# Patient Record
Sex: FEMALE | URBAN - METROPOLITAN AREA
[De-identification: names, ages, dates, MRNs, and addresses within clinical notes are randomized per-mention and may not be internally consistent; named-entity substitution may affect disease eponyms.]

---

## 2017-05-02 ENCOUNTER — FOLLOW UP (OUTPATIENT)
Dept: URBAN - METROPOLITAN AREA CLINIC 27 | Facility: CLINIC | Age: 77
End: 2017-05-02

## 2017-05-02 ENCOUNTER — GENERIC CONVERSION - ENCOUNTER (OUTPATIENT)
Dept: OTHER | Facility: OTHER | Age: 77
End: 2017-05-02

## 2017-05-02 DIAGNOSIS — H43.813: ICD-10-CM

## 2017-05-02 DIAGNOSIS — H35.3132: ICD-10-CM

## 2017-05-02 DIAGNOSIS — E11.9: ICD-10-CM

## 2017-05-02 DIAGNOSIS — H25.9: ICD-10-CM

## 2017-05-02 PROCEDURE — G8427 DOCREV CUR MEDS BY ELIG CLIN: HCPCS

## 2017-05-02 PROCEDURE — 2019F DILATED MACUL EXAM DONE: CPT

## 2017-05-02 PROCEDURE — 92134 CPTRZ OPH DX IMG PST SGM RTA: CPT

## 2017-05-02 PROCEDURE — 2022F DILAT RTA XM EVC RTNOPTHY: CPT

## 2017-05-02 PROCEDURE — 92014 COMPRE OPH EXAM EST PT 1/>: CPT

## 2017-05-02 PROCEDURE — 92250 FUNDUS PHOTOGRAPHY W/I&R: CPT

## 2017-05-02 PROCEDURE — 2024F 7 FLD RTA PHOTO EVC RTNOPTHY: CPT

## 2017-05-02 PROCEDURE — 1036F TOBACCO NON-USER: CPT

## 2017-05-02 PROCEDURE — 2026F EYE IMG VALID EVC RTNOPTHY: CPT

## 2017-05-02 PROCEDURE — 4177F TALK PT/CRGVR RE AREDS PREV: CPT

## 2017-05-02 PROCEDURE — 3072F LOW RISK FOR RETINOPATHY: CPT

## 2017-05-02 ASSESSMENT — VISUAL ACUITY
OS_CC: 20/40-2
OD_CC: 20/50-2

## 2017-05-02 ASSESSMENT — TONOMETRY
OD_IOP_MMHG: 18
OS_IOP_MMHG: 17

## 2017-05-30 ENCOUNTER — GENERIC CONVERSION - ENCOUNTER (OUTPATIENT)
Dept: OTHER | Facility: OTHER | Age: 77
End: 2017-05-30

## 2017-12-08 DIAGNOSIS — E11.40 TYPE 2 DIABETES MELLITUS WITH DIABETIC NEUROPATHY (HCC): ICD-10-CM

## 2017-12-08 DIAGNOSIS — Z13.820 ENCOUNTER FOR SCREENING FOR OSTEOPOROSIS: ICD-10-CM

## 2017-12-08 DIAGNOSIS — Z12.31 ENCOUNTER FOR SCREENING MAMMOGRAM FOR MALIGNANT NEOPLASM OF BREAST: ICD-10-CM

## 2017-12-13 ENCOUNTER — TRANSCRIBE ORDERS (OUTPATIENT)
Dept: ADMINISTRATIVE | Facility: HOSPITAL | Age: 77
End: 2017-12-13

## 2017-12-13 ENCOUNTER — APPOINTMENT (OUTPATIENT)
Dept: LAB | Facility: HOSPITAL | Age: 77
End: 2017-12-13
Attending: FAMILY MEDICINE
Payer: MEDICARE

## 2017-12-13 DIAGNOSIS — E10.43 DIABETIC AUTONOMIC NEUROPATHY ASSOCIATED WITH TYPE 1 DIABETES MELLITUS (HCC): Primary | ICD-10-CM

## 2017-12-13 DIAGNOSIS — E11.40 TYPE 2 DIABETES MELLITUS WITH DIABETIC NEUROPATHY (HCC): ICD-10-CM

## 2017-12-13 DIAGNOSIS — E10.43 DIABETIC AUTONOMIC NEUROPATHY ASSOCIATED WITH TYPE 1 DIABETES MELLITUS (HCC): ICD-10-CM

## 2017-12-13 LAB
EST. AVERAGE GLUCOSE BLD GHB EST-MCNC: 186 MG/DL
HBA1C MFR BLD: 8.1 % (ref 4.2–6.3)

## 2017-12-13 PROCEDURE — 36415 COLL VENOUS BLD VENIPUNCTURE: CPT

## 2017-12-13 PROCEDURE — 83036 HEMOGLOBIN GLYCOSYLATED A1C: CPT

## 2017-12-14 ENCOUNTER — ALLSCRIPTS OFFICE VISIT (OUTPATIENT)
Dept: OTHER | Facility: OTHER | Age: 77
End: 2017-12-14

## 2017-12-21 ENCOUNTER — GENERIC CONVERSION - ENCOUNTER (OUTPATIENT)
Dept: OTHER | Facility: OTHER | Age: 77
End: 2017-12-21

## 2018-01-22 VITALS
RESPIRATION RATE: 18 BRPM | HEIGHT: 62 IN | BODY MASS INDEX: 41.29 KG/M2 | DIASTOLIC BLOOD PRESSURE: 78 MMHG | WEIGHT: 224.38 LBS | TEMPERATURE: 97.3 F | HEART RATE: 78 BPM | OXYGEN SATURATION: 98 % | SYSTOLIC BLOOD PRESSURE: 126 MMHG

## 2018-01-23 ENCOUNTER — HOSPITAL ENCOUNTER (OUTPATIENT)
Dept: RADIOLOGY | Facility: HOSPITAL | Age: 78
Discharge: HOME | End: 2018-01-23
Attending: FAMILY MEDICINE
Payer: MEDICARE

## 2018-01-23 VITALS
WEIGHT: 225 LBS | OXYGEN SATURATION: 98 % | SYSTOLIC BLOOD PRESSURE: 150 MMHG | BODY MASS INDEX: 41.41 KG/M2 | HEIGHT: 62 IN | RESPIRATION RATE: 20 BRPM | TEMPERATURE: 97.6 F | HEART RATE: 74 BPM | DIASTOLIC BLOOD PRESSURE: 72 MMHG

## 2018-01-23 DIAGNOSIS — Z13.820 ENCOUNTER FOR SCREENING FOR OSTEOPOROSIS: ICD-10-CM

## 2018-01-23 DIAGNOSIS — Z12.31 ENCOUNTER FOR SCREENING MAMMOGRAM FOR MALIGNANT NEOPLASM OF BREAST: ICD-10-CM

## 2018-01-23 PROCEDURE — 77067 SCR MAMMO BI INCL CAD: CPT

## 2018-01-23 PROCEDURE — 77080 DXA BONE DENSITY AXIAL: CPT

## 2018-01-23 PROCEDURE — 77063 BREAST TOMOSYNTHESIS BI: CPT

## 2018-01-25 DIAGNOSIS — I10 ESSENTIAL HYPERTENSION: Primary | ICD-10-CM

## 2018-01-26 RX ORDER — METOPROLOL TARTRATE 100 MG/1
TABLET ORAL
Qty: 180 TABLET | Refills: 0 | Status: SHIPPED | OUTPATIENT
Start: 2018-01-26 | End: 2018-05-03 | Stop reason: SDUPTHER

## 2018-01-31 ENCOUNTER — TRANSCRIBE ORDERS (OUTPATIENT)
Dept: ADMINISTRATIVE | Facility: HOSPITAL | Age: 78
End: 2018-01-31

## 2018-01-31 ENCOUNTER — APPOINTMENT (OUTPATIENT)
Dept: LAB | Facility: HOSPITAL | Age: 78
End: 2018-01-31
Attending: FAMILY MEDICINE
Payer: MEDICARE

## 2018-01-31 DIAGNOSIS — Z01.84 IMMUNITY STATUS TESTING: ICD-10-CM

## 2018-01-31 DIAGNOSIS — Z01.84 IMMUNITY STATUS TESTING: Primary | ICD-10-CM

## 2018-01-31 DIAGNOSIS — Z11.1 SCREENING EXAMINATION FOR PULMONARY TUBERCULOSIS: ICD-10-CM

## 2018-01-31 LAB — RUBV IGG SERPL IA-ACNC: >175 IU/ML

## 2018-01-31 PROCEDURE — 86762 RUBELLA ANTIBODY: CPT

## 2018-01-31 PROCEDURE — 86787 VARICELLA-ZOSTER ANTIBODY: CPT

## 2018-01-31 PROCEDURE — 86765 RUBEOLA ANTIBODY: CPT

## 2018-01-31 PROCEDURE — 86480 TB TEST CELL IMMUN MEASURE: CPT

## 2018-01-31 PROCEDURE — 86735 MUMPS ANTIBODY: CPT

## 2018-01-31 PROCEDURE — 36415 COLL VENOUS BLD VENIPUNCTURE: CPT

## 2018-02-01 LAB
MEV IGG SER QL: NORMAL
MUV IGG SER QL: NORMAL
VZV IGG SER IA-ACNC: NORMAL

## 2018-02-02 LAB
ANNOTATION COMMENT IMP: NORMAL
GAMMA INTERFERON BACKGROUND BLD IA-ACNC: 0.05 IU/ML
M TB IFN-G BLD-IMP: NEGATIVE
M TB IFN-G CD4+ BCKGRND COR BLD-ACNC: <0.01 IU/ML
M TB IFN-G CD4+ T-CELLS BLD-ACNC: 0.03 IU/ML
MITOGEN IGNF BLD-ACNC: 6.36 IU/ML
QUANTIFERON-TB GOLD IN TUBE: NORMAL
SERVICE CMNT-IMP: NORMAL

## 2018-02-19 ENCOUNTER — APPOINTMENT (OUTPATIENT)
Dept: LAB | Facility: HOSPITAL | Age: 78
End: 2018-02-19
Attending: FAMILY MEDICINE
Payer: MEDICARE

## 2018-02-19 ENCOUNTER — TRANSCRIBE ORDERS (OUTPATIENT)
Dept: ADMINISTRATIVE | Facility: HOSPITAL | Age: 78
End: 2018-02-19

## 2018-02-19 DIAGNOSIS — E13.49 OTHER DIABETIC NEUROLOGICAL COMPLICATION ASSOCIATED WITH OTHER SPECIFIED DIABETES MELLITUS (HCC): Primary | ICD-10-CM

## 2018-02-19 LAB
EST. AVERAGE GLUCOSE BLD GHB EST-MCNC: 174 MG/DL
HBA1C MFR BLD: 7.7 % (ref 4.2–6.3)

## 2018-02-19 PROCEDURE — 36415 COLL VENOUS BLD VENIPUNCTURE: CPT | Performed by: FAMILY MEDICINE

## 2018-02-19 PROCEDURE — 83036 HEMOGLOBIN GLYCOSYLATED A1C: CPT | Performed by: FAMILY MEDICINE

## 2018-02-22 ENCOUNTER — OFFICE VISIT (OUTPATIENT)
Dept: FAMILY MEDICINE CLINIC | Facility: CLINIC | Age: 78
End: 2018-02-22
Payer: MEDICARE

## 2018-02-22 VITALS
HEIGHT: 62 IN | OXYGEN SATURATION: 99 % | SYSTOLIC BLOOD PRESSURE: 128 MMHG | RESPIRATION RATE: 20 BRPM | DIASTOLIC BLOOD PRESSURE: 80 MMHG | HEART RATE: 82 BPM | BODY MASS INDEX: 41.22 KG/M2 | WEIGHT: 224 LBS

## 2018-02-22 DIAGNOSIS — M25.511 CHRONIC PAIN OF BOTH SHOULDERS: ICD-10-CM

## 2018-02-22 DIAGNOSIS — G89.29 CHRONIC PAIN OF BOTH SHOULDERS: ICD-10-CM

## 2018-02-22 DIAGNOSIS — Z23 NEED FOR TDAP VACCINATION: ICD-10-CM

## 2018-02-22 DIAGNOSIS — Z23 NEED FOR VACCINATION AGAINST STREPTOCOCCUS PNEUMONIAE: ICD-10-CM

## 2018-02-22 DIAGNOSIS — M25.512 CHRONIC PAIN OF BOTH SHOULDERS: ICD-10-CM

## 2018-02-22 DIAGNOSIS — E11.9 TYPE 2 DIABETES MELLITUS WITHOUT COMPLICATION, WITHOUT LONG-TERM CURRENT USE OF INSULIN (HCC): Primary | ICD-10-CM

## 2018-02-22 PROCEDURE — 90715 TDAP VACCINE 7 YRS/> IM: CPT | Performed by: FAMILY MEDICINE

## 2018-02-22 PROCEDURE — G0009 ADMIN PNEUMOCOCCAL VACCINE: HCPCS | Performed by: FAMILY MEDICINE

## 2018-02-22 PROCEDURE — 90471 IMMUNIZATION ADMIN: CPT | Performed by: FAMILY MEDICINE

## 2018-02-22 PROCEDURE — 90670 PCV13 VACCINE IM: CPT | Performed by: FAMILY MEDICINE

## 2018-02-22 PROCEDURE — 99213 OFFICE O/P EST LOW 20 MIN: CPT | Performed by: FAMILY MEDICINE

## 2018-02-22 RX ORDER — METOPROLOL SUCCINATE 100 MG/1
TABLET, EXTENDED RELEASE ORAL
COMMUNITY
Start: 2003-12-27 | End: 2018-07-12

## 2018-02-22 RX ORDER — POTASSIUM CHLORIDE 1500 MG/1
TABLET, FILM COATED, EXTENDED RELEASE ORAL EVERY MORNING
COMMUNITY
Start: 2017-03-20 | End: 2019-05-06 | Stop reason: SDUPTHER

## 2018-02-22 RX ORDER — AMLODIPINE BESYLATE 10 MG/1
TABLET ORAL
Refills: 3 | COMMUNITY
Start: 2018-01-03 | End: 2018-12-13 | Stop reason: SDUPTHER

## 2018-02-22 RX ORDER — FAMOTIDINE 20 MG/1
1 TABLET, FILM COATED ORAL 2 TIMES DAILY
COMMUNITY
Start: 2014-09-22 | End: 2018-07-12

## 2018-02-22 RX ORDER — ESOMEPRAZOLE MAGNESIUM 40 MG/1
40 CAPSULE, DELAYED RELEASE ORAL AS NEEDED
COMMUNITY
End: 2021-04-21 | Stop reason: ALTCHOICE

## 2018-02-22 RX ORDER — ATORVASTATIN CALCIUM 40 MG/1
TABLET, FILM COATED ORAL
Refills: 3 | COMMUNITY
Start: 2017-12-14 | End: 2018-09-25 | Stop reason: SDUPTHER

## 2018-02-22 RX ORDER — LISINOPRIL 10 MG/1
TABLET ORAL
Refills: 3 | COMMUNITY
Start: 2017-12-14 | End: 2018-06-10 | Stop reason: SDUPTHER

## 2018-02-26 NOTE — PROGRESS NOTES
Assessment/Plan:    No problem-specific Assessment & Plan notes found for this encounter  Diagnoses and all orders for this visit:    Type 2 diabetes mellitus without complication, without long-term current use of insulin (HCC)    Chronic pain of both shoulders  -     Ambulatory referral to Orthopedic Surgery; Future    Need for vaccination against Streptococcus pneumoniae  -     Pneumococcal Conjugate Vaccine 13-valent IM    Need for Tdap vaccination  -     Tdap vaccine greater than or equal to 6yo IM    Other orders  -     amLODIPine (NORVASC) 10 mg tablet; TK 1 T PO QD  -     atorvastatin (LIPITOR) 40 mg tablet; TK 1 T PO D  -     famotidine (PEPCID) 20 mg tablet; Take 1 tablet by mouth 2 (two) times a day  -     Potassium Chloride ER (K-TAB) 20 MEQ TBCR; Take by mouth daily  -     metFORMIN (GLUCOPHAGE) 500 mg tablet; Take 1 tablet by mouth 2 (two) times a day  -     lisinopril (ZESTRIL) 10 mg tablet; TK 1 T PO QD  -     metoprolol succinate (TOPROL-XL) 100 mg 24 hr tablet; Take by mouth  -     esomeprazole (NEXIUM) 40 MG capsule; Take by mouth          Subjective:      Patient ID: Gissel Moore is a 65 y o  female  [facility] is here to discuss her recent hemoglobin A1c which has improved from the low eights to now the low sevens she is complaining that she has bilateral shoulder pain she did get injections by Dr Marks back 2 months ago but that didn't seem to help she would like her Orthopedic referral      Diabetes         The following portions of the patient's history were reviewed and updated as appropriate: allergies, current medications, past family history, past social history, past surgical history and problem list     Review of Systems   Constitutional: Negative  Eyes: Negative  Respiratory: Negative  Cardiovascular: Negative            Objective:      /80   Pulse 82   Resp 20   Ht 5' 2" (1 575 m)   Wt 102 kg (224 lb)   SpO2 99%   BMI 40 97 kg/m²          Physical Exam   Constitutional: She appears well-developed and well-nourished  HENT:   Head: Normocephalic and atraumatic  Eyes: Pupils are equal, round, and reactive to light  Cardiovascular: Normal rate      Pulmonary/Chest: Effort normal

## 2018-03-02 ENCOUNTER — APPOINTMENT (OUTPATIENT)
Dept: RADIOLOGY | Facility: CLINIC | Age: 78
End: 2018-03-02
Payer: MEDICARE

## 2018-03-02 ENCOUNTER — OFFICE VISIT (OUTPATIENT)
Dept: OBGYN CLINIC | Facility: CLINIC | Age: 78
End: 2018-03-02
Payer: MEDICARE

## 2018-03-02 VITALS
HEIGHT: 61 IN | HEART RATE: 83 BPM | DIASTOLIC BLOOD PRESSURE: 75 MMHG | SYSTOLIC BLOOD PRESSURE: 135 MMHG | WEIGHT: 225.6 LBS | BODY MASS INDEX: 42.59 KG/M2

## 2018-03-02 DIAGNOSIS — M75.81 ROTATOR CUFF TENDONITIS, RIGHT: ICD-10-CM

## 2018-03-02 DIAGNOSIS — M25.511 BILATERAL SHOULDER PAIN, UNSPECIFIED CHRONICITY: Primary | ICD-10-CM

## 2018-03-02 DIAGNOSIS — M25.512 CHRONIC PAIN OF BOTH SHOULDERS: ICD-10-CM

## 2018-03-02 DIAGNOSIS — M25.512 BILATERAL SHOULDER PAIN, UNSPECIFIED CHRONICITY: Primary | ICD-10-CM

## 2018-03-02 DIAGNOSIS — G89.29 CHRONIC PAIN OF BOTH SHOULDERS: ICD-10-CM

## 2018-03-02 DIAGNOSIS — M75.82 ROTATOR CUFF TENDONITIS, LEFT: ICD-10-CM

## 2018-03-02 DIAGNOSIS — M25.511 CHRONIC PAIN OF BOTH SHOULDERS: ICD-10-CM

## 2018-03-02 PROCEDURE — 20610 DRAIN/INJ JOINT/BURSA W/O US: CPT | Performed by: ORTHOPAEDIC SURGERY

## 2018-03-02 PROCEDURE — 73030 X-RAY EXAM OF SHOULDER: CPT

## 2018-03-02 PROCEDURE — 99203 OFFICE O/P NEW LOW 30 MIN: CPT | Performed by: ORTHOPAEDIC SURGERY

## 2018-03-02 RX ORDER — LIDOCAINE HYDROCHLORIDE 10 MG/ML
4 INJECTION, SOLUTION INFILTRATION; PERINEURAL
Status: COMPLETED | OUTPATIENT
Start: 2018-03-02 | End: 2018-03-02

## 2018-03-02 RX ADMIN — LIDOCAINE HYDROCHLORIDE 4 ML: 10 INJECTION, SOLUTION INFILTRATION; PERINEURAL at 12:05

## 2018-03-02 NOTE — PROGRESS NOTES
Assessment/Plan:  Assessment/Plan   Diagnoses and all orders for this visit:    Bilateral shoulder pain, unspecified chronicity  -     XR shoulder 2+ vw right  -     XR shoulder 2+ vw left    Chronic pain of both shoulders  -     Ambulatory referral to Orthopedic Surgery     We discussed with Ms Birgit Hernandez her findings today being most consistent with left greater than right rotator cuff tendinitis and impingement syndrome  Considering this we have strongly recommended that she go for physical therapy along with left-sided subacromial bursa injection given today in the office, which she tolerated well  We will see her back for follow-up in 6 weeks  Subjective:   Patient ID:     Adair Devi is a 65 y o  RHD female presenting today for evaluation of bilateral shoulder pain in the left greater than the right  She has had this problem for years and has had multiple injections in the past with relief for years  She was recently seen at [facility] and given a subacromial injection bilaterally in mid December with relief only lasting roughly 1 week  She notices pain primarily with overhead activities  She denies any feelings of shoulder instability, popping, swelling  She denies any new muscle weakness of the upper extremities or numbness/tingling        The following portions of the patient's history were reviewed and updated as appropriate: allergies, current medications, past family history, past medical history, past social history, past surgical history and problem list     Review of Systems   HENT:        Cataract   Musculoskeletal:        As noted in HPI       Objective:  Right Shoulder Exam     Tests   Apprehension: negative  Cross Arm: negative    Other   Sensation: normal  Pulse: present    Comments:  (+) hawkin's   4/5 strength of supraspinatus  5/5 strength of infraspinatus and subscapularis      Left Shoulder Exam     Tests   Apprehension: negative  Cross Arm: negative    Other   Sensation: normal  Pulse: present     Comments:  (+) hawkin's   4/5 strength of supraspinatus  5/5 strength of infraspinatus and subscapularis            Physical Exam   Constitutional: She is oriented to person, place, and time  She appears well-developed and well-nourished  No distress  HENT:   Head: Normocephalic and atraumatic  Eyes: Conjunctivae and EOM are normal  Pupils are equal, round, and reactive to light  Right eye exhibits no discharge  Left eye exhibits no discharge  No scleral icterus  Cardiovascular: Normal rate, regular rhythm and intact distal pulses  Pulmonary/Chest: Effort normal  No respiratory distress  Neurological: She is alert and oriented to person, place, and time  Skin: She is not diaphoretic  Psychiatric: She has a normal mood and affect  Her behavior is normal  Judgment and thought content normal        I have personally reviewed pertinent films in PACS and my interpretation is productive changes noted at the greater tuberosity of the right shoulder  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  Wyatt Segundo Large joint arthrocentesis  Date/Time: 3/2/2018 12:05 PM  Consent given by: patient  Site marked: site marked  Timeout: Immediately prior to procedure a time out was called to verify the correct patient, procedure, equipment, support staff and site/side marked as required   Supporting Documentation  Indications: pain   Procedure Details  Location: shoulder - L subacromial bursa  Needle size: 22 G  Ultrasound guidance: no  Approach: posterolateral  Medications administered: 4 mL lidocaine 1 % (dexamethasone 120mg/30mL ([address] 66358-428-46) 1mL used along with lidocaine)    Patient tolerance: patient tolerated the procedure well with no immediate complications  Sterile dressing applied: bandage applied

## 2018-03-22 ENCOUNTER — EVALUATION (OUTPATIENT)
Dept: PHYSICAL THERAPY | Facility: CLINIC | Age: 78
End: 2018-03-22
Payer: MEDICARE

## 2018-03-22 DIAGNOSIS — M75.81 ROTATOR CUFF TENDONITIS, RIGHT: ICD-10-CM

## 2018-03-22 DIAGNOSIS — M75.82 ROTATOR CUFF TENDONITIS, LEFT: Primary | ICD-10-CM

## 2018-03-22 PROCEDURE — G8984 CARRY CURRENT STATUS: HCPCS | Performed by: PHYSICAL THERAPIST

## 2018-03-22 PROCEDURE — G8985 CARRY GOAL STATUS: HCPCS | Performed by: PHYSICAL THERAPIST

## 2018-03-22 PROCEDURE — 97162 PT EVAL MOD COMPLEX 30 MIN: CPT | Performed by: PHYSICAL THERAPIST

## 2018-03-22 NOTE — PROGRESS NOTES
PT Evaluation     Today's date: 3/22/2018  Patient name: Eyal Junior  : 1940  MRN: [id number]  Referring provider: Jerson Hensley  Dx:   Encounter Diagnosis     ICD-10-CM    1  Rotator cuff tendonitis, left M75 82 Ambulatory referral to Physical Therapy   2  Rotator cuff tendonitis, right M75 81 Ambulatory referral to Physical Therapy                  Assessment  Impairments: abnormal or restricted ROM, abnormal movement, impaired physical strength, lacks appropriate home exercise program, pain with function and scapular dyskinesis  Functional limitations: decreased ability to perform ADLs, decreased ability to perform overhead movements, decreased upper body dressing, decreased ability to perform upper body dressing  Assessment details: Eyal Junior is a 65 y o  female who presents with: Rotator cuff tendonitis, left  (primary encounter diagnosis)  Rotator cuff tendonitis, right  Pt presents with pain, decreased UE range of motion, decreased UE strength, impaired function, decreased activity tolerance and fair posture  Pt presents with these impairments and would benefit from skilled physical therapy to address these impairments in order to maximize their function  Pt to trial PT to assist with decreasing pain levels and improve ROM B shoulders     Understanding of Dx/Px/POC: good   Prognosis: good    Goals  Short term goals:  4 weeks  + Patient will have pain level 3/10 bilateral shoulder with ADL's   + Patient will report a 50% improvement in symptoms with ADL's  + Patient will demonstrate appropriate scapulohumeral rhythm with reaching shoulder height and below    + paitent will report a 50% improvement in function   + Patient will be independent in basic HEP     Long term goals: 8 weeks  +  Patient will demonstrate a reduction in tenderness and tension in hypertonic musculature    + Patient will report 85 % improvement improvement in symptoms with ADL's  + Patient will have pain level 0/10 bilateral shoulder with ADL's   + Patient will increase FOTO score to 64 (10 sessions)  + Patient will demonstrate appropriate scapulohumeral rhythm with reaching overhead   + patient will demonstrate functional reaching without compensatory patterns    + Patient will be independent in a comprehensive home exercise program           Plan  Planned modality interventions: thermotherapy: hydrocollator packs  Planned therapy interventions: ADL training, activity modification, breathing training, body mechanics training, home exercise program, graded exercise, graded activity, functional ROM exercises, flexibility, therapeutic training, therapeutic exercise, therapeutic activities, stretching, strengthening, postural training, patient education, neuromuscular re-education, joint mobilization and manual therapy  Frequency: 2x week  Duration in weeks: 8  Treatment plan discussed with: patient        Subjective Evaluation    History of Present Illness  Mechanism of injury: Pt reporting chronic B shoulder pain over past 20-30 years  Pt states she has had a multiple injections in B shoulders and in the past the patient is reporting pain has subsided with injections   Pt states she recently started having a flare up of B shoulder pain and had injection in February at [place] and then went to ortho for second injection  Recurrent probem    Quality of life: fair    Pain  Current pain ratin  At best pain ratin  At worst pain ratin  Location: B shoulders  Quality: sharp, radiating, squeezing, cramping and discomfort  Relieving factors: relaxation, support and change in position  Aggravating factors: lifting and overhead activity  Progression: worsening    Social Support  Steps to enter house: yes (4 steps to enter, 6 steps to apt)  Stairs in house: no   Lives in: apartment  Lives with: alone    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, 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)  Hand dominance: right      Diagnostic Tests  X-ray: abnormal  Treatments  Previous treatment: injection treatment  Current treatment: injection treatment  Patient Goals  Patient goals for therapy: decreased pain, increased motion, return to sport/leisure activities, increased strength and independence with ADLs/IADLs  Patient goal: "to get rid of the pain"        Objective     Postural Observations  Seated posture: fair  Standing posture: fair        Palpation   Left   Tenderness of the anterior deltoid, biceps, infraspinatus, latissimus and levator scapulae  Right Tenderness of the anterior deltoid, biceps, infraspinatus, latissimus and levator scapulae  Cervical/Thoracic Screen   Cervical range of motion within normal limits with the following exceptions: Cervical ROM WFL with stiffness reported at end range  No radicular symptoms B Ues with cervical ROM    Neurological Testing     Additional Neurological Details  Pt reporting c/o "tingling" R hand intermittent       Active Range of Motion   Left Shoulder   Flexion: 90 degrees   Abduction: 30 degrees WFL    Right Shoulder   Flexion: 124 (pain with bringing arm down) degrees with pain  Abduction: 30 degrees with pain    Additional Active Range of Motion Details  Functional IR (c7 to tip of thumb) 41 cm L side,  31 cm of R shoulder functional IR        Passive Range of Motion   Left Shoulder   Flexion: 95 degrees with pain  Abduction: 35 degrees with pain    Right Shoulder   Flexion: 130 degrees with pain  Abduction: 35 degrees with pain    Strength/Myotome Testing     Left Shoulder     Planes of Motion   Flexion: 2+   Extension: 2+   Abduction: 2+   Adduction: 2+   External rotation at 0°: 2+   External rotation at 90°: 2+   Internal rotation at 0°: 2+     Right Shoulder     Planes of Motion   Flexion: 3   Extension: 3   Abduction: 3   Adduction: 3   External rotation at 0°: 3   Internal rotation at 0°: 3     Tests     Left Shoulder   Positive empty can, full can, Hawkin's, Neer's, painful arc and Speed's  Right Shoulder   Positive empty can, full can, Hawkin's, Neer's, painful arc and Speed's       General Comments     Shoulder Comments   Pt very limited with all mobility B shoulders with increase c/o pain with all ROM B shoulders      Flowsheet Rows    Flowsheet Row Most Recent Value   PT/OT G-Codes   Current Score  51   Projected Score  64   FOTO information reviewed  Yes   Assessment Type  Evaluation   G code set  Carrying, Moving & Handling Objects   Carrying, Moving and Handling Objects Current Status ()  CK   Carrying, Moving and Handling Objects Goal Status ()  CJ          Precautions: HBP, increased cholesterol, L TKR in 2008, Diabetes    Daily Treatment Diary     Manual  3/22/18            STM B shoulders             PROM B shoulders                                                        Exercise Diary  3/22/18            scap retraction x5            pendulum             Cervical rotation x5            Bicep curls             Nu step             Shoulder circles x5                                                                                                                                                                                                      Modalities  3/22/18            MHP  MHP x 10 min B shoulders, pt seated

## 2018-03-26 ENCOUNTER — OFFICE VISIT (OUTPATIENT)
Dept: PHYSICAL THERAPY | Facility: CLINIC | Age: 78
End: 2018-03-26
Payer: MEDICARE

## 2018-03-26 DIAGNOSIS — M75.82 ROTATOR CUFF TENDONITIS, LEFT: Primary | ICD-10-CM

## 2018-03-26 DIAGNOSIS — M75.81 ROTATOR CUFF TENDONITIS, RIGHT: ICD-10-CM

## 2018-03-26 PROCEDURE — 97140 MANUAL THERAPY 1/> REGIONS: CPT | Performed by: PHYSICAL THERAPIST

## 2018-03-26 PROCEDURE — 97110 THERAPEUTIC EXERCISES: CPT | Performed by: PHYSICAL THERAPIST

## 2018-03-26 NOTE — PROGRESS NOTES
Daily Note     Today's date: 3/26/2018  Patient name: Pam Hernández  : 1940  MRN: [id number]  Referring provider: Eduardo Hugo  Dx:   Encounter Diagnosis     ICD-10-CM    1  Rotator cuff tendonitis, left M75 82    2  Rotator cuff tendonitis, right M75 81                   Subjective: Pt reporting increase mm soreness B shoulders/arms  4/10 "maybe I did too many exercises"  Objective: See treatment diary below  Precautions: HBP, increased cholesterol, L TKR in , Diabetes    Daily Treatment Diary     Manual  3/22/18 3/26/18           STM B shoulders  x13 min           PROM B shoulders  x10 reps                                     Manual   15 min total               Exercise Diary  3/22/18 3/26/18           scap retraction x5 x10 green TB           pendulum             Cervical rotation x5 x10 hold 5           Bicep curls  With cane 2x10           Nu step             Shoulder circles x5 x10 forward and backwards           Cane standing extension  x10 hold 5           Cervical SB  x10 hold 10                                                                                                                                                                           Modalities  3/22/18 3/26/18           MHP  MHP x 10 min B shoulders, pt seated MHP x10 min B shoulders                                           Assessment: Tolerated treatment well  Patient demonstrated fatigue post treatment  Pt reporting decrease in pain post PT   Pt responding well to manual STM to B UT and biceps region  Plan: Continue per plan of care  Add additional therex as per flowsheet NV as tolerated

## 2018-03-30 ENCOUNTER — OFFICE VISIT (OUTPATIENT)
Dept: PHYSICAL THERAPY | Facility: CLINIC | Age: 78
End: 2018-03-30
Payer: MEDICARE

## 2018-03-30 DIAGNOSIS — M75.82 ROTATOR CUFF TENDONITIS, LEFT: Primary | ICD-10-CM

## 2018-03-30 DIAGNOSIS — M75.81 ROTATOR CUFF TENDONITIS, RIGHT: ICD-10-CM

## 2018-03-30 PROCEDURE — 97140 MANUAL THERAPY 1/> REGIONS: CPT | Performed by: PHYSICAL THERAPIST

## 2018-03-30 PROCEDURE — 97110 THERAPEUTIC EXERCISES: CPT | Performed by: PHYSICAL THERAPIST

## 2018-03-30 NOTE — PROGRESS NOTES
Daily Note     Today's date: 3/30/2018  Patient name: Velma Landrum  : 1940  MRN: [id number]  Referring provider: Felicita Richard  Dx:   Encounter Diagnosis     ICD-10-CM    1  Rotator cuff tendonitis, left M75 82    2  Rotator cuff tendonitis, right M75 81                   Subjective: Pt reporting increase mm soreness B shoulders/arms  4/10 "I have a big muscle knot in my neck which is really bothering me"  Objective: See treatment diary below  Precautions: HBP, increased cholesterol, L TKR in , Diabetes    Daily Treatment Diary     Manual  3/22/18 3/26/18 3/30/18          STM B shoulders  x13 min x13 min          PROM B shoulders  x10 reps x10 reps                                    Manual   15 min total 15 min total               Exercise Diary  3/22/18 3/26/18 3/30/18          scap retraction x5 x10 green TB x10 green TB          pendulum   x10 hold 5          Cervical rotation x5 x10 hold 5 x10 hold 5          Bicep curls  With cane 2x10 1# x10 R, x7 Left hold 5          Nu step             Shoulder circles x5 x10 forward and backwards x10 forward/backwards          Cane standing extension  x10 hold 5 x10 hold 10          Cervical SB  x10 hold 10 x10 hold 10                                                                                                                                                                          Modalities  3/22/18 3/26/18 3/30/18          MHP  MHP x 10 min B shoulders, pt seated MHP x10 min B shoulders MHP x10 min B shoulders, pt seated                                          Assessment: Tolerated treatment well  Patient demonstrated fatigue post treatment  Pt reporting decrease in pain post PT   Pt responding well to manual STM to B UT and biceps region  Plan: Continue per plan of care  Add additional therex as per flowsheet NV as tolerated

## 2018-04-03 DIAGNOSIS — E11.42 TYPE 2 DIABETES MELLITUS WITH DIABETIC POLYNEUROPATHY, WITHOUT LONG-TERM CURRENT USE OF INSULIN (HCC): Primary | ICD-10-CM

## 2018-04-04 ENCOUNTER — OFFICE VISIT (OUTPATIENT)
Dept: PHYSICAL THERAPY | Facility: CLINIC | Age: 78
End: 2018-04-04
Payer: MEDICARE

## 2018-04-04 DIAGNOSIS — M75.82 ROTATOR CUFF TENDONITIS, LEFT: Primary | ICD-10-CM

## 2018-04-04 PROCEDURE — 97140 MANUAL THERAPY 1/> REGIONS: CPT

## 2018-04-04 PROCEDURE — 97110 THERAPEUTIC EXERCISES: CPT

## 2018-04-04 NOTE — PROGRESS NOTES
Daily Note     Today's date: 2018  Patient name: Adolfo Dumont  : 1940  MRN: [id number]  Referring provider: Yvette Lacy  Dx:   Encounter Diagnosis     ICD-10-CM    1  Rotator cuff tendonitis, left M75 82                   Subjective: left shldr pain  5/10 & right 3-410; pt states she did not have any relief from recent injections       Objective: See treatment diary below  Precautions: HBP, increased cholesterol, L TKR in , Diabetes    Daily Treatment Diary     Manual  3/22/18 3/26/18 3/30/18 4-4         STM B shoulders  x13 min x13 min X 10 min          PROM B shoulders  x10 reps x10 reps                                    Manual   15 min total 15 min total  10 min              Exercise Diary  3/22/18 3/26/18 3/30/18 4-4         scap retraction x5 x10 green TB x10 green TB X 10 green TB         pendulum   x10 hold 5 X 10 ea          Cervical rotation x5 x10 hold 5 x10 hold 5 X 10 ea hold 5          Bicep curls  With cane 2x10 1# x10 R, x7 Left hold 5 1# x 10 ea         Nu step             Shoulder circles x5 x10 forward and backwards x10 forward/backwards X 10 ea  F/B          Cane standing extension  x10 hold 5 x10 hold 10 X 10   Hold 10         Cervical SB  x10 hold 10 x10 hold 10 X 10  Hold 10                                                                                                                                                                          Modalities  3/22/18 3/26/18 3/30/18 4-         MHP  MHP x 10 min B shoulders, pt seated MHP x10 min B shoulders MHP x10 min B shoulders, pt seated MHP x 10 min B shldrs post in sitting                                           Assessment: Tolerated treatment well  Patient would benefit from continued PT; Pt with palpable tenderness bilateral biceps/ant shldrs/mid deltoid; pt req cueing for correct technique with exercises to avoid pain      Plan: Continue per plan of care   progress as tolerated with AAROM/manual therapy for bilateral RTC tendinitis

## 2018-04-06 ENCOUNTER — APPOINTMENT (OUTPATIENT)
Dept: PHYSICAL THERAPY | Facility: CLINIC | Age: 78
End: 2018-04-06
Payer: MEDICARE

## 2018-04-06 LAB
LEFT EYE DIABETIC RETINOPATHY: NORMAL
RIGHT EYE DIABETIC RETINOPATHY: NORMAL

## 2018-04-09 ENCOUNTER — OFFICE VISIT (OUTPATIENT)
Dept: PHYSICAL THERAPY | Facility: CLINIC | Age: 78
End: 2018-04-09
Payer: MEDICARE

## 2018-04-09 DIAGNOSIS — M75.81 ROTATOR CUFF TENDONITIS, RIGHT: ICD-10-CM

## 2018-04-09 DIAGNOSIS — M75.82 ROTATOR CUFF TENDONITIS, LEFT: Primary | ICD-10-CM

## 2018-04-09 PROCEDURE — 97140 MANUAL THERAPY 1/> REGIONS: CPT | Performed by: PHYSICAL THERAPIST

## 2018-04-09 PROCEDURE — 97110 THERAPEUTIC EXERCISES: CPT | Performed by: PHYSICAL THERAPIST

## 2018-04-09 NOTE — PROGRESS NOTES
Daily Note     Today's date: 2018  Patient name: Haider Marino  : 1940  MRN: [id number]  Referring provider: Beatriz Rehman  Dx:   Encounter Diagnosis     ICD-10-CM    1  Rotator cuff tendonitis, left M75 82    2  Rotator cuff tendonitis, right M75 81                   Subjective: Bilateral shldr pain  5/10  pt states has more pain today due to busy weekend of "bingo"  Objective: See treatment diary below  Precautions: HBP, increased cholesterol, L TKR in , Diabetes    Daily Treatment Diary     Manual  3/22/18 3/26/18 3/30/18 4-4 18        STM B shoulders  x13 min x13 min X 10 min  x10  Min B shoulders/B UT/B bicep region        PROM B shoulders  x10 reps x10 reps  x10 reps                                  Manual   15 min total 15 min total  10 min  15 min total            Exercise Diary  3/22/18 3/26/18 3/30/18 4-4 18        scap retraction x5 x10 green TB x10 green TB X 10 green TB x10 green TB        pendulum   x10 hold 5 X 10 ea  x10        Cervical rotation x5 x10 hold 5 x10 hold 5 X 10 ea hold 5  x15 hold 5 ea        Bicep curls  With cane 2x10 1# x10 R, x7 Left hold 5 1# x 10 ea 2# 2x10        Nu step             Shoulder circles x5 x10 forward and backwards x10 forward/backwards X 10 ea  F/B  x10 ea        Cane standing extension  x10 hold 5 x10 hold 10 X 10   Hold 10 2x10        Cervical SB  x10 hold 10 x10 hold 10 X 10  Hold 10  x10 hold 10                                                                                                                                                                        Modalities  3/22/18 3/26/18 3/30/18 4-4-18 18        MHP  MHP x 10 min B shoulders, pt seated MHP x10 min B shoulders MHP x10 min B shoulders, pt seated MHP x 10 min B shldrs post in sitting MHP x 10 min B shoulders post PT in sititng                                          Assessment: Tolerated treatment well   Patient would benefit from continued PT; Pt with palpable tenderness bilateral biceps/ant shldrs/mid deltoid; pt req cueing for correct technique with exercises to avoid pain  Pt weak and deconditioned  R biceps tightness greater than L today with decreased pain post PT to 2/10 B shoulders  FOTO issued and pt scored less  "it is just not a good day for me"   Will re attempt KHADIJAH WATKINS      Plan: Continue per plan of care  progress as tolerated with AAROM/manual therapy for bilateral RTC tendinitis   FOTO NV

## 2018-04-11 ENCOUNTER — OFFICE VISIT (OUTPATIENT)
Dept: PHYSICAL THERAPY | Facility: CLINIC | Age: 78
End: 2018-04-11
Payer: MEDICARE

## 2018-04-11 DIAGNOSIS — M75.82 ROTATOR CUFF TENDONITIS, LEFT: Primary | ICD-10-CM

## 2018-04-11 DIAGNOSIS — M75.81 ROTATOR CUFF TENDONITIS, RIGHT: ICD-10-CM

## 2018-04-11 PROCEDURE — G8984 CARRY CURRENT STATUS: HCPCS | Performed by: PHYSICAL THERAPIST

## 2018-04-11 PROCEDURE — 97110 THERAPEUTIC EXERCISES: CPT | Performed by: PHYSICAL THERAPIST

## 2018-04-11 PROCEDURE — 97140 MANUAL THERAPY 1/> REGIONS: CPT | Performed by: PHYSICAL THERAPIST

## 2018-04-11 PROCEDURE — G8985 CARRY GOAL STATUS: HCPCS | Performed by: PHYSICAL THERAPIST

## 2018-04-11 NOTE — PROGRESS NOTES
PT Re-evaluation:    Today's date: 2018  Patient name: Anibal Macias  : 1940  MRN: [id number]  Referring provider: Lacy Guzman  Dx:   Encounter Diagnosis     ICD-10-CM    1  Rotator cuff tendonitis, left M75 82    2  Rotator cuff tendonitis, right M75 81        Start Time: 1545  Stop Time: 1645  Total time in clinic (min): 60 minutes    Assessment  Impairments: abnormal or restricted ROM, abnormal movement, impaired physical strength, lacks appropriate home exercise program, pain with function and scapular dyskinesis  Functional limitations: decreased ability to perform ADLs, decreased ability to perform overhead movements, decreased upper body dressing, decreased ability to perform upper body dressing  Assessment details: Anibal Macias is a 65 y o  female who presents with: Rotator cuff tendonitis, left  (primary encounter diagnosis)  Rotator cuff tendonitis, right  Pt presents with pain, decreased UE range of motion, decreased UE strength, impaired function, decreased activity tolerance and fair posture  Pt presents with these impairments and would benefit from skilled physical therapy to address these impairments in order to maximize their function  Pt to trial PT to assist with decreasing pain levels and improve ROM B shoulders  18: Pt reporting increased functional mobility and functional use since [facility]    Understanding of Dx/Px/POC: good   Prognosis: good    Goals  Short term goals:  4 weeks  + Patient will have pain level 3/10 bilateral shoulder with ADL's (goal met 18)  + Patient will report a 50% improvement in symptoms with ADL's (goal met 18)  + Patient will demonstrate appropriate scapulohumeral rhythm with reaching shoulder height and below  (goal met 18)  + paitent will report a 50% improvement in function (goal met 18)  + Patient will be independent in basic HEP (goal met 18)    Long term goals: 8 weeks  +  Patient will demonstrate a reduction in tenderness and tension in hypertonic musculature    + Patient will report 85% improvement improvement in symptoms with ADL's  + Patient will have pain level 0/10 bilateral shoulder with ADL's   + Patient will increase FOTO score to 64 (10 sessions)  + Patient will demonstrate appropriate scapulohumeral rhythm with reaching overhead   + patient will demonstrate functional reaching without compensatory patterns    + Patient will be independent in a comprehensive home exercise program           Plan  Planned modality interventions: thermotherapy: hydrocollator packs  Planned therapy interventions: ADL training, activity modification, breathing training, body mechanics training, home exercise program, graded exercise, graded activity, functional ROM exercises, flexibility, therapeutic training, therapeutic exercise, therapeutic activities, stretching, strengthening, postural training, patient education, neuromuscular re-education, joint mobilization and manual therapy  Frequency: 2x week  Duration in weeks: 8  Treatment plan discussed with: patient        Subjective Evaluation    History of Present Illness  Mechanism of injury: Pt reporting chronic B shoulder pain over past 20-30 years  Pt states she has had a multiple injections in B shoulders and in the past the patient is reporting pain has subsided with injections  Pt states she recently started having a flare up of B shoulder pain and had injection in February at [facility] and then went to [facility] for second injection    18: pt reporting feeling good today and states she feels about 60% improvement since [facility]  Pt reporting now able to put on her jacket with less pain    Recurrent probem    Quality of life: fair    Pain  No pain reported  Current pain ratin  At best pain ratin  At worst pain ratin  Location: B shoulders  Quality: sharp, radiating, squeezing, cramping and discomfort  Relieving factors: relaxation, support and change in position  Aggravating factors: lifting and overhead activity  Progression: worsening    Social Support  Steps to enter house: yes (4 steps to enter, 6 steps to apt)  Stairs in house: no   Lives in: apartment  Lives with: alone    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, 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)  Hand dominance: right      Diagnostic Tests  X-ray: abnormal  Treatments  Previous treatment: injection treatment  Current treatment: injection treatment  Patient Goals  Patient goals for therapy: decreased pain, increased motion, return to sport/leisure activities, increased strength and independence with ADLs/IADLs  Patient goal: "to get rid of the pain"        Objective     Postural Observations  Seated posture: fair  Standing posture: fair        Palpation   Left   Tenderness of the anterior deltoid, biceps, infraspinatus, latissimus and levator scapulae  Right Tenderness of the anterior deltoid, biceps, infraspinatus, latissimus and levator scapulae  Cervical/Thoracic Screen   Cervical range of motion within normal limits with the following exceptions: Cervical ROM WFL with stiffness reported at end range  No radicular symptoms B Ues with cervical ROM    Neurological Testing     Additional Neurological Details  Pt reporting c/o "tingling" R hand intermittent       Active Range of Motion   Left Shoulder   Flexion: 120 degrees   Abduction: 90 degrees WFL    Right Shoulder   Flexion: 130 (pain with bringing arm down) degrees with pain  Abduction: 90 degrees with pain    Additional Active Range of Motion Details  Functional IR (c7 to tip of thumb) 41 cm L side,  31 cm of R shoulder functional IR        Passive Range of Motion   Left Shoulder   Flexion: with pain  Abduction: with pain    Right Shoulder   Flexion: with pain  Abduction: with pain    Strength/Myotome Testing     Left Shoulder     Planes of Motion   Flexion: 3   Extension: 3   Abduction: 3   Adduction: 3   External rotation at 0°: 3 External rotation at 90°: 3   Internal rotation at 0°: 3     Right Shoulder     Planes of Motion   Flexion: 3+   Extension: 3+   Abduction: 3+   Adduction: 3+   External rotation at 0°: 3+   Internal rotation at 0°: 3+     Tests     Left Shoulder   Positive empty can, full can, Hawkin's and painful arc  Negative Neer's and Speed's  Right Shoulder   Positive empty can, full can, Hawkin's and painful arc  Negative Neer's and Speed's  General Comments     Shoulder Comments   Pt very limited with all mobility B shoulders with increase c/o pain with all ROM B shoulders    As of 4/11/18: PROM/AROM improved since [facility] with improved strength and decreased pain since [facility]      Flowsheet Rows    Flowsheet Row Most Recent Value   PT/OT G-Codes   Current Score  62   Projected Score  64   FOTO information reviewed  Yes   Assessment Type  Re-evaluation   G code set  Carrying, Moving & Handling Objects   Carrying, Moving and Handling Objects Current Status ()  CJ   Carrying, Moving and Handling Objects Goal Status ()  CJ          Precautions: HBP, increased cholesterol, L TKR in 2008, Diabetes    Daily Treatment Diary     Manual  3/22/18 3/26/18 3/30/18 4-4 4/9/18 4/11/18       STM B shoulders  x13 min x13 min X 10 min  x10  Min B shoulders/B UT/B bicep region x10 minB shoulder/B Ut/ B bicep region       PROM B shoulders  x10 reps x10 reps  x10 reps x10 reps                                 Manual   15 min total 15 min total  10 min  15 min total 15 min total           Exercise Diary  3/22/18 3/26/18 3/30/18 4-4 4/9/18 4/11/18       scap retraction x5 x10 green TB x10 green TB X 10 green TB x10 green TB x15 green TB       pendulum   x10 hold 5 X 10 ea  x10 x10       Cervical rotation x5 x10 hold 5 x10 hold 5 X 10 ea hold 5  x15 hold 5 ea x15 hold 5 ea       Bicep curls  With cane 2x10 1# x10 R, x7 Left hold 5 1# x 10 ea 2# 2x10 2# 2x10       Nu step      5 min   15 miles arms/legs   Level 1       Shoulder circles x5 x10 forward and backwards x10 forward/backwards X 10 ea  F/B  x10 ea x20x20       Cane standing extension  x10 hold 5 x10 hold 10 X 10   Hold 10 2x10 x20       Cervical SB  x10 hold 10 x10 hold 10 X 10  Hold 10  x10 hold 10 x10 hold 10       Shoulder abd      x10 with strap                                                                                                                                                          Modalities  3/22/18 3/26/18 3/30/18 4-4-18 4/9/18 4/11/18       MHP  MHP x 10 min B shoulders, pt seated MHP x10 min B shoulders MHP x10 min B shoulders, pt seated MHP x 10 min B shldrs post in sitting MHP x 10 min B shoulders post PT in sititng MHP x 10 min B shoulder, pt seated post PT

## 2018-04-13 ENCOUNTER — OFFICE VISIT (OUTPATIENT)
Dept: OBGYN CLINIC | Facility: CLINIC | Age: 78
End: 2018-04-13
Payer: MEDICARE

## 2018-04-13 VITALS
DIASTOLIC BLOOD PRESSURE: 76 MMHG | SYSTOLIC BLOOD PRESSURE: 137 MMHG | BODY MASS INDEX: 42.29 KG/M2 | HEIGHT: 61 IN | WEIGHT: 224 LBS | HEART RATE: 90 BPM

## 2018-04-13 DIAGNOSIS — M75.52 SUBACROMIAL BURSITIS OF LEFT SHOULDER JOINT: Primary | ICD-10-CM

## 2018-04-13 DIAGNOSIS — M75.51 SUBACROMIAL BURSITIS OF RIGHT SHOULDER JOINT: ICD-10-CM

## 2018-04-13 PROCEDURE — 99213 OFFICE O/P EST LOW 20 MIN: CPT | Performed by: ORTHOPAEDIC SURGERY

## 2018-04-13 NOTE — PROGRESS NOTES
Assessment/Plan:  Assessment/Plan   1  Subacromial bursitis of left shoulder joint  Ambulatory referral to Physical Therapy   2  Subacromial bursitis of right shoulder joint  Ambulatory referral to Physical Therapy     [employer]ibe Attestation    I,:   Daniel Rosa am acting as a scribe while in the presence of the attending physician :        I,:   Jeanie Valencia MD personally performed the services described in this documentation    as scribed in my presence :            Plan  Upon physical examination, I am pleased that Tee Weaver has demonstrated improvement with physical therapy  At this time, I'm recommending she continue formal physical therapy for his long as she feels this is benefiting her, and we will provide her with a prescription in the office today  I will follow up with her on an as-needed basis as symptoms dictate  Tee Weaver understands and is in agreement with this treatment plan  Subjective:   Patient ID: Cielo Gabriel is a 65 y o  female  Tee Weaver is a 76-year-old right-hand-dominant female here today for follow-up evaluation of her bilateral shoulder pain  She was previously evaluated 3/2/18, and received an injection which she reports did not significantly affect her symptoms  She does report she initiated physical therapy, and has been attending as prescribed  She reports that this is gone well and is helped improve her symptoms and function by about 50%  Today, she reports continued intermittent bilateral shoulder pain with activities requiring her to reach above her head  She reports her left shoulder is worse than her right shoulder  She rates her pain as moderate in nature, but denies any numbness, tingling, rating pain, or prior bilateral shoulder injury/trauma  The following portions of the patient's history were reviewed and updated as appropriate:   She  has no past medical history on file  She There are no active problems to display for this patient      She  has no past surgical history on file  Her family history includes Breast cancer in her mother; Diabetes type II in her mother; Lung cancer in her father  She  reports that she has never smoked  She has never used smokeless tobacco  She reports that she drinks alcohol  She reports that she does not use drugs  Current Outpatient Prescriptions   Medication Sig Dispense Refill    amLODIPine (NORVASC) 10 mg tablet TK 1 T PO QD  3    atorvastatin (LIPITOR) 40 mg tablet TK 1 T PO D  3    esomeprazole (NEXIUM) 40 MG capsule Take by mouth      famotidine (PEPCID) 20 mg tablet Take 1 tablet by mouth 2 (two) times a day      lisinopril (ZESTRIL) 10 mg tablet TK 1 T PO QD  3    metFORMIN (GLUCOPHAGE) 500 mg tablet TAKE 1 TABLET BY MOUTH TWICE DAILY 360 tablet 0    metoprolol succinate (TOPROL-XL) 100 mg 24 hr tablet Take by mouth      metoprolol tartrate (LOPRESSOR) 100 mg tablet TAKE 1 TABLET BY MOUTH TWICE DAILY 180 tablet 0    Potassium Chloride ER (K-TAB) 20 MEQ TBCR Take by mouth daily       No current facility-administered medications for this visit  Current Outpatient Prescriptions on File Prior to Visit   Medication Sig    amLODIPine (NORVASC) 10 mg tablet TK 1 T PO QD    atorvastatin (LIPITOR) 40 mg tablet TK 1 T PO D    esomeprazole (NEXIUM) 40 MG capsule Take by mouth    famotidine (PEPCID) 20 mg tablet Take 1 tablet by mouth 2 (two) times a day    lisinopril (ZESTRIL) 10 mg tablet TK 1 T PO QD    metFORMIN (GLUCOPHAGE) 500 mg tablet TAKE 1 TABLET BY MOUTH TWICE DAILY    metoprolol succinate (TOPROL-XL) 100 mg 24 hr tablet Take by mouth    metoprolol tartrate (LOPRESSOR) 100 mg tablet TAKE 1 TABLET BY MOUTH TWICE DAILY    Potassium Chloride ER (K-TAB) 20 MEQ TBCR Take by mouth daily     No current facility-administered medications on file prior to visit  She has No Known Allergies       Review of Systems   Constitutional: Negative for chills, fever and unexpected weight change     HENT: Negative for hearing loss, nosebleeds and sore throat  Eyes: Negative for pain, redness and visual disturbance  Respiratory: Negative for cough, shortness of breath and wheezing  Cardiovascular: Negative for chest pain, palpitations and leg swelling  Gastrointestinal: Negative for abdominal pain, nausea and vomiting  Endocrine: Negative for polydipsia and polyuria  Genitourinary: Negative for dysuria and hematuria  Musculoskeletal: Positive for arthralgias  Negative for joint swelling and myalgias  Skin: Negative for rash and wound  Neurological: Negative for dizziness, numbness and headaches  Psychiatric/Behavioral: Negative for decreased concentration and suicidal ideas  The patient is not nervous/anxious  Objective:  Right Shoulder Exam     Tenderness   The patient is experiencing no tenderness  Range of Motion   The patient has normal right shoulder ROM  Active Abduction: normal   Forward Flexion: normal   External Rotation: normal   Internal Rotation 0 degrees: normal     Muscle Strength   Abduction: 4/5   Internal Rotation: 5/5   External Rotation: 4/5   Supraspinatus: 4/5   Subscapularis: 5/5     Tests   Drop Arm: negative  Hawkin's test: negative    Other   Sensation: normal  Pulse: present    Comments:  Empty can: negative      Left Shoulder Exam     Tenderness   The patient is experiencing no tenderness  Range of Motion   Active Abduction:  90 abnormal   Forward Flexion:  90 normal   External Rotation: normal   Internal Rotation 0 degrees: normal     Muscle Strength   Abduction: 4/5   Internal Rotation: 5/5   External Rotation: 4/5   Supraspinatus: 4/5   Subscapularis: 5/5     Tests   Drop Arm: negative  Hawkin's test: positive    Comments:  Empty can: positive            Physical Exam   Constitutional: She is oriented to person, place, and time  She appears well-developed and well-nourished  HENT:   Head: Normocephalic     Nose: Nose normal    Eyes: Conjunctivae and EOM are normal    Neck: Normal range of motion  Cardiovascular: Normal rate and intact distal pulses  Pulmonary/Chest: Effort normal  No respiratory distress  Abdominal: Soft  She exhibits no distension  Musculoskeletal:        Right shoulder: She exhibits pain and decreased strength  She exhibits normal range of motion, no tenderness and normal pulse  Left shoulder: She exhibits decreased range of motion, pain and decreased strength  She exhibits no tenderness and normal pulse  As noted in Ortho exam   Neurological: She is alert and oriented to person, place, and time  Skin: Skin is warm and dry  Psychiatric: She has a normal mood and affect   Her behavior is normal  Judgment and thought content normal

## 2018-04-16 ENCOUNTER — OFFICE VISIT (OUTPATIENT)
Dept: PHYSICAL THERAPY | Facility: CLINIC | Age: 78
End: 2018-04-16
Payer: MEDICARE

## 2018-04-16 DIAGNOSIS — M75.81 ROTATOR CUFF TENDONITIS, RIGHT: ICD-10-CM

## 2018-04-16 DIAGNOSIS — M75.82 ROTATOR CUFF TENDONITIS, LEFT: Primary | ICD-10-CM

## 2018-04-16 PROCEDURE — 97110 THERAPEUTIC EXERCISES: CPT | Performed by: PHYSICAL THERAPIST

## 2018-04-16 PROCEDURE — 97140 MANUAL THERAPY 1/> REGIONS: CPT | Performed by: PHYSICAL THERAPIST

## 2018-04-16 NOTE — PROGRESS NOTES
Daily Note     Today's date: 2018  Patient name: Milo Paniagua  : 1940  MRN: [id number]  Referring provider: Vinh Tucker  Dx:   Encounter Diagnosis     ICD-10-CM    1  Rotator cuff tendonitis, left M75 82    2  Rotator cuff tendonitis, right M75 81        Start Time: 1530  Stop Time: 1630  Total time in clinic (min): 60 minutes    Subjective: Bilateral shldr pain  4/10 "feels pretty good  The doctor was happy with how I was doing and said I did not need to go back to him unless I had any other problems "    Objective: See treatment diary below    Precautions: HBP, increased cholesterol, L TKR in , Diabetes    Daily Treatment Diary     Manual  3/22/18 3/26/18 3/30/18 4-4 18       STM B shoulders  x13 min x13 min X 10 min  x10  Min B shoulders/B UT/B bicep region x10 min B shoulders/ BUT/B bicep region       PROM B shoulders  x10 reps x10 reps  x10 reps x10 reps                                 Manual   15 min total 15 min total  10 min  15 min total 15 min total           Exercise Diary  3/22/18 3/26/18 3/30/18 4-4 18       scap retraction x5 x10 green TB x10 green TB X 10 green TB x10 green TB x10 hold 5 blue TB       pendulum   x10 hold 5 X 10 ea  x10 x10       Cervical rotation x5 x10 hold 5 x10 hold 5 X 10 ea hold 5  x15 hold 5 ea x15 hold 5 ea       Bicep curls  With cane 2x10 1# x10 R, x7 Left hold 5 1# x 10 ea 2# 2x10 2# 2x10        Nu step     x5 min   25 miles x10 min       Shoulder circles x5 x10 forward and backwards x10 forward/backwards X 10 ea  F/B  x10 ea x20       Cane standing extension  x10 hold 5 x10 hold 10 X 10   Hold 10 2x10 2x10       Cervical SB  x10 hold 10 x10 hold 10 X 10  Hold 10  x10 hold 10 x10 hold 10                                                                                                                                                                       Modalities  3/22/18 3/26/18 3/30/18 4-4-18 18       MHP  MHP x 10 min B shoulders, pt seated MHP x10 min B shoulders MHP x10 min B shoulders, pt seated MHP x 10 min B shldrs post in sitting MHP x 10 min B shoulders post PT in sititng MHP x10 min B shoulders post PT in sitting                                         Assessment: Tolerated treatment well  Patient would benefit from continued PT; Pt with less palpable tenderness bilateral biceps/ant shldrs/mid deltoid; Pt pleased with progress and reporting now able to lift arms behind head and put her jacket on which she was unable to do prior to [facility]    Plan: Continue per plan of care  progress as tolerated with AAROM/manual therapy for bilateral RTC tendinitis  PT x 3 additional sessions with anticipation DC to HEP

## 2018-04-16 NOTE — PROGRESS NOTES
PT Re-evaluation:    Today's date: 2018  Patient name: Kenny Cheung  : 1940  MRN: [id number]  Referring provider: Seng Haas  Dx:   Encounter Diagnosis     ICD-10-CM    1  Rotator cuff tendonitis, left M75 82    2  Rotator cuff tendonitis, right M75 81                   Assessment  Impairments: abnormal or restricted ROM, abnormal movement, impaired physical strength, lacks appropriate home exercise program, pain with function and scapular dyskinesis  Functional limitations: decreased ability to perform ADLs, decreased ability to perform overhead movements, decreased upper body dressing, decreased ability to perform upper body dressing  Assessment details: Kenny Cheung is a 65 y o  female who presents with: Rotator cuff tendonitis, left  (primary encounter diagnosis)  Rotator cuff tendonitis, right  Pt presents with pain, decreased UE range of motion, decreased UE strength, impaired function, decreased activity tolerance and fair posture  Pt presents with these impairments and would benefit from skilled physical therapy to address these impairments in order to maximize their function  Pt to trial PT to assist with decreasing pain levels and improve ROM B shoulders  18: Pt reporting increased functional mobility and functional use since [facility]  Understanding of Dx/Px/POC: good   Prognosis: good    Goals  Short term goals:  4 weeks  + Patient will have pain level 3/10 bilateral shoulder with ADL's (goal met 18)  + Patient will report a 50% improvement in symptoms with ADL's (goal met 18)  + Patient will demonstrate appropriate scapulohumeral rhythm with reaching shoulder height and below  (goal met 18)  + paitent will report a 50% improvement in function (goal met 18)  + Patient will be independent in basic HEP (goal met 18)    Long term goals: 8 weeks  +  Patient will demonstrate a reduction in tenderness and tension in hypertonic musculature     + Patient will report 85% improvement improvement in symptoms with ADL's  + Patient will have pain level 0/10 bilateral shoulder with ADL's   + Patient will increase FOTO score to 64 (10 sessions)  + Patient will demonstrate appropriate scapulohumeral rhythm with reaching overhead   + patient will demonstrate functional reaching without compensatory patterns    + Patient will be independent in a comprehensive home exercise program           Plan  Planned modality interventions: thermotherapy: hydrocollator packs  Planned therapy interventions: ADL training, activity modification, breathing training, body mechanics training, home exercise program, graded exercise, graded activity, functional ROM exercises, flexibility, therapeutic training, therapeutic exercise, therapeutic activities, stretching, strengthening, postural training, patient education, neuromuscular re-education, joint mobilization and manual therapy  Frequency: 2x week  Duration in weeks: 8  Treatment plan discussed with: patient        Subjective Evaluation    History of Present Illness  Mechanism of injury: Pt reporting chronic B shoulder pain over past 20-30 years  Pt states she has had a multiple injections in B shoulders and in the past the patient is reporting pain has subsided with injections  Pt states she recently started having a flare up of B shoulder pain and had injection in February at [facility] and then went to [facility] for second injection    18: pt reporting feeling good today and states she feels about 60% improvement since [facility]  Pt reporting now able to put on her jacket with less pain    Recurrent probem    Quality of life: fair    Pain  No pain reported  Current pain ratin  At best pain ratin  At worst pain ratin  Location: B shoulders  Quality: sharp, radiating, squeezing, cramping and discomfort  Relieving factors: relaxation, support and change in position  Aggravating factors: lifting and overhead activity  Progression: worsening    Social Support  Steps to enter house: yes (4 steps to enter, 6 steps to apt)  Stairs in house: no   Lives in: apartment  Lives with: alone    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, 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)  Hand dominance: right      Diagnostic Tests  X-ray: abnormal  Treatments  Previous treatment: injection treatment  Current treatment: injection treatment  Patient Goals  Patient goals for therapy: decreased pain, increased motion, return to sport/leisure activities, increased strength and independence with ADLs/IADLs  Patient goal: "to get rid of the pain"        Objective     Postural Observations  Seated posture: fair  Standing posture: fair        Palpation   Left   Tenderness of the anterior deltoid, biceps, infraspinatus, latissimus and levator scapulae  Right Tenderness of the anterior deltoid, biceps, infraspinatus, latissimus and levator scapulae  Cervical/Thoracic Screen   Cervical range of motion within normal limits with the following exceptions: Cervical ROM WFL with stiffness reported at end range  No radicular symptoms B Ues with cervical ROM    Neurological Testing     Additional Neurological Details  Pt reporting c/o "tingling" R hand intermittent       Active Range of Motion   Left Shoulder   Flexion: 120 degrees   Abduction: 90 degrees WFL    Right Shoulder   Flexion: 130 (pain with bringing arm down) degrees with pain  Abduction: 90 degrees with pain    Additional Active Range of Motion Details  Functional IR (c7 to tip of thumb) 41 cm L side,  31 cm of R shoulder functional IR        Passive Range of Motion   Left Shoulder   Flexion: with pain  Abduction: with pain    Right Shoulder   Flexion: with pain  Abduction: with pain    Strength/Myotome Testing     Left Shoulder     Planes of Motion   Flexion: 3   Extension: 3   Abduction: 3   Adduction: 3   External rotation at 0°: 3   External rotation at 90°: 3   Internal rotation at 0°: 3     Right Shoulder     Planes of Motion   Flexion: 3+   Extension: 3+   Abduction: 3+   Adduction: 3+   External rotation at 0°: 3+   Internal rotation at 0°: 3+     Tests     Left Shoulder   Positive empty can, full can, Hawkin's and painful arc  Negative Neer's and Speed's  Right Shoulder   Positive empty can, full can, Hawkin's and painful arc  Negative Neer's and Speed's  General Comments     Shoulder Comments   Pt very limited with all mobility B shoulders with increase c/o pain with all ROM B shoulders    As of 4/11/18: PROM/AROM improved since [facility] with improved strength and decreased pain since [facility]          Precautions: HBP, increased cholesterol, L TKR in 2008, Diabetes    Daily Treatment Diary     Manual  3/22/18 3/26/18 3/30/18 4-4 4/9/18 4/11/18       STM B shoulders  x13 min x13 min X 10 min  x10  Min B shoulders/B UT/B bicep region x10 minB shoulder/B Ut/ B bicep region       PROM B shoulders  x10 reps x10 reps  x10 reps x10 reps                                 Manual   15 min total 15 min total  10 min  15 min total 15 min total           Exercise Diary  3/22/18 3/26/18 3/30/18 4-4 4/9/18 4/11/18       scap retraction x5 x10 green TB x10 green TB X 10 green TB x10 green TB x15 green TB       pendulum   x10 hold 5 X 10 ea  x10 x10       Cervical rotation x5 x10 hold 5 x10 hold 5 X 10 ea hold 5  x15 hold 5 ea x15 hold 5 ea       Bicep curls  With cane 2x10 1# x10 R, x7 Left hold 5 1# x 10 ea 2# 2x10 2# 2x10       Nu step      5 min   15 miles arms/legs   Level 1       Shoulder circles x5 x10 forward and backwards x10 forward/backwards X 10 ea  F/B  x10 ea x20x20       Cane standing extension  x10 hold 5 x10 hold 10 X 10   Hold 10 2x10 x20       Cervical SB  x10 hold 10 x10 hold 10 X 10  Hold 10  x10 hold 10 x10 hold 10       Shoulder abd      x10 with strap Modalities  3/22/18 3/26/18 3/30/18 4-4-18 4/9/18 4/11/18       MHP  MHP x 10 min B shoulders, pt seated MHP x10 min B shoulders MHP x10 min B shoulders, pt seated MHP x 10 min B shldrs post in sitting MHP x 10 min B shoulders post PT in sititng MHP x 10 min B shoulder, pt seated post PT

## 2018-04-18 ENCOUNTER — OFFICE VISIT (OUTPATIENT)
Dept: PHYSICAL THERAPY | Facility: CLINIC | Age: 78
End: 2018-04-18
Payer: MEDICARE

## 2018-04-18 DIAGNOSIS — M75.82 ROTATOR CUFF TENDONITIS, LEFT: Primary | ICD-10-CM

## 2018-04-18 DIAGNOSIS — M75.81 ROTATOR CUFF TENDONITIS, RIGHT: ICD-10-CM

## 2018-04-18 PROCEDURE — 97110 THERAPEUTIC EXERCISES: CPT | Performed by: PHYSICAL THERAPIST

## 2018-04-18 PROCEDURE — 97140 MANUAL THERAPY 1/> REGIONS: CPT | Performed by: PHYSICAL THERAPIST

## 2018-04-18 NOTE — PROGRESS NOTES
Daily Note     Today's date: 2018  Patient name: Kenny Cheung  : 1940  MRN: [id number]  Referring provider: Seng Haas  Dx:   Encounter Diagnosis     ICD-10-CM    1  Rotator cuff tendonitis, left M75 82    2  Rotator cuff tendonitis, right M75 81        Start Time: 1545  Stop Time: 1650  Total time in clinic (min): 65 minutes    Subjective: Bilateral shldr pain 3/10 "feels pretty good" " I have 2 more sessions with you"  Objective: See treatment diary below    Precautions: HBP, increased cholesterol, L TKR in , Diabetes    Daily Treatment Diary     Manual  3/22/18 3/26/18 3/30/18 4-4 18      STM B shoulders  x13 min x13 min X 10 min  x10  Min B shoulders/B UT/B bicep region x10 min B shoulders/ BUT/B bicep region x10 min B shoulders/ B UT/B bicep region      PROM B shoulders  x10 reps x10 reps  x10 reps x10 reps x10 reps                                Manual   15 min total 15 min total  10 min  15 min total 15 min total 15 min total           Exercise Diary  3/22/18 3/26/18 3/30/18 4-4 18      scap retraction x5 x10 green TB x10 green TB X 10 green TB x10 green TB x10 hold 5 blue TB x10 hold 5 blue TB       pendulum   x10 hold 5 X 10 ea  x10 x10 2x10      Cervical rotation x5 x10 hold 5 x10 hold 5 X 10 ea hold 5  x15 hold 5 ea x15 hold 5 ea x15 hold 5 ea      Bicep curls  With cane 2x10 1# x10 R, x7 Left hold 5 1# x 10 ea 2# 2x10 2# 2x10  2# 2x10      Nu step     x5 min   25 miles x10 min   25 miles x 10 min      Shoulder circles x5 x10 forward and backwards x10 forward/backwards X 10 ea  F/B  x10 ea x20 x20      Cane standing extension  x10 hold 5 x10 hold 10 X 10   Hold 10 2x10 2x10 2x10      Cervical SB  x10 hold 10 x10 hold 10 X 10  Hold 10  x10 hold 10 x10 hold 10 x10 hold 10                                                                                                                                                                      Modalities 3/22/18 3/26/18 3/30/18 4-4-18 4/9/18 4/16/18 4/18/18      MHP  MHP x 10 min B shoulders, pt seated MHP x10 min B shoulders MHP x10 min B shoulders, pt seated MHP x 10 min B shldrs post in sitting MHP x 10 min B shoulders post PT in sititng MHP x10 min B shoulders post PT in sitting MHP x10 min B shoulders post PT in sitting                                        Assessment: Tolerated treatment well  Patient would benefit from continued PT; Pt with less palpable tenderness bilateral biceps/ant shldrs/mid deltoid; Pt pleased with progress and reporting now able to lift arms behind head and put her jacket on which she was unable to do prior to [facility]  Pt able to perform all therex without any increase pain  Plan: Continue per plan of care  progress as tolerated with AAROM/manual therapy for bilateral RTC tendinitis  PT x 2 additional sessions with anticipation DC to HEP

## 2018-04-23 ENCOUNTER — OFFICE VISIT (OUTPATIENT)
Dept: PHYSICAL THERAPY | Facility: CLINIC | Age: 78
End: 2018-04-23
Payer: MEDICARE

## 2018-04-23 DIAGNOSIS — M75.82 ROTATOR CUFF TENDONITIS, LEFT: Primary | ICD-10-CM

## 2018-04-23 DIAGNOSIS — M75.81 ROTATOR CUFF TENDONITIS, RIGHT: ICD-10-CM

## 2018-04-23 PROCEDURE — 97110 THERAPEUTIC EXERCISES: CPT | Performed by: PHYSICAL THERAPIST

## 2018-04-23 PROCEDURE — 97140 MANUAL THERAPY 1/> REGIONS: CPT | Performed by: PHYSICAL THERAPIST

## 2018-04-23 NOTE — PROGRESS NOTES
Daily Note     Today's date: 2018  Patient name: Viviane Hong  : 1940  MRN: [id number]  Referring provider: Inga Patterson  Dx:   Encounter Diagnosis     ICD-10-CM    1  Rotator cuff tendonitis, left M75 82    2  Rotator cuff tendonitis, right M75 81        Start Time: 1530  Stop Time: 1650  Total time in clinic (min): 80 minutes    Subjective: Bilateral shldr pain 2/10 "feels pretty good" " I have 1 more session with you"    Objective: See treatment diary below    Precautions: HBP, increased cholesterol, L TKR in , Diabetes    Daily Treatment Diary     Manual  3/22/18 3/26/18 3/30/18 4-4 18     STM B shoulders  x13 min x13 min X 10 min  x10  Min B shoulders/B UT/B bicep region x10 min B shoulders/ BUT/B bicep region x10 min B shoulders/ B UT/B bicep region x10 min B shoulders/B Ut/ B biceps region      PROM B shoulders  x10 reps x10 reps  x10 reps x10 reps x10 reps x10 reps                                Manual   15 min total 15 min total  10 min  15 min total 15 min total 15 min total  15 min total         Exercise Diary  3/22/18 3/26/18 3/30/18 4-4 18     scap retraction x5 x10 green TB x10 green TB X 10 green TB x10 green TB x10 hold 5 blue TB x10 hold 5 blue TB  x10 hold 5 blue TB     pendulum   x10 hold 5 X 10 ea  x10 x10 2x10 2x10     Cervical rotation x5 x10 hold 5 x10 hold 5 X 10 ea hold 5  x15 hold 5 ea x15 hold 5 ea x15 hold 5 ea x15 hold 5 ea     Bicep curls  With cane 2x10 1# x10 R, x7 Left hold 5 1# x 10 ea 2# 2x10 2# 2x10  2# 2x10 3# 2x10     Nu step     x5 min   25 miles x10 min   25 miles x 10 min   25 miles x10 min level 3     Shoulder circles x5 x10 forward and backwards x10 forward/backwards X 10 ea  F/B  x10 ea x20 x20 x20     Cane standing extension  x10 hold 5 x10 hold 10 X 10   Hold 10 2x10 2x10 2x10 2x10     Cervical SB  x10 hold 10 x10 hold 10 X 10  Hold 10  x10 hold 10 x10 hold 10 x10 hold 10 x10 hold 10 Modalities  3/22/18 3/26/18 3/30/18 4-4-18 4/9/18 4/16/18 4/18/18 4/23/18     MHP  MHP x 10 min B shoulders, pt seated MHP x10 min B shoulders MHP x10 min B shoulders, pt seated MHP x 10 min B shldrs post in sitting MHP x 10 min B shoulders post PT in sititng MHP x10 min B shoulders post PT in sitting MHP x10 min B shoulders post PT in sitting MHP x10 min B shoulders, pt seated                                       Assessment: Tolerated treatment well  Patient would benefit from continued PT; Pt with less palpable tenderness bilateral biceps/ant shldrs/mid deltoid; Pt pleased with progress and reporting now able to lift arms behind head and put her jacket on which she was unable to do prior to [facility]  Pt able to perform all therex without any increase pain  Advised pt to stretch more throughout her day as pt enjoys playing games on computer and is causing tightening of of her biceps    Plan: Continue per plan of care  progress as tolerated with AAROM/manual therapy for bilateral RTC tendinitis  PT x 1 additional session with anticipation DC to HEP

## 2018-04-25 ENCOUNTER — OFFICE VISIT (OUTPATIENT)
Dept: PHYSICAL THERAPY | Facility: CLINIC | Age: 78
End: 2018-04-25
Payer: MEDICARE

## 2018-04-25 DIAGNOSIS — M75.82 ROTATOR CUFF TENDONITIS, LEFT: Primary | ICD-10-CM

## 2018-04-25 DIAGNOSIS — M75.81 ROTATOR CUFF TENDONITIS, RIGHT: ICD-10-CM

## 2018-04-25 PROCEDURE — G8985 CARRY GOAL STATUS: HCPCS | Performed by: PHYSICAL THERAPIST

## 2018-04-25 PROCEDURE — 97140 MANUAL THERAPY 1/> REGIONS: CPT | Performed by: PHYSICAL THERAPIST

## 2018-04-25 PROCEDURE — G8986 CARRY D/C STATUS: HCPCS | Performed by: PHYSICAL THERAPIST

## 2018-04-25 PROCEDURE — 97110 THERAPEUTIC EXERCISES: CPT | Performed by: PHYSICAL THERAPIST

## 2018-04-25 NOTE — LETTER
2018    Bernardo Singh  [address] [facility] [address]  [address] [address]    Patient: Anibal Macias   YOB: 1940   Date of Visit: 2018     Encounter Diagnosis     ICD-10-CM    1  Rotator cuff tendonitis, left M75 82    2  Rotator cuff tendonitis, right M75 81        Dear Dr Kristy Jones:    Please review the attached Plan of Care from [address] [facility] recent visit  PT appropriate for DC to HEP as goals have been met for Pt  Please verify that you agree therapy should discontinue by signing the attached document and sending it back to our office  If you have any questions or concerns, please don't hesitate to call  Sincerely,    Ivan Almaraz, PT      Referring Provider:      I certify that I have read the below Plan of Care and certify the need for these services furnished under this plan of treatment while under my care  Bernardo Singh  [address] [facility] [address]  [place]               PT DISCHARGE:    Today's date: 2018  Patient name: Anibal Macias  : 1940  MRN: [id number]  Referring provider: Lacy Guzman  Dx:   Encounter Diagnosis     ICD-10-CM    1  Rotator cuff tendonitis, left M75 82    2  Rotator cuff tendonitis, right M75 81        Start Time: 1530  Stop Time: 1640  Total time in clinic (min): 70 minutes    Assessment  Impairments: abnormal or restricted ROM, abnormal movement, impaired physical strength, lacks appropriate home exercise program, pain with function and scapular dyskinesis  Functional limitations: decreased ability to perform ADLs, decreased ability to perform overhead movements, decreased upper body dressing, decreased ability to perform upper body dressing  Assessment details: Anibal Macias is a 65 y o  female who presents with: Rotator cuff tendonitis, left  (primary encounter diagnosis)  Rotator cuff tendonitis, right   Pt presents with pain, decreased UE range of motion, decreased UE strength, impaired function, decreased activity tolerance and fair posture  Pt presents with these impairments and would benefit from skilled physical therapy to address these impairments in order to maximize their function  Pt to trial PT to assist with decreasing pain levels and improve ROM B shoulders  4/25/18: Pt reporting increased functional mobility and functional use since [facility]    Understanding of Dx/Px/POC: good   Prognosis: good    Goals  Short term goals:  4 weeks  + Patient will have pain level 3/10 bilateral shoulder with ADL's (goal met 4/11/18)  + Patient will report a 50% improvement in symptoms with ADL's (goal met 4/11/18)  + Patient will demonstrate appropriate scapulohumeral rhythm with reaching shoulder height and below  (goal met 4/11/18)  + paitent will report a 50% improvement in function (goal met 4/11/18)  + Patient will be independent in basic HEP (goal met 4/11/18)    Long term goals: 8 weeks  +  Patient will demonstrate a reduction in tenderness and tension in hypertonic musculature  (goal met 4/25/18  + Patient will report 85% improvement improvement in symptoms with ADL's (goal met 4/25/18)  + Patient will have pain level 0/10 bilateral shoulder with ADL's (goal met 4/25/18 pt reporting met about 30% of time)  + Patient will increase FOTO score to 64 (10 sessions) (goal met 4/25/18)  + Patient will demonstrate appropriate scapulohumeral rhythm with reaching overhead (goal met 4/25/18)  + patient will demonstrate functional reaching without compensatory patterns  (goal met 4/25/18)  + Patient will be independent in a comprehensive home exercise program (goal met 4/25/18)          Plan  Planned modality interventions: thermotherapy: hydrocollator packs  Planned therapy interventions: ADL training, activity modification, breathing training, body mechanics training, home exercise program, graded exercise, graded activity, functional ROM exercises, flexibility, therapeutic training, therapeutic exercise, therapeutic activities, stretching, strengthening, postural training, patient education, neuromuscular re-education, joint mobilization and manual therapy  Frequency: 2x week  Duration in weeks: 8  Treatment plan discussed with: patient        Subjective Evaluation    History of Present Illness  Mechanism of injury: Pt reporting chronic B shoulder pain over past 20-30 years  Pt states she has had a multiple injections in B shoulders and in the past the patient is reporting pain has subsided with injections  Pt states she recently started having a flare up of B shoulder pain and had injection in February at [place] and then went to ortho for second injection    18: pt reporting feeling good today and states she feels about 60% improvement since [facility]  Pt reporting now able to put on her jacket with less pain      18: Pt reporting feeling 80% improvement since [facility]  "I have not had one of those incidents where I felt like crying"  Recurrent probem    Quality of life: fair    Pain  No pain reported  Current pain ratin  At best pain ratin  At worst pain rating: 3  Location: B shoulders  Quality: sharp, radiating, squeezing, cramping and discomfort  Relieving factors: relaxation, support and change in position  Aggravating factors: lifting and overhead activity  Progression: worsening    Social Support  Steps to enter house: yes (4 steps to enter, 6 steps to apt)  Stairs in house: no   Lives in: apartment  Lives with: alone    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, 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)  Hand dominance: right      Diagnostic Tests  X-ray: abnormal  Treatments  Previous treatment: injection treatment  Current treatment: injection treatment  Patient Goals  Patient goals for therapy: decreased pain, increased motion, return to sport/leisure activities, increased strength and independence with ADLs/IADLs  Patient goal: "to get rid of the pain"        Objective Postural Observations  Seated posture: fair  Standing posture: fair        Palpation   Left   Tenderness of the anterior deltoid, biceps, infraspinatus, latissimus and levator scapulae  Right Tenderness of the anterior deltoid, biceps, infraspinatus, latissimus and levator scapulae  Cervical/Thoracic Screen   Cervical range of motion within normal limits with the following exceptions: Cervical ROM WFL with stiffness reported at end range  No radicular symptoms B Ues with cervical ROM    Active Range of Motion   Left Shoulder   Flexion: 120 degrees   Abduction: 90 degrees WFL    Right Shoulder   Flexion: 130 (pain with bringing arm down) degrees with pain  Abduction: 90 degrees with pain    Additional Active Range of Motion Details  Functional IR (c7 to tip of thumb) 41 cm L side,  41 cm of R shoulder functional IR        Passive Range of Motion   Left Shoulder   Normal passive range of motion    Right Shoulder   Normal passive range of motion    Strength/Myotome Testing     Left Shoulder     Planes of Motion   Flexion: 4-   Extension: 4-   Abduction: 4-   Adduction: 4-   External rotation at 0°:  4-   External rotation at 90°:  4-   Internal rotation at 0°:  4-     Right Shoulder     Planes of Motion   Flexion: 4-   Extension: 4-   Abduction: 4-   Adduction: 4-   External rotation at 0°:  4-   Internal rotation at 0°:  4-     Tests     Left Shoulder   Negative empty can, full can, Hawkin's, Neer's, painful arc and Speed's  Right Shoulder   Negative empty can, full can, Hawkin's, Neer's, painful arc and Speed's       General Comments     Shoulder Comments   Pt very limited with all mobility B shoulders with increase c/o pain with all ROM B shoulders    As of 4/25/18: PROM/AROM improved since [facility] with improved strength and decreased pain since [facility]      Flowsheet Rows    Flowsheet Row Most Recent Value   PT/OT G-Codes   Current Score  65   Projected Score  64   FOTO information reviewed  Yes   Assessment Type  Discharge   G code set  Carrying, Moving & Handling Objects   Carrying, Moving and Handling Objects Goal Status ()  CJ   Carrying, Moving and Handling Objects Discharge Status ()  CJ          Precautions: HBP, increased cholesterol, L TKR in 2008, Diabetes    Daily Treatment Diary       Manual  3/22/18 3/26/18 3/30/18 4-4 4/9/18 4/16/18 4/18/18 4/23/18 4/25/18    STM B shoulders  x13 min x13 min X 10 min  x10  Min B shoulders/B UT/B bicep region x10 min B shoulders/ BUT/B bicep region x10 min B shoulders/ B UT/B bicep region x10 min B shoulders/B Ut/ B biceps region  x10 min B shoulder/B UT/B biceps    PROM B shoulders  x10 reps x10 reps  x10 reps x10 reps x10 reps x10 reps  x10 reps                              Manual   15 min total 15 min total  10 min  15 min total 15 min total 15 min total  15 min total 15 min total        Exercise Diary  3/22/18 3/26/18 3/30/18 4-4 4/9/18 4/16/18 4/18/18 4/23/18 4/25/18    scap retraction x5 x10 green Tbhold 5 x10 green TB X 10 green TB x10 green TB x10 hold 5 blue TB x10 hold 5 blue TB  x10 hold 5 blue TB x15 hold 5 Blue TB    pendulum   x10 hold 5 X 10 ea  x10 x10 2x10 2x10 2x10    Cervical rotation x5 x10 hold 5 x10 hold 5 X 10 ea hold 5  x15 hold 5 ea x15 hold 5 ea x15 hold 5 ea x15 hold 5 ea x15 hold 5    Bicep curls  With cane 2x10 1# x10 R, x7 Left hold 5 1# x 10 ea 2# 2x10 2# 2x10  2# 2x10 3# 2x10 3# 2x10    Nu step     x5 min   25 miles x10 min   25 miles x 10 min   25 miles x10 min level 3  25 miles x10 min level 3    Shoulder circles x5 x10 forward and backwards x10 forward/backwards X 10 ea  F/B  x10 ea x20 x20 x20 x20    Cane standing extension  x10 hold 5 x10 hold 10 X 10   Hold 10 2x10 2x10 2x10 2x10 2x10    Cervical SB  x10 hold 10 x10 hold 10 X 10  Hold 10  x10 hold 10 x10 hold 10 x10 hold 10 x10 hold 10 x10 hold 10 Modalities  3/22/18 3/26/18 3/30/18 4-4-18 4/9/18 4/16/18 4/18/18 4/23/18 4/25/18    MHP  MHP x 10 min B shoulders, pt seated MHP x10 min B shoulders MHP x10 min B shoulders, pt seated MHP x 10 min B shldrs post in sitting MHP x 10 min B shoulders post PT in sititng MHP x10 min B shoulders post PT in sitting MHP x10 min B shoulders post PT in sitting MHP x10 min B shoulders, pt seated MHP x10 min B shoulders, pt seated

## 2018-04-25 NOTE — PROGRESS NOTES
PT DISCHARGE:    Today's date: 2018  Patient name: Oswald Martin  : 1940  MRN: [id number]  Referring provider: Fremont Schlatter  Dx:   Encounter Diagnosis     ICD-10-CM    1  Rotator cuff tendonitis, left M75 82    2  Rotator cuff tendonitis, right M75 81        Start Time: 1530  Stop Time: 1640  Total time in clinic (min): 70 minutes    Assessment  Impairments: abnormal or restricted ROM, abnormal movement, impaired physical strength, lacks appropriate home exercise program, pain with function and scapular dyskinesis  Functional limitations: decreased ability to perform ADLs, decreased ability to perform overhead movements, decreased upper body dressing, decreased ability to perform upper body dressing  Assessment details: Oswald Martin is a 65 y o  female who presents with: Rotator cuff tendonitis, left  (primary encounter diagnosis)  Rotator cuff tendonitis, right  Pt presents with pain, decreased UE range of motion, decreased UE strength, impaired function, decreased activity tolerance and fair posture  Pt presents with these impairments and would benefit from skilled physical therapy to address these impairments in order to maximize their function  Pt to trial PT to assist with decreasing pain levels and improve ROM B shoulders  18: Pt reporting increased functional mobility and functional use since [facility]    Understanding of Dx/Px/POC: good   Prognosis: good    Goals  Short term goals:  4 weeks  + Patient will have pain level 3/10 bilateral shoulder with ADL's (goal met 18)  + Patient will report a 50% improvement in symptoms with ADL's (goal met 18)  + Patient will demonstrate appropriate scapulohumeral rhythm with reaching shoulder height and below  (goal met 18)  + paitent will report a 50% improvement in function (goal met 18)  + Patient will be independent in basic HEP (goal met 18)    Long term goals: 8 weeks  +  Patient will demonstrate a reduction in tenderness and tension in hypertonic musculature  (goal met 4/25/18  + Patient will report 85% improvement improvement in symptoms with ADL's (goal met 4/25/18)  + Patient will have pain level 0/10 bilateral shoulder with ADL's (goal met 4/25/18 pt reporting met about 30% of time)  + Patient will increase FOTO score to 64 (10 sessions) (goal met 4/25/18)  + Patient will demonstrate appropriate scapulohumeral rhythm with reaching overhead (goal met 4/25/18)  + patient will demonstrate functional reaching without compensatory patterns  (goal met 4/25/18)  + Patient will be independent in a comprehensive home exercise program (goal met 4/25/18)          Plan  Planned modality interventions: thermotherapy: hydrocollator packs  Planned therapy interventions: ADL training, activity modification, breathing training, body mechanics training, home exercise program, graded exercise, graded activity, functional ROM exercises, flexibility, therapeutic training, therapeutic exercise, therapeutic activities, stretching, strengthening, postural training, patient education, neuromuscular re-education, joint mobilization and manual therapy  Frequency: 2x week  Duration in weeks: 8  Treatment plan discussed with: patient        Subjective Evaluation    History of Present Illness  Mechanism of injury: Pt reporting chronic B shoulder pain over past 20-30 years  Pt states she has had a multiple injections in B shoulders and in the past the patient is reporting pain has subsided with injections  Pt states she recently started having a flare up of B shoulder pain and had injection in February at [facility] and then went to ortho for second injection    4/11/18: pt reporting feeling good today and states she feels about 60% improvement since [facility]  Pt reporting now able to put on her jacket with less pain      4/25/18: Pt reporting feeling 80% improvement since [facility]  "I have not had one of those incidents where I felt like crying"  Recurrent probem    Quality of life: fair    Pain  No pain reported  Current pain ratin  At best pain ratin  At worst pain rating: 3  Location: B shoulders  Quality: sharp, radiating, squeezing, cramping and discomfort  Relieving factors: relaxation, support and change in position  Aggravating factors: lifting and overhead activity  Progression: worsening    Social Support  Steps to enter house: yes (4 steps to enter, 6 steps to apt)  Stairs in house: no   Lives in: apartment  Lives with: alone    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, 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)  Hand dominance: right      Diagnostic Tests  X-ray: abnormal  Treatments  Previous treatment: injection treatment  Current treatment: injection treatment  Patient Goals  Patient goals for therapy: decreased pain, increased motion, return to sport/leisure activities, increased strength and independence with ADLs/IADLs  Patient goal: "to get rid of the pain"        Objective     Postural Observations  Seated posture: fair  Standing posture: fair        Palpation   Left   Tenderness of the anterior deltoid, biceps, infraspinatus, latissimus and levator scapulae  Right Tenderness of the anterior deltoid, biceps, infraspinatus, latissimus and levator scapulae  Cervical/Thoracic Screen   Cervical range of motion within normal limits with the following exceptions: Cervical ROM WFL with stiffness reported at end range   No radicular symptoms B Ues with cervical ROM    Active Range of Motion   Left Shoulder   Flexion: 120 degrees   Abduction: 90 degrees WFL    Right Shoulder   Flexion: 130 (pain with bringing arm down) degrees with pain  Abduction: 90 degrees with pain    Additional Active Range of Motion Details  Functional IR (c7 to tip of thumb) 41 cm L side,  41 cm of R shoulder functional IR        Passive Range of Motion   Left Shoulder   Normal passive range of motion    Right Shoulder   Normal passive range of motion    Strength/Myotome Testing     Left Shoulder     Planes of Motion   Flexion: 4-   Extension: 4-   Abduction: 4-   Adduction: 4-   External rotation at 0°: 4-   External rotation at 90°: 4-   Internal rotation at 0°: 4-     Right Shoulder     Planes of Motion   Flexion: 4-   Extension: 4-   Abduction: 4-   Adduction: 4-   External rotation at 0°: 4-   Internal rotation at 0°: 4-     Tests     Left Shoulder   Negative empty can, full can, Hawkin's, Neer's, painful arc and Speed's  Right Shoulder   Negative empty can, full can, Hawkin's, Neer's, painful arc and Speed's       General Comments     Shoulder Comments   Pt very limited with all mobility B shoulders with increase c/o pain with all ROM B shoulders    As of 4/25/18: PROM/AROM improved since [facility] with improved strength and decreased pain since [facility]      Flowsheet Rows    Flowsheet Row Most Recent Value   PT/OT G-Codes   Current Score  65   Projected Score  64   FOTO information reviewed  Yes   Assessment Type  Discharge   G code set  Carrying, Moving & Handling Objects   Carrying, Moving and Handling Objects Goal Status ()  CJ   Carrying, Moving and Handling Objects Discharge Status ()  CJ          Precautions: HBP, increased cholesterol, L TKR in 2008, Diabetes    Daily Treatment Diary       Manual  3/22/18 3/26/18 3/30/18 4-4 4/9/18 4/16/18 4/18/18 4/23/18 4/25/18    STM B shoulders  x13 min x13 min X 10 min  x10  Min B shoulders/B UT/B bicep region x10 min B shoulders/ BUT/B bicep region x10 min B shoulders/ B UT/B bicep region x10 min B shoulders/B Ut/ B biceps region  x10 min B shoulder/B UT/B biceps    PROM B shoulders  x10 reps x10 reps  x10 reps x10 reps x10 reps x10 reps  x10 reps                              Manual   15 min total 15 min total  10 min  15 min total 15 min total 15 min total  15 min total 15 min total        Exercise Diary  3/22/18 3/26/18 3/30/18 4-4 4/9/18 4/16/18 4/18/18 4/23/18 4/25/18    scap retraction x5 x10 green Tbhold 5 x10 green TB X 10 green TB x10 green TB x10 hold 5 blue TB x10 hold 5 blue TB  x10 hold 5 blue TB x15 hold 5 Blue TB    pendulum   x10 hold 5 X 10 ea  x10 x10 2x10 2x10 2x10    Cervical rotation x5 x10 hold 5 x10 hold 5 X 10 ea hold 5  x15 hold 5 ea x15 hold 5 ea x15 hold 5 ea x15 hold 5 ea x15 hold 5    Bicep curls  With cane 2x10 1# x10 R, x7 Left hold 5 1# x 10 ea 2# 2x10 2# 2x10  2# 2x10 3# 2x10 3# 2x10    Nu step     x5 min   25 miles x10 min   25 miles x 10 min   25 miles x10 min level 3  25 miles x10 min level 3    Shoulder circles x5 x10 forward and backwards x10 forward/backwards X 10 ea  F/B  x10 ea x20 x20 x20 x20    Cane standing extension  x10 hold 5 x10 hold 10 X 10   Hold 10 2x10 2x10 2x10 2x10 2x10    Cervical SB  x10 hold 10 x10 hold 10 X 10  Hold 10  x10 hold 10 x10 hold 10 x10 hold 10 x10 hold 10 x10 hold 10                                                                                                                                                                    Modalities  3/22/18 3/26/18 3/30/18 4-4-18 4/9/18 4/16/18 4/18/18 4/23/18 4/25/18    MHP  MHP x 10 min B shoulders, pt seated MHP x10 min B shoulders MHP x10 min B shoulders, pt seated MHP x 10 min B shldrs post in sitting MHP x 10 min B shoulders post PT in sititng MHP x10 min B shoulders post PT in sitting MHP x10 min B shoulders post PT in sitting MHP x10 min B shoulders, pt seated MHP x10 min B shoulders, pt seated

## 2018-05-03 DIAGNOSIS — I10 ESSENTIAL HYPERTENSION: ICD-10-CM

## 2018-05-04 RX ORDER — METOPROLOL TARTRATE 100 MG/1
TABLET ORAL
Qty: 180 TABLET | Refills: 0 | Status: SHIPPED | OUTPATIENT
Start: 2018-05-04 | End: 2018-08-03 | Stop reason: SDUPTHER

## 2018-06-10 DIAGNOSIS — I10 ESSENTIAL HYPERTENSION: Primary | ICD-10-CM

## 2018-06-19 ENCOUNTER — FOLLOW UP (OUTPATIENT)
Dept: URBAN - METROPOLITAN AREA CLINIC 27 | Facility: CLINIC | Age: 78
End: 2018-06-19

## 2018-06-19 DIAGNOSIS — H35.3132: ICD-10-CM

## 2018-06-19 DIAGNOSIS — H43.813: ICD-10-CM

## 2018-06-19 DIAGNOSIS — H25.9: ICD-10-CM

## 2018-06-19 DIAGNOSIS — E11.9: ICD-10-CM

## 2018-06-19 PROCEDURE — 92134 CPTRZ OPH DX IMG PST SGM RTA: CPT

## 2018-06-19 PROCEDURE — 92014 COMPRE OPH EXAM EST PT 1/>: CPT

## 2018-06-19 RX ORDER — LISINOPRIL 10 MG/1
TABLET ORAL
Qty: 90 TABLET | Refills: 0 | Status: SHIPPED | OUTPATIENT
Start: 2018-06-19 | End: 2018-09-13 | Stop reason: SDUPTHER

## 2018-06-19 ASSESSMENT — VISUAL ACUITY
OD_CC: 20/70-1
OS_CC: 20/60-2

## 2018-06-19 ASSESSMENT — TONOMETRY
OD_IOP_MMHG: 17
OS_IOP_MMHG: 18

## 2018-07-12 NOTE — PRE-PROCEDURE INSTRUCTIONS
Pre-Surgery Instructions:   Medication Instructions    amLODIPine (NORVASC) 10 mg tablet Instructed patient per Anesthesia Guidelines   atorvastatin (LIPITOR) 40 mg tablet Instructed patient per Anesthesia Guidelines   Calcium Carb-Cholecalciferol (CALCIUM 600+D) 600-800 MG-UNIT TABS Instructed patient per Anesthesia Guidelines   CINNAMON PO Instructed patient per Anesthesia Guidelines   esomeprazole (NEXIUM) 40 MG capsule Instructed patient per Anesthesia Guidelines   lisinopril (ZESTRIL) 10 mg tablet Instructed patient per Anesthesia Guidelines   metFORMIN (GLUCOPHAGE) 500 mg tablet Instructed patient per Anesthesia Guidelines   metoprolol tartrate (LOPRESSOR) 100 mg tablet Instructed patient per Anesthesia Guidelines   Potassium Chloride ER (K-TAB) 20 MEQ TBCR Instructed patient per Anesthesia Guidelines  Pre op instructions given  Pt to take lisinopril,metoprolol tartrate,amlodipine the am of surgery   Biocycle Surgical Experience    The following information was developed to assist you to prepare for your operation  What do I need to do before coming to the hospital?   Arrange for a responsible person to drive you to and from the hospital    Arrange care for your children at home  Children are not allowed in the recovery areas of the hospital   Plan to wear clothing that is easy to put on and take off  If you are having shoulder surgery, wear a shirt that buttons or zippers in the front  Bathing  o Shower the evening before and the morning of your surgery with an antibacterial soap  Please refer to the Pre Op Showering Instructions for Surgery Patients Sheet   o Remove nail polish and all body piercing jewelry  o Do not shave any body part for at least 24 hours before surgery-this includes face, arms, legs and upper body  Food  o Nothing to eat or drink after midnight the night before your surgery   This includes candy and chewing gum  o Exception: If your surgery is after 12:00pm (noon), you may have clear liquids such as 7-Up®, ginger ale, apple or cranberry juice, Jell-O®, water, or clear broth until 8:00 am  o Do not drink milk or juice with pulp on the morning before surgery  o Do not drink alcohol 24 hours before surgery  Medicine  o Follow instructions you received from your surgeon about which medicines you may take on the day of surgery  o If instructed to take medicine on the morning of surgery, take pills with just a small sip of water  Call your prescribing doctor for specific infroamtion on what to do if you take insulin    What should I bring to the hospital?    Bring:  Yadira Shelling or a walker, if you have them, for foot or knee surgery   A list of the daily medicines, vitamins, minerals, herbals and nutritional supplements you take  Include the dosages of medicines and the time you take them each day   Glasses, dentures or hearing aids   Minimal clothing; you will be wearing hospital sleepwear   Photo ID; required to verify your identity   If you have a Living Will or Power of , bring a copy of the documents   If you have an ostomy, bring an extra pouch and any supplies you use    Do not bring   Medicines or inhalers   Money, valuables or jewelry    What other information should I know about the day of surgery?  Notify your surgeons if you develop a cold, sore throat, cough, fever, rash or any other illness   Report to the Ambulatory Surgical/Same Day Surgery Unit   You will be instructed to stop at Registration only if you have not been pre-registered   Inform your  fi they do not stay that they will be asked by the staff to leave a phone number where they can be reached   Be available to be reached before surgery   In the event the operating room schedule changes, you may be asked to come in earlier or later than expected    *It is important to tell your doctor and others involved in your health care if you are taking or have been taking any non-prescription drugs, vitamins, minerals, herbals or other nutritional supplements   Any of these may interact with some food or medicines and cause a reaction

## 2018-07-16 ENCOUNTER — OFFICE VISIT (OUTPATIENT)
Dept: FAMILY MEDICINE CLINIC | Facility: CLINIC | Age: 78
End: 2018-07-16
Payer: MEDICARE

## 2018-07-16 VITALS
BODY MASS INDEX: 41.57 KG/M2 | WEIGHT: 220 LBS | HEART RATE: 78 BPM | RESPIRATION RATE: 16 BRPM | TEMPERATURE: 98 F | OXYGEN SATURATION: 98 % | DIASTOLIC BLOOD PRESSURE: 70 MMHG | SYSTOLIC BLOOD PRESSURE: 160 MMHG

## 2018-07-16 DIAGNOSIS — H25.811 COMBINED FORMS OF AGE-RELATED CATARACT OF RIGHT EYE: Primary | ICD-10-CM

## 2018-07-16 DIAGNOSIS — I10 ESSENTIAL HYPERTENSION: ICD-10-CM

## 2018-07-16 PROCEDURE — 99213 OFFICE O/P EST LOW 20 MIN: CPT | Performed by: FAMILY MEDICINE

## 2018-07-16 RX ORDER — OFLOXACIN 3 MG/ML
SOLUTION/ DROPS OPHTHALMIC
Refills: 5 | COMMUNITY
Start: 2018-06-20 | End: 2019-03-18 | Stop reason: ALTCHOICE

## 2018-07-16 RX ORDER — PREDNISOLONE ACETATE 10 MG/ML
SUSPENSION/ DROPS OPHTHALMIC
Refills: 5 | COMMUNITY
Start: 2018-06-20 | End: 2019-03-18 | Stop reason: ALTCHOICE

## 2018-07-16 RX ORDER — KETOROLAC TROMETHAMINE 5 MG/ML
SOLUTION OPHTHALMIC
Refills: 5 | COMMUNITY
Start: 2018-06-20 | End: 2019-03-18 | Stop reason: ALTCHOICE

## 2018-07-16 NOTE — PROGRESS NOTES
Assessment/Plan:    Essential hypertension  A/P:BP elevated at 160/70 during this visit  Per patient, she has not yet taken her medication for today and is very nervous about the upcoming surgery  BP at home and on previous visits range between 645 to 843J systolic  Gave reassurance and advised patient to continue with her meds  Combined forms of age-related cataract of right eye  A/P: Scheduled right eye cataract surgery on 7/19/18 by Dr Roberto Melendez, followed by left eye 1 month later  Surgical clearance form was filled out during this visit and faxed to doctor's office  Subjective:      Patient ID: Rich Cloud is a 65 y o  female  Patient is a 18-year-old female with medical history of hypertension, hyperlipidemia and controlled diabetes mellitus type 2 here for surgical clearance for cataracts  Patient has a scheduled right eye cataract surgery on 7/19/18 with Dr Roberto Melendez under conscious sedation  Will have her left eye cataract surgery removal a month later  No complaints at this time  Denies any fever, headache, dizziness, chest pain, shortness of breath, abdominal or urinary symptoms  The following portions of the patient's history were reviewed and updated as appropriate: allergies, current medications, past family history, past medical history, past social history, past surgical history and problem list     Review of Systems   Constitutional: Negative for chills and fever  HENT: Negative  Eyes: Positive for visual disturbance  Negative for pain, discharge and redness  Respiratory: Negative for shortness of breath  Cardiovascular: Negative for chest pain  Gastrointestinal: Negative  Endocrine: Negative for polydipsia and polyuria  Genitourinary: Negative  Musculoskeletal: Negative  Skin: Negative  Neurological: Negative for dizziness, light-headedness and headaches  Psychiatric/Behavioral: Negative for confusion           Objective:      /70   Pulse 78   Temp 98 °F (36 7 °C)   Resp 16   Wt 99 8 kg (220 lb)   SpO2 98%   BMI 41 57 kg/m²          Physical Exam   Constitutional: She is oriented to person, place, and time  She appears well-developed and well-nourished  No distress  HENT:   Head: Normocephalic and atraumatic  Nose: Nose normal    Mouth/Throat: Oropharynx is clear and moist  No oropharyngeal exudate  Eyes: Conjunctivae and EOM are normal  Pupils are equal, round, and reactive to light  Neck: Normal range of motion  Neck supple  Cardiovascular: Normal rate, regular rhythm and normal heart sounds  Pulmonary/Chest: Effort normal and breath sounds normal  No respiratory distress  She has no wheezes  She has no rales  She exhibits no tenderness  Abdominal: Soft  Bowel sounds are normal  She exhibits no distension and no mass  There is no tenderness  There is no rebound and no guarding  Musculoskeletal: Normal range of motion  She exhibits no edema, tenderness or deformity  Lymphadenopathy:     She has no cervical adenopathy  Neurological: She is alert and oriented to person, place, and time  Skin: Skin is warm and dry  No rash noted  No erythema  No pallor  Psychiatric: She has a normal mood and affect  Nursing note and vitals reviewed

## 2018-07-16 NOTE — H&P
Admit Note     Pippa Ferris    The above female patient   65y o   years old has been followed for cataract development in their RIGHT eye  Due to the decrease in vision and the affect on their lifestyle, they have elected to have cataract surgery  The risks and benefits were discussed with the patient using verbal discussion and education material  The IOL option were also discussed  The patient was cleared by  their medical doctor and had an interview with the anesthesia department  No contraindications to the surgery were found  Informed consent was obtained for cataract surgery and possible intraocular lens implantation  Ophthalmic Examination:     A complete ophthalmic exam was performed  The best corrected visual acuity in each eye was  OD 20/100     and OS 20/400  The Snellen chart was used as well as glare testing if indicated to determine the level of vision function  Slit lamp was used to examine the cornea and anterior structures of the eye  No significant pathology was found as a contraindication to surgery  Intraocular pressures were checked and found to be within normal limits  Dilated fundus exam was performed and no significant pathology was found that would attribute to the decreased vision  Examination of the crystalline lens revealed significant cataract formation and the cataract was a significant cause of the patient's decrease in vision  The potential benefits of the cataract surgery out weighed the risks of the procedure and were reviewed with the patient during the pre-operative visit  In summary, it was determined that the patient's decrease in visual acuity was mainly attributable to the cataract formation  Cataract surgery was recommended to for visual rehabilitation and improvement in the patient's  lifestyle  The decision included the patient's ability to safely drive a motor vehicle as well as live independently   The patient had an A-scan to determine the power of the lens to be placed into the eye at the time of cataract surgery  The risks and benefits were also review again at this visit including total loss of the eye on rare occassions  The IOL options were also reviewed based on the patients lifestyle and ocular findings  The above patient was informed of the need to be cleared by their medical doctor prior to surgery  Any pre-operative labs would be determined by their primary care doctor and/or cardiologist      A potential Vision was checked and found to be  20/200 in the operative eye  The post operative plan and visits were reviewed with the patient and scheduled  Admitting Diagnosis:    Cataract RIGHT Eye  Procedure Planned:  Cataract Extraction RIGHT Eye with possible Intraocular lens Implant      Anesthesia: Local MAC    Surgeon: Leslie Rankin MD

## 2018-07-17 PROBLEM — I10 ESSENTIAL HYPERTENSION: Status: ACTIVE | Noted: 2018-07-17

## 2018-07-17 NOTE — ASSESSMENT & PLAN NOTE
A/P:BP elevated at 160/70 during this visit  Per patient, she has not yet taken her medication for today and is very nervous about the upcoming surgery  BP at home and on previous visits range between 483 to 556I systolic  Gave reassurance and advised patient to continue with her meds

## 2018-07-17 NOTE — ASSESSMENT & PLAN NOTE
A/P: Scheduled right eye cataract surgery on 7/19/18 by Dr Jac Cannon, followed by left eye 1 month later  Surgical clearance form was filled out during this visit and faxed to doctor's office

## 2018-07-18 ENCOUNTER — ANESTHESIA EVENT (OUTPATIENT)
Dept: PERIOP | Facility: AMBULARY SURGERY CENTER | Age: 78
End: 2018-07-18
Payer: MEDICARE

## 2018-07-18 NOTE — ANESTHESIA PREPROCEDURE EVALUATION
Review of Systems/Medical History  Patient summary reviewed  Chart reviewed  No history of anesthetic complications     Cardiovascular  Exercise tolerance (METS): >4,  Hyperlipidemia, Hypertension ,    Pulmonary       GI/Hepatic    GERD ,             Endo/Other  Diabetes type 2 ,   Obesity  morbid obesity   GYN       Hematology   Musculoskeletal    Arthritis     Neurology   Psychology           Physical Exam    Airway    Mallampati score: II  TM Distance: >3 FB  Neck ROM: full     Dental   No notable dental hx     Cardiovascular  Cardiovascular exam normal    Pulmonary  Pulmonary exam normal     Other Findings        Anesthesia Plan  ASA Score- 3     Anesthesia Type- IV sedation with anesthesia with ASA Monitors  Additional Monitors:   Airway Plan:         Plan Factors-    Induction-     Postoperative Plan-     Informed Consent- Anesthetic plan and risks discussed with patient

## 2018-07-19 ENCOUNTER — ANESTHESIA (OUTPATIENT)
Dept: PERIOP | Facility: AMBULARY SURGERY CENTER | Age: 78
End: 2018-07-19
Payer: MEDICARE

## 2018-07-19 ENCOUNTER — HOSPITAL ENCOUNTER (OUTPATIENT)
Facility: AMBULARY SURGERY CENTER | Age: 78
Setting detail: OUTPATIENT SURGERY
Discharge: HOME | End: 2018-07-19
Attending: OPHTHALMOLOGY | Admitting: OPHTHALMOLOGY
Payer: MEDICARE

## 2018-07-19 VITALS
DIASTOLIC BLOOD PRESSURE: 79 MMHG | RESPIRATION RATE: 16 BRPM | OXYGEN SATURATION: 95 % | HEIGHT: 61 IN | SYSTOLIC BLOOD PRESSURE: 160 MMHG | TEMPERATURE: 97.6 F | BODY MASS INDEX: 41.54 KG/M2 | WEIGHT: 220 LBS | HEART RATE: 60 BPM

## 2018-07-19 PROBLEM — H25.811 COMBINED FORMS OF AGE-RELATED CATARACT OF RIGHT EYE: Status: RESOLVED | Noted: 2018-07-16 | Resolved: 2018-07-19

## 2018-07-19 LAB — GLUCOSE SERPL-MCNC: 158 MG/DL (ref 65–140)

## 2018-07-19 PROCEDURE — 82948 REAGENT STRIP/BLOOD GLUCOSE: CPT

## 2018-07-19 PROCEDURE — V2632 POST CHMBR INTRAOCULAR LENS: HCPCS | Performed by: OPHTHALMOLOGY

## 2018-07-19 DEVICE — IOL SN60WF 15.5: Type: IMPLANTABLE DEVICE | Site: EYE | Status: FUNCTIONAL

## 2018-07-19 RX ORDER — PHENYLEPHRINE HCL 2.5 %
1 DROPS OPHTHALMIC (EYE)
Status: COMPLETED | OUTPATIENT
Start: 2018-07-19 | End: 2018-07-19

## 2018-07-19 RX ORDER — TETRACAINE HYDROCHLORIDE 5 MG/ML
SOLUTION OPHTHALMIC AS NEEDED
Status: DISCONTINUED | OUTPATIENT
Start: 2018-07-19 | End: 2018-07-19 | Stop reason: HOSPADM

## 2018-07-19 RX ORDER — PROPOFOL 10 MG/ML
INJECTION, EMULSION INTRAVENOUS AS NEEDED
Status: DISCONTINUED | OUTPATIENT
Start: 2018-07-19 | End: 2018-07-19 | Stop reason: SURG

## 2018-07-19 RX ORDER — OFLOXACIN 3 MG/ML
1 SOLUTION/ DROPS OPHTHALMIC
Status: COMPLETED | OUTPATIENT
Start: 2018-07-19 | End: 2018-07-19

## 2018-07-19 RX ORDER — SODIUM CHLORIDE 9 MG/ML
125 INJECTION, SOLUTION INTRAVENOUS CONTINUOUS
Status: DISCONTINUED | OUTPATIENT
Start: 2018-07-19 | End: 2018-07-19 | Stop reason: HOSPADM

## 2018-07-19 RX ORDER — LIDOCAINE HYDROCHLORIDE 20 MG/ML
1 JELLY TOPICAL
Status: COMPLETED | OUTPATIENT
Start: 2018-07-19 | End: 2018-07-19

## 2018-07-19 RX ORDER — BALANCED SALT SOLUTION 6.4; .75; .48; .3; 3.9; 1.7 MG/ML; MG/ML; MG/ML; MG/ML; MG/ML; MG/ML
SOLUTION OPHTHALMIC AS NEEDED
Status: DISCONTINUED | OUTPATIENT
Start: 2018-07-19 | End: 2018-07-19 | Stop reason: HOSPADM

## 2018-07-19 RX ORDER — CYCLOPENTOLATE HYDROCHLORIDE 10 MG/ML
1 SOLUTION/ DROPS OPHTHALMIC
Status: COMPLETED | OUTPATIENT
Start: 2018-07-19 | End: 2018-07-19

## 2018-07-19 RX ORDER — TROPICAMIDE 10 MG/ML
1 SOLUTION/ DROPS OPHTHALMIC
Status: COMPLETED | OUTPATIENT
Start: 2018-07-19 | End: 2018-07-19

## 2018-07-19 RX ORDER — MIDAZOLAM HYDROCHLORIDE 1 MG/ML
INJECTION INTRAMUSCULAR; INTRAVENOUS AS NEEDED
Status: DISCONTINUED | OUTPATIENT
Start: 2018-07-19 | End: 2018-07-19 | Stop reason: SURG

## 2018-07-19 RX ORDER — OFLOXACIN 3 MG/ML
SOLUTION/ DROPS OPHTHALMIC AS NEEDED
Status: DISCONTINUED | OUTPATIENT
Start: 2018-07-19 | End: 2018-07-19 | Stop reason: HOSPADM

## 2018-07-19 RX ADMIN — LIDOCAINE HYDROCHLORIDE 1 APPLICATION: 20 JELLY TOPICAL at 06:59

## 2018-07-19 RX ADMIN — TROPICAMIDE 1 DROP: 10 SOLUTION/ DROPS OPHTHALMIC at 07:19

## 2018-07-19 RX ADMIN — MIDAZOLAM HYDROCHLORIDE 1 MG: 1 INJECTION, SOLUTION INTRAMUSCULAR; INTRAVENOUS at 07:47

## 2018-07-19 RX ADMIN — SODIUM CHLORIDE 125 ML/HR: 0.9 INJECTION, SOLUTION INTRAVENOUS at 07:05

## 2018-07-19 RX ADMIN — PHENYLEPHRINE HYDROCHLORIDE 1 DROP: 25 SOLUTION/ DROPS OPHTHALMIC at 07:19

## 2018-07-19 RX ADMIN — CYCLOPENTOLATE HYDROCHLORIDE 1 DROP: 10 SOLUTION/ DROPS OPHTHALMIC at 06:59

## 2018-07-19 RX ADMIN — TROPICAMIDE 1 DROP: 10 SOLUTION/ DROPS OPHTHALMIC at 06:59

## 2018-07-19 RX ADMIN — PROPOFOL 50 MG: 10 INJECTION, EMULSION INTRAVENOUS at 07:34

## 2018-07-19 RX ADMIN — TROPICAMIDE 1 DROP: 10 SOLUTION/ DROPS OPHTHALMIC at 07:09

## 2018-07-19 RX ADMIN — LIDOCAINE HYDROCHLORIDE 1 APPLICATION: 20 JELLY TOPICAL at 07:10

## 2018-07-19 RX ADMIN — CYCLOPENTOLATE HYDROCHLORIDE 1 DROP: 10 SOLUTION/ DROPS OPHTHALMIC at 07:19

## 2018-07-19 RX ADMIN — PHENYLEPHRINE HYDROCHLORIDE 1 DROP: 25 SOLUTION/ DROPS OPHTHALMIC at 06:44

## 2018-07-19 RX ADMIN — TROPICAMIDE 1 DROP: 10 SOLUTION/ DROPS OPHTHALMIC at 06:45

## 2018-07-19 RX ADMIN — MIDAZOLAM HYDROCHLORIDE 1 MG: 1 INJECTION, SOLUTION INTRAMUSCULAR; INTRAVENOUS at 07:39

## 2018-07-19 RX ADMIN — LIDOCAINE HYDROCHLORIDE 1 APPLICATION: 20 JELLY TOPICAL at 06:45

## 2018-07-19 RX ADMIN — LIDOCAINE HYDROCHLORIDE 1 APPLICATION: 20 JELLY TOPICAL at 07:19

## 2018-07-19 RX ADMIN — CYCLOPENTOLATE HYDROCHLORIDE 1 DROP: 10 SOLUTION/ DROPS OPHTHALMIC at 06:44

## 2018-07-19 RX ADMIN — PHENYLEPHRINE HYDROCHLORIDE 1 DROP: 25 SOLUTION/ DROPS OPHTHALMIC at 06:59

## 2018-07-19 RX ADMIN — OFLOXACIN 1 DROP: 3 SOLUTION/ DROPS OPHTHALMIC at 06:44

## 2018-07-19 RX ADMIN — OFLOXACIN 1 DROP: 3 SOLUTION/ DROPS OPHTHALMIC at 07:09

## 2018-07-19 RX ADMIN — PHENYLEPHRINE HYDROCHLORIDE 1 DROP: 25 SOLUTION/ DROPS OPHTHALMIC at 07:09

## 2018-07-19 RX ADMIN — OFLOXACIN 1 DROP: 3 SOLUTION/ DROPS OPHTHALMIC at 07:19

## 2018-07-19 RX ADMIN — CYCLOPENTOLATE HYDROCHLORIDE 1 DROP: 10 SOLUTION/ DROPS OPHTHALMIC at 07:09

## 2018-07-19 RX ADMIN — OFLOXACIN 1 DROP: 3 SOLUTION/ DROPS OPHTHALMIC at 06:59

## 2018-07-19 NOTE — OP NOTE
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]  ARMD Right Eye    Procedure(s):  EXTRACTION EXTRACAPSULAR CATARACT PHACO INTRAOCULAR LENS (IOL) RIGHT EYE    Surgeon(s) and Role:     * Alessio Razo MD - Primary    Assistants:  Circulator: Hammad Cobb RN; Aurea Valdes RN  Scrub Person: Sunil Cuevas    Anesthesia Type:   IV Sedation with Anesthesia     Anesthesiologist: Tanya Victoria MD    Operative Indication:  Vision reduced to 20/100 in the right eye secondary to progressive cataract formation  The cataract was interfering with the patient's daily activities  All risks and benefits to the surgical procedure were explained to the patient and family members that were present during the pre-operative visit  The risks included but were not limited to blindness, infection, choroidal hemorrhage, loss of the eye, glaucoma, corneal edema, macular edema, retinal detachment, the need for a corneal transplant and the need to wear glasses postoperatively  The patient understood and signed the appropriate consent form  Operative Technique:  The patient was brought back to the operating suite and placed in the supine position  The patient was identified in from of the surgeon as well as the OR staff  The operative eye was confirmed and marked  The patient  was given a peribulbar block using  2% Lidocaine  The pressure of the eye was checked by digital massage  The operative eye was prepped and draped in the usual sterile fashion  A wire lid speculum was inserted  A clear corneal, temporal approach was used  A 2 75 Phaco blade was used to tunnel and enter the  cornea from the temporal side   The anterior chamber was entered and visco-elastic was used to deepen the anterior chamber  Side port paracentesis tracts were performed using a 1 0 mm paracentesis blade at approximately 12 and 6 position  A circular tear capsulotomy was performed using a 25 gauge bent needle and Utrata capsulotomy forceps  [place] dissection was carried out with balanced salt solution using a 27 gauge flat irrigating cannula  The nucleus was freely rotatable with in the capsular bag  The Nucleus was phacoemulsified   Using the [facility] machine and the phaco chop method  The cortical shell was removed using the bimanual I/A  The posterior capsule was polished as indicated  The posterior capsular bag was inflated using Provisc  The posterior chamber intraocular lens power+15 50 [id number] was taken from the package an  inspected and the power confirmed  The lens was then inserted  into the posterior capsular bag using the appropriate IOL folder and   The lens was well centered using the Sinsky hook  The Provisc was removed using the I/A unit  The side ports and the temporal incision were stromal hydrated until they were water tight  A 10-vicryl suture was placed to further secure the temporal incision if indicated after testing the incision  The pressure of the eye was adjusted accordingly using balance salt solution through the side ports  At the end of the case the patient was given a drop of Iopidine to prevent a pressure spike  Also, the patient was given a drop of antibiotic drop and antibiotic ointment to prevent infection  The patient's eye was then patched with an eye pad and covered with a plastic shield  The Patient was then taken to the recovery room  After vital signs were stable the patient was discharged with family/friend in satisfactory condition       Addendum:  Leslie Rankin MD

## 2018-07-19 NOTE — DISCHARGE SUMMARY
Annabelle Colmenares was discharged in satisfactory condition  Discharge instructions were reviewed and then given to the patient   Arrangements were made for follow up the next day with Mary Cox MD

## 2018-07-19 NOTE — DISCHARGE INSTRUCTIONS
[facility]  Discharge Instructions    Dr Blase Kawasaki Cooley and Dr Sue Hernandez discharged in satisfactory condition  Post operative instructions were given  Follow-up Appointment was given for 9 AM at [facility] on July 20, 2018  Normal Symptoms: Foreign body sensation, scratchy, picky feeling  Try Extra Strength Tylenol for headache  Call Office if severe pain or discomfort  Keep patch on operative eye until 4 pm today  Bring sunglasses to office in the morning  Do not bring the 3 eye drops  NEW Instructions on how to use the 3 drops will be given in AM     Activity: Avoid any heavy,  bending, lifting or excessive activity until instructed  May walk around home with assistance  OK to watch TV  Avoid any excessive reading  No driving until told (Normally 2-4 days after surgery)  Avoid sleeping on operative eye  No water in the eye    Diet: Resume normal diet as tolerated  If experiencing nausea, try bland foods or liquid diet first      Resume normal medications unless otherwise instructed     If any Questions or Problems Please Call: Jayden Huntley take off [facility] and patch and start drops , Do not put shield back on during the day  BLURRY OR DOUBLE VISION IS NORMAL TODAY     Pink/White Top                     Stearns Top               Rodriguez Top    4:00PM                         4:05 PM               4:10 PM    8:00PM                         8: 05PM                8:10PM    BEDTIME:    Take Tan Top ONLY and then replace the plastic shield over eye  No gauze pad needed at bedtime    TOMORROW MORNING , before coming to office, take all THREE drops, then put on glasses or eye shield  Example of times below  7:00AM                      7: 05AM                  7:10 AM            YOU MAY EXPERIENCE "DOUBLE VISION" FOR A WHILE ONCE YOU TAKE OFF EYE PATCH/SHIELD THIS IS EXPECTED

## 2018-07-27 DIAGNOSIS — I10 ESSENTIAL HYPERTENSION: ICD-10-CM

## 2018-07-27 RX ORDER — METOPROLOL TARTRATE 100 MG/1
TABLET ORAL
Qty: 180 TABLET | Refills: 0 | Status: CANCELLED | OUTPATIENT
Start: 2018-07-27

## 2018-08-03 DIAGNOSIS — I10 ESSENTIAL HYPERTENSION: ICD-10-CM

## 2018-08-06 NOTE — PRE-PROCEDURE INSTRUCTIONS
Pre-Surgery Instructions:   Medication Instructions    amLODIPine (NORVASC) 10 mg tablet Instructed patient per Anesthesia Guidelines   atorvastatin (LIPITOR) 40 mg tablet Instructed patient per Anesthesia Guidelines   Calcium Carb-Cholecalciferol (CALCIUM 600+D) 600-800 MG-UNIT TABS Instructed patient per Anesthesia Guidelines   CINNAMON PO Patient was instructed by Physician and understands   esomeprazole (NEXIUM) 40 MG capsule Instructed patient per Anesthesia Guidelines   ketorolac (ACULAR) 0 5 % ophthalmic solution Instructed patient per Anesthesia Guidelines   lisinopril (ZESTRIL) 10 mg tablet Instructed patient per Anesthesia Guidelines   metFORMIN (GLUCOPHAGE) 500 mg tablet Instructed patient per Anesthesia Guidelines   metoprolol tartrate (LOPRESSOR) 100 mg tablet Instructed patient per Anesthesia Guidelines   ofloxacin (OCUFLOX) 0 3 % ophthalmic solution Instructed patient per Anesthesia Guidelines   Potassium Chloride ER (K-TAB) 20 MEQ TBCR Instructed patient per Anesthesia Guidelines   prednisoLONE acetate (PRED FORTE) 1 % ophthalmic suspension Instructed patient per Anesthesia Guidelines  Pre op instructions reviewed with pt via phone  Instructed to take metoprolol, amlodipine and lisinopril a m of surgery  FBS on arrival  LD cinnamon July 2018 per pt   friend Radhames Kim, to provide mobile # on arrival  My Surgical Experience    The following information was developed to assist you to prepare for your operation  What do I need to do before coming to the hospital?   Arrange for a responsible person to drive you to and from the hospital    Arrange care for your children at home  Children are not allowed in the recovery areas of the hospital   Plan to wear clothing that is easy to put on and take off   If you are having shoulder surgery, wear a shirt that buttons or zippers in the front  Bathing  o Shower the evening before and the morning of your surgery with an antibacterial soap  Please refer to the Pre Op Showering Instructions for Surgery Patients Sheet   o Remove nail polish and all body piercing jewelry  o Do not shave any body part for at least 24 hours before surgery-this includes face, arms, legs and upper body  Food  o Nothing to eat or drink after midnight the night before your surgery  This includes candy and chewing gum  o Exception: If your surgery is after 12:00pm (noon), you may have clear liquids such as 7-Up®, ginger ale, apple or cranberry juice, Jell-O®, water, or clear broth until 8:00 am  o Do not drink milk or juice with pulp on the morning before surgery  o Do not drink alcohol 24 hours before surgery  Medicine  o Follow instructions you received from your surgeon about which medicines you may take on the day of surgery  o If instructed to take medicine on the morning of surgery, take pills with just a small sip of water  Call your prescribing doctor for specific information on what to do if you take insulin    What should I bring to the hospital?    Bring:  Conception Blandon or a walker, if you have them, for foot or knee surgery   A list of the daily medicines, vitamins, minerals, herbals and nutritional supplements you take  Include the dosages of medicines and the time you take them each day   Glasses, dentures or hearing aids   Minimal clothing; you will be wearing hospital sleepwear   Photo ID; required to verify your identity   If you have a Living Will or Power of , bring a copy of the documents   If you have an ostomy, bring an extra pouch and any supplies you use    Do not bring   Medicines or inhalers   Money, valuables or jewelry    What other information should I know about the day of surgery?  Notify your surgeons if you develop a cold, sore throat, cough, fever, rash or any other illness     Report to the Ambulatory Surgical/Same Day Surgery Unit   You will be instructed to stop at Registration only if you have not been pre-registered   Inform your  fi they do not stay that they will be asked by the staff to leave a phone number where they can be reached   Be available to be reached before surgery  In the event the operating room schedule changes, you may be asked to come in earlier or later than expected    *It is important to tell your doctor and others involved in your health care if you are taking or have been taking any non-prescription drugs, vitamins, minerals, herbals or other nutritional supplements   Any of these may interact with some food or medicines and cause a reaction

## 2018-08-07 PROBLEM — H25.812 COMBINED FORMS OF AGE-RELATED CATARACT OF LEFT EYE: Status: ACTIVE | Noted: 2018-08-07

## 2018-08-07 NOTE — H&P
Admit Note     Braxton Hull    The above female patient   65y o   years old has been followed for cataract development in their LEFT eye  Due to the decrease in vision and the affect on their lifestyle, they have elected to have cataract surgery  The risks and benefits were discussed with the patient using verbal discussion and education material  The IOL option were also discussed  The patient was cleared by  their medical doctor and had an interview with the anesthesia department  No contraindications to the surgery were found  Informed consent was obtained for cataract surgery and possible intraocular lens implantation  Ophthalmic Examination:     A complete ophthalmic exam was performed  The best corrected visual acuity in each eye was  OD 20/50     and OS 20/100  The Snellen chart was used as well as glare testing if indicated to determine the level of vision function  Slit lamp was used to examine the cornea and anterior structures of the eye  No significant pathology was found as a contraindication to surgery  Intraocular pressures were checked and found to be within normal limits  Dilated fundus exam was performed and no significant pathology was found that would attribute to the decreased vision  Examination of the crystalline lens revealed significant cataract formation and the cataract was a significant cause of the patient's decrease in vision  The potential benefits of the cataract surgery out weighed the risks of the procedure and were reviewed with the patient during the pre-operative visit  In summary, it was determined that the patient's decrease in visual acuity was mainly attributable to the cataract formation  Cataract surgery was recommended to for visual rehabilitation and improvement in the patient's  lifestyle  The decision included the patient's ability to safely drive a motor vehicle as well as live independently   The patient had an A-scan to determine the power of the lens to be placed into the eye at the time of cataract surgery  The risks and benefits were also review again at this visit including total loss of the eye on rare occassions  The IOL options were also reviewed based on the patients lifestyle and ocular findings  The above patient was informed of the need to be cleared by their medical doctor prior to surgery  Any pre-operative labs would be determined by their primary care doctor and/or cardiologist      A potential Vision was checked and found to be  20/100 in the operative eye  The post operative plan and visits were reviewed with the patient and scheduled  Admitting Diagnosis:    Cataract LEFT Eye  Procedure Planned:  Cataract Extraction LEFT Eye with possible Intraocular lens Implant      Anesthesia: Local MAC    Surgeon: Bob Moss MD

## 2018-08-08 ENCOUNTER — ANESTHESIA EVENT (OUTPATIENT)
Dept: PERIOP | Facility: AMBULARY SURGERY CENTER | Age: 78
End: 2018-08-08
Payer: MEDICARE

## 2018-08-09 ENCOUNTER — ANESTHESIA (OUTPATIENT)
Dept: PERIOP | Facility: AMBULARY SURGERY CENTER | Age: 78
End: 2018-08-09
Payer: MEDICARE

## 2018-08-09 ENCOUNTER — HOSPITAL ENCOUNTER (OUTPATIENT)
Facility: AMBULARY SURGERY CENTER | Age: 78
Setting detail: OUTPATIENT SURGERY
Discharge: HOME | End: 2018-08-09
Attending: OPHTHALMOLOGY | Admitting: OPHTHALMOLOGY
Payer: MEDICARE

## 2018-08-09 VITALS
HEART RATE: 78 BPM | SYSTOLIC BLOOD PRESSURE: 194 MMHG | TEMPERATURE: 97.4 F | DIASTOLIC BLOOD PRESSURE: 93 MMHG | OXYGEN SATURATION: 98 % | RESPIRATION RATE: 16 BRPM

## 2018-08-09 PROBLEM — H25.812 COMBINED FORMS OF AGE-RELATED CATARACT OF LEFT EYE: Status: RESOLVED | Noted: 2018-08-07 | Resolved: 2018-08-09

## 2018-08-09 LAB — GLUCOSE SERPL-MCNC: 163 MG/DL (ref 65–140)

## 2018-08-09 PROCEDURE — 82948 REAGENT STRIP/BLOOD GLUCOSE: CPT

## 2018-08-09 PROCEDURE — V2632 POST CHMBR INTRAOCULAR LENS: HCPCS | Performed by: OPHTHALMOLOGY

## 2018-08-09 DEVICE — IOL SN60WF 16.0: Type: IMPLANTABLE DEVICE | Site: EYE | Status: FUNCTIONAL

## 2018-08-09 RX ORDER — MIDAZOLAM HYDROCHLORIDE 1 MG/ML
INJECTION INTRAMUSCULAR; INTRAVENOUS AS NEEDED
Status: DISCONTINUED | OUTPATIENT
Start: 2018-08-09 | End: 2018-08-09 | Stop reason: SURG

## 2018-08-09 RX ORDER — BALANCED SALT SOLUTION 6.4; .75; .48; .3; 3.9; 1.7 MG/ML; MG/ML; MG/ML; MG/ML; MG/ML; MG/ML
SOLUTION OPHTHALMIC AS NEEDED
Status: DISCONTINUED | OUTPATIENT
Start: 2018-08-09 | End: 2018-08-09 | Stop reason: HOSPADM

## 2018-08-09 RX ORDER — TETRACAINE HYDROCHLORIDE 5 MG/ML
SOLUTION OPHTHALMIC AS NEEDED
Status: DISCONTINUED | OUTPATIENT
Start: 2018-08-09 | End: 2018-08-09 | Stop reason: HOSPADM

## 2018-08-09 RX ORDER — PROPOFOL 10 MG/ML
INJECTION, EMULSION INTRAVENOUS AS NEEDED
Status: DISCONTINUED | OUTPATIENT
Start: 2018-08-09 | End: 2018-08-09 | Stop reason: SURG

## 2018-08-09 RX ORDER — OFLOXACIN 3 MG/ML
1 SOLUTION/ DROPS OPHTHALMIC
Status: COMPLETED | OUTPATIENT
Start: 2018-08-09 | End: 2018-08-09

## 2018-08-09 RX ORDER — CYCLOPENTOLATE HYDROCHLORIDE 10 MG/ML
1 SOLUTION/ DROPS OPHTHALMIC
Status: COMPLETED | OUTPATIENT
Start: 2018-08-09 | End: 2018-08-09

## 2018-08-09 RX ORDER — OFLOXACIN 3 MG/ML
SOLUTION/ DROPS OPHTHALMIC AS NEEDED
Status: DISCONTINUED | OUTPATIENT
Start: 2018-08-09 | End: 2018-08-09 | Stop reason: HOSPADM

## 2018-08-09 RX ORDER — LIDOCAINE HYDROCHLORIDE 10 MG/ML
INJECTION, SOLUTION INFILTRATION; PERINEURAL AS NEEDED
Status: DISCONTINUED | OUTPATIENT
Start: 2018-08-09 | End: 2018-08-09 | Stop reason: SURG

## 2018-08-09 RX ORDER — SODIUM CHLORIDE, SODIUM LACTATE, POTASSIUM CHLORIDE, CALCIUM CHLORIDE 600; 310; 30; 20 MG/100ML; MG/100ML; MG/100ML; MG/100ML
125 INJECTION, SOLUTION INTRAVENOUS CONTINUOUS
Status: DISCONTINUED | OUTPATIENT
Start: 2018-08-09 | End: 2018-08-09 | Stop reason: HOSPADM

## 2018-08-09 RX ORDER — LIDOCAINE HYDROCHLORIDE 20 MG/ML
1 JELLY TOPICAL
Status: COMPLETED | OUTPATIENT
Start: 2018-08-09 | End: 2018-08-09

## 2018-08-09 RX ORDER — TROPICAMIDE 10 MG/ML
1 SOLUTION/ DROPS OPHTHALMIC
Status: COMPLETED | OUTPATIENT
Start: 2018-08-09 | End: 2018-08-09

## 2018-08-09 RX ORDER — PHENYLEPHRINE HCL 2.5 %
1 DROPS OPHTHALMIC (EYE)
Status: COMPLETED | OUTPATIENT
Start: 2018-08-09 | End: 2018-08-09

## 2018-08-09 RX ADMIN — OFLOXACIN 1 DROP: 3 SOLUTION OPHTHALMIC at 06:42

## 2018-08-09 RX ADMIN — CYCLOPENTOLATE HYDROCHLORIDE 1 DROP: 10 SOLUTION/ DROPS OPHTHALMIC at 07:01

## 2018-08-09 RX ADMIN — MIDAZOLAM HYDROCHLORIDE 1 MG: 1 INJECTION, SOLUTION INTRAMUSCULAR; INTRAVENOUS at 07:28

## 2018-08-09 RX ADMIN — TROPICAMIDE 1 DROP: 10 SOLUTION/ DROPS OPHTHALMIC at 06:42

## 2018-08-09 RX ADMIN — PHENYLEPHRINE HYDROCHLORIDE 1 DROP: 25 SOLUTION/ DROPS OPHTHALMIC at 07:01

## 2018-08-09 RX ADMIN — LIDOCAINE HYDROCHLORIDE 1 APPLICATION: 20 JELLY TOPICAL at 06:42

## 2018-08-09 RX ADMIN — TROPICAMIDE 1 DROP: 10 SOLUTION/ DROPS OPHTHALMIC at 07:02

## 2018-08-09 RX ADMIN — OFLOXACIN 1 DROP: 3 SOLUTION OPHTHALMIC at 06:53

## 2018-08-09 RX ADMIN — CYCLOPENTOLATE HYDROCHLORIDE 1 DROP: 10 SOLUTION/ DROPS OPHTHALMIC at 06:52

## 2018-08-09 RX ADMIN — LIDOCAINE HYDROCHLORIDE 1 APPLICATION: 20 JELLY TOPICAL at 07:14

## 2018-08-09 RX ADMIN — PHENYLEPHRINE HYDROCHLORIDE 1 DROP: 25 SOLUTION/ DROPS OPHTHALMIC at 07:14

## 2018-08-09 RX ADMIN — TROPICAMIDE 1 DROP: 10 SOLUTION/ DROPS OPHTHALMIC at 06:53

## 2018-08-09 RX ADMIN — PROPOFOL 80 MG: 10 INJECTION, EMULSION INTRAVENOUS at 07:34

## 2018-08-09 RX ADMIN — CYCLOPENTOLATE HYDROCHLORIDE 1 DROP: 10 SOLUTION/ DROPS OPHTHALMIC at 06:41

## 2018-08-09 RX ADMIN — OFLOXACIN 1 DROP: 3 SOLUTION OPHTHALMIC at 07:14

## 2018-08-09 RX ADMIN — OFLOXACIN 1 DROP: 3 SOLUTION OPHTHALMIC at 07:01

## 2018-08-09 RX ADMIN — MIDAZOLAM HYDROCHLORIDE 1 MG: 1 INJECTION, SOLUTION INTRAMUSCULAR; INTRAVENOUS at 07:43

## 2018-08-09 RX ADMIN — LIDOCAINE HYDROCHLORIDE 20 MG: 10 INJECTION, SOLUTION INFILTRATION; PERINEURAL at 07:34

## 2018-08-09 RX ADMIN — LIDOCAINE HYDROCHLORIDE 1 APPLICATION: 20 JELLY TOPICAL at 07:01

## 2018-08-09 RX ADMIN — SODIUM CHLORIDE, SODIUM LACTATE, POTASSIUM CHLORIDE, AND CALCIUM CHLORIDE 125 ML/HR: .6; .31; .03; .02 INJECTION, SOLUTION INTRAVENOUS at 07:25

## 2018-08-09 RX ADMIN — CYCLOPENTOLATE HYDROCHLORIDE 1 DROP: 10 SOLUTION/ DROPS OPHTHALMIC at 07:15

## 2018-08-09 RX ADMIN — LIDOCAINE HYDROCHLORIDE 1 APPLICATION: 20 JELLY TOPICAL at 06:53

## 2018-08-09 RX ADMIN — PHENYLEPHRINE HYDROCHLORIDE 1 DROP: 25 SOLUTION/ DROPS OPHTHALMIC at 06:53

## 2018-08-09 RX ADMIN — PHENYLEPHRINE HYDROCHLORIDE 1 DROP: 25 SOLUTION/ DROPS OPHTHALMIC at 06:42

## 2018-08-09 RX ADMIN — TROPICAMIDE 1 DROP: 10 SOLUTION/ DROPS OPHTHALMIC at 07:14

## 2018-08-09 NOTE — OP NOTE
Postoperative Note  PATIENT NAME: Braxton Hull  : 1940  MRN: [id number]  [facility] OR ROOM 01    Surgery Date: 2018    Combined forms of age-related cataract of left eye [H25 812]    Post-Op Diagnosis Codes:     * Combined forms of age-related cataract of left eye [H25 812]    Procedure(s):  EXTRACTION EXTRACAPSULAR CATARACT PHACO INTRAOCULAR LENS (IOL) LEFT EYE    Surgeon(s) and Role:     * Aston Painting MD - Primary    Assistants:  Circulator: Jabier Giordano RN  Scrub Person: Bertha France    Anesthesia Type:   IV Sedation with Anesthesia     Anesthesiologist: Justin Espinoza MD  CRNA: Marisa Munoz CRNA    Operative Indication:  Vision reduced to 20/100 in the left eye secondary to progressive cataract formation  The cataract was interfering with the patient's daily activities  All risks and benefits to the surgical procedure were explained to the patient and family members that were present during the pre-operative visit  The risks included but were not limited to blindness, infection, choroidal hemorrhage, loss of the eye, glaucoma, corneal edema, macular edema, retinal detachment, the need for a corneal transplant and the need to wear glasses postoperatively  The patient understood and signed the appropriate consent form  Operative Technique:  The patient was brought back to the operating suite and placed in the supine position  The patient was identified in from of the surgeon as well as the OR staff  The operative eye was confirmed and marked  The patient  was given a peribulbar block using  2% Lidocaine  The pressure of the eye was checked by digital massage  The operative eye was prepped and draped in the usual sterile fashion  A wire lid speculum was inserted  A clear corneal, temporal approach was used  A 2 75 Phaco blade was used to tunnel and enter the  cornea from the temporal side  The anterior chamber was entered and visco-elastic was used to deepen the anterior chamber    Side port paracentesis tracts were performed using a 1 0 mm paracentesis blade at approximately 12 and 6 position  A circular tear capsulotomy was performed using a 25 gauge bent needle and Utrata capsulotomy forceps  [place] dissection was carried out with balanced salt solution using a 27 gauge flat irrigating cannula  The nucleus was freely rotatable with in the capsular bag  The Nucleus was phacoemulsified   Using the [facility] machine and the phaco chop method  The cortical shell was removed using the bimanual I/A  The posterior capsule was polished as indicated  The posterior capsular bag was inflated using Provisc  The posterior chamber intraocular lens power+16 00 [id number] was taken from the package an  inspected and the power confirmed  The lens was then inserted  into the posterior capsular bag using the appropriate IOL folder and   The lens was well centered using the Sinsky hook  The Provisc was removed using the I/A unit  The side ports and the temporal incision were stromal hydrated until they were water tight  A 10-vicryl suture was placed to further secure the temporal incision if indicated after testing the incision  The pressure of the eye was adjusted accordingly using balance salt solution through the side ports  At the end of the case the patient was given a drop of Iopidine to prevent a pressure spike  Also, the patient was given a drop of antibiotic drop and antibiotic ointment to prevent infection  The patient's eye was then patched with an eye pad and covered with a plastic shield  The Patient was then taken to the recovery room  After vital signs were stable the patient was discharged with family/friend in satisfactory condition       Addendum:  Gabby Segovia MD

## 2018-08-09 NOTE — DISCHARGE SUMMARY
Remy Dunaway was discharged in satisfactory condition  Discharge instructions were reviewed and then given to the patient   Arrangements were made for follow up the next day with Edmund Carlos MD

## 2018-08-09 NOTE — DISCHARGE INSTRUCTIONS
[facility]  Discharge Instructions    Dr Blase Kawasaki Cooley and Dr Sue Hernandez discharged in satisfactory condition  Post operative instructions were given  Follow-up Appointment was given for 9 AM at [facility] on Friday 8/10/18  Normal Symptoms: Foreign body sensation, scratchy, picky feeling  Try Extra Strength Tylenol for headache  Call Office if severe pain or discomfort  Keep patch on operative eye until 4 pm today  Bring sunglasses to office in the morning  Do not bring the 3 eye drops  NEW Instructions on how to use the 3 drops will be given in AM     Activity: Avoid any heavy,  bending, lifting or excessive activity until instructed  May walk around home with assistance  OK to watch TV  Avoid any excessive reading  No driving until told (Normally 2-4 days after surgery)  Avoid sleeping on operative eye  No water in the eye    Diet: Resume normal diet as tolerated  If experiencing nausea, try bland foods or liquid diet first      Resume normal medications unless otherwise instructed     If any Questions or Problems Please Call: [phone] [facility] take off [facility] and patch and start drops , Do not put shield back on during the day  BLURRY OR DOUBLE VISION IS NORMAL TODAY     Pink/White Top                     Stearns Top               Rodriguez Top    4:00PM                                     4:05 PM               4:10 PM    8:00PM                                    8: 05PM                8:10PM    BEDTIME:    Take Tan Top ONLY and then replace the plastic shield over eye  No gauze pad needed at bedtime    TOMORROW MORNING , before coming to office, take all THREE drops, then put on glasses or eye shield  Example of times below  7:00AM                                     7: 05AM                  7:10 AM            YOU MAY EXPERIENCE "DOUBLE VISION" FOR A WHILE ONCE YOU TAKE OFF EYE PATCH/SHIELD THIS IS EXPECTED

## 2018-08-09 NOTE — ANESTHESIA PREPROCEDURE EVALUATION
Review of Systems/Medical History  Patient summary reviewed  Chart reviewed  No history of anesthetic complications     Cardiovascular  Hyperlipidemia, Hypertension ,    Pulmonary       GI/Hepatic    GERD ,             Endo/Other  Diabetes type 2 Oral agent,   Obesity  morbid obesity   GYN       Hematology   Musculoskeletal    Arthritis     Neurology   Psychology           Physical Exam    Airway    Mallampati score: III  TM Distance: >3 FB  Neck ROM: full     Dental   No notable dental hx     Cardiovascular  Rhythm: regular, Rate: normal,     Pulmonary  Breath sounds clear to auscultation,     Other Findings        Anesthesia Plan  ASA Score- 3     Anesthesia Type- IV sedation with anesthesia with ASA Monitors  Additional Monitors:   Airway Plan:         Plan Factors-    Induction- intravenous  Postoperative Plan-     Informed Consent- Anesthetic plan and risks discussed with patient  I personally reviewed this patient with the CRNA  Discussed and agreed on the Anesthesia Plan with the CRNA  Horace Ragland

## 2018-08-14 RX ORDER — METOPROLOL TARTRATE 100 MG/1
100 TABLET ORAL 2 TIMES DAILY
Qty: 180 TABLET | Refills: 0 | Status: SHIPPED | OUTPATIENT
Start: 2018-08-14 | End: 2019-02-03 | Stop reason: SDUPTHER

## 2018-09-13 DIAGNOSIS — I10 ESSENTIAL HYPERTENSION: ICD-10-CM

## 2018-09-13 RX ORDER — LISINOPRIL 10 MG/1
TABLET ORAL
Qty: 90 TABLET | Refills: 0 | Status: SHIPPED | OUTPATIENT
Start: 2018-09-13 | End: 2018-12-13 | Stop reason: SDUPTHER

## 2018-09-25 DIAGNOSIS — E78.2 MIXED HYPERLIPIDEMIA: Primary | ICD-10-CM

## 2018-09-28 RX ORDER — ATORVASTATIN CALCIUM 40 MG/1
TABLET, FILM COATED ORAL
Qty: 90 TABLET | Refills: 0 | Status: SHIPPED | OUTPATIENT
Start: 2018-09-28 | End: 2018-12-25 | Stop reason: SDUPTHER

## 2018-10-22 DIAGNOSIS — E11.42 TYPE 2 DIABETES MELLITUS WITH DIABETIC POLYNEUROPATHY, WITHOUT LONG-TERM CURRENT USE OF INSULIN (HCC): ICD-10-CM

## 2018-11-12 ENCOUNTER — APPOINTMENT (OUTPATIENT)
Dept: RADIOLOGY | Facility: CLINIC | Age: 78
End: 2018-11-12
Payer: MEDICARE

## 2018-11-12 ENCOUNTER — OFFICE VISIT (OUTPATIENT)
Dept: URGENT CARE | Facility: CLINIC | Age: 78
End: 2018-11-12
Payer: MEDICARE

## 2018-11-12 VITALS
TEMPERATURE: 97.3 F | BODY MASS INDEX: 42.48 KG/M2 | DIASTOLIC BLOOD PRESSURE: 99 MMHG | HEIGHT: 61 IN | HEART RATE: 78 BPM | RESPIRATION RATE: 20 BRPM | WEIGHT: 225 LBS | SYSTOLIC BLOOD PRESSURE: 172 MMHG | OXYGEN SATURATION: 97 %

## 2018-11-12 DIAGNOSIS — R06.02 SHORTNESS OF BREATH: ICD-10-CM

## 2018-11-12 DIAGNOSIS — R06.02 SHORTNESS OF BREATH: Primary | ICD-10-CM

## 2018-11-12 PROCEDURE — 71046 X-RAY EXAM CHEST 2 VIEWS: CPT

## 2018-11-12 PROCEDURE — 99213 OFFICE O/P EST LOW 20 MIN: CPT | Performed by: FAMILY MEDICINE

## 2018-11-12 NOTE — PROGRESS NOTES
[address] [facility] [facility]        NAME: Babar Grossman is a 77 y o  female  : 1940    MRN: [id number]  DATE: 2018  TIME: 2:29 PM    Assessment and Plan   Shortness of breath [R06 02]  1  Shortness of breath  XR chest pa & lateral     Pneumonia and pulmonary embolism unlikely per clinical evaluation  Chest x-ray remarkable for mild costophrenic angle blunting, hilar lymphadenopathy and an enlarged heart  Will follow up official read  Will need to see her primary care physician to further evaluate for possible CHF versus sarcoidosis  Patient Instructions     Follow up with PCP in 3-5 days  Proceed to  ER if symptoms worsen  Chief Complaint     Chief Complaint   Patient presents with    Shortness of Breath     shortness of breath, started in early November  Mild symptoms at first and has increased in intensity  occasional cough  pt is feeling a bit better but is concerned at how long the short of breath is lasting          History of Present Illness     43-year-old female with currently uncontrolled hypertension presents today with shortness of breath on exertion x2 weeks  Takes Zyrtec every night for seasonal allergies  Denies any fevers, chills, chest pain, pillow orthopnea, dizziness or pedal swelling  Shortness of breath sometimes associated with mild cough which had moderately improved with Mucinex  Review of Systems   Review of Systems   Constitutional: Negative for chills and fever  HENT: Positive for rhinorrhea  Negative for congestion  Respiratory: Positive for cough and shortness of breath  Negative for chest tightness and wheezing  No pillow orthopnea  Cardiovascular: Negative for chest pain and palpitations  Gastrointestinal: Negative for abdominal pain, nausea and vomiting  Allergic/Immunologic: Positive for environmental allergies (takes Zyrtec qHS)  Neurological: Negative for dizziness and headaches           Current Medications       Current Outpatient Prescriptions:     amLODIPine (NORVASC) 10 mg tablet, am, Disp: , Rfl: 3    atorvastatin (LIPITOR) 40 mg tablet, TAKE 1 TABLET BY MOUTH DAILY, Disp: 90 tablet, Rfl: 0    Calcium Carb-Cholecalciferol (CALCIUM 600+D) 600-800 MG-UNIT TABS, Take by mouth every morning, Disp: , Rfl:     CINNAMON PO, Take 1,000 mg by mouth every morning, Disp: , Rfl:     esomeprazole (NEXIUM) 40 MG capsule, Take 40 mg by mouth as needed  , Disp: , Rfl:     lisinopril (ZESTRIL) 10 mg tablet, TAKE 1 TABLET BY MOUTH EVERY DAY, Disp: 90 tablet, Rfl: 0    metFORMIN (GLUCOPHAGE) 500 mg tablet, TAKE 1 TABLET BY MOUTH TWICE DAILY, Disp: 360 tablet, Rfl: 0    metoprolol tartrate (LOPRESSOR) 100 mg tablet, Take 1 tablet (100 mg total) by mouth 2 (two) times a day, Disp: 180 tablet, Rfl: 0    Potassium Chloride ER (K-TAB) 20 MEQ TBCR, Take by mouth every morning  , Disp: , Rfl:     ketorolac (ACULAR) 0 5 % ophthalmic solution, INT 1 GTT IN OD BID, Disp: , Rfl: 5    ofloxacin (OCUFLOX) 0 3 % ophthalmic solution, INT 1 GTT IN OD QID, Disp: , Rfl: 5    prednisoLONE acetate (PRED FORTE) 1 % ophthalmic suspension, PLACE 1 DROP IN RIGHT EYE EVERY 2 HOURS WHILE AWAKE   FOR USE AFTER CATARACT SURGERY , Disp: , Rfl: 5    Current Allergies     Allergies as of 11/12/2018    (No Known Allergies)            The following portions of the patient's history were reviewed and updated as appropriate: allergies, current medications, past family history, past medical history, past social history, past surgical history and problem list      Past Medical History:   Diagnosis Date    Arthritis     knees    Diabetes mellitus ([address] )     type 2    GERD (gastroesophageal reflux disease)     Hyperlipidemia     Hypertension     Urinary incontinence     Use of cane as ambulatory aid     Wears glasses        Past Surgical History:   Procedure Laterality Date    BREAST SURGERY Left     nothing was there     COLONOSCOPY      HYSTERECTOMY complete-fibroid    JOINT REPLACEMENT Left     knee    [place] REMV CATARACT EXTRACAP,INSERT LENS Right 7/19/2018    Procedure: EXTRACTION EXTRACAPSULAR CATARACT PHACO INTRAOCULAR LENS (IOL); Surgeon: Tavia Arrieta MD;  Location: [facility] MAIN OR;  Service: Ophthalmology     [address] CATARACT EXTRACAP,INSERT LENS Left 8/9/2018    Procedure: EXTRACTION EXTRACAPSULAR CATARACT PHACO INTRAOCULAR LENS (IOL); Surgeon: Tavia Arrieta MD;  Location: [facility] MAIN OR;  Service: Ophthalmology       Family History   Problem Relation Age of Onset   Lashawn Cm Breast cancer Mother     Diabetes type II Mother     Atrial fibrillation Mother     Diabetes Mother         diet controlled    Hypertension Mother     Lung cancer Father     Alcohol abuse Father     Cancer Father         lung-smoker         Medications have been verified  Objective   BP (!) 172/99   Pulse 78   Temp (!) 97 3 °F (36 3 °C)   Resp 20   Ht 5' 1" (1 549 m)   Wt 102 kg (225 lb)   SpO2 97%   BMI 42 51 kg/m²        Physical Exam     Physical Exam   Constitutional: She is oriented to person, place, and time  She appears well-developed and well-nourished  No distress  HENT:   Head: Normocephalic and atraumatic  Eyes: Conjunctivae are normal    Cardiovascular: Normal rate and regular rhythm  Murmur (Systolic ejection murmur heard best at the right upper sternal border ) heard  Pulmonary/Chest: Effort normal and breath sounds normal  No respiratory distress  She has no wheezes  She has no rales  Maintained O2 saturation greater than 97% while walking 3 laps around the office hallway, but did developed shortness of breath at the end  Musculoskeletal: She exhibits edema (Minimal to 1+ pitting edema of the ankles  )  Neurological: She is alert and oriented to person, place, and time  Skin: Skin is warm  She is not diaphoretic  Psychiatric: She has a normal mood and affect   Her behavior is normal  Judgment and thought content normal

## 2018-11-15 ENCOUNTER — OFFICE VISIT (OUTPATIENT)
Dept: FAMILY MEDICINE CLINIC | Facility: CLINIC | Age: 78
End: 2018-11-15
Payer: MEDICARE

## 2018-11-15 VITALS
HEART RATE: 82 BPM | SYSTOLIC BLOOD PRESSURE: 150 MMHG | TEMPERATURE: 98.1 F | BODY MASS INDEX: 42.83 KG/M2 | DIASTOLIC BLOOD PRESSURE: 90 MMHG | RESPIRATION RATE: 18 BRPM | OXYGEN SATURATION: 96 % | WEIGHT: 226.7 LBS

## 2018-11-15 DIAGNOSIS — R60.0 EDEMA OF LEFT LOWER EXTREMITY: ICD-10-CM

## 2018-11-15 DIAGNOSIS — E11.40 TYPE 2 DIABETES MELLITUS WITH DIABETIC NEUROPATHY, WITHOUT LONG-TERM CURRENT USE OF INSULIN (HCC): ICD-10-CM

## 2018-11-15 DIAGNOSIS — R06.00 DYSPNEA ON EXERTION: Primary | ICD-10-CM

## 2018-11-15 PROBLEM — E87.6 HYPOKALEMIA: Status: ACTIVE | Noted: 2017-03-20

## 2018-11-15 PROCEDURE — 99213 OFFICE O/P EST LOW 20 MIN: CPT | Performed by: FAMILY MEDICINE

## 2018-11-15 NOTE — PROGRESS NOTES
Assessment/Plan:  77year old T2DM, HTN, anemia presents with new onset shortness of breath and given requisition for cardiac echo to evaluate for possible CHF  Patient also given labslip for HbA1c as due for recheck  Patient counseled if develops worsening acute SOB or chest pain to go to ED  D/w Dr Veena Booker as needed, will review diagnostic tests when resulted     Diagnoses and all orders for this visit:    Dyspnea on exertion  -     Echo complete with contrast if indicated; Future    Type 2 diabetes mellitus with diabetic neuropathy, without long-term current use of insulin (HCC)  -     HEMOGLOBIN A1C W/ EAG ESTIMATION; Future    Edema of left lower extremity  · Trace swelling and described as chronic per patient as occurs when on her feet for longer periods  Subjective:      Patient ID: Juan Giordano is a 77 y o  female  HPI   77year old obese female with PMH Type 2 DM, HTN, and anemia reports new onset of SOB over last 2 weeks  Never smoked  Patient had dyspnea of exertion from walking from parking lot and even when getting dressed  Denies chest pain or palpitations  Denies syncopal episodes  Patient reports she sleeps with a wedge  Denies PND  Denies a prior cardiac work up  Patient denies No family history of stroke or MI  No fevers or chills or cough  The following portions of the patient's history were reviewed and updated as appropriate: allergies, current medications, past family history, past medical history, past social history, past surgical history and problem list     Review of Systems   Constitutional: Negative for chills and fever  Eyes: Negative for visual disturbance  Respiratory: Positive for shortness of breath  Cardiovascular: Negative for chest pain, palpitations and leg swelling  Neurological: Negative for syncope, light-headedness and headaches           Objective:       /90 (BP Location: Left arm, Patient Position: Sitting, Cuff Size: Large) Pulse 82   Temp 98 1 °F (36 7 °C) (Tympanic)   Resp 18   Wt 103 kg (226 lb 11 2 oz)   SpO2 96%   BMI 42 83 kg/m²          Physical Exam   Constitutional: She is oriented to person, place, and time  She appears well-developed and well-nourished  HENT:   Head: Normocephalic and atraumatic  Eyes: Conjunctivae are normal  No scleral icterus  Neck: Normal range of motion  Neck supple  Cardiovascular: Normal rate, regular rhythm and intact distal pulses  Murmur (systolic ejection murmur) heard  Pulmonary/Chest: Effort normal and breath sounds normal  She has no wheezes  She has no rales  Abdominal: Soft  Bowel sounds are normal    Musculoskeletal: Normal range of motion  She exhibits edema (minimal trace edema bilaterally)  Neurological: She is alert and oriented to person, place, and time  Skin: Skin is warm and dry

## 2018-11-27 RX ORDER — ATORVASTATIN CALCIUM 40 MG/1
TABLET, FILM COATED ORAL
Qty: 90 TABLET | Refills: 0 | OUTPATIENT
Start: 2018-11-27

## 2018-12-04 ENCOUNTER — HOSPITAL ENCOUNTER (OUTPATIENT)
Dept: NON INVASIVE DIAGNOSTICS | Facility: HOSPITAL | Age: 78
Discharge: HOME | End: 2018-12-04
Payer: MEDICARE

## 2018-12-04 DIAGNOSIS — R06.00 DYSPNEA ON EXERTION: ICD-10-CM

## 2018-12-04 PROCEDURE — 93306 TTE W/DOPPLER COMPLETE: CPT

## 2018-12-05 ENCOUNTER — TELEPHONE (OUTPATIENT)
Dept: FAMILY MEDICINE CLINIC | Facility: CLINIC | Age: 78
End: 2018-12-05

## 2018-12-05 PROCEDURE — 93306 TTE W/DOPPLER COMPLETE: CPT | Performed by: INTERNAL MEDICINE

## 2018-12-06 NOTE — TELEPHONE ENCOUNTER
Dr Vaibhav Gutierrez-  S/w Pt, states she missed a call from you 12/5/18, please call when you can      [phone]

## 2018-12-06 NOTE — TELEPHONE ENCOUNTER
Reviewed cardiac echo results with patient which showed normal EF 55%  Aortic Valve shows mild stenosis (functionally bicuspid)  Patient reporting improved without MOMIN walking to car and up stairs  Patient still denies having peripheral edema  Pt will follow up with appointment for diabetes in 2 weeks

## 2018-12-10 ENCOUNTER — APPOINTMENT (OUTPATIENT)
Dept: LAB | Facility: HOSPITAL | Age: 78
End: 2018-12-10
Payer: MEDICARE

## 2018-12-10 ENCOUNTER — TRANSCRIBE ORDERS (OUTPATIENT)
Dept: ADMINISTRATIVE | Facility: HOSPITAL | Age: 78
End: 2018-12-10

## 2018-12-10 DIAGNOSIS — E11.40 TYPE 2 DIABETES MELLITUS WITH DIABETIC NEUROPATHY, WITHOUT LONG-TERM CURRENT USE OF INSULIN (HCC): ICD-10-CM

## 2018-12-10 LAB
EST. AVERAGE GLUCOSE BLD GHB EST-MCNC: 180 MG/DL
HBA1C MFR BLD: 7.9 % (ref 4.2–6.3)

## 2018-12-10 PROCEDURE — 36415 COLL VENOUS BLD VENIPUNCTURE: CPT

## 2018-12-10 PROCEDURE — 83036 HEMOGLOBIN GLYCOSYLATED A1C: CPT

## 2018-12-13 DIAGNOSIS — I10 ESSENTIAL HYPERTENSION: ICD-10-CM

## 2018-12-13 DIAGNOSIS — I10 ESSENTIAL HYPERTENSION: Primary | ICD-10-CM

## 2018-12-14 RX ORDER — AMLODIPINE BESYLATE 10 MG/1
TABLET ORAL
Qty: 90 TABLET | Refills: 3 | Status: SHIPPED | OUTPATIENT
Start: 2018-12-14 | End: 2020-04-17 | Stop reason: SDUPTHER

## 2018-12-14 RX ORDER — LISINOPRIL 10 MG/1
TABLET ORAL
Qty: 90 TABLET | Refills: 0 | Status: SHIPPED | OUTPATIENT
Start: 2018-12-14 | End: 2019-03-13 | Stop reason: SDUPTHER

## 2018-12-18 ENCOUNTER — OFFICE VISIT (OUTPATIENT)
Dept: FAMILY MEDICINE CLINIC | Facility: CLINIC | Age: 78
End: 2018-12-18
Payer: MEDICARE

## 2018-12-18 VITALS
HEART RATE: 75 BPM | RESPIRATION RATE: 18 BRPM | OXYGEN SATURATION: 97 % | DIASTOLIC BLOOD PRESSURE: 80 MMHG | WEIGHT: 225.8 LBS | SYSTOLIC BLOOD PRESSURE: 144 MMHG | TEMPERATURE: 97.6 F | BODY MASS INDEX: 42.66 KG/M2

## 2018-12-18 DIAGNOSIS — R06.2 WHEEZING: ICD-10-CM

## 2018-12-18 DIAGNOSIS — E11.42 TYPE 2 DIABETES MELLITUS WITH DIABETIC POLYNEUROPATHY, WITHOUT LONG-TERM CURRENT USE OF INSULIN (HCC): ICD-10-CM

## 2018-12-18 DIAGNOSIS — E11.40 TYPE 2 DIABETES MELLITUS WITH DIABETIC NEUROPATHY, WITHOUT LONG-TERM CURRENT USE OF INSULIN (HCC): Primary | ICD-10-CM

## 2018-12-18 DIAGNOSIS — R05.9 COUGH: ICD-10-CM

## 2018-12-18 PROCEDURE — 99213 OFFICE O/P EST LOW 20 MIN: CPT | Performed by: FAMILY MEDICINE

## 2018-12-18 RX ORDER — ALBUTEROL SULFATE 90 UG/1
2 AEROSOL, METERED RESPIRATORY (INHALATION) EVERY 6 HOURS PRN
Qty: 8.5 G | Refills: 0 | Status: SHIPPED | OUTPATIENT
Start: 2018-12-18 | End: 2019-07-31 | Stop reason: ALTCHOICE

## 2018-12-18 NOTE — PROGRESS NOTES
Assessment/Plan:  76 yo female comes in for follow up of T2DM with slightly worsened HbA1c from 7 7 to 7 9 and increased Rx of metformin from 500 mg BID to 1000 mg BID  Patient also given labslip to comlete microalbumin creatining ratio and next HbA1c in 3 months  D/w Dr Margaux Schmitt in 3 months to review and f/u labwork for T2DM      Diagnoses and all orders for this visit:    Type 2 diabetes mellitus with diabetic neuropathy, without long-term current use of insulin (HCC)  -    Increased metformin to 1000mg BID   - Microalbumin / creatinine urine ratio  -     HEMOGLOBIN A1C W/ EAG ESTIMATION; Future    Wheezing  -     albuterol (PROAIR HFA) 90 mcg/act inhaler; Inhale 2 puffs every 6 (six) hours as needed for wheezing    Cough  -     albuterol (PROAIR HFA) 90 mcg/act inhaler; Inhale 2 puffs every 6 (six) hours as needed for wheezing    Type 2 diabetes mellitus with diabetic polyneuropathy, without long-term current use of insulin (HCC)  -     metFORMIN (GLUCOPHAGE) 1000 MG tablet; Take 1 tablet (1,000 mg total) by mouth 2 (two) times a day  -     HEMOGLOBIN A1C W/ EAG ESTIMATION; Future        Subjective:      Patient ID: Brook  is a 77 y o  female  HPI   76 yo female comes in to follow up type 2 diabetes  Patient's last hemoglobin A1c 7 9 resulted 12/10/18 from prior 7 7  Patient endorses controlling what she eats with low carb meals and reports compliance on metformin 500 mg BID  Denies numbness or tingling in hands or feet  Patient is up to date with annual foot exam   Patient follows opthalmologist   Patient has a history of macular degeneration that is stable and followed by ophthalmologist, on AREDS 2 sees Dr Rosario Goldman yearly  Cataract surgery completed 7/2018 and 8/2018  Patient reports mild improvement        The following portions of the patient's history were reviewed and updated as appropriate: allergies, current medications, past family history, past medical history, past social history, past surgical history and problem list     Review of Systems   Constitutional: Negative for chills and fever  Eyes: Positive for visual disturbance  Negative for pain, discharge and redness  Respiratory: Negative for shortness of breath  Cardiovascular: Negative for chest pain  Endocrine: Negative for polydipsia, polyphagia and polyuria  Skin: Negative for wound  Neurological: Negative for dizziness, light-headedness and headaches  Psychiatric/Behavioral: Negative for confusion  Objective:      /80 (BP Location: Left arm, Patient Position: Sitting, Cuff Size: Large)   Pulse 75   Temp 97 6 °F (36 4 °C) (Tympanic)   Resp 18   Wt 102 kg (225 lb 12 8 oz)   SpO2 97%   BMI 42 66 kg/m²          Physical Exam   Constitutional: She is oriented to person, place, and time  She appears well-developed and well-nourished  HENT:   Head: Normocephalic and atraumatic  Eyes: Conjunctivae are normal  No scleral icterus  Cardiovascular: Normal rate, regular rhythm and intact distal pulses  Pulmonary/Chest: Effort normal and breath sounds normal  She has no wheezes  Musculoskeletal: Normal range of motion  Neurological: She is alert and oriented to person, place, and time  Skin: No rash noted  No erythema  Psychiatric: She has a normal mood and affect

## 2018-12-25 DIAGNOSIS — E78.2 MIXED HYPERLIPIDEMIA: ICD-10-CM

## 2018-12-26 RX ORDER — LISINOPRIL 10 MG/1
TABLET ORAL
Qty: 90 TABLET | Refills: 0 | Status: SHIPPED | OUTPATIENT
Start: 2018-12-26 | End: 2019-06-26 | Stop reason: SDUPTHER

## 2018-12-27 RX ORDER — ATORVASTATIN CALCIUM 40 MG/1
TABLET, FILM COATED ORAL
Qty: 90 TABLET | Refills: 0 | Status: SHIPPED | OUTPATIENT
Start: 2018-12-27 | End: 2019-03-27 | Stop reason: SDUPTHER

## 2019-02-03 DIAGNOSIS — I10 ESSENTIAL HYPERTENSION: ICD-10-CM

## 2019-02-05 RX ORDER — METOPROLOL TARTRATE 100 MG/1
TABLET ORAL
Qty: 180 TABLET | Refills: 0 | Status: SHIPPED | OUTPATIENT
Start: 2019-02-05 | End: 2019-05-06 | Stop reason: SDUPTHER

## 2019-03-13 DIAGNOSIS — I10 ESSENTIAL HYPERTENSION: ICD-10-CM

## 2019-03-14 LAB
LEFT EYE DIABETIC RETINOPATHY: NORMAL
RIGHT EYE DIABETIC RETINOPATHY: NORMAL

## 2019-03-14 RX ORDER — LISINOPRIL 10 MG/1
TABLET ORAL
Qty: 90 TABLET | Refills: 0 | Status: SHIPPED | OUTPATIENT
Start: 2019-03-14 | End: 2019-03-18 | Stop reason: SDUPTHER

## 2019-03-16 ENCOUNTER — APPOINTMENT (EMERGENCY)
Dept: RADIOLOGY | Facility: HOSPITAL | Age: 79
End: 2019-03-16
Payer: MEDICARE

## 2019-03-16 ENCOUNTER — HOSPITAL ENCOUNTER (EMERGENCY)
Facility: HOSPITAL | Age: 79
Discharge: HOME | End: 2019-03-16
Attending: EMERGENCY MEDICINE | Admitting: EMERGENCY MEDICINE
Payer: MEDICARE

## 2019-03-16 VITALS
SYSTOLIC BLOOD PRESSURE: 169 MMHG | HEART RATE: 66 BPM | OXYGEN SATURATION: 99 % | TEMPERATURE: 98.3 F | RESPIRATION RATE: 20 BRPM | DIASTOLIC BLOOD PRESSURE: 73 MMHG

## 2019-03-16 DIAGNOSIS — S42.309A HUMERUS FRACTURE: ICD-10-CM
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
## 2019-03-16 PROCEDURE — 73030 X-RAY EXAM OF SHOULDER: CPT

## 2019-03-16 PROCEDURE — 99284 EMERGENCY DEPT VISIT MOD MDM: CPT

## 2019-03-16 PROCEDURE — 73000 X-RAY EXAM OF COLLAR BONE: CPT

## 2019-03-16 RX ORDER — IBUPROFEN 400 MG/1
400 TABLET ORAL ONCE
Status: COMPLETED | OUTPATIENT
Start: 2019-03-16 | End: 2019-03-16

## 2019-03-16 RX ORDER — OXYCODONE HYDROCHLORIDE AND ACETAMINOPHEN 5; 325 MG/1; MG/1
1 TABLET ORAL EVERY 8 HOURS PRN
Qty: 10 TABLET | Refills: 0 | Status: SHIPPED | OUTPATIENT
Start: 2019-03-16 | End: 2019-03-19

## 2019-03-16 RX ORDER — OXYCODONE HYDROCHLORIDE AND ACETAMINOPHEN 5; 325 MG/1; MG/1
1 TABLET ORAL ONCE
Status: COMPLETED | OUTPATIENT
Start: 2019-03-16 | End: 2019-03-16

## 2019-03-16 RX ADMIN — OXYCODONE AND ACETAMINOPHEN 1 TABLET: 5; 325 TABLET ORAL at 17:54

## 2019-03-16 RX ADMIN — IBUPROFEN 400 MG: 400 TABLET ORAL at 17:09

## 2019-03-16 NOTE — ED PROVIDER NOTES
History  Chief Complaint   Patient presents with   [employer] Fall     pt presents to the ed post fall, pt states she fell on a side walk  pt denies sycope at this time      76 y/o female presents after a fall, she tripped and fell today on a curb, mechanical fall  Denies any LOC, no head injury, no neck pain, headache, altered mentation, nausea  Complaining of right shoulder pain and injury, no other injuries or complaints  Not on anticoagulation  History provided by:  Patient   used: No        Prior to Admission Medications   Prescriptions Last Dose Informant Patient Reported? Taking?    CINNAMON PO   Yes No   Sig: Take 1,000 mg by mouth every morning   Calcium Carb-Cholecalciferol (CALCIUM 600+D) 600-800 MG-UNIT TABS   Yes No   Sig: Take by mouth every morning   Potassium Chloride ER (K-TAB) 20 MEQ TBCR   Yes No   Sig: Take by mouth every morning     albuterol (PROAIR HFA) 90 mcg/act inhaler   No No   Sig: Inhale 2 puffs every 6 (six) hours as needed for wheezing   amLODIPine (NORVASC) 10 mg tablet   No No   Sig: TAKE 1 TABLET BY MOUTH EVERY DAY   atorvastatin (LIPITOR) 40 mg tablet   No No   Sig: TAKE 1 TABLET BY MOUTH DAILY   esomeprazole (NEXIUM) 40 MG capsule   Yes No   Sig: Take 40 mg by mouth as needed     ketorolac (ACULAR) 0 5 % ophthalmic solution   Yes No   Sig: INT 1 GTT IN OD BID   lisinopril (ZESTRIL) 10 mg tablet   No No   Sig: TAKE 1 TABLET BY MOUTH EVERY DAY   lisinopril (ZESTRIL) 10 mg tablet   No No   Sig: TAKE 1 TABLET BY MOUTH EVERY DAY   lisinopril (ZESTRIL) 10 mg tablet   No No   Sig: TAKE 1 TABLET BY MOUTH EVERY DAY   metFORMIN (GLUCOPHAGE) 1000 MG tablet   No No   Sig: TAKE 1 TABLET(1000 MG) BY MOUTH TWICE DAILY   metoprolol tartrate (LOPRESSOR) 100 mg tablet   No No   Sig: TAKE 1 TABLET(100 MG) BY MOUTH TWICE DAILY   ofloxacin (OCUFLOX) 0 3 % ophthalmic solution   Yes No   Sig: INT 1 GTT IN OD QID   prednisoLONE acetate (PRED FORTE) 1 % ophthalmic suspension   Yes No   Sig: PLACE 1 DROP IN RIGHT EYE EVERY 2 HOURS WHILE AWAKE  FOR USE AFTER CATARACT SURGERY  Facility-Administered Medications: None       Past Medical History:   Diagnosis Date    Arthritis     knees    Diabetes mellitus ([address] )     type 2    GERD (gastroesophageal reflux disease)     Hyperlipidemia     Hypertension     Urinary incontinence     Use of cane as ambulatory aid     Wears glasses        Past Surgical History:   Procedure Laterality Date    BREAST SURGERY Left     nothing was there     COLONOSCOPY      HYSTERECTOMY      complete-fibroid    JOINT REPLACEMENT Left     knee    [place] REMV CATARACT EXTRACAP,INSERT LENS Right 7/19/2018    Procedure: EXTRACTION EXTRACAPSULAR CATARACT PHACO INTRAOCULAR LENS (IOL); Surgeon: Elizabet Giang MD;  Location: [facility] MAIN OR;  Service: Ophthalmology     [facility] CATARACT EXTRACAP,INSERT LENS Left 8/9/2018    Procedure: EXTRACTION EXTRACAPSULAR CATARACT PHACO INTRAOCULAR LENS (IOL); Surgeon: Elizabet Giang MD;  Location: [facility] MAIN OR;  Service: Ophthalmology       Family History   Problem Relation Age of Onset   [facility] Breast cancer Mother     Diabetes type II Mother     Atrial fibrillation Mother     Diabetes Mother         diet controlled    Hypertension Mother     Lung cancer Father     Alcohol abuse Father     Cancer Father         lung-smoker     I have reviewed and agree with the history as documented  Social History     Tobacco Use    Smoking status: Never Smoker    Smokeless tobacco: Never Used   Substance Use Topics    Alcohol use: Yes     Comment: social    Drug use: No        Review of Systems   All other systems reviewed and are negative  Physical Exam  Physical Exam   Constitutional: She is oriented to person, place, and time  She appears well-developed and well-nourished  HENT:   Head: Normocephalic and atraumatic  Eyes: Pupils are equal, round, and reactive to light  EOM are normal    Neck: Normal range of motion  Neck supple  Cardiovascular: Normal rate and regular rhythm  Pulmonary/Chest: Effort normal and breath sounds normal    Abdominal: Soft  Bowel sounds are normal    Musculoskeletal: Normal range of motion  Right humerus:ROM restricted because of pain, mild deformity noted at the proximal humerus, axially nerve is intact, radial and brachial pulses intact   Neurological: She is alert and oriented to person, place, and time  Skin: Skin is warm and dry  Psychiatric: She has a normal mood and affect  Nursing note and vitals reviewed  Vital Signs  ED Triage Vitals   Temperature Pulse Respirations Blood Pressure SpO2   03/16/19 1632 03/16/19 1632 03/16/19 1632 03/16/19 1632 03/16/19 1632   98 3 °F (36 8 °C) 69 18 (!) 190/84 98 %      Temp src Heart Rate Source Patient Position - Orthostatic VS BP Location FiO2 (%)   -- 03/16/19 1632 -- -- --    Monitor         Pain Score       03/16/19 1709       Worst Possible Pain           Vitals:    03/16/19 1632   BP: (!) 190/84   Pulse: 69       qSOFA     Row Name 03/16/19 1632                Altered mental status GCS < 15  --        Respiratory Rate > / =99  0        Systolic BP < / =339  0        Q Sofa Score  0              Visual Acuity      ED Medications  Medications   ibuprofen (MOTRIN) tablet 400 mg (400 mg Oral Given 3/16/19 1709)   oxyCODONE-acetaminophen (PERCOCET) 5-325 mg per tablet 1 tablet (1 tablet Oral Given 3/16/19 1754)       Diagnostic Studies  Results Reviewed     None                 XR shoulder 2+ views RIGHT    (Results Pending)   XR clavicle RIGHT    (Results Pending)              Procedures  Procedures       Phone Contacts  ED Phone Contact    ED Course                               MDM  Number of Diagnoses or Management Options  Diagnosis management comments: Patient evaluated with imaging  I reviewed the results and discussed them with the patient  placed in a sling,   Patient discharged with appropriate instructions medications and follow-up    Patient verbalized understanding had no further questions at the time of discharge  Patient had stable vital signs and well-appearing at the time of discharge  Amount and/or Complexity of Data Reviewed  Tests in the radiology section of CPT®: ordered and reviewed  Tests in the medicine section of CPT®: ordered and reviewed    Patient Progress  Patient progress: stable      Disposition  Final diagnoses:   Fall, initial encounter   Humerus fracture     Time reflects when diagnosis was documented in both MDM as applicable and the Disposition within this note     Time User Action Codes Description Comment    3/16/2019  5:57 PM Anepu, Anamika Escort Add [I73  SPIK] Fall, initial encounter     3/16/2019  5:57 PM Anepu, Anamika Escort Add [[id number]] Humerus fracture       ED Disposition     ED Disposition Condition Date/Time Comment    Discharge Stable Sat Mar 16, 2019  5:57 PM Luz Moore discharge to home/self care  Follow-up Information     Follow up With Specialties Details Why Contact Info    Patria Stanton, [address], Internal Medicine   [address] [place] [address],[address],  Orthopedic Surgery   [address] [place] [address]  [phone]            Patient's Medications   Discharge Prescriptions    OXYCODONE-ACETAMINOPHEN (PERCOCET) 5-325 MG PER TABLET    Take 1 tablet by mouth every 8 (eight) hours as needed for moderate pain for up to 3 daysMax Daily Amount: 3 tablets       Start Date: 3/16/2019 End Date: 3/19/2019       Order Dose: 1 tablet       Quantity: 10 tablet    Refills: 0     No discharge procedures on file      ED Provider  Electronically Signed by           Kita Bence, DO  03/16/19 6308

## 2019-03-18 ENCOUNTER — OFFICE VISIT (OUTPATIENT)
Dept: OBGYN CLINIC | Facility: CLINIC | Age: 79
End: 2019-03-18
Payer: MEDICARE

## 2019-03-18 VITALS
DIASTOLIC BLOOD PRESSURE: 81 MMHG | BODY MASS INDEX: 40.63 KG/M2 | SYSTOLIC BLOOD PRESSURE: 126 MMHG | WEIGHT: 215.2 LBS | HEART RATE: 101 BPM | HEIGHT: 61 IN

## 2019-03-18 DIAGNOSIS — S42.294A OTHER CLOSED NONDISPLACED FRACTURE OF PROXIMAL END OF RIGHT HUMERUS, INITIAL ENCOUNTER: Primary | ICD-10-CM

## 2019-03-18 PROCEDURE — 99213 OFFICE O/P EST LOW 20 MIN: CPT | Performed by: ORTHOPAEDIC SURGERY

## 2019-03-18 PROCEDURE — 23600 CLTX PROX HUMRL FX W/O MNPJ: CPT | Performed by: ORTHOPAEDIC SURGERY

## 2019-03-18 NOTE — PROGRESS NOTES
Assessment/Plan:  1  Other closed nondisplaced fracture of proximal end of right humerus, initial encounter         Scribe Attestation    I,:   Jesusita Morrell am acting as a scribe while in the presence of the attending physician :        I,:   Erin Velasco MD personally performed the services described in this documentation    as scribed in my presence :          Morena Boyd has suffered a proximal humerus fracture  We did review her imaging today in the office and I explained that the fracture is stable, well-aligned, and I do not believe she needs operative intervention  I explained that she should do very well with conservative management  We discussed her prognosis and I explained that she will spend 4-6 weeks immobilized in a sling at which point will begin physical therapy  She does understand that it can take up to a full year to regain full strength and range of motion about the shoulder  I did provide her with a new sling today in the office and she states that it is much more comfortable after her fitting  She may remove the sling for showering  We also discussed her medication regimen  I encouraged her to wean off of her opioid medication and transition to over-the-counter NSAIDs and analgesics  We will follow her with serial x-rays to assess fracture healing  I will see her back in one week for repeat clinical evaluation and repeat x-ray  Fracture / Dislocation Treatment  Date/Time: 3/18/2019 11:47 AM  Performed by: Erin Velasco MD  Authorized by: Erin Velasco MD     Patient Location:  Clinic  Other Assisting Provider: No    Verbal consent obtained?: Yes    Written consent obtained?: No    Risks and benefits: Risks, benefits and alternatives were discussed    Consent given by:  Patient  Patient states understanding of procedure being performed: Yes    Radiology Images displayed and confirmed   If images not available, report reviewed: Yes    Patient identity confirmed: Verbally with patient  Injury location:  Shoulder  Location details:  Right shoulder  Injury type:  Fracture  Fracture type: surgical neck    Neurovascular status: Neurovascularly intact    Distal perfusion: normal    Neurological function: normal    Range of motion: reduced    Local anesthesia used?: No    Manipulation performed?: No    Immobilization:  Sling  Neurovascular status: Neurovascularly intact    Distal perfusion: normal    Neurological function: normal    Range of motion: unchanged    Patient tolerance:  Patient tolerated the procedure well with no immediate complications          Subjective:   Velma Landrum is a 77 y o  female who presents to the office today for evaluation of her right shoulder  She states that on 03/16/2019 she fell in the parking lot while walking to [employer]  She fell on an outstretched hand and noticed immediate pain about the right shoulder  She was evaluated in the emergency department and had x-rays which demonstrated a proximal humerus fracture  She presents today for evaluation  She states that she has experienced a constant mild ache about the shoulder since that time which can be sharp were and more moderate to severe with attempts at movement  She has been in a sling provided to her at the emergency department  She finds uncomfortable and states that it bothers her neck  She does also report significant bruising throughout the upper arm  She does live alone but states that she has a neighbor that is a home health aide who can assist her while she recovers  She has been using prescription opioids for pain but states that they do upset her stomach  She denies any distal paresthesias of the upper extremity  Review of Systems   Constitutional: Negative for chills, fever and unexpected weight change  HENT: Negative for hearing loss, nosebleeds and sore throat  Eyes: Negative for pain, redness and visual disturbance     Respiratory: Negative for cough, shortness of breath and wheezing  Cardiovascular: Negative for chest pain, palpitations and leg swelling  Gastrointestinal: Negative for abdominal pain, nausea and vomiting  Endocrine: Negative for polydipsia and polyuria  Genitourinary: Negative for dysuria and hematuria  Musculoskeletal:        See HPI   Skin: Negative for rash and wound  Neurological: Negative for dizziness, numbness and headaches  Psychiatric/Behavioral: Negative for decreased concentration and suicidal ideas  The patient is not nervous/anxious  Past Medical History:   Diagnosis Date    Arthritis     knees    Diabetes mellitus ([address] )     type 2    GERD (gastroesophageal reflux disease)     Hyperlipidemia     Hypertension     Urinary incontinence     Use of cane as ambulatory aid     Wears glasses        Past Surgical History:   Procedure Laterality Date    BREAST SURGERY Left     nothing was there     COLONOSCOPY      HYSTERECTOMY      complete-fibroid    JOINT REPLACEMENT Left     knee    [place] REMV CATARACT EXTRACAP,INSERT LENS Right 7/19/2018    Procedure: EXTRACTION EXTRACAPSULAR CATARACT PHACO INTRAOCULAR LENS (IOL); Surgeon: Tavia Arrieta MD;  Location: [facility] OR;  Service: Ophthalmology     [facility] CATARACT EXTRACAP,INSERT LENS Left 8/9/2018    Procedure: EXTRACTION EXTRACAPSULAR CATARACT PHACO INTRAOCULAR LENS (IOL);   Surgeon: Tavia Arrieta MD;  Location: [facility] OR;  Service: Ophthalmology       Family History   Problem Relation Age of Onset   [facility] Breast cancer Mother     Diabetes type II Mother     Atrial fibrillation Mother     Diabetes Mother         diet controlled    Hypertension Mother     Lung cancer Father     Alcohol abuse Father     Cancer Father         lung-smoker       Social History     Occupational History    Not on file   Tobacco Use    Smoking status: Never Smoker    Smokeless tobacco: Never Used   Substance and Sexual Activity    Alcohol use: Yes     Comment: social  Drug use: No    Sexual activity: Not on file         Current Outpatient Medications:     albuterol (PROAIR HFA) 90 mcg/act inhaler, Inhale 2 puffs every 6 (six) hours as needed for wheezing, Disp: 8 5 g, Rfl: 0    amLODIPine (NORVASC) 10 mg tablet, TAKE 1 TABLET BY MOUTH EVERY DAY, Disp: 90 tablet, Rfl: 3    atorvastatin (LIPITOR) 40 mg tablet, TAKE 1 TABLET BY MOUTH DAILY, Disp: 90 tablet, Rfl: 0    Calcium Carb-Cholecalciferol (CALCIUM 600+D) 600-800 MG-UNIT TABS, Take by mouth every morning, Disp: , Rfl:     CINNAMON PO, Take 1,000 mg by mouth every morning, Disp: , Rfl:     esomeprazole (NEXIUM) 40 MG capsule, Take 40 mg by mouth as needed  , Disp: , Rfl:     lisinopril (ZESTRIL) 10 mg tablet, TAKE 1 TABLET BY MOUTH EVERY DAY, Disp: 90 tablet, Rfl: 0    metFORMIN (GLUCOPHAGE) 1000 MG tablet, TAKE 1 TABLET(1000 MG) BY MOUTH TWICE DAILY, Disp: 180 tablet, Rfl: 3    metoprolol tartrate (LOPRESSOR) 100 mg tablet, TAKE 1 TABLET(100 MG) BY MOUTH TWICE DAILY, Disp: 180 tablet, Rfl: 0    oxyCODONE-acetaminophen (PERCOCET) 5-325 mg per tablet, Take 1 tablet by mouth every 8 (eight) hours as needed for moderate pain for up to 3 daysMax Daily Amount: 3 tablets, Disp: 10 tablet, Rfl: 0    Potassium Chloride ER (K-TAB) 20 MEQ TBCR, Take by mouth every morning  , Disp: , Rfl:     No Known Allergies    Objective:  Vitals:    03/18/19 1056   BP: 126/81   Pulse: 101       Right Shoulder Exam     Tenderness   Right shoulder tenderness location: at fracture site  Muscle Strength   Right shoulder normal muscle strength: Deferred  Other   Erythema: absent  Scars: absent  Sensation: normal (to light touch through upper extremity)  Pulse: present (2+ radial)    Comments:  Strength intact in testing right EPL, FPL, apb, and 1st dorsal interosseous muscles well as right wrist flexors and extensors  Sensation to light touch intact right C5 through T1 including the axillary nerve distribution right upper extremity  Pain to palpation over the proximal humerus  Physical Exam   Constitutional: She is oriented to person, place, and time  She appears well-developed and well-nourished  HENT:   Head: Normocephalic and atraumatic  Eyes: Pupils are equal, round, and reactive to light  Conjunctivae are normal    Neck: Normal range of motion  Neck supple  Cardiovascular: Normal rate and intact distal pulses  Pulmonary/Chest: Effort normal  No respiratory distress  Musculoskeletal:   As noted in HPI   Neurological: She is alert and oriented to person, place, and time  Skin: Skin is warm and dry  Psychiatric: She has a normal mood and affect  Her behavior is normal    Vitals reviewed  I have personally reviewed pertinent films in PACS and my interpretation is as follows:  X-ray of the right shoulder obtained on 03/16/2019 shows an oblique fracture of the proximal humerus he involving the neck  There is no involvement of the humeral head, greater tuberosity, or lesser tuberosity  There is also moderate to severe degenerative change of the acromioclavicular joint

## 2019-03-20 ENCOUNTER — VBI (OUTPATIENT)
Dept: FAMILY MEDICINE CLINIC | Facility: CLINIC | Age: 79
End: 2019-03-20

## 2019-03-20 NOTE — TELEPHONE ENCOUNTER
Pt was seen in [address] on 3/16/19  CC: Fall  DX: fall;humerus fracture  Pt was seen at ortho 3/19/19, no out reach done

## 2019-03-25 ENCOUNTER — APPOINTMENT (OUTPATIENT)
Dept: RADIOLOGY | Facility: CLINIC | Age: 79
End: 2019-03-25
Payer: MEDICARE

## 2019-03-25 ENCOUNTER — OFFICE VISIT (OUTPATIENT)
Dept: OBGYN CLINIC | Facility: CLINIC | Age: 79
End: 2019-03-25

## 2019-03-25 VITALS — BODY MASS INDEX: 40.59 KG/M2 | WEIGHT: 215 LBS | HEIGHT: 61 IN

## 2019-03-25 DIAGNOSIS — S42.294A OTHER CLOSED NONDISPLACED FRACTURE OF PROXIMAL END OF RIGHT HUMERUS, INITIAL ENCOUNTER: ICD-10-CM

## 2019-03-25 DIAGNOSIS — S42.294A OTHER CLOSED NONDISPLACED FRACTURE OF PROXIMAL END OF RIGHT HUMERUS, INITIAL ENCOUNTER: Primary | ICD-10-CM

## 2019-03-25 PROCEDURE — 73030 X-RAY EXAM OF SHOULDER: CPT

## 2019-03-25 PROCEDURE — 99024 POSTOP FOLLOW-UP VISIT: CPT | Performed by: ORTHOPAEDIC SURGERY

## 2019-03-25 NOTE — PROGRESS NOTES
Assessment/Plan:  1  Other closed nondisplaced fracture of proximal end of right humerus, initial encounter  XR shoulder 2+ vw right       Scribe Attestation    I,:   Sandilazaro Sharpe Call am acting as a scribe while in the presence of the attending physician :        I,:   Julieta Puri MD personally performed the services described in this documentation    as scribed in my presence :              Yessenia's right proximal humerus fracture remains acceptably aligned  She will continue to use her sling except when bathing  I am pleased to see that her pain has reduced and she is no longer using the opioids  She can continue with the ibuprofen as long as this does not cause gastric distress  I will see her back in three weeks for repeat xrays  She will remain in her sling and will likely for a total of 6 weeks  She will follow up with Amada Hay, [facility]    Subjective:   Iraida Rodriguez is a 77 y o  female who presents to the office today for follow-up evaluation of the right proximal humerus fracture suffered 9 days ago  She states she is doing better than she was on last visit  She has not taken her opioids recently  She will take 2 ibuprofen if she knows she is going to be "out and about" and more active  She continues to use her sling  She does have the intermittent pain laterally about the right shoulder that will radiate distally with sudden movements  She denies distal paresthesias        Review of Systems      Past Medical History:   Diagnosis Date    Arthritis     knees    Diabetes mellitus ([address] )     type 2    GERD (gastroesophageal reflux disease)     Hyperlipidemia     Hypertension     Urinary incontinence     Use of cane as ambulatory aid     Wears glasses        Past Surgical History:   Procedure Laterality Date    BREAST SURGERY Left     nothing was there     COLONOSCOPY      HYSTERECTOMY      complete-fibroid    JOINT REPLACEMENT Left     knee    [facility] REMV CATARACT EXTRACAP,INSERT LENS Right 7/19/2018    Procedure: EXTRACTION EXTRACAPSULAR CATARACT PHACO INTRAOCULAR LENS (IOL); Surgeon: Joe Casillas MD;  Location: [facility] MAIN OR;  Service: Ophthalmology     [address] CATARACT EXTRACAP,INSERT LENS Left 8/9/2018    Procedure: EXTRACTION EXTRACAPSULAR CATARACT PHACO INTRAOCULAR LENS (IOL);   Surgeon: Joe Casillas MD;  Location: [facility] MAIN OR;  Service: Ophthalmology       Family History   Problem Relation Age of Onset   [facility] Breast cancer Mother     Diabetes type II Mother     Atrial fibrillation Mother     Diabetes Mother         diet controlled    Hypertension Mother     Lung cancer Father     Alcohol abuse Father     Cancer Father         lung-smoker       Social History     Occupational History    Not on file   Tobacco Use    Smoking status: Never Smoker    Smokeless tobacco: Never Used   Substance and Sexual Activity    Alcohol use: Yes     Comment: social    Drug use: No    Sexual activity: Not on file         Current Outpatient Medications:     albuterol (PROAIR HFA) 90 mcg/act inhaler, Inhale 2 puffs every 6 (six) hours as needed for wheezing, Disp: 8 5 g, Rfl: 0    amLODIPine (NORVASC) 10 mg tablet, TAKE 1 TABLET BY MOUTH EVERY DAY, Disp: 90 tablet, Rfl: 3    atorvastatin (LIPITOR) 40 mg tablet, TAKE 1 TABLET BY MOUTH DAILY, Disp: 90 tablet, Rfl: 0    Calcium Carb-Cholecalciferol (CALCIUM 600+D) 600-800 MG-UNIT TABS, Take by mouth every morning, Disp: , Rfl:     CINNAMON PO, Take 1,000 mg by mouth every morning, Disp: , Rfl:     esomeprazole (NEXIUM) 40 MG capsule, Take 40 mg by mouth as needed  , Disp: , Rfl:     lisinopril (ZESTRIL) 10 mg tablet, TAKE 1 TABLET BY MOUTH EVERY DAY, Disp: 90 tablet, Rfl: 0    metFORMIN (GLUCOPHAGE) 1000 MG tablet, TAKE 1 TABLET(1000 MG) BY MOUTH TWICE DAILY, Disp: 180 tablet, Rfl: 3    metoprolol tartrate (LOPRESSOR) 100 mg tablet, TAKE 1 TABLET(100 MG) BY MOUTH TWICE DAILY, Disp: 180 tablet, Rfl: 0    Potassium Chloride ER (K-TAB) 20 MEQ TBCR, Take by mouth every morning  , Disp: , Rfl:     No Known Allergies    Objective: There were no vitals filed for this visit  Ortho Exam  Inspection of the right upper extremity reveals ecchymosis throughout the medial aspect of the right upper arm as well as into the forearm and hand  She has normal sensations to light touch and 2+ radial pulse  Hand intrinsics are normal   Physical Exam    I have personally reviewed pertinent films in PACS and my interpretation is as follows:  X-rays of the right shoulder demonstrates an acceptably aligned proximal humerus fracture  The glenohumeral joint is well aligned

## 2019-03-27 DIAGNOSIS — E78.2 MIXED HYPERLIPIDEMIA: ICD-10-CM

## 2019-03-27 RX ORDER — ATORVASTATIN CALCIUM 40 MG/1
TABLET, FILM COATED ORAL
Qty: 90 TABLET | Refills: 0 | Status: SHIPPED | OUTPATIENT
Start: 2019-03-27 | End: 2019-06-26 | Stop reason: SDUPTHER

## 2019-04-01 ENCOUNTER — TELEPHONE (OUTPATIENT)
Dept: RHEUMATOLOGY | Facility: CLINIC | Age: 79
End: 2019-04-01

## 2019-04-01 ENCOUNTER — OFFICE VISIT (OUTPATIENT)
Dept: FAMILY MEDICINE CLINIC | Facility: CLINIC | Age: 79
End: 2019-04-01
Payer: MEDICARE

## 2019-04-01 VITALS
BODY MASS INDEX: 39.88 KG/M2 | SYSTOLIC BLOOD PRESSURE: 118 MMHG | TEMPERATURE: 98.2 F | OXYGEN SATURATION: 97 % | HEART RATE: 84 BPM | DIASTOLIC BLOOD PRESSURE: 66 MMHG | WEIGHT: 211.06 LBS

## 2019-04-01 DIAGNOSIS — E11.40 TYPE 2 DIABETES MELLITUS WITH DIABETIC NEUROPATHY, WITHOUT LONG-TERM CURRENT USE OF INSULIN (HCC): ICD-10-CM

## 2019-04-01 DIAGNOSIS — D64.9 ANEMIA, UNSPECIFIED TYPE: ICD-10-CM

## 2019-04-01 DIAGNOSIS — R53.83 FATIGUE, UNSPECIFIED TYPE: ICD-10-CM

## 2019-04-01 DIAGNOSIS — R60.0 EDEMA OF LEFT LOWER EXTREMITY: ICD-10-CM

## 2019-04-01 DIAGNOSIS — R60.0 EDEMA OF LEFT LOWER EXTREMITY: Primary | ICD-10-CM

## 2019-04-01 DIAGNOSIS — I10 ESSENTIAL HYPERTENSION: ICD-10-CM

## 2019-04-01 LAB
SL AMB  POCT GLUCOSE, UA: NORMAL
SL AMB LEUKOCYTE ESTERASE,UA: 25
SL AMB POCT BILIRUBIN,UA: ABNORMAL
SL AMB POCT BLOOD,UA: ABNORMAL
SL AMB POCT CLARITY,UA: CLEAR
SL AMB POCT COLOR,UA: ABNORMAL
SL AMB POCT KETONES,UA: 15
SL AMB POCT NITRITE,UA: ABNORMAL
SL AMB POCT PH,UA: 5
SL AMB POCT SPECIFIC GRAVITY,UA: 1.02
SL AMB POCT URINE PROTEIN: ABNORMAL
SL AMB POCT UROBILINOGEN: 1

## 2019-04-01 PROCEDURE — 81002 URINALYSIS NONAUTO W/O SCOPE: CPT | Performed by: FAMILY MEDICINE

## 2019-04-01 PROCEDURE — 99213 OFFICE O/P EST LOW 20 MIN: CPT | Performed by: FAMILY MEDICINE

## 2019-04-01 RX ORDER — FUROSEMIDE 20 MG/1
20 TABLET ORAL DAILY
Qty: 7 TABLET | Refills: 0 | Status: SHIPPED | OUTPATIENT
Start: 2019-04-01 | End: 2019-04-01 | Stop reason: SDUPTHER

## 2019-04-01 RX ORDER — FUROSEMIDE 20 MG/1
20 TABLET ORAL DAILY
Qty: 7 TABLET | Refills: 0 | Status: SHIPPED | OUTPATIENT
Start: 2019-04-01 | End: 2019-05-06

## 2019-04-01 RX ORDER — FUROSEMIDE 20 MG/1
TABLET ORAL
Qty: 90 TABLET | Refills: 0 | Status: SHIPPED | OUTPATIENT
Start: 2019-04-01 | End: 2019-05-06

## 2019-04-02 ENCOUNTER — APPOINTMENT (OUTPATIENT)
Dept: LAB | Facility: CLINIC | Age: 79
End: 2019-04-02
Payer: MEDICARE

## 2019-04-02 DIAGNOSIS — D64.9 ANEMIA, UNSPECIFIED TYPE: ICD-10-CM

## 2019-04-02 DIAGNOSIS — E11.40 TYPE 2 DIABETES MELLITUS WITH DIABETIC NEUROPATHY, WITHOUT LONG-TERM CURRENT USE OF INSULIN (HCC): ICD-10-CM

## 2019-04-02 DIAGNOSIS — R60.0 EDEMA OF LEFT LOWER EXTREMITY: ICD-10-CM

## 2019-04-02 DIAGNOSIS — R53.83 FATIGUE, UNSPECIFIED TYPE: ICD-10-CM

## 2019-04-02 LAB
ALBUMIN SERPL BCP-MCNC: 3.9 G/DL (ref 3.5–5)
ALP SERPL-CCNC: 101 U/L (ref 46–116)
ALT SERPL W P-5'-P-CCNC: 18 U/L (ref 12–78)
ANION GAP SERPL CALCULATED.3IONS-SCNC: 7 MMOL/L (ref 4–13)
AST SERPL W P-5'-P-CCNC: 13 U/L (ref 5–45)
BILIRUB SERPL-MCNC: 0.8 MG/DL (ref 0.2–1)
BUN SERPL-MCNC: 17 MG/DL (ref 5–25)
CALCIUM SERPL-MCNC: 9.3 MG/DL (ref 8.3–10.1)
CHLORIDE SERPL-SCNC: 103 MMOL/L (ref 100–108)
CO2 SERPL-SCNC: 28 MMOL/L (ref 21–32)
CREAT SERPL-MCNC: 0.96 MG/DL (ref 0.6–1.3)
CREAT UR-MCNC: 35.6 MG/DL
ERYTHROCYTE [DISTWIDTH] IN BLOOD BY AUTOMATED COUNT: 17.6 % (ref 11.6–15.1)
EST. AVERAGE GLUCOSE BLD GHB EST-MCNC: 146 MG/DL
GFR SERPL CREATININE-BSD FRML MDRD: 56 ML/MIN/1.73SQ M
GLUCOSE P FAST SERPL-MCNC: 125 MG/DL (ref 65–99)
HBA1C MFR BLD: 6.7 % (ref 4.2–6.3)
HCT VFR BLD AUTO: 34.3 % (ref 34.8–46.1)
HGB BLD-MCNC: 10.7 G/DL (ref 11.5–15.4)
MCH RBC QN AUTO: 25.5 PG (ref 26.8–34.3)
MCHC RBC AUTO-ENTMCNC: 31.2 G/DL (ref 31.4–37.4)
MCV RBC AUTO: 82 FL (ref 82–98)
MICROALBUMIN UR-MCNC: 18.9 MG/L (ref 0–20)
MICROALBUMIN/CREAT 24H UR: 53 MG/G CREATININE (ref 0–30)
NT-PROBNP SERPL-MCNC: 1033 PG/ML
PLATELET # BLD AUTO: 327 THOUSANDS/UL (ref 149–390)
PMV BLD AUTO: 11.7 FL (ref 8.9–12.7)
POTASSIUM SERPL-SCNC: 4.1 MMOL/L (ref 3.5–5.3)
PROT SERPL-MCNC: 7.6 G/DL (ref 6.4–8.2)
RBC # BLD AUTO: 4.19 MILLION/UL (ref 3.81–5.12)
SODIUM SERPL-SCNC: 138 MMOL/L (ref 136–145)
WBC # BLD AUTO: 6.09 THOUSAND/UL (ref 4.31–10.16)

## 2019-04-02 PROCEDURE — 82043 UR ALBUMIN QUANTITATIVE: CPT | Performed by: FAMILY MEDICINE

## 2019-04-02 PROCEDURE — 36415 COLL VENOUS BLD VENIPUNCTURE: CPT

## 2019-04-02 PROCEDURE — 85027 COMPLETE CBC AUTOMATED: CPT

## 2019-04-02 PROCEDURE — 80053 COMPREHEN METABOLIC PANEL: CPT

## 2019-04-02 PROCEDURE — 83036 HEMOGLOBIN GLYCOSYLATED A1C: CPT

## 2019-04-02 PROCEDURE — 83880 ASSAY OF NATRIURETIC PEPTIDE: CPT

## 2019-04-02 PROCEDURE — 82570 ASSAY OF URINE CREATININE: CPT | Performed by: FAMILY MEDICINE

## 2019-04-10 ENCOUNTER — OFFICE VISIT (OUTPATIENT)
Dept: FAMILY MEDICINE CLINIC | Facility: CLINIC | Age: 79
End: 2019-04-10
Payer: MEDICARE

## 2019-04-10 VITALS
HEIGHT: 61 IN | WEIGHT: 207 LBS | TEMPERATURE: 97.9 F | HEART RATE: 91 BPM | SYSTOLIC BLOOD PRESSURE: 120 MMHG | BODY MASS INDEX: 39.08 KG/M2 | DIASTOLIC BLOOD PRESSURE: 60 MMHG | OXYGEN SATURATION: 98 %

## 2019-04-10 DIAGNOSIS — R60.0 EDEMA OF LEFT LOWER EXTREMITY: Primary | ICD-10-CM

## 2019-04-10 DIAGNOSIS — I51.89 GRADE II DIASTOLIC DYSFUNCTION: ICD-10-CM

## 2019-04-10 DIAGNOSIS — R79.89 ELEVATED BRAIN NATRIURETIC PEPTIDE (BNP) LEVEL: ICD-10-CM

## 2019-04-10 PROCEDURE — 99213 OFFICE O/P EST LOW 20 MIN: CPT | Performed by: FAMILY MEDICINE

## 2019-04-16 DIAGNOSIS — E11.42 TYPE 2 DIABETES MELLITUS WITH DIABETIC POLYNEUROPATHY, WITHOUT LONG-TERM CURRENT USE OF INSULIN (HCC): ICD-10-CM

## 2019-04-17 ENCOUNTER — APPOINTMENT (OUTPATIENT)
Dept: RADIOLOGY | Facility: CLINIC | Age: 79
End: 2019-04-17
Payer: MEDICARE

## 2019-04-17 ENCOUNTER — OFFICE VISIT (OUTPATIENT)
Dept: OBGYN CLINIC | Facility: CLINIC | Age: 79
End: 2019-04-17

## 2019-04-17 VITALS
HEART RATE: 59 BPM | BODY MASS INDEX: 40.59 KG/M2 | WEIGHT: 215 LBS | SYSTOLIC BLOOD PRESSURE: 135 MMHG | DIASTOLIC BLOOD PRESSURE: 73 MMHG | HEIGHT: 61 IN

## 2019-04-17 DIAGNOSIS — S42.294A OTHER CLOSED NONDISPLACED FRACTURE OF PROXIMAL END OF RIGHT HUMERUS, INITIAL ENCOUNTER: Primary | ICD-10-CM

## 2019-04-17 DIAGNOSIS — S42.294A OTHER CLOSED NONDISPLACED FRACTURE OF PROXIMAL END OF RIGHT HUMERUS, INITIAL ENCOUNTER: ICD-10-CM

## 2019-04-17 PROCEDURE — 99024 POSTOP FOLLOW-UP VISIT: CPT | Performed by: PHYSICIAN ASSISTANT

## 2019-04-17 PROCEDURE — 73030 X-RAY EXAM OF SHOULDER: CPT

## 2019-04-18 ENCOUNTER — OFFICE VISIT (OUTPATIENT)
Dept: FAMILY MEDICINE CLINIC | Facility: CLINIC | Age: 79
End: 2019-04-18
Payer: MEDICARE

## 2019-04-18 VITALS
OXYGEN SATURATION: 99 % | WEIGHT: 206 LBS | HEART RATE: 71 BPM | BODY MASS INDEX: 38.92 KG/M2 | SYSTOLIC BLOOD PRESSURE: 124 MMHG | RESPIRATION RATE: 16 BRPM | DIASTOLIC BLOOD PRESSURE: 70 MMHG

## 2019-04-18 DIAGNOSIS — R60.0 EDEMA OF LEFT LOWER EXTREMITY: Primary | ICD-10-CM

## 2019-04-18 PROCEDURE — 99213 OFFICE O/P EST LOW 20 MIN: CPT | Performed by: FAMILY MEDICINE

## 2019-04-19 DIAGNOSIS — E11.42 TYPE 2 DIABETES MELLITUS WITH DIABETIC POLYNEUROPATHY, WITHOUT LONG-TERM CURRENT USE OF INSULIN (HCC): ICD-10-CM

## 2019-05-05 DIAGNOSIS — I10 ESSENTIAL HYPERTENSION: ICD-10-CM

## 2019-05-06 ENCOUNTER — CONSULT (OUTPATIENT)
Dept: CARDIOLOGY CLINIC | Facility: CLINIC | Age: 79
End: 2019-05-06
Payer: MEDICARE

## 2019-05-06 VITALS
SYSTOLIC BLOOD PRESSURE: 132 MMHG | DIASTOLIC BLOOD PRESSURE: 72 MMHG | BODY MASS INDEX: 39.76 KG/M2 | OXYGEN SATURATION: 98 % | HEIGHT: 61 IN | HEART RATE: 72 BPM | WEIGHT: 210.6 LBS

## 2019-05-06 DIAGNOSIS — R79.89 ELEVATED BRAIN NATRIURETIC PEPTIDE (BNP) LEVEL: ICD-10-CM

## 2019-05-06 DIAGNOSIS — R60.0 EDEMA OF LEFT LOWER EXTREMITY: ICD-10-CM

## 2019-05-06 DIAGNOSIS — I48.0 PAROXYSMAL ATRIAL FIBRILLATION (HCC): Primary | ICD-10-CM

## 2019-05-06 DIAGNOSIS — I10 ESSENTIAL HYPERTENSION: ICD-10-CM

## 2019-05-06 DIAGNOSIS — I51.89 GRADE II DIASTOLIC DYSFUNCTION: ICD-10-CM

## 2019-05-06 PROCEDURE — 93000 ELECTROCARDIOGRAM COMPLETE: CPT | Performed by: INTERNAL MEDICINE

## 2019-05-06 PROCEDURE — 99214 OFFICE O/P EST MOD 30 MIN: CPT | Performed by: INTERNAL MEDICINE

## 2019-05-06 RX ORDER — METOPROLOL TARTRATE 100 MG/1
100 TABLET ORAL EVERY 12 HOURS SCHEDULED
Qty: 180 TABLET | Refills: 1 | Status: SHIPPED | OUTPATIENT
Start: 2019-05-06 | End: 2019-07-31 | Stop reason: SDUPTHER

## 2019-05-06 RX ORDER — TORSEMIDE 20 MG/1
20 TABLET ORAL EVERY MORNING
Qty: 90 TABLET | Refills: 3 | Status: SHIPPED | OUTPATIENT
Start: 2019-05-06 | End: 2019-07-24 | Stop reason: SDUPTHER

## 2019-05-06 RX ORDER — POTASSIUM CHLORIDE 1500 MG/1
TABLET, FILM COATED, EXTENDED RELEASE ORAL
Qty: 90 TABLET | Refills: 3 | Status: SHIPPED | OUTPATIENT
Start: 2019-05-06 | End: 2021-04-29 | Stop reason: SDUPTHER

## 2019-05-08 ENCOUNTER — APPOINTMENT (OUTPATIENT)
Dept: RADIOLOGY | Facility: CLINIC | Age: 79
End: 2019-05-08
Payer: MEDICARE

## 2019-05-08 ENCOUNTER — TELEPHONE (OUTPATIENT)
Dept: OBGYN CLINIC | Facility: HOSPITAL | Age: 79
End: 2019-05-08

## 2019-05-08 ENCOUNTER — OFFICE VISIT (OUTPATIENT)
Dept: OBGYN CLINIC | Facility: CLINIC | Age: 79
End: 2019-05-08

## 2019-05-08 ENCOUNTER — TELEPHONE (OUTPATIENT)
Dept: OBGYN CLINIC | Facility: CLINIC | Age: 79
End: 2019-05-08

## 2019-05-08 VITALS
HEIGHT: 61 IN | BODY MASS INDEX: 40.22 KG/M2 | DIASTOLIC BLOOD PRESSURE: 75 MMHG | HEART RATE: 73 BPM | WEIGHT: 213 LBS | SYSTOLIC BLOOD PRESSURE: 144 MMHG

## 2019-05-08 DIAGNOSIS — S42.294A OTHER CLOSED NONDISPLACED FRACTURE OF PROXIMAL END OF RIGHT HUMERUS, INITIAL ENCOUNTER: ICD-10-CM

## 2019-05-08 DIAGNOSIS — S42.294A OTHER CLOSED NONDISPLACED FRACTURE OF PROXIMAL END OF RIGHT HUMERUS, INITIAL ENCOUNTER: Primary | ICD-10-CM

## 2019-05-08 PROCEDURE — 99024 POSTOP FOLLOW-UP VISIT: CPT | Performed by: PHYSICIAN ASSISTANT

## 2019-05-08 PROCEDURE — 73030 X-RAY EXAM OF SHOULDER: CPT

## 2019-05-17 ENCOUNTER — TELEPHONE (OUTPATIENT)
Dept: OBGYN CLINIC | Facility: CLINIC | Age: 79
End: 2019-05-17

## 2019-05-29 RX ORDER — METOPROLOL TARTRATE 100 MG/1
100 TABLET ORAL EVERY 12 HOURS SCHEDULED
Qty: 180 TABLET | Refills: 0 | Status: SHIPPED | OUTPATIENT
Start: 2019-05-29 | End: 2019-07-24 | Stop reason: SDUPTHER

## 2019-06-11 DIAGNOSIS — I10 ESSENTIAL HYPERTENSION: ICD-10-CM

## 2019-06-11 RX ORDER — LISINOPRIL 10 MG/1
TABLET ORAL
Qty: 90 TABLET | Refills: 0 | Status: SHIPPED | OUTPATIENT
Start: 2019-06-11 | End: 2019-06-26 | Stop reason: SDUPTHER

## 2019-06-19 ENCOUNTER — APPOINTMENT (OUTPATIENT)
Dept: RADIOLOGY | Facility: CLINIC | Age: 79
End: 2019-06-19
Payer: MEDICARE

## 2019-06-19 ENCOUNTER — OFFICE VISIT (OUTPATIENT)
Dept: OBGYN CLINIC | Facility: CLINIC | Age: 79
End: 2019-06-19
Payer: MEDICARE

## 2019-06-19 VITALS
DIASTOLIC BLOOD PRESSURE: 63 MMHG | BODY MASS INDEX: 37 KG/M2 | WEIGHT: 196 LBS | HEART RATE: 76 BPM | SYSTOLIC BLOOD PRESSURE: 99 MMHG | HEIGHT: 61 IN

## 2019-06-19 DIAGNOSIS — S42.294A OTHER CLOSED NONDISPLACED FRACTURE OF PROXIMAL END OF RIGHT HUMERUS, INITIAL ENCOUNTER: Primary | ICD-10-CM

## 2019-06-19 DIAGNOSIS — S42.294A OTHER CLOSED NONDISPLACED FRACTURE OF PROXIMAL END OF RIGHT HUMERUS, INITIAL ENCOUNTER: ICD-10-CM

## 2019-06-19 PROCEDURE — 73030 X-RAY EXAM OF SHOULDER: CPT

## 2019-06-19 PROCEDURE — 99213 OFFICE O/P EST LOW 20 MIN: CPT | Performed by: ORTHOPAEDIC SURGERY

## 2019-06-25 ENCOUNTER — EVALUATION (OUTPATIENT)
Dept: PHYSICAL THERAPY | Facility: CLINIC | Age: 79
End: 2019-06-25
Payer: MEDICARE

## 2019-06-25 ENCOUNTER — FOLLOW UP (OUTPATIENT)
Dept: URBAN - METROPOLITAN AREA CLINIC 27 | Facility: CLINIC | Age: 79
End: 2019-06-25

## 2019-06-25 DIAGNOSIS — H35.3132: ICD-10-CM

## 2019-06-25 DIAGNOSIS — E11.9: ICD-10-CM

## 2019-06-25 DIAGNOSIS — H43.813: ICD-10-CM

## 2019-06-25 DIAGNOSIS — S42.294A OTHER CLOSED NONDISPLACED FRACTURE OF PROXIMAL END OF RIGHT HUMERUS, INITIAL ENCOUNTER: Primary | ICD-10-CM

## 2019-06-25 DIAGNOSIS — Z96.1: ICD-10-CM

## 2019-06-25 PROCEDURE — G8984 CARRY CURRENT STATUS: HCPCS

## 2019-06-25 PROCEDURE — 97161 PT EVAL LOW COMPLEX 20 MIN: CPT

## 2019-06-25 PROCEDURE — 92134 CPTRZ OPH DX IMG PST SGM RTA: CPT

## 2019-06-25 PROCEDURE — G8985 CARRY GOAL STATUS: HCPCS

## 2019-06-25 PROCEDURE — 92014 COMPRE OPH EXAM EST PT 1/>: CPT

## 2019-06-25 ASSESSMENT — TONOMETRY
OS_IOP_MMHG: 18
OD_IOP_MMHG: 20

## 2019-06-25 ASSESSMENT — VISUAL ACUITY
OD_CC: 20/50
OS_CC: 20/50

## 2019-06-26 DIAGNOSIS — I10 ESSENTIAL HYPERTENSION: ICD-10-CM

## 2019-06-26 DIAGNOSIS — E78.2 MIXED HYPERLIPIDEMIA: ICD-10-CM

## 2019-06-26 RX ORDER — LISINOPRIL 10 MG/1
10 TABLET ORAL DAILY
Qty: 90 TABLET | Refills: 1 | Status: SHIPPED | OUTPATIENT
Start: 2019-06-26 | End: 2019-12-26 | Stop reason: SDUPTHER

## 2019-06-26 RX ORDER — ATORVASTATIN CALCIUM 40 MG/1
40 TABLET, FILM COATED ORAL DAILY
Qty: 90 TABLET | Refills: 1 | Status: SHIPPED | OUTPATIENT
Start: 2019-06-26 | End: 2020-01-06 | Stop reason: SDUPTHER

## 2019-06-27 ENCOUNTER — OFFICE VISIT (OUTPATIENT)
Dept: PHYSICAL THERAPY | Facility: CLINIC | Age: 79
End: 2019-06-27
Payer: MEDICARE

## 2019-06-27 DIAGNOSIS — S42.294A OTHER CLOSED NONDISPLACED FRACTURE OF PROXIMAL END OF RIGHT HUMERUS, INITIAL ENCOUNTER: Primary | ICD-10-CM

## 2019-06-27 PROCEDURE — 97110 THERAPEUTIC EXERCISES: CPT

## 2019-07-02 ENCOUNTER — OFFICE VISIT (OUTPATIENT)
Dept: PHYSICAL THERAPY | Facility: CLINIC | Age: 79
End: 2019-07-02
Payer: MEDICARE

## 2019-07-02 DIAGNOSIS — S42.294A OTHER CLOSED NONDISPLACED FRACTURE OF PROXIMAL END OF RIGHT HUMERUS, INITIAL ENCOUNTER: Primary | ICD-10-CM

## 2019-07-02 PROCEDURE — 97110 THERAPEUTIC EXERCISES: CPT

## 2019-07-02 NOTE — PROGRESS NOTES
Daily Note     Today's date: 2019  Patient name: Michaelle Nava  : 1940  MRN: [id number]  Referring provider: Rigoberto Crow  Dx:   Encounter Diagnosis     ICD-10-CM    1  Other closed nondisplaced fracture of proximal end of right humerus, initial encounter S42 294A        Start Time: 1515  Stop Time: 1610  Total time in clinic (min): 55 minutes    Subjective: Patient denies pain right shldr - stiifness/tightness; has  difficulty reaching overhead, back & out to the side       Objective: See treatment diary below    Specialty Daily Treatment Diary   Precautions: standard       Manual  19       DTM and TPR  X 10 min to R UT/LS        Shoulder mobs                                     Exercise Diary  2       PROM Semi reclined x 3 min        IR/ER isos X 10 ea ( manual )        pulleys Flexion x 20        Rows and ext RTB 2 x 10        Active IR/ER Not today        scaption & ER  w cane seated x 10 ea       Shelf reach nv        Active scaption  Seated x 10        IR/ext  w cane   x 10 ea        Flexion  Semi- reclined  hands clasped x 10;   w cane x 10                                                                                            Modalities        heat X 10 min pre        Ice  X 10 min post                       Assessment: Tolerated treatment well  Patient would benefit from continued PT; patient presents with functional ROM - will be a good candidate to tolerate gentle strengthening       Plan: Continue per plan of care   Progress to overhead reaching into cabinet

## 2019-07-05 ENCOUNTER — OFFICE VISIT (OUTPATIENT)
Dept: PHYSICAL THERAPY | Facility: CLINIC | Age: 79
End: 2019-07-05
Payer: MEDICARE

## 2019-07-05 DIAGNOSIS — S42.294A OTHER CLOSED NONDISPLACED FRACTURE OF PROXIMAL END OF RIGHT HUMERUS, INITIAL ENCOUNTER: Primary | ICD-10-CM

## 2019-07-05 PROCEDURE — 97110 THERAPEUTIC EXERCISES: CPT

## 2019-07-05 PROCEDURE — 97112 NEUROMUSCULAR REEDUCATION: CPT

## 2019-07-05 NOTE — PROGRESS NOTES
Daily Note     Today's date: 2019  Patient name: Miquel Narayan  : 1940  MRN: [id number]  Referring provider: Lis Olson  Dx:   Encounter Diagnosis     ICD-10-CM    1  Other closed nondisplaced fracture of proximal end of right humerus, initial encounter S42 294A                   Subjective: Pt reports normal fatigue but no increase in pain to start session  Feels she is improving  Objective: Pt requires cueing to decrease shoulder hike throughout AA scaption w/ cane and controlled eccentrics as well as wall slides w/ lift off  Corrects well but fatigues quickly  Precautions: standard       Manual  7-- 7/5      DTM and TPR  X 10 min to R UT/LS  no      Shoulder mobs                                     Exercise Diary  2 3      PROM Semi reclined x 3 min  x6-7 mins semi-reclined w/ end range stretch       IR/ER isos X 10 ea ( manual )  x15 in semi-reclined       pulleys Flexion x 20  x15-20 for scaption and flexion each      Rows and ext RTB 2 x 10  RTB 2x10-15      Active IR/ER Not today  YTB 2x10-15       scaption & ER  w cane seated x 10 ea Seated AA cane scaption x 15, w/ ecc lowering 2x5      Shelf reach nv        Active scaption  Seated x 10        IR/ext  w cane   x 10 ea        Flexion  Semi- reclined  hands clasped x 10;   w cane x 10  Semi-reclined wand flexion x 15        Wall slides x 10, w/ lift off 2x5                                                                                   Modalities 2 3      heat X 10 min pre  no      Ice  X 10 min post  10 min post                      Assessment: Tolerated treatment well  Patient demonstrated fatigue post treatment and would benefit from continued PT  Does well w/ progressions to more active motion, showing fatigue and tendency to hike shoulder when tired  She corrects well w/ cueing and will benefit from further attention to this matter  Given handout depicting AA and controlled ecc exercises         Plan: Continue per plan of care   IR/ER progressions, wall slide progressions, scaption progressions

## 2019-07-09 ENCOUNTER — OFFICE VISIT (OUTPATIENT)
Dept: PHYSICAL THERAPY | Facility: CLINIC | Age: 79
End: 2019-07-09
Payer: MEDICARE

## 2019-07-09 ENCOUNTER — APPOINTMENT (OUTPATIENT)
Dept: LAB | Facility: CLINIC | Age: 79
End: 2019-07-09
Payer: MEDICARE

## 2019-07-09 DIAGNOSIS — R60.0 EDEMA OF LEFT LOWER EXTREMITY: ICD-10-CM

## 2019-07-09 DIAGNOSIS — I48.0 PAROXYSMAL ATRIAL FIBRILLATION (HCC): ICD-10-CM

## 2019-07-09 DIAGNOSIS — R79.89 ELEVATED BRAIN NATRIURETIC PEPTIDE (BNP) LEVEL: ICD-10-CM

## 2019-07-09 DIAGNOSIS — I10 ESSENTIAL HYPERTENSION: ICD-10-CM

## 2019-07-09 DIAGNOSIS — S42.294A OTHER CLOSED NONDISPLACED FRACTURE OF PROXIMAL END OF RIGHT HUMERUS, INITIAL ENCOUNTER: Primary | ICD-10-CM

## 2019-07-09 LAB
ALBUMIN SERPL BCP-MCNC: 3.8 G/DL (ref 3.5–5)
ALP SERPL-CCNC: 91 U/L (ref 46–116)
ALT SERPL W P-5'-P-CCNC: 17 U/L (ref 12–78)
ANION GAP SERPL CALCULATED.3IONS-SCNC: 9 MMOL/L (ref 4–13)
AST SERPL W P-5'-P-CCNC: 12 U/L (ref 5–45)
BILIRUB SERPL-MCNC: 0.28 MG/DL (ref 0.2–1)
BUN SERPL-MCNC: 28 MG/DL (ref 5–25)
CALCIUM SERPL-MCNC: 9.2 MG/DL (ref 8.3–10.1)
CHLORIDE SERPL-SCNC: 107 MMOL/L (ref 100–108)
CO2 SERPL-SCNC: 21 MMOL/L (ref 21–32)
CREAT SERPL-MCNC: 1.19 MG/DL (ref 0.6–1.3)
GFR SERPL CREATININE-BSD FRML MDRD: 44 ML/MIN/1.73SQ M
GLUCOSE P FAST SERPL-MCNC: 126 MG/DL (ref 65–99)
NT-PROBNP SERPL-MCNC: 1300 PG/ML
POTASSIUM SERPL-SCNC: 4.3 MMOL/L (ref 3.5–5.3)
PROT SERPL-MCNC: 7.9 G/DL (ref 6.4–8.2)
SODIUM SERPL-SCNC: 137 MMOL/L (ref 136–145)

## 2019-07-09 PROCEDURE — 36415 COLL VENOUS BLD VENIPUNCTURE: CPT

## 2019-07-09 PROCEDURE — 83880 ASSAY OF NATRIURETIC PEPTIDE: CPT

## 2019-07-09 PROCEDURE — 97140 MANUAL THERAPY 1/> REGIONS: CPT

## 2019-07-09 PROCEDURE — 97112 NEUROMUSCULAR REEDUCATION: CPT

## 2019-07-09 PROCEDURE — 80053 COMPREHEN METABOLIC PANEL: CPT

## 2019-07-09 PROCEDURE — 97110 THERAPEUTIC EXERCISES: CPT

## 2019-07-09 NOTE — PROGRESS NOTES
Daily Note     Today's date: 2019  Patient name: Wyatt Thibodeaux  : 1940  MRN: [id number]  Referring provider: Jarret Lowery  Dx:   Encounter Diagnosis     ICD-10-CM    1  Other closed nondisplaced fracture of proximal end of right humerus, initial encounter S42 413A                   Subjective: Patient denies pain right shldr - just a little tight; reaching behind the back is still hard       Objective: Pt requires cueing to decrease shoulder hike throughout AA scaption w/ cane and controlled eccentrics as well as wall slides w/ lift off  Corrects well but fatigues quickly  Precautions: standard       Manual  - 7-9     DTM and TPR  X 10 min to R UT/LS  no X 10 min to R UT/LS      Shoulder mobs                                     Exercise Diary  2 3 4     PROM Semi reclined x 3 min  x6-7 mins semi-reclined w/ end range stretch  X 5 min semi reclined      IR/ER isos X 10 ea ( manual )  x15 in semi-reclined  X 15 ea in semi recl     pulleys Flexion x 20  x15-20 for scaption and flexion each Not today      Rows and ext RTB 2 x 10  RTB 2x10-15 RTB 2 x 10 ea      Active IR/ER Not today  YTB 2x10-15  YTB 2 x 10 ea      scaption & ER  w cane seated x 10 ea Seated AA cane scaption x 15, w/ ecc lowering 2x5 Stance AA cane scaption 1 x 10      Shelf reach nv        Active scaption  Seated x 10        IR/ext  w cane   x 10 ea        Flexion  Semi- reclined  hands clasped x 10;   w cane x 10  Semi-reclined wand flexion x 15 Semi- reclined wand flexion 2 x 10        Wall slides x 10, w/ lift off 2x5  WS w lift off x 7                 Post cuff stretch         IR w cane x 10 & active IR x 5                                                          Modalities 2 3 4     heat X 10 min pre  no X 10 min      Ice  X 10 min post  10 min post  X 10 min                       Assessment: Tolerated treatment well   Patient would benefit from continued PT; patient demonstrated limited ER with abduction with PROM; good range with AA IR w cane but active IR limited to right outer gluteals; palpable soft tissue restriction right rhomboid area & posterior right shldr - added posterior cuff stretch; patient fatigued post session       Plan: Continue per plan of care

## 2019-07-11 ENCOUNTER — OFFICE VISIT (OUTPATIENT)
Dept: PHYSICAL THERAPY | Facility: CLINIC | Age: 79
End: 2019-07-11
Payer: MEDICARE

## 2019-07-11 DIAGNOSIS — S42.294A OTHER CLOSED NONDISPLACED FRACTURE OF PROXIMAL END OF RIGHT HUMERUS, INITIAL ENCOUNTER: Primary | ICD-10-CM

## 2019-07-11 PROCEDURE — 97110 THERAPEUTIC EXERCISES: CPT

## 2019-07-11 PROCEDURE — 97112 NEUROMUSCULAR REEDUCATION: CPT

## 2019-07-11 NOTE — PROGRESS NOTES
Daily Note     Today's date: 2019  Patient name: Shantanu Kemp  : 1940  MRN: [id number]  Referring provider: Mya Willett  Dx:   Encounter Diagnosis     ICD-10-CM    1  Other closed nondisplaced fracture of proximal end of right humerus, initial encounter S42 294A                   Subjective: Pt stated that her pain level since last visit has been mild  She reported that she is feeling better and believes that PT is helping her  Objective: Pt was able to perform abduction with cane and slow eccentric lowering with mod cueing and hands on for tactile reinforcement  Quick fatigue noted w/ this specific technique      Precautions: standard       Manual  - 7-9     DTM and TPR  X 10 min to R UT/LS  no X 10 min to R UT/LS  no    Shoulder mobs                                     Exercise Diary  2 3 4 5    PROM Semi reclined x 3 min  x6-7 mins semi-reclined w/ end range stretch  X 5 min semi reclined  X 5-7 min semi reclined    IR/ER isos X 10 ea ( manual )  x15 in semi-reclined  X 15 ea in semi recl 2x10 ea in semi recl    pulleys Flexion x 20  x15-20 for scaption and flexion each Not today      Rows and ext RTB 2 x 10  RTB 2x10-15 RTB 2 x 10 ea  Y tubing 2x10 ea    Active IR/ER Not today  YTB 2x10-15  YTB 2 x 10 ea  YTB 2x10 ea    scaption & ER  w cane seated x 10 ea Seated AA cane scaption x 15, w/ ecc lowering 2x5 Stance AA cane scaption 1 x 10  Tried self scaption but stopped s/t pain    Shelf reach nv        Active scaption  Seated x 10        IR/ext  w cane   x 10 ea        Flexion  Semi- reclined  hands clasped x 10;   w cane x 10  Semi-reclined wand flexion x 15 Semi- reclined wand flexion 2 x 10  AB w cane slow ecc 2x 10      Wall slides x 10, w/ lift off 2x5  WS w lift off x 7  Reviewed how to do ext/ rows for HEP and gave YTB               Post cuff stretch         IR w cane x 10 & active IR x 5                                                          Modalities 2 3 4 5    heat X 10 min pre  no X 10 min  10 min seated    Ice  X 10 min post  10 min post  X 10 min  10 min seated                       Assessment: Tolerated treatment well  Patient demonstrated fatigue post treatment  Pt was quite fatigued by the end of session today  The cane abduction and slow eccentric were tolerated well, but challenging  Pt will continue to benefit from skilled PT in order to continue to progress toward her goals  Plan: Continue per plan of care      Scaption, continued cane AB, PROM, AROM    Stefany Cue, SPT

## 2019-07-12 ENCOUNTER — TELEPHONE (OUTPATIENT)
Dept: CARDIOLOGY CLINIC | Facility: CLINIC | Age: 79
End: 2019-07-12

## 2019-07-12 NOTE — TELEPHONE ENCOUNTER
SW patient, swelling is done, and her weight has tapered down     Advised to cut torsemide to EOD; and she will be here on the 24th for her appointment with you

## 2019-07-12 NOTE — TELEPHONE ENCOUNTER
----- Message from Alexandr Diaz MD sent at 7/12/2019  8:52 AM EDT -----  Is her swelling better/weight coming down   If so I want her to cut her torsemide to every other day

## 2019-07-16 ENCOUNTER — OFFICE VISIT (OUTPATIENT)
Dept: PHYSICAL THERAPY | Facility: CLINIC | Age: 79
End: 2019-07-16
Payer: MEDICARE

## 2019-07-16 DIAGNOSIS — S42.294A OTHER CLOSED NONDISPLACED FRACTURE OF PROXIMAL END OF RIGHT HUMERUS, INITIAL ENCOUNTER: Primary | ICD-10-CM

## 2019-07-16 PROCEDURE — 97112 NEUROMUSCULAR REEDUCATION: CPT

## 2019-07-16 PROCEDURE — 97110 THERAPEUTIC EXERCISES: CPT

## 2019-07-16 NOTE — PROGRESS NOTES
Daily Note     Today's date: 2019  Patient name: Haider Marino  : 1940  MRN: [id number]  Referring provider: Josefina Fields  Dx:   Encounter Diagnosis     ICD-10-CM    1  Other closed nondisplaced fracture of proximal end of right humerus, initial encounter S42 294A        Start Time: 1430  Stop Time: 1525  Total time in clinic (min): 55 minutes    Subjective: Patient denies pain right shldr; can now reach into her overhead cabinet; cannot hook her bra in the back       Objective: Pt was able to perform abduction with cane and slow eccentric lowering with mod cueing and hands on for tactile reinforcement  Quick fatigue noted w/ this specific technique      Precautions: standard       Manual    7- 7-16   DTM and TPR  X 10 min to R UT/LS  no X 10 min to R UT/LS  no no   Shoulder mobs                                     Exercise Diary   3 4 5 6   PROM Semi reclined x 3 min  x6-7 mins semi-reclined w/ end range stretch  X 5 min semi reclined  X 5-7 min semi reclined X 5 min semi-reclined    IR/ER isos X 10 ea ( manual )  x15 in semi-reclined  X 15 ea in semi recl 2x10 ea in semi recl No    pulleys Flexion x 20  x15-20 for scaption and flexion each Not today   Flexion, scaption & ER x 20 ea    Rows and ext RTB 2 x 10  RTB 2x10-15 RTB 2 x 10 ea  Y tubing 2x10 ea GTB rows & ext 2 x 10 ea seated   Active IR/ER Not today  YTB 2x10-15  YTB 2 x 10 ea  YTB 2x10 ea YTB 2 x 10 ea    scaption & ER  Trial active scaption again  Seated AA cane scaption x 15, w/ ecc lowering 2x5 Stance AA cane scaption 1 x 10  Tried self scaption but stopped s/t pain AA scaption with cane seated x 10    Shelf reach nv     Supine flex 1 lb x 10    Active scaption  Seated x 10        IR/ext  w cane   x 10 ea        Flexion  Semi- reclined  hands clasped x 10;   w cane x 10  Semi-reclined wand flexion x 15 Semi- reclined wand flexion 2 x 10  AB w cane slow ecc 2x 10 Semi reclined flexion w wand    x10      Wall slides x 10, w/ lift off 2x5  WS w lift off x 7  Reviewed how to do ext/ rows for HEP and gave YTB NS arms only lvl1 x 7 min             Trial overhead reach into cabinet   Post cuff stretch         IR w cane x 10 & active IR x 5           Biceps 2 lb x 20     Add triceps w tband                                            Modalities 2 3 4 5 6   heat X 10 min pre  no X 10 min  10 min seated No- Nustep above    Ice  X 10 min post  10 min post  X 10 min  10 min seated 10 min semi rec                        Assessment: Tolerated treatment well  Patient would benefit from continued PT; patient able to tolerate Nustep for UE warmup; progressed strengthening to include biceps w weight  & supine shldr flexion w light weight       Plan: Continue per plan of care

## 2019-07-18 ENCOUNTER — OFFICE VISIT (OUTPATIENT)
Dept: PHYSICAL THERAPY | Facility: CLINIC | Age: 79
End: 2019-07-18
Payer: MEDICARE

## 2019-07-18 DIAGNOSIS — S42.294A OTHER CLOSED NONDISPLACED FRACTURE OF PROXIMAL END OF RIGHT HUMERUS, INITIAL ENCOUNTER: Primary | ICD-10-CM

## 2019-07-18 PROCEDURE — 97140 MANUAL THERAPY 1/> REGIONS: CPT

## 2019-07-18 PROCEDURE — 97110 THERAPEUTIC EXERCISES: CPT

## 2019-07-18 PROCEDURE — 97112 NEUROMUSCULAR REEDUCATION: CPT

## 2019-07-18 NOTE — PROGRESS NOTES
Daily Note     Today's date: 2019  Patient name: Kaity Jaime  : 1940  MRN: [id number]  Referring provider: eSrvando Farnsworth  Dx:   Encounter Diagnosis     ICD-10-CM    1  Other closed nondisplaced fracture of proximal end of right humerus, initial encounter S42 294A                   Subjective: Pt reported that her pain level since the last visit had been a 0/10 and was a 0/10 after therapy  She noted that her shoulder did not cause any difficulty in sleeping  Objective: Pt needed min cueing to ensure that her shoulder did not hike during the AAROM with cane and help of therapist during scaption and slow ecc  Pt became quite fatigued by the end of the first set of 10        Precautions: standard       Manual    7-9  7-16   DTM and TPR  X 10 min to R UT/LS  no X 10 min to R UT/LS  no no   Shoulder mobs                                     Exercise Diary  7  4 5 6   PROM Semi reclined x 3 min  x6-7 mins semi-reclined w/ end range stretch  X 5 min semi reclined  X 5-7 min semi reclined X 5 min semi-reclined    IR/ER isos X 20 ea supine(manual )  x15 in semi-reclined  X 15 ea in semi recl 2x10 ea in semi recl No    pulleys Flexion, scaption & ER 3 min x15-20 for scaption and flexion each Not today   Flexion, scaption & ER x 20 ea    Rows and ext GTB 2 x 10  RTB 2x10-15 RTB 2 x 10 ea  Y tubing 2x10 ea GTB rows & ext 2 x 10 ea seated   Active IR/ER YTB 2x10 YTB 2x10-15  YTB 2 x 10 ea  YTB 2x10 ea YTB 2 x 10 ea    scaption & ER  AA with cane and therapist- slow ecc  1x10 Seated AA cane scaption x 15, w/ ecc lowering 2x5 Stance AA cane scaption 1 x 10  Tried self scaption but stopped s/t pain AA scaption with cane seated x 10    Shelf reach no    Supine flex 1 lb x 10    Active scaption         IR/ext  w cane   x 10 ea        Flexion    Semi-reclined wand flexion x 15 Semi- reclined wand flexion 2 x 10  AB w cane slow ecc 2x 10 Semi reclined flexion w wand    x10     Elbow flex/ext iso 2x10 ea Wall slides x 10, w/ lift off 2x5  WS w lift off x 7  Reviewed how to do ext/ rows for HEP and gave YTB NS arms only lvl1 x 7 min               Post cuff stretch         IR w cane x 10 & active IR x 5           Biceps 2 lb x 20                                                 Modalities 7  4 5 6   heat X 10 min pre  no X 10 min  10 min seated No- Nustep above    Ice  X 10 min post  10 min post  X 10 min  10 min seated 10 min semi rec                    Assessment: Tolerated treatment well  Patient demonstrated fatigue post treatment; pt will continue to gain strength and ROM with further skilled PT which will help her reach her goal of returning to ADLs  Plan: Continue per plan of care      AA scaption, reach toward shelf, pulleys    Mikki Garrett, SPT

## 2019-07-23 ENCOUNTER — OFFICE VISIT (OUTPATIENT)
Dept: PHYSICAL THERAPY | Facility: CLINIC | Age: 79
End: 2019-07-23
Payer: MEDICARE

## 2019-07-23 DIAGNOSIS — S42.294A OTHER CLOSED NONDISPLACED FRACTURE OF PROXIMAL END OF RIGHT HUMERUS, INITIAL ENCOUNTER: Primary | ICD-10-CM

## 2019-07-23 PROCEDURE — 97110 THERAPEUTIC EXERCISES: CPT

## 2019-07-23 PROCEDURE — 97112 NEUROMUSCULAR REEDUCATION: CPT

## 2019-07-23 PROCEDURE — 97140 MANUAL THERAPY 1/> REGIONS: CPT

## 2019-07-23 NOTE — PROGRESS NOTES
Daily Note     Today's date: 2019  Patient name: Cielo Gabriel  : 1940  MRN: [id number]  Referring provider: Sachin Ayoub  Dx:   Encounter Diagnosis     ICD-10-CM    1  Other closed nondisplaced fracture of proximal end of right humerus, initial encounter S42 294A        Start Time: 1430  Stop Time: 1535  Total time in clinic (min): 65 minutes    Subjective: Patient denies pain right shldr - reports having weakness reaching into her cabinets; has been  feeling very fatigued - not sleeping well ( but not related to shldr )       Objective: Pt needed min cueing to ensure that her shoulder did not hike during the AAROM with cane and help of therapist during scaption and slow ecc  Pt became quite fatigued by the end of the first set of 10        Precautions: standard       Manual    FOTO/PN    7-16   DTM and TPR  X 10 min to R UT/LS  X 8min X 10 min to R UT/LS  no no   Shoulder mobs                                     Exercise Diary  7 8  5 6   PROM Semi reclined x 3 min  Semi- reclined x 2 min - no  X 5 min semi reclined  X 5-7 min semi reclined X 5 min semi-reclined    IR/ER isos X 20 ea supine(manual )  X 20 ea seated - no  X 15 ea in semi recl 2x10 ea in semi recl No    pulleys Flexion, scaption & ER 3 min Flexion,  scaption and ER x 20 ea  Not today   Flexion, scaption & ER x 20 ea    Rows and ext GTB 2 x 10  GTB 2 x 10 ea   Seated  RTB 2 x 10 ea  Y tubing 2x10 ea GTB rows & ext 2 x 10 ea seated   Active IR/ER YTB 2x10 YTB 2x10 YTB 2 x 10 ea  YTB 2x10 ea YTB 2 x 10 ea    scaption & ER  AA with cane and therapist- slow ecc  1x10 Stance  AA cane scaption x 10  w/ ecc lowering  Stance AA cane scaption 1 x 10  Tried self scaption but stopped s/t pain AA scaption with cane seated x 10    Shelf reach no    Supine flex 1 lb x 10    Active scaption         IR/ext  w cane   x 10 ea  X 10 ea - no       Flexion    Overhead reach to 1st cabinet shelf w 1 lb x 10  Semi- reclined wand flexion 2 x 10  AB w cane slow ecc 2x 10 Semi reclined flexion w wand    x10     Elbow flex/ext iso  2x10 ea Wall slides x 10 - no  WS w lift off x 7  Reviewed how to do ext/ rows for HEP and gave YTB NS arms only lvl1 x 7 min               Post cuff stretch         IR w cane x 10 & active IR x 5           Biceps 2 lb x 20                                                 Modalities 7 8  5 6   heat X 10 min pre  X 10 min  X 10 min  10 min seated No- Nustep above    Ice  X 10 min post  10 min post  X 10 min  10 min seated 10 min semi rec                   Assessment: Tolerated treatment well  Patient would benefit from continued PT: FOTO score improved; ROM improving & tolerating gentle strengthening with therabands; added overhead reach into cabinet with 1 lb - able to perform 10 reps       Plan: Continue per plan of care

## 2019-07-23 NOTE — PROGRESS NOTES
Progress Note:       Goals  Short term goals (3 weeks): ALL ACHIEVED   1) Pt will improve R shoulder AROM by at least 25% pain free  2) Pt will improve R shoulder MMT testing by 1/3 grade  3) Pt will report pain at worse <3/10  4) Pt will initiate and progress HEP w/ special emphasis on functional shoulder ROM and stability  Long term goals (6 weeks) ALL PROGRESSED   1) Pt will improve FOTO to at least 65  - achieved   2) Pt will perform all self care to include dressing, washing, grooming, and light household work w/ pain <3/10 in R shoulder  3) Pt will R shoulder shelf lift w/ 2# x 10 w/o compensation or shoulder hike  4) Pt will be independent and compliant w/ HEP in order to maximize functional benefit of skilled PT following d/c  New Goals 2019:  Short term goals (2 weeks):    1) Pt will further improve R shoulder AROM by at least 25% pain free  2) Pt will further improve R shoulder MMT testing by 1/3 grade  3) Pt will report pain at worse <2/10  4) Pt will progress HEP w/ special emphasis on functional shoulder ROM and stability  Long term goals (4 weeks)   1) Pt will improve FOTO to at least 76    2) Pt will perform all self care to include dressing, washing, grooming, and light household work w/ pain <3/10 in R shoulder  3) Pt will R shoulder shelf lift w/ 2# x 10 w/o compensation or shoulder hike  4) Pt will be independent and compliant w/ HEP in order to maximize functional benefit of skilled PT following d/c       Pain  Current pain ratin  At best pain ratin  At worst pain ratin-2  Location: R anterior and lateral shoulder, into R biceps along path of proximal humerus   Quality: dull ache  Relieving factors: rest  Aggravating factors: overhead activity and lifting  Progression: improved    Social Support  Steps to enter house: yes  4  Lives in: apartment  Lives with: alone    Employment status: working (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[employer], 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)  Hand dominance: right    Patient Goals  Patient goals for therapy: increased strength, decreased pain and increased motion  Patient goal: be able to use R arm better than I currently am, be able to reach behind my back        Objective     Postural Observations  Seated posture: poor  Standing posture: fair  Correction of posture: makes symptoms better        Palpation     Additional Palpation Details  Pt has mod TTP over anterior aspect of R shoulder, along proximal shaft of humerus   General increase in tissue density throughout R upper quarter    Cervical/Thoracic Screen   Cervical range of motion within normal limits    Neurological Testing     Sensation     Shoulder   Left Shoulder   Intact: light touch    Right Shoulder   Intact: light touch    Active Range of Motion   Left Shoulder   Normal active range of motion    Right Shoulder   Flexion: 110 degrees with min pain  Abduction: 105 degrees with min pain    Additional Active Range of Motion Details  L IR to T5, R IR to base of L4/5    L ER to T1, R ER to C3/4 w/ head turn   Min sShoulder fe noted w/ R shoulder abduction and flexion     Passive Range of Motion   Left Shoulder   Normal passive range of motion    Right Shoulder   Flexion: 135 degrees   Abduction: 120 degrees   External rotation 0°: 15-20 degrees   Internal rotation 0°: 40 degrees     Additional Passive Range of Motion Details  Measured in semi-reclined position, requires less cueing throughout to prevent muscle guarding     Scapular Mobility     Right Shoulder   Scapular mobility: fair  Scapular Mobility with Shoulder to 90° FF   Upward rotation: delayed  Downward rotation: delayed    Strength/Myotome Testing     Left Shoulder     Planes of Motion   Flexion: 4+   Extension: 4+   Abduction: 4+   Adduction: 4+   External rotation at 0°: 4+   Internal rotation at 0°: 4+     Right Shoulder     Planes of Motion   Flexion: 3+   Extension: 4-   Abduction: 3+  External rotation at 0°: 4- Internal rotation at 0°: 4-     Additional Strength Details  MT and LT grossly 4/5 w/o pain     Tests     Additional Tests Details  Deferred s/t knowledge of diagnosis     ---------------------------------------------------------------------------------------------------------------------  PLEASE SEE PTA NOTE FOR TODAY'S TX  ---------------------------------------------------------------------------------------------------------------------    Assessment: Pt has achieved all STG and is making good progress towards LTG  She has done well in increases to intensity in overhead mobility and strength program  We will continue to increase this program as sx allow, w/ emphasis on functional activities  She reports very little pain throughout this process  We will continue on 2x/week basis for the next 3-4 weeks w/ eye towards possible d/c at that time

## 2019-07-24 ENCOUNTER — OFFICE VISIT (OUTPATIENT)
Dept: CARDIOLOGY CLINIC | Facility: CLINIC | Age: 79
End: 2019-07-24
Payer: MEDICARE

## 2019-07-24 VITALS
HEART RATE: 59 BPM | DIASTOLIC BLOOD PRESSURE: 70 MMHG | HEIGHT: 61 IN | SYSTOLIC BLOOD PRESSURE: 140 MMHG | BODY MASS INDEX: 38.87 KG/M2 | OXYGEN SATURATION: 97 % | WEIGHT: 205.9 LBS

## 2019-07-24 DIAGNOSIS — R60.0 EDEMA OF LEFT LOWER EXTREMITY: ICD-10-CM

## 2019-07-24 DIAGNOSIS — I48.0 PAROXYSMAL ATRIAL FIBRILLATION (HCC): Primary | ICD-10-CM

## 2019-07-24 DIAGNOSIS — I10 ESSENTIAL HYPERTENSION: ICD-10-CM

## 2019-07-24 DIAGNOSIS — R79.89 ELEVATED BRAIN NATRIURETIC PEPTIDE (BNP) LEVEL: ICD-10-CM

## 2019-07-24 DIAGNOSIS — I51.89 GRADE II DIASTOLIC DYSFUNCTION: ICD-10-CM

## 2019-07-24 PROCEDURE — 93000 ELECTROCARDIOGRAM COMPLETE: CPT | Performed by: INTERNAL MEDICINE

## 2019-07-24 PROCEDURE — 99214 OFFICE O/P EST MOD 30 MIN: CPT | Performed by: INTERNAL MEDICINE

## 2019-07-24 RX ORDER — TORSEMIDE 20 MG/1
TABLET ORAL
Qty: 90 TABLET | Refills: 3 | Status: SHIPPED | OUTPATIENT
Start: 2019-07-24 | End: 2019-10-23 | Stop reason: SDUPTHER

## 2019-07-24 RX ORDER — METOPROLOL TARTRATE 100 MG/1
100 TABLET ORAL EVERY 12 HOURS SCHEDULED
Qty: 180 TABLET | Refills: 1 | Status: SHIPPED | OUTPATIENT
Start: 2019-07-24 | End: 2020-04-22 | Stop reason: SDUPTHER

## 2019-07-24 NOTE — PROGRESS NOTES
Cardiology Outpatient Consultation                                                           Cally Zhang  [id number]  1940      Consult for:Afib  Appreciate consult by: Brooke Crawford    1  Paroxysmal atrial fibrillation (HCC)  POCT ECG       Discussion/Summary:  Afib-   Given age, htn, dm2- [id number] 5  eliquis 5mg twice a day  Continue on metoprolol 100 mg twice a day  No further falls    Acute on chronic diastolic hf- improved  Plan to change to torsemide 20 mg daily plus potassium supplementation [id number]  She will continue on metoprolol 100 mg twice a day  She was found to be in atrial fibrillation  No prior history  Recheck labwork in one month  Fall- Completed physical thearpy    Htn- controlled    Dm2- atorvastatin      HPI:  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 mellitus, hypertension with recent unfortunate mechanical fall found to be in atrial fibrillation  She also has a history of diastolic dysfunction and lower extremity edema chronic  She states being compliant with her diuretic but with poor response  She has been of plan with her medications  She denies having chest heaviness  She denies having any history of prior CVA  She denies having any major bleeding episodes  07/24/2019:  Her lower extremity edema is significantly improved  Her blood pressures been well controlled  Her heart rates have been controlled  She denies having any further falls  We reviewed through her last blood work  She denies feeling dizziness or lightheadedness    Past Medical History:   Diagnosis Date    Arthritis     knees    Diabetes mellitus ([address] )     type 2    GERD (gastroesophageal reflux disease)     Hyperlipidemia     Hypertension     Urinary incontinence     Use of cane as ambulatory aid     Wears glasses      Social History     Socioeconomic History    Marital status: Single     Spouse name: Not on file  Number of children: Not on file    Years of education: Not on file    Highest education level: Not on file   Occupational History    Not on file   Social Needs    Financial resource strain: Not on file    Food insecurity:     Worry: Not on file     Inability: Not on file    Transportation needs:     Medical: Not on file     Non-medical: Not on file   Tobacco Use    Smoking status: Never Smoker    Smokeless tobacco: Never Used   Substance and Sexual Activity    Alcohol use: Yes     Comment: social    Drug use: No    Sexual activity: Not on file   Lifestyle    Physical activity:     Days per week: Not on file     Minutes per session: Not on file    Stress: Not on file   Relationships    Social connections:     Talks on phone: Not on file     Gets together: Not on file     Attends [employer] service: Not on file     Active member of club or organization: Not on file     Attends meetings of clubs or organizations: Not on file     Relationship status: Not on file    Intimate partner violence:     Fear of current or ex partner: Not on file     Emotionally abused: Not on file     Physically abused: Not on file     Forced sexual activity: Not on file   Other Topics Concern    Not on file   Social History Narrative    Not on file      Family History   Problem Relation Age of Onset    Breast cancer Mother     Diabetes type II Mother     Atrial fibrillation Mother     Diabetes Mother         diet controlled    Hypertension Mother     Lung cancer Father     Alcohol abuse Father     Cancer Father         lung-smoker     Past Surgical History:   Procedure Laterality Date    BREAST SURGERY Left     nothing was there     COLONOSCOPY      HYSTERECTOMY      complete-fibroid    JOINT REPLACEMENT Left     knee    DC REMV CATARACT EXTRACAP,INSERT LENS Right 7/19/2018    Procedure: EXTRACTION EXTRACAPSULAR CATARACT PHACO INTRAOCULAR LENS (IOL);   Surgeon: Michelle Padron MD;  Location: [facility] MAIN OR; Service: Ophthalmology     [address] CATARACT EXTRACAP,INSERT LENS Left 8/9/2018    Procedure: EXTRACTION EXTRACAPSULAR CATARACT PHACO INTRAOCULAR LENS (IOL);   Surgeon: Arya Gaines MD;  Location: [facility] MAIN OR;  Service: Ophthalmology       Current Outpatient Medications:     amLODIPine (NORVASC) 10 mg tablet, TAKE 1 TABLET BY MOUTH EVERY DAY, Disp: 90 tablet, Rfl: 3    apixaban (ELIQUIS) 5 mg, Take 1 tablet (5 mg total) by mouth 2 (two) times a day, Disp: 56 tablet, Rfl: 0    atorvastatin (LIPITOR) 40 mg tablet, Take 1 tablet (40 mg total) by mouth daily, Disp: 90 tablet, Rfl: 1    CINNAMON PO, Take 1,000 mg by mouth every morning, Disp: , Rfl:     esomeprazole (NEXIUM) 40 MG capsule, Take 40 mg by mouth as needed  , Disp: , Rfl:     lisinopril (ZESTRIL) 10 mg tablet, Take 1 tablet (10 mg total) by mouth daily, Disp: 90 tablet, Rfl: 1    metFORMIN (GLUCOPHAGE) 1000 MG tablet, Take 1 tablet by mouth twice a day, Disp: 180 tablet, Rfl: 3    metoprolol tartrate (LOPRESSOR) 100 mg tablet, Take 1 tablet (100 mg total) by mouth every 12 (twelve) hours, Disp: 180 tablet, Rfl: 0    Multiple Vitamins-Minerals (PRESERVISION AREDS PO), Take by mouth, Disp: , Rfl:     Potassium Chloride ER (K-TAB) 20 MEQ TBCR, Take one tablet each day with torsemide, Disp: 90 tablet, Rfl: 3    torsemide (DEMADEX) 20 mg tablet, Take 1 tablet (20 mg total) by mouth every morning, Disp: 90 tablet, Rfl: 3    albuterol (PROAIR HFA) 90 mcg/act inhaler, Inhale 2 puffs every 6 (six) hours as needed for wheezing (Patient not taking: Reported on 5/6/2019), Disp: 8 5 g, Rfl: 0    Calcium Carb-Cholecalciferol (CALCIUM 600+D) 600-800 MG-UNIT TABS, Take by mouth every morning, Disp: , Rfl:     metoprolol tartrate (LOPRESSOR) 100 mg tablet, Take 1 tablet (100 mg total) by mouth every 12 (twelve) hours (Patient not taking: Reported on 7/24/2019), Disp: 180 tablet, Rfl: 1  No Known Allergies  Vitals:    07/24/19 1507   BP: 140/70   BP Location: Right arm   Patient Position: Sitting   Cuff Size: Large   Pulse: 59   SpO2: 97%   Weight: 93 4 kg (205 lb 14 4 oz)   Height: 5' 1" (1 549 m)       Review of Systems:  Review of Systems   Constitutional: Negative  Negative for activity change, appetite change, chills, diaphoresis, fatigue, fever and unexpected weight change  HENT: Negative  Negative for congestion, dental problem, drooling, ear discharge, ear pain, facial swelling, hearing loss, mouth sores, nosebleeds, postnasal drip, rhinorrhea, sinus pressure, sinus pain, sneezing, sore throat, tinnitus, trouble swallowing and voice change  Eyes: Negative  Negative for photophobia, pain, redness, itching and visual disturbance  Respiratory: Positive for shortness of breath  Negative for apnea, cough, choking, chest tightness, wheezing and stridor  Cardiovascular: Positive for leg swelling  Negative for chest pain and palpitations  Gastrointestinal: Negative  Negative for abdominal distention, abdominal pain, anal bleeding, blood in stool, constipation, diarrhea, nausea, rectal pain and vomiting  Endocrine: Negative  Negative for cold intolerance, heat intolerance, polydipsia, polyphagia and polyuria  Genitourinary: Negative  Negative for decreased urine volume, difficulty urinating, dyspareunia, dysuria, enuresis, flank pain, frequency, genital sores, hematuria, menstrual problem, pelvic pain, urgency, vaginal bleeding, vaginal discharge and vaginal pain  Musculoskeletal: Negative  Negative for arthralgias, back pain, gait problem, joint swelling, myalgias, neck pain and neck stiffness  Skin: Negative  Negative for color change, pallor, rash and wound  Allergic/Immunologic: Negative  Negative for environmental allergies, food allergies and immunocompromised state  Neurological: Negative  Negative for dizziness, tremors, seizures, syncope, facial asymmetry, speech difficulty, weakness, light-headedness, numbness and headaches  Hematological: Negative  Negative for adenopathy  Does not bruise/bleed easily  Psychiatric/Behavioral: Negative  Negative for agitation, behavioral problems, confusion, decreased concentration, dysphoric mood, hallucinations, self-injury, sleep disturbance and suicidal ideas  The patient is not nervous/anxious and is not hyperactive  All other systems reviewed and are negative  Vitals:    07/24/19 1507   BP: 140/70   BP Location: Right arm   Patient Position: Sitting   Cuff Size: Large   Pulse: 59   SpO2: 97%   Weight: 93 4 kg (205 lb 14 4 oz)   Height: 5' 1" (1 549 m)     Physical Exam:  Physical Exam   Constitutional: She is oriented to person, place, and time  No distress  HENT:   Head: Normocephalic and atraumatic  Right Ear: External ear normal    Left Ear: External ear normal    Eyes: Pupils are equal, round, and reactive to light  Conjunctivae are normal  Right eye exhibits no discharge  Left eye exhibits no discharge  No scleral icterus  Neck: Normal range of motion  Neck supple  JVD present  No tracheal deviation present  No thyromegaly present  Cardiovascular: Normal rate and regular rhythm  Exam reveals no gallop and no friction rub  Murmur heard  Pulmonary/Chest: Effort normal and breath sounds normal  No stridor  No respiratory distress  She has no wheezes  She has no rales  She exhibits no tenderness  Abdominal: Soft  Bowel sounds are normal  She exhibits no distension and no mass  There is no tenderness  There is no rebound and no guarding  Musculoskeletal: Normal range of motion  She exhibits no tenderness or deformity  Left lower leg: She exhibits edema  Neurological: She is alert and oriented to person, place, and time  She has normal reflexes  She displays normal reflexes  No cranial nerve deficit  She exhibits normal muscle tone  Coordination normal    Skin: Skin is warm and dry  No rash noted  She is not diaphoretic  No erythema  No pallor     Psychiatric: She has a normal mood and affect  Her behavior is normal  Judgment and thought content normal        Labs:     Lab Results   Component Value Date    WBC 6 09 2019    HGB 10 7 (L) 2019    HCT 34 3 (L) 2019    MCV 82 2019     2019     Lab Results   Component Value Date    K 4 3 2019     2019    CO2 21 2019    BUN 28 (H) 2019    CREATININE 1 19 2019    GLUF 126 (H) 2019    CALCIUM 9 2 2019    AST 12 2019    ALT 17 2019    ALKPHOS 91 2019    EGFR 44 2019     No results found for: CHOL  Lab Results   Component Value Date    HDL 74 (H) 2016     Lab Results   Component Value Date    LDLCALC 62 2016     Lab Results   Component Value Date    TRIG 132 2016     Lab Results   Component Value Date    HGBA1C 6 7 (H) 2019     No results found for: TSH    Imaging & Testing   I have personally reviewed pertinent reports  EKG: Personally reviewed  Atrial fibrillation nonspecific st-t wave changes    Cardiac testing:   Results for orders placed during the hospital encounter of 18   Echo complete with contrast if indicated    Narrative [address]  [address] [address]  [phone]    Transthoracic Echocardiogram  2D, M-mode, Doppler, and Color Doppler    Study date:  04-Dec-2018    Patient: Sharyn Cotton  MR number: [id number]  Account number: [de-identified]  : 1940  Age: 77 years  Gender: Female  Status: Outpatient  Location: Echo lab  Height: 61 in  Weight: 225 5 lb  BP: 147/ 97 mmHg    Indications: Dyspnea    Diagnoses: 794 31 - ABNORM ELECTROCARDIOGRAM    Sonographer:  AGAPITO Bates  Primary Physician:   Ren Leach DO  Referring Physician:  Harlan Johnson:  [facility]  Interpreting Physician:  Joe Castañeda MD    SUMMARY    LEFT VENTRICLE:  Systolic function was at the lower limits of normal  Ejection fraction was estimated in the range of 50 % to 55 % to be 55 %  There were no regional wall motion abnormalities  Wall thickness was mildly increased  Features were consistent with a pseudonormal left ventricular filling pattern, with concomitant abnormal relaxation and increased filling pressure (grade 2 diastolic dysfunction)  LEFT ATRIUM:  The atrium was mildly dilated  RIGHT ATRIUM:  The atrium was mildly dilated  AORTIC VALVE:  The valve was functionally bicuspid  Leaflets exhibited mildly to moderately increased thickness, mild to moderate calcification, mildly reduced cuspal separation, and sclerosis  Transaortic velocity was increased due to valvular stenosis  There was mild stenosis  Valve mean gradient was 15 mmHg  TRICUSPID VALVE:  There was trace regurgitation  The findings suggest mild pulmonary hypertension  HISTORY: PRIOR HISTORY: Diabetes, Edema,HTN,Hyperlipidemia,Gastroesophageal reflux disease    PROCEDURE: The procedure was performed in the echo lab  This was a routine study  The transthoracic approach was used  The study included complete 2D imaging, M-mode, complete spectral Doppler, and color Doppler  The heart rate was 67 bpm,  at the start of the study  Images were obtained from the parasternal, apical, subcostal, and suprasternal notch acoustic windows  Echocardiographic views were limited due to restricted patient mobility, poor patient compliance, poor  acoustic window availability, decreased penetration, and lung interference  This was a technically difficult study  LEFT VENTRICLE: Size was normal  Systolic function was at the lower limits of normal  Ejection fraction was estimated in the range of 50 % to 55 % to be 55 %  There were no regional wall motion abnormalities  Wall thickness was mildly  increased  No evidence of apical thrombus   DOPPLER: Features were consistent with a pseudonormal left ventricular filling pattern, with concomitant abnormal relaxation and increased filling pressure (grade 2 diastolic dysfunction)  RIGHT VENTRICLE: The size was normal  Systolic function was normal  Wall thickness was normal     LEFT ATRIUM: The atrium was mildly dilated  RIGHT ATRIUM: The atrium was mildly dilated  MITRAL VALVE: Valve structure was normal  There was normal leaflet separation  DOPPLER: The transmitral velocity was within the normal range  There was no evidence for stenosis  There was no significant regurgitation  AORTIC VALVE: The valve was functionally bicuspid  Leaflets exhibited mildly to moderately increased thickness, mild to moderate calcification, mildly reduced cuspal separation, and sclerosis  DOPPLER: Transaortic velocity was increased  due to valvular stenosis  There was mild stenosis  There was no significant regurgitation  TRICUSPID VALVE: The valve structure was normal  There was normal leaflet separation  DOPPLER: The transtricuspid velocity was within the normal range  There was no evidence for stenosis  There was trace regurgitation  Estimated peak PA  pressure was 38 mmHg  The findings suggest mild pulmonary hypertension  PULMONIC VALVE: Leaflets exhibited normal thickness, no calcification, and normal cuspal separation  DOPPLER: The transpulmonic velocity was within the normal range  There was no significant regurgitation  PERICARDIUM: There was no pericardial effusion  The pericardium was normal in appearance  AORTA: The root exhibited normal size  SYSTEMIC VEINS: IVC: The inferior vena cava was normal in size      MEASUREMENT TABLES    DOPPLER MEASUREMENTS  Aortic valve   (Reference normals)  Peak zaire   250 cm/s   (--)  Peak gradient   26 mmHg   (--)  Mean gradient   15 mmHg   (--)    SYSTEM MEASUREMENT TABLES    2D mode  AoR Diam 2D: 3 2 cm  LA Diam (2D): 4 1 cm  LA/Ao (2D): 1 28  FS (2D Teich): 26 4 %  IVSd (2D): 1 31 cm  LVDEV: 72 1 cm³  LVEDV MOD BP: 102 cm³  LVESV: 34 4 cm³  LVIDd(2D): 4 05 cm  LVISd (2D): 2 98 cm  LVOT Area 2D: 3 14 cm squared  LVPWd (2D): 1 3 cm  SV (Teich): 37 7 cm³    Apical four chamber  LVEF A4C: 50 %    Apical two chamber  LVEF A2C: 52 %    Unspecified Scan Mode  DEVON Cont Eq (Peak Steve): 1 49 cm squared  LVOT Diam : 2 cm  LVOT Vmax: 1210 mm/s  LVOT Vmax; Mean: 1210 mm/s  Peak Grad ; Mean: 6 mm[Hg]  MV Peak A Steve: 291 mm/s  MV Peak E Steve  Mean: 1060 mm/s  MVA (PHT): 6 29 cm squared  PHT: 35 ms  Max P mm[Hg]  V Max: 2390 mm/s  Vmax: 2880 mm/s  RA Area: 21 5 cm squared  RA Volume: 65 8 cm³  TAPSE: 1 9 cm    [facility]    Prepared and electronically signed by    Alexandr Diaz MD  Signed 05-Dec-2018 11:11:08       No results found for this or any previous visit  Alexandr Diaz MD [facility] [phone]  Please call with any questions or suggestions    Counseling :  A description of the counseling:   Goals and Barriers:  Patient's ability to self care:  Medication side effect reviewed with patient in detail and all their questions answered  "This note has been constructed using a voice recognition system  Therefore there may be syntax, spelling, and/or grammatical errors   Please call if you have any questions  "

## 2019-07-25 ENCOUNTER — OFFICE VISIT (OUTPATIENT)
Dept: PHYSICAL THERAPY | Facility: CLINIC | Age: 79
End: 2019-07-25
Payer: MEDICARE

## 2019-07-25 DIAGNOSIS — S42.294A OTHER CLOSED NONDISPLACED FRACTURE OF PROXIMAL END OF RIGHT HUMERUS, INITIAL ENCOUNTER: Primary | ICD-10-CM

## 2019-07-25 PROCEDURE — 97140 MANUAL THERAPY 1/> REGIONS: CPT

## 2019-07-25 PROCEDURE — 97110 THERAPEUTIC EXERCISES: CPT

## 2019-07-25 PROCEDURE — 97112 NEUROMUSCULAR REEDUCATION: CPT

## 2019-07-31 ENCOUNTER — OFFICE VISIT (OUTPATIENT)
Dept: OBGYN CLINIC | Facility: CLINIC | Age: 79
End: 2019-07-31
Payer: MEDICARE

## 2019-07-31 ENCOUNTER — APPOINTMENT (OUTPATIENT)
Dept: RADIOLOGY | Facility: CLINIC | Age: 79
End: 2019-07-31
Payer: MEDICARE

## 2019-07-31 VITALS
HEART RATE: 51 BPM | SYSTOLIC BLOOD PRESSURE: 105 MMHG | BODY MASS INDEX: 38.78 KG/M2 | WEIGHT: 205.4 LBS | DIASTOLIC BLOOD PRESSURE: 67 MMHG | HEIGHT: 61 IN

## 2019-07-31 DIAGNOSIS — S42.294A OTHER CLOSED NONDISPLACED FRACTURE OF PROXIMAL END OF RIGHT HUMERUS, INITIAL ENCOUNTER: ICD-10-CM

## 2019-07-31 DIAGNOSIS — S42.294D OTHER CLOSED NONDISPLACED FRACTURE OF PROXIMAL END OF RIGHT HUMERUS WITH ROUTINE HEALING, SUBSEQUENT ENCOUNTER: ICD-10-CM

## 2019-07-31 DIAGNOSIS — S42.294A OTHER CLOSED NONDISPLACED FRACTURE OF PROXIMAL END OF RIGHT HUMERUS, INITIAL ENCOUNTER: Primary | ICD-10-CM

## 2019-07-31 PROCEDURE — 73030 X-RAY EXAM OF SHOULDER: CPT

## 2019-07-31 PROCEDURE — 99213 OFFICE O/P EST LOW 20 MIN: CPT | Performed by: ORTHOPAEDIC SURGERY

## 2019-07-31 NOTE — PROGRESS NOTES
Assessment/Plan:  1  Other closed nondisplaced fracture of proximal end of right humerus, initial encounter  XR shoulder 2+ vw right   2  Other closed nondisplaced fracture of proximal end of right humerus with routine healing, subsequent encounter         Scribe Attestation    I,:   Nadir Adames am acting as a scribe while in the presence of the attending physician :        I,:   Harmeet Lundy MD personally performed the services described in this documentation    as scribed in my presence :          Melba Ramirez is doing very well  On exam today, she demonstrates excellent range of motion and improving strength about her shoulder  We did review her x-rays and I explained that her fracture is now completely healed  I do think that she would benefit from continued formal physical therapy 2-3 times per week for the next four weeks focusing on improving her strength  She will follow up on an as-needed basis  Subjective:   Miquel Narayan is a 77 y o  female who presents to the office today for follow-up evaluation of her right shoulder, now approximately four months status post closed treatment a proximal humerus fracture  She has been participating in formal outpatient physical therapy and is very happy with her experience there  At today's visit, she states that her shoulder is pain-free including during periods of extended activity  She notes good range of motion about the shoulder and states that her strength is improving  She does still complain of inability to reach behind her back but otherwise is able to perform all activities of daily living  She denies any new injury or trauma  She denies any distal paresthesias of the upper extremity  Review of Systems   Constitutional: Negative for chills, fever and unexpected weight change  HENT: Negative for hearing loss, nosebleeds and sore throat  Eyes: Negative for pain, redness and visual disturbance     Respiratory: Negative for cough, shortness of breath and wheezing  Cardiovascular: Negative for chest pain, palpitations and leg swelling  Gastrointestinal: Negative for abdominal pain, nausea and vomiting  Endocrine: Negative for polydipsia and polyuria  Genitourinary: Negative for dysuria and hematuria  Musculoskeletal: Negative for arthralgias, joint swelling and myalgias  See HPI   Skin: Negative for rash and wound  Neurological: Negative for dizziness, numbness and headaches  Psychiatric/Behavioral: Negative for decreased concentration and suicidal ideas  The patient is not nervous/anxious  Past Medical History:   Diagnosis Date    Arthritis     knees    Diabetes mellitus ([address] )     type 2    GERD (gastroesophageal reflux disease)     Hyperlipidemia     Hypertension     Urinary incontinence     Use of cane as ambulatory aid     Wears glasses        Past Surgical History:   Procedure Laterality Date    BREAST SURGERY Left     nothing was there     COLONOSCOPY      HYSTERECTOMY      complete-fibroid    JOINT REPLACEMENT Left     knee    HI REMV CATARACT EXTRACAP,INSERT LENS Right 7/19/2018    Procedure: EXTRACTION EXTRACAPSULAR CATARACT PHACO INTRAOCULAR LENS (IOL); Surgeon: Hellen Gutierrez MD;  Location: [facility] MAIN OR;  Service: Ophthalmology     [facility] CATARACT EXTRACAP,INSERT LENS Left 8/9/2018    Procedure: EXTRACTION EXTRACAPSULAR CATARACT PHACO INTRAOCULAR LENS (IOL);   Surgeon: Hellen Gutierrez MD;  Location: [facility] MAIN OR;  Service: Ophthalmology       Family History   Problem Relation Age of Onset   Shilpa Cabrera Breast cancer Mother     Diabetes type II Mother     Atrial fibrillation Mother     Diabetes Mother         diet controlled    Hypertension Mother     Lung cancer Father     Alcohol abuse Father     Cancer Father         lung-smoker       Social History     Occupational History    Not on file   Tobacco Use    Smoking status: Never Smoker    Smokeless tobacco: Never Used   Substance and Sexual Activity    Alcohol use: Yes     Comment: social    Drug use: No    Sexual activity: Not on file         Current Outpatient Medications:     amLODIPine (NORVASC) 10 mg tablet, TAKE 1 TABLET BY MOUTH EVERY DAY, Disp: 90 tablet, Rfl: 3    apixaban (ELIQUIS) 5 mg, Take 1 tablet (5 mg total) by mouth 2 (two) times a day, Disp: 180 tablet, Rfl: 3    atorvastatin (LIPITOR) 40 mg tablet, Take 1 tablet (40 mg total) by mouth daily, Disp: 90 tablet, Rfl: 1    Calcium Carb-Cholecalciferol (CALCIUM 600+D) 600-800 MG-UNIT TABS, Take by mouth every morning, Disp: , Rfl:     CINNAMON PO, Take 1,000 mg by mouth every morning, Disp: , Rfl:     esomeprazole (NEXIUM) 40 MG capsule, Take 40 mg by mouth as needed  , Disp: , Rfl:     lisinopril (ZESTRIL) 10 mg tablet, Take 1 tablet (10 mg total) by mouth daily, Disp: 90 tablet, Rfl: 1    metFORMIN (GLUCOPHAGE) 1000 MG tablet, Take 1 tablet by mouth twice a day, Disp: 180 tablet, Rfl: 3    metoprolol tartrate (LOPRESSOR) 100 mg tablet, Take 1 tablet (100 mg total) by mouth every 12 (twelve) hours, Disp: 180 tablet, Rfl: 1    Multiple Vitamins-Minerals (PRESERVISION AREDS PO), Take by mouth, Disp: , Rfl:     Potassium Chloride ER (K-TAB) 20 MEQ TBCR, Take one tablet each day with torsemide, Disp: 90 tablet, Rfl: 3    torsemide (DEMADEX) 20 mg tablet, Take one tablet [id number]-[id number] morning, Disp: 90 tablet, Rfl: 3    No Known Allergies    Objective:  Vitals:    07/31/19 1402   BP: 105/67   Pulse: (!) 51       Right Shoulder Exam     Range of Motion   Active abduction: 140   Forward flexion: 140   Internal rotation 0 degrees: Sacrum     Muscle Strength   Abduction: 4/5   Internal rotation: 5/5   External rotation: 5/5     Other   Erythema: absent  Scars: absent  Sensation: normal  Pulse: present            Physical Exam   Constitutional: She is oriented to person, place, and time  She appears well-developed and well-nourished     HENT:   Head: Normocephalic and atraumatic  Eyes: Pupils are equal, round, and reactive to light  Conjunctivae are normal    Neck: Normal range of motion  Neck supple  Cardiovascular: Normal rate and intact distal pulses  Pulmonary/Chest: Effort normal  No respiratory distress  Musculoskeletal:   As noted in HPI   Neurological: She is alert and oriented to person, place, and time  Skin: Skin is warm and dry  Psychiatric: She has a normal mood and affect  Her behavior is normal    Vitals reviewed  I have personally reviewed pertinent films in PACS and my interpretation is as follows:  X-ray of the right shoulder obtained on 07/31/2019 shows a healed fracture of the proximal humerus

## 2019-08-01 ENCOUNTER — OFFICE VISIT (OUTPATIENT)
Dept: PHYSICAL THERAPY | Facility: CLINIC | Age: 79
End: 2019-08-01
Payer: MEDICARE

## 2019-08-01 DIAGNOSIS — S42.294A OTHER CLOSED NONDISPLACED FRACTURE OF PROXIMAL END OF RIGHT HUMERUS, INITIAL ENCOUNTER: Primary | ICD-10-CM

## 2019-08-01 PROCEDURE — 97110 THERAPEUTIC EXERCISES: CPT

## 2019-08-01 PROCEDURE — 97112 NEUROMUSCULAR REEDUCATION: CPT

## 2019-08-01 NOTE — PROGRESS NOTES
Daily Note     Today's date: 2019  Patient name: Braxton Hull  : 1940  MRN: [id number]  Referring provider: Linda Rowan  Dx:   Encounter Diagnosis     ICD-10-CM    1   Other closed nondisplaced fracture of proximal end of right humerus, initial encounter S42 294A        Start Time: 1345  Stop Time: 1445  Total time in clinic (min): 60 minutes    Subjective: Pt denies pain right shldr - saw MD - pleased with her bone growth/ healing; sending her back for PT to improve her IR       Objective: see exercise diary below       Precautions: standard       Manual    FOTO/PN   8- 1`     DTM and TPR  X 10 min to R UT/LS  X 8min X 10 min to R UT/LS  no no   Shoulder mobs                                     Exercise Diary  7 8 9 10 6   PROM Semi reclined x 3 min  Semi- reclined x 2 min - no  X 5 min semi reclined  X 5 semi - reclined  X 5 min semi-reclined    IR/ER isos X 20 ea supine(manual )  X 20 ea seated - no  X 15 ea in semi recl   2x10 ea in semi recl - no  No    pulleys Flexion, scaption & ER 3 min Flexion,  scaption and ER x 20 ea  Not today  Pulleys for flexion, scaption, ER x 20 ea  Flexion, scaption & ER x 20 ea    Rows and ext GTB 2 x 10  GTB 2 x 10 ea   Seated  GTB  2 x 10 ea  GTB 2 x 10 ea  GTB rows & ext 2 x 10 ea seated   Active IR/ER YTB 2x10 YTB 2x10 RTB  2 x 10 ea  RTB 2 x 10 ea  YTB 2 x 10 ea    scaption & ER  AA with cane and therapist- slow ecc  1x10 Stance  AA cane scaption x 10  w/ ecc lowering  Stance AA cane scaption x 10 w ecc lowering  Stance AA cane scaption x 10 w ecc lowering  AA scaption with cane seated x 10    Shelf reach no  Biceps 3 lb 2 positions x 15 ea; triceps BTB x 20  Biceps ( hammer curls ) seated 2 x 10 ; triceps BTB 2 x 10  Supine flex 1 lb x 10    Active scaption         IR/ext  w cane   x 10 ea  X 10 ea - no  IR - active x 10  IR w cane x 10 & w SOS x 5     Flexion    Overhead reach to 1st cabinet shelf w 1 lb x 10  Overhead reach to 1st cabinet shelf w 1 lb 2 x 10  Trial 2 lb NV        Elbow flex/ext iso  2x10 ea Wall slides x 10 - no  No   NS arms only lvl1 x 7 min                                                                                 Modalities 7 8 9 10     heat X 10 min pre  X 10 min  X 10 min  10 min seated No- Nustep above    Ice  X 10 min post  10 min post  X 10 min  10 min seated 10 min semi rec                       Assessment: Tolerated treatment well  Patient would benefit from continued PT; scaption & IR remain slightly painful & limited with both AAROM & AROM       Plan: Continue per plan of care

## 2019-08-05 ENCOUNTER — OFFICE VISIT (OUTPATIENT)
Dept: PHYSICAL THERAPY | Facility: CLINIC | Age: 79
End: 2019-08-05
Payer: MEDICARE

## 2019-08-05 DIAGNOSIS — S42.294A OTHER CLOSED NONDISPLACED FRACTURE OF PROXIMAL END OF RIGHT HUMERUS, INITIAL ENCOUNTER: Primary | ICD-10-CM

## 2019-08-05 PROCEDURE — 97112 NEUROMUSCULAR REEDUCATION: CPT

## 2019-08-05 PROCEDURE — 97110 THERAPEUTIC EXERCISES: CPT

## 2019-08-05 NOTE — PROGRESS NOTES
Daily Note     Today's date: 2019  Patient name: Haider Marino  : 1940  MRN: [id number]  Referring provider: Josefina Fields  Dx:   Encounter Diagnosis     ICD-10-CM    1   Other closed nondisplaced fracture of proximal end of right humerus, initial encounter S42 294A        Start Time: 1600  Stop Time: 1650  Total time in clinic (min): 50 minutes    Subjective: Patient describes a "discomfort" in her right shldr -  even had trouble sleeping on it; has trouble reaching behind to wash/dry her back     Objective: see exercise diary below       Precautions: standard       Manual     FOTO/PN   8- 1`  8-5   DTM and TPR  X 10 min to R UT/LS  X 8min X 10 min to R UT/LS  no no   Shoulder mobs                                     Exercise Diary   8 9 10 11   PROM Semi reclined x 3 min  Semi- reclined x 2 min - no  X 5 min semi reclined  X 5 semi - reclined  X 5 min semi-reclined    IR/ER isos X 20 ea supine(manual )  X 20 ea seated - no  X 15 ea in semi recl   2x10 ea in semi recl - no  No    pulleys Flexion, scaption & ER 3 min Flexion,  scaption and ER x 20 ea  Not today  Pulleys for flexion, scaption, ER x 20 ea  Flexion, scaption & ER x 20 ea    Rows and ext GTB 2 x 10  GTB 2 x 10 ea   Seated  GTB  2 x 10 ea  GTB 2 x 10 ea  GTB rows & ext 2 x 10 ea seated   Active IR/ER YTB 2x10 YTB 2x10 RTB  2 x 10 ea  RTB 2 x 10 ea  RTB 2 X 10 EA    scaption & ER  AA with cane and therapist- slow ecc  1x10 Stance  AA cane scaption x 10  w/ ecc lowering  Stance AA cane scaption x 10 w ecc lowering  Stance AA cane scaption x 10 w ecc lowering  AA scaption with cane seated x 10    Shelf reach no  Biceps 3 lb 2 positions x 15 ea; triceps BTB x 20  Biceps ( hammer curls ) seated 2 x 10 ; triceps BTB 2 x 10  Biceps 3 lb 2 positions - palms up & hammer x 15 ea; triceps BTB 2 x 10    Active scaption         IR/ext  w cane   x 10 ea  X 10 ea - no  IR - active x 10  IR w cane x 10 & w SOS x 5  IR w cane x 10 & w SOS x 5    Flexion    Overhead reach to 1st cabinet shelf w 1 lb x 10  Overhead reach to 1st cabinet shelf w 1 lb 2 x 10  Trial 2 lb NV  Overhead reach to 1 st cabinet shelf w 2 lb x 10      Elbow flex/ext iso  2x10 ea Wall slides x 10 - no  No                                                                                   Modalities  8 9 10  11   heat X 10 min pre  X 10 min  X 10 min  10 min seated No    Ice  X 10 min post  10 min post  X 10 min  10 min seated 10 min semi rec                   Assessment: Tolerated treatment well  Patient would benefit from continued PT; patient challenged with therex today - easily fatigued this session but able to progress weight for overhead reach to cabinet; functional IR still limited - encouraged gentle stretching @ home       Plan: Continue per plan of care

## 2019-08-08 ENCOUNTER — OFFICE VISIT (OUTPATIENT)
Dept: PHYSICAL THERAPY | Facility: CLINIC | Age: 79
End: 2019-08-08
Payer: MEDICARE

## 2019-08-08 DIAGNOSIS — S42.294A OTHER CLOSED NONDISPLACED FRACTURE OF PROXIMAL END OF RIGHT HUMERUS, INITIAL ENCOUNTER: Primary | ICD-10-CM

## 2019-08-08 PROCEDURE — 97112 NEUROMUSCULAR REEDUCATION: CPT

## 2019-08-08 PROCEDURE — 97110 THERAPEUTIC EXERCISES: CPT

## 2019-08-08 NOTE — PROGRESS NOTES
Daily Note     Today's date: 2019  Patient name: Oswald Martin  : 1940  MRN: [id number]  Referring provider: Emely Mcguire  Dx:   Encounter Diagnosis     ICD-10-CM    1   Other closed nondisplaced fracture of proximal end of right humerus, initial encounter S42 294A        Start Time: 1645  Stop Time: 1735  Total time in clinic (min): 50 minutes    Subjective: 0/10 pain right shldr; states besides difficulty reaching behind to hook a bra she is doing well - even reaching out to the side has improved     Objective: see exercise diary below       Precautions: standard       Manual  8-8  7-25 8- 1`  8-5   DTM and TPR  No  X 8min X 10 min to R UT/LS  no no   Shoulder mobs                                     Exercise Diary  12  9 10 11   PROM No  Semi- reclined x 2 min - no  X 5 min semi reclined  X 5 semi - reclined  X 5 min semi-reclined    IR/ER isos No  X 20 ea seated - no  X 15 ea in semi recl   2x10 ea in semi recl - no  No    pulleys Flexion, scaption & ER 3 min - no  Flexion,  scaption and ER x 20 ea  Not today  Pulleys for flexion, scaption, ER x 20 ea  Flexion, scaption & ER x 20 ea    Rows and ext BTB rows & ext x 20 ea  GTB 2 x 10 ea   Seated  GTB  2 x 10 ea  GTB 2 x 10 ea  GTB rows & ext 2 x 10 ea seated   Active IR/ER RTB 2 x 10 ea  YTB 2x10 RTB  2 x 10 ea  RTB 2 x 10 ea  RTB 2 X 10 EA    scaption & ER  AA with cane w eccentric lower x 20  Stance  AA cane scaption x 10  w/ ecc lowering  Stance AA cane scaption x 10 w ecc lowering  Stance AA cane scaption x 10 w ecc lowering  AA scaption with cane seated x 10    Shelf reach Biceps 3 lb 2 positions - palms up & hammer curls x 15 ea; triceps   BTB 2 x 10   Biceps 3 lb 2 positions x 15 ea; triceps BTB x 20  Biceps ( hammer curls ) seated 2 x 10 ; triceps BTB 2 x 10  Biceps 3 lb 2 positions - palms up & hammer x 15 ea; triceps BTB 2 x 10    Active scaption         IR/ext  w cane  IR w SOS x 10  X 10 ea - no  IR - active x 10  IR w cane x 10 & w SOS x 5  IR w cane x 10 & w SOS x 5    Flexion  Overhead reach to 1st cabinet shelf w 2 lb x 15  Overhead reach to 1st cabinet shelf w 1 lb x 10  Overhead reach to 1st cabinet shelf w 1 lb 2 x 10  Trial 2 lb NV  Overhead reach to 1 st cabinet shelf w 2 lb x 10       Wall slides x 10 - no  No                                                                                   Modalities 12  9 10  11   heat No  X 10 min  X 10 min  10 min seated No    Ice  No  10 min post  X 10 min  10 min seated 10 min semi rec                 Assessment: Tolerated treatment well  Patient would benefit from continued PT; patient able to perform overhead reach to 1st cabinet  shelf w 2 lb without pain & with increased reps; now performing scaption incl slow eccentric lower well; IR remains painful but able to perform 10 reps gently with SOS; discussed potential DC next session - patient in agreement       Plan: Continue per plan of care

## 2019-08-14 ENCOUNTER — OFFICE VISIT (OUTPATIENT)
Dept: PHYSICAL THERAPY | Facility: CLINIC | Age: 79
End: 2019-08-14
Payer: MEDICARE

## 2019-08-14 DIAGNOSIS — S42.294A OTHER CLOSED NONDISPLACED FRACTURE OF PROXIMAL END OF RIGHT HUMERUS, INITIAL ENCOUNTER: Primary | ICD-10-CM

## 2019-08-14 PROCEDURE — 97110 THERAPEUTIC EXERCISES: CPT

## 2019-08-14 PROCEDURE — 97112 NEUROMUSCULAR REEDUCATION: CPT

## 2019-08-14 NOTE — PROGRESS NOTES
D/C Note     Today's date: 2019  Patient name: Chito Syed  : 1940  MRN: [id number]  Referring provider: Abraham Lanier  Dx:   Encounter Diagnosis     ICD-10-CM    1  Other closed nondisplaced fracture of proximal end of right humerus, initial encounter S42 294A                   Subjective: Pt reports that PT has been very helpful but that she can manage remaining sx on own  Would like to review HEP one last time before d/c, but thinks that continued work at home will ensure continued progress  She notes that overhead motions are getting easier, and that motions behind the back are starting to loosen up  Functional status below:        Goals  Short term goals (3 weeks): ALL ACHIEVED   1) Pt will improve R shoulder AROM by at least 25% pain free  2) Pt will improve R shoulder MMT testing by 1/3 grade  3) Pt will report pain at worse <3/10  4) Pt will initiate and progress HEP w/ special emphasis on functional shoulder ROM and stability  Long term goals (6 weeks) ALL PROGRESSED   1) Pt will improve FOTO to at least 65  - achieved   2) Pt will perform all self care to include dressing, washing, grooming, and light household work w/ pain <3/10 in R shoulder  3) Pt will R shoulder shelf lift w/ 2# x 10 w/o compensation or shoulder hike  4) Pt will be independent and compliant w/ HEP in order to maximize functional benefit of skilled PT following d/c  New Goals 2019:  Short term goals (2 weeks):    1) Pt will further improve R shoulder AROM by at least 25% pain free  - progressed  2) Pt will further improve R shoulder MMT testing by 1/3 grade  - achieved   3) Pt will report pain at worse <2/10  - achieved   4) Pt will progress HEP w/ special emphasis on functional shoulder ROM and stability   - achieved     Long term goals (4 weeks)   1) Pt will improve FOTO to at least 76  - achieved   2) Pt will perform all self care to include dressing, washing, grooming, and light household work w/ pain <3/10 in R shoulder  - achieved   3) Pt will R shoulder shelf lift w/ 2# x 10 w/o compensation or shoulder hike  - progressed/achieved   4) Pt will be independent and compliant w/ HEP in order to maximize functional benefit of skilled PT following d/c  - achieved     Pain  Current pain ratin  At best pain ratin  At worst pain ratin  Location: R anterior and lateral shoulder, into R biceps along path of proximal humerus   Quality: dull ache  Relieving factors: rest  Aggravating factors: overhead activity and lifting  Progression: resolved     Social Support  Steps to enter house: yes  4  Lives in: apartment  Lives with: alone    Employment status: working (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[employer], 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)  Hand dominance: right    Patient Goals  Patient goals for therapy: increased strength, decreased pain and increased motion  Patient goal: be able to use R arm better than I currently am, be able to reach behind my back        Objective     Postural Observations  Seated posture: poor  Standing posture: fair  Correction of posture: makes symptoms better        Palpation     Additional Palpation Details  Pt has minimal TTP over anterior aspect of R shoulder, along proximal shaft of humerus   General increase in tissue density throughout R upper quarter    Cervical/Thoracic Screen   Cervical range of motion within normal limits    Neurological Testing     Sensation     Shoulder   Left Shoulder   Intact: light touch    Right Shoulder   Intact: light touch    Active Range of Motion   Left Shoulder   Normal active range of motion    Right Shoulder   Flexion: 120 degrees  Abduction: 115 degrees     Additional Active Range of Motion Details  L IR to T5, R IR to base of L4    L ER to T1, R ER to C5/6    Passive Range of Motion   Left Shoulder   Normal passive range of motion    Right Shoulder   Flexion: 135 degrees   Abduction: 130 degrees   External rotation 0°: 20-25 degrees Internal rotation 0°: 40 degrees     Additional Passive Range of Motion Details  Measured in semi-reclined position, requires less cueing throughout to prevent muscle guarding     Scapular Mobility     Right Shoulder   Scapular mobility: fair  Scapular Mobility with Shoulder to 90° FF   Upward rotation: delayed  Downward rotation: delayed    Strength/Myotome Testing     Left Shoulder     Planes of Motion   Flexion: 4+   Extension: 4+   Abduction: 4+   Adduction: 4+   External rotation at 0°: 4+   Internal rotation at 0°: 4+     Right Shoulder     Planes of Motion   Flexion: 4-   Extension: 4-   Abduction: 4-  External rotation at 0°: 4-   Internal rotation at 0°: 4     Additional Strength Details  MT and LT grossly 4/5 w/o pain     Tests     Additional Tests Details  Deferred s/t knowledge of diagnosis         Precautions: standard       Manual  8-8 8/14  8- 1`  8-5   DTM and TPR  No    no no   Shoulder mobs                                     Exercise Diary  13 14  11 12   PROM No    X 5 semi - reclined  X 5 min semi-reclined    IR/ER isos No    2x10 ea in semi recl - no  No    pulleys Flexion, scaption & ER 3 min - no  Flexion,  scaption and ER x 20 ea   Pulleys for flexion, scaption, ER x 20 ea  Flexion, scaption & ER x 20 ea    Rows and ext BTB rows & ext x 20 ea  GTB 2 x 10-15 standing  GTB 2 x 10 ea  GTB rows & ext 2 x 10 ea seated   Active IR/ER RTB 2 x 10 ea  YTB 2x10  RTB 2 x 10 ea  RTB 2 X 10 EA    scaption & ER  AA with cane w eccentric lower x 20  Reviewed   Stance AA cane scaption x 10 w ecc lowering  AA scaption with cane seated x 10    Shelf reach Biceps 3 lb 2 positions - palms up & hammer curls x 15 ea; triceps   BTB 2 x 10  Review of shelf reach  Biceps ( hammer curls ) seated 2 x 10 ; triceps BTB 2 x 10  Biceps 3 lb 2 positions - palms up & hammer x 15 ea; triceps BTB 2 x 10    Active scaption         IR/ext  w cane  IR w SOS x 10    IR w cane x 10 & w SOS x 5  IR w cane x 10 & w SOS x 5 Flexion  Overhead reach to 1st cabinet shelf w 2 lb x 15    Trial 2 lb NV  Overhead reach to 1 st cabinet shelf w 2 lb x 10               Review of dowel stretches x 2-3 mins        Final HEP review and MMT/ROM assessment x 5-6 mins total                                                                   Modalities 13 14  11  12   heat No  no  10 min seated No    Ice  No  no  10 min seated 10 min semi rec               Assessment: Tolerated treatment well  Patient exhibited good technique with therapeutic exercises  She has made good progress towards, or achieved all goals set at IE  She is appropriate for d/c to home program now as her ROM and strength have improved to the point where HEP will be adequate for continued gains/maintenence  She has been a pleasure to work with and will follow up on an as needed basis         Plan: D/C to HEP

## 2019-08-19 ENCOUNTER — APPOINTMENT (OUTPATIENT)
Dept: PHYSICAL THERAPY | Facility: CLINIC | Age: 79
End: 2019-08-19
Payer: MEDICARE

## 2019-08-22 ENCOUNTER — APPOINTMENT (OUTPATIENT)
Dept: PHYSICAL THERAPY | Facility: CLINIC | Age: 79
End: 2019-08-22
Payer: MEDICARE

## 2019-08-26 ENCOUNTER — APPOINTMENT (OUTPATIENT)
Dept: PHYSICAL THERAPY | Facility: CLINIC | Age: 79
End: 2019-08-26
Payer: MEDICARE

## 2019-08-29 ENCOUNTER — APPOINTMENT (OUTPATIENT)
Dept: PHYSICAL THERAPY | Facility: CLINIC | Age: 79
End: 2019-08-29
Payer: MEDICARE

## 2019-10-07 ENCOUNTER — OFFICE VISIT (OUTPATIENT)
Dept: URGENT CARE | Facility: CLINIC | Age: 79
End: 2019-10-07
Payer: MEDICARE

## 2019-10-07 VITALS
RESPIRATION RATE: 16 BRPM | OXYGEN SATURATION: 100 % | DIASTOLIC BLOOD PRESSURE: 80 MMHG | BODY MASS INDEX: 39.78 KG/M2 | SYSTOLIC BLOOD PRESSURE: 138 MMHG | WEIGHT: 202.6 LBS | HEART RATE: 54 BPM | HEIGHT: 60 IN | TEMPERATURE: 97 F

## 2019-10-07 DIAGNOSIS — M54.31 RIGHT SIDED SCIATICA: Primary | ICD-10-CM

## 2019-10-07 PROCEDURE — 99213 OFFICE O/P EST LOW 20 MIN: CPT | Performed by: PHYSICIAN ASSISTANT

## 2019-10-07 RX ORDER — METHYLPREDNISOLONE 4 MG/1
TABLET ORAL
Qty: 21 EACH | Refills: 0 | Status: SHIPPED | OUTPATIENT
Start: 2019-10-07 | End: 2019-10-31 | Stop reason: SDUPTHER

## 2019-10-07 NOTE — PROGRESS NOTES
[address] [facility]        NAME: Michael Peguero is a [de-identified] y o  female  : 1940    MRN: [id number]  DATE: October 10, 2019  TIME: 8:49 AM    Assessment and Plan   Right sided sciatica [M54 31]  1  Right sided sciatica  methylPREDNISolone 4 MG tablet therapy pack         Patient Instructions     Discussed condition with patient  She has an acute flare sciatic in the absence of injury or trauma to lower back  Given her comorbid conditions and her anticoagulation therapy I will prescribe her an oral Medrol Dosepak to help reduce the inflammation and recommended ice, rest, gentle intermittent low back stretching, and avoiding any strenuous activity  If symptoms do not resolve within the next 1-2 weeks that she should be re-evaluated  Follow up with PCP in 3-5 days  Proceed to  ER if symptoms worsen  Chief Complaint     Chief Complaint   Patient presents with    Sciatica     pain began Friday in rt lumbar arear and radiates down to toes , has been using advil and heat but not getting expected relief   Has weakness when walking          History of Present Illness       Pt presents with 3 day history of pain in the right buttock which radiates down the RLE  Denies injury/trauma  She is concerned about sciatica  Has never had similar symptoms previously  She also has diabetic neuropathy  Reports walking and weightbearing is difficult  Denies radiation of the pain into the lower back  She has taken Advil/Tylenol and has applied heat  Review of Systems   Review of Systems   Constitutional: Negative  Respiratory: Negative  Cardiovascular: Negative  Gastrointestinal: Negative  Genitourinary: Negative  Musculoskeletal:        Pain in right buttock radiating down the right lower extremity   Neurological: Negative            Current Medications       Current Outpatient Medications:     amLODIPine (NORVASC) 10 mg tablet, TAKE 1 TABLET BY MOUTH EVERY DAY, Disp: 90 tablet, Rfl: 3    apixaban (ELIQUIS) 5 mg, Take 1 tablet (5 mg total) by mouth 2 (two) times a day, Disp: 180 tablet, Rfl: 3    atorvastatin (LIPITOR) 40 mg tablet, Take 1 tablet (40 mg total) by mouth daily, Disp: 90 tablet, Rfl: 1    CINNAMON PO, Take 1,000 mg by mouth every morning, Disp: , Rfl:     lisinopril (ZESTRIL) 10 mg tablet, Take 1 tablet (10 mg total) by mouth daily, Disp: 90 tablet, Rfl: 1    metFORMIN (GLUCOPHAGE) 1000 MG tablet, Take 1 tablet by mouth twice a day, Disp: 180 tablet, Rfl: 3    metoprolol tartrate (LOPRESSOR) 100 mg tablet, Take 1 tablet (100 mg total) by mouth every 12 (twelve) hours, Disp: 180 tablet, Rfl: 1    Multiple Vitamins-Minerals (PRESERVISION AREDS PO), Take by mouth, Disp: , Rfl:     Potassium Chloride ER (K-TAB) 20 MEQ TBCR, Take one tablet each day with torsemide, Disp: 90 tablet, Rfl: 3    torsemide (DEMADEX) 20 mg tablet, Take one tablet [id number] morning, Disp: 90 tablet, Rfl: 3    Calcium Carb-Cholecalciferol (CALCIUM 600+D) 600-800 MG-UNIT TABS, Take by mouth every morning, Disp: , Rfl:     esomeprazole (NEXIUM) 40 MG capsule, Take 40 mg by mouth as needed  , Disp: , Rfl:     methylPREDNISolone 4 MG tablet therapy pack, Use as directed on package, Disp: 21 each, Rfl: 0    Current Allergies     Allergies as of 10/07/2019    (No Known Allergies)            The following portions of the patient's history were reviewed and updated as appropriate: allergies, current medications, past family history, past medical history, past social history, past surgical history and problem list      Past Medical History:   Diagnosis Date    Arthritis     knees    Diabetes mellitus ([address] )     type 2    GERD (gastroesophageal reflux disease)     Hyperlipidemia     Hypertension     Urinary incontinence     Use of cane as ambulatory aid     Wears glasses        Past Surgical History:   Procedure Laterality Date    BREAST SURGERY Left     nothing was there     COLONOSCOPY      HYSTERECTOMY complete-fibroid    JOINT REPLACEMENT Left     knee    [place] REMV CATARACT EXTRACAP,INSERT LENS Right 7/19/2018    Procedure: EXTRACTION EXTRACAPSULAR CATARACT PHACO INTRAOCULAR LENS (IOL); Surgeon: Madan Flowers MD;  Location: [facility] MAIN OR;  Service: Ophthalmology     [address] CATARACT EXTRACAP,INSERT LENS Left 8/9/2018    Procedure: EXTRACTION EXTRACAPSULAR CATARACT PHACO INTRAOCULAR LENS (IOL); Surgeon: Madan Flowers MD;  Location: [facility] MAIN OR;  Service: Ophthalmology       Family History   Problem Relation Age of Onset   [facility] Breast cancer Mother     Diabetes type II Mother     Atrial fibrillation Mother     Diabetes Mother         diet controlled    Hypertension Mother     Lung cancer Father     Alcohol abuse Father     Cancer Father         lung-smoker         Medications have been verified  Objective   /80   Pulse (!) 54   Temp (!) 97 °F (36 1 °C)   Resp 16   Ht 5' (1 524 m)   Wt 91 9 kg (202 lb 9 6 oz)   SpO2 100%   BMI 39 57 kg/m²        Physical Exam     Physical Exam   Constitutional: She is oriented to person, place, and time  She appears well-developed and well-nourished  No distress  Musculoskeletal:   Tenderness to palpation over right buttock in area of the SI joint and gluteus  No midline tenderness over the lumbar spine  No paraspinal spasm noted  Patient was able to actively flex the lumbar spine with some difficulty and range of motion is otherwise intact  Positive straight leg raise on the right  Neurological: She is alert and oriented to person, place, and time  She has normal reflexes  Psychiatric: She has a normal mood and affect  Vitals reviewed

## 2019-10-07 NOTE — PATIENT INSTRUCTIONS
Sciatica   WHAT YOU NEED TO KNOW:   Sciatica is a condition that causes pain along your sciatic nerve  The sciatic nerve runs from your spine through both sides of your buttocks  It then runs down the back of your thigh, into your lower leg and foot  Your sciatic nerve may be compressed, inflamed, irritated, or stretched  DISCHARGE INSTRUCTIONS:   Medicines:   · NSAIDs:  These medicines decrease swelling and pain  NSAIDs are available without a doctor's order  Ask your healthcare provider which medicine is right for you  Ask how much to take and when to take it  Take as directed  NSAIDs can cause stomach bleeding or kidney problems if not taken correctly  · Acetaminophen: This medicine decreases pain  Acetaminophen is available without a doctor's order  Ask how much to take and when to take it  Follow directions  Acetaminophen can cause liver damage if not taken correctly  · Muscle relaxers  help decrease pain and muscle spasms  · Take your medicine as directed  Contact your healthcare provider if you think your medicine is not helping or if you have side effects  Tell him of her if you are allergic to any medicine  Keep a list of the medicines, vitamins, and herbs you take  Include the amounts, and when and why you take them  Bring the list or the pill bottles to follow-up visits  Carry your medicine list with you in case of an emergency  Follow up with your healthcare provider as directed:  Write down your questions so you remember to ask them during your visits  Manage your symptoms:   · Activity:  Decrease your activity  Do not lift heavy objects or twist your back for at least 6 weeks  Slowly return to your usual activity  · Ice:  Ice helps decrease swelling and pain  Ice may also help prevent tissue damage  Use an ice pack, or put crushed ice in a plastic bag  Cover it with a towel and place it on your low back or leg for 15 to 20 minutes every hour or as directed      · Heat:  Heat helps decrease pain and muscle spasms  Apply heat on the area for 20 to 30 minutes every 2 hours for as many days as directed  · Physical therapy:  You may need to see physical therapist to teach you exercises to help improve movement and strength, and to decrease pain  An occupational therapist teaches you skills to help with your daily activities  · Use assistive devices if directed: You may need to wear back support, such as a back brace  You may need crutches, a cane, or a walker to decrease stress on your lower back and leg muscles  Ask your healthcare provider for more information about assistive devices and how to use them correctly  Self-care:   · Avoid pressure on your back and legs:  Do not  lift heavy objects, or stand or sit for long periods of time  · Lift objects safely:  Keep your back straight and bend your knees when you  an object  Do not bend or twist your back when you lift  · Maintain a healthy weight:  Ask your healthcare provider how much you should weigh  Ask him to help you create a weight loss plan if you are overweight  · Exercise:  Ask your healthcare provider about the best stretching, warmup, and exercise plan for you  Contact your healthcare provider if:   · You have pain in your lower back at night or when resting  · You have pain in your lower back with numbness below the knee  · You have weakness in one leg only  · You have questions or concerns about your condition or care  Return to the emergency department if:   · You have trouble holding back your urine or bowel movements  · You have weakness in both legs  · You have numbness in your groin or buttocks  © 2017 [address] [employer] Information is for End User's use only and may not be sold, redistributed or otherwise used for commercial purposes  All illustrations and images included in CareNotes® are the copyrighted property of [employer]  or [employer]    The above information is an  only  It is not intended as medical advice for individual conditions or treatments  Talk to your doctor, nurse or pharmacist before following any medical regimen to see if it is safe and effective for you  Lower Back Exercises   WHAT YOU NEED TO KNOW:   Lower back exercises help heal and strengthen your back muscles to prevent another injury  Ask your healthcare provider if you need to see a physical therapist for more advanced exercises  DISCHARGE INSTRUCTIONS:   Return to the emergency department if:   · You have severe pain that prevents you from moving  Contact your healthcare provider if:   · Your pain becomes worse  · You have new pain  · You have questions or concerns about your condition or care  Do lower back exercises safely:   · Do the exercises on a mat or firm surface  (not on a bed) to support your spine and prevent low back pain  · Move slowly and smoothly  Avoid fast or jerky motions  · Breathe normally  Do not hold your breath  · Stop if you feel pain  It is normal to feel some discomfort at first  Regular exercise will help decrease your discomfort over time  Lower back exercises: Your healthcare provider may recommend that you do back exercises 10 to 30 minutes each day  He may also recommend that you do exercises 1 to 3 times each day  Ask your healthcare provider which exercises are best for you and how often to do them  · Ankle pumps:  Lie on your back  Move your foot up (with your toes pointing toward your head)  Then, move your foot down (with your toes pointing away from you)  Repeat this exercise 10 times on each side  · Heel slides:  Lie on your back  Slowly bend one leg and then straighten it  Next, bend the other leg and then straighten it  Repeat 10 times on each side  · Pelvic tilt:  Lie on your back with your knees bent and feet flat on the floor   Place your arms in a relaxed position beside your body  Tighten the muscles of your abdomen and flatten your back against the floor  Hold for 5 seconds  Repeat 5 times  · Back stretch:  Lie on your back with your hands behind your head  Bend your knees and turn the lower half of your body to one side  Hold this position for 10 seconds  Repeat 3 times on each side  · Straight leg raises:  Lie on your back with one leg straight  Bend the other knee  Tighten your abdomen and then slowly lift the straight leg up about 6 to 12 inches off the floor  Hold for 1 to 5 seconds  Lower your leg slowly  Repeat 10 times on each leg  · Knee-to-chest:  Lie on your back with your knees bent and feet flat on the floor  Pull one of your knees toward your chest and hold it there for 5 seconds  Return your leg to the starting position  Lift the other knee toward your chest and hold for 5 seconds  Do this 5 times on each side  · Cat and camel:  Place your hands and knees on the floor  Arch your back upward toward the ceiling and lower your head  Round out your spine as much as you can  Hold for 5 seconds  Lift your head upward and push your chest downward toward the floor  Hold for 5 seconds  Do 3 sets or as directed  · Wall squats:  Stand with your back against a wall  Tighten the muscles of your abdomen  Slowly lower your body until your knees are bent at a 45 degree angle  Hold this position for 5 seconds  Slowly move back up to a standing position  Repeat 10 times  · Curl up:  Lie on your back with your knees bent and feet flat on the floor  Place your hands, palms down, underneath the curve in your lower back  Next, with your elbows on the floor, lift your shoulders and chest 2 to 3 inches  Keep your head in line with your shoulders  Hold this position for 5 seconds  When you can do this exercise without pain for 10 to 15 seconds, you may add a rotation   While your shoulders and chest are lifted off the ground, turn slightly to the left and hold  Repeat on the other side  · Bird dog:  Place your hands and knees on the floor  Keep your wrists directly below your shoulders and your knees directly below your hips  Pull your belly button in toward your spine  Do not flatten or arch your back  Tighten your abdominal muscles  Raise one arm straight out so that it is aligned with your head  Next, raise the leg opposite your arm  Hold this position for 15 seconds  Lower your arm and leg slowly and change sides  Do 5 sets  © 2017 [employer] Information is for End User's use only and may not be sold, redistributed or otherwise used for commercial purposes  All illustrations and images included in CareNotes® are the copyrighted property of [employer]  or [employer]  The above information is an  only  It is not intended as medical advice for individual conditions or treatments  Talk to your doctor, nurse or pharmacist before following any medical regimen to see if it is safe and effective for you

## 2019-10-18 ENCOUNTER — APPOINTMENT (OUTPATIENT)
Dept: LAB | Facility: CLINIC | Age: 79
End: 2019-10-18
Payer: MEDICARE

## 2019-10-18 DIAGNOSIS — R60.0 EDEMA OF LEFT LOWER EXTREMITY: ICD-10-CM

## 2019-10-18 DIAGNOSIS — I51.89 GRADE II DIASTOLIC DYSFUNCTION: ICD-10-CM

## 2019-10-18 DIAGNOSIS — I48.0 PAROXYSMAL ATRIAL FIBRILLATION (HCC): ICD-10-CM

## 2019-10-18 LAB
ANION GAP SERPL CALCULATED.3IONS-SCNC: 8 MMOL/L (ref 4–13)
BUN SERPL-MCNC: 24 MG/DL (ref 5–25)
CALCIUM SERPL-MCNC: 9.3 MG/DL (ref 8.3–10.1)
CHLORIDE SERPL-SCNC: 109 MMOL/L (ref 100–108)
CO2 SERPL-SCNC: 24 MMOL/L (ref 21–32)
CREAT SERPL-MCNC: 1.03 MG/DL (ref 0.6–1.3)
GFR SERPL CREATININE-BSD FRML MDRD: 51 ML/MIN/1.73SQ M
GLUCOSE P FAST SERPL-MCNC: 131 MG/DL (ref 65–99)
NT-PROBNP SERPL-MCNC: 2164 PG/ML
POTASSIUM SERPL-SCNC: 4 MMOL/L (ref 3.5–5.3)
SODIUM SERPL-SCNC: 141 MMOL/L (ref 136–145)

## 2019-10-18 PROCEDURE — 83880 ASSAY OF NATRIURETIC PEPTIDE: CPT

## 2019-10-18 PROCEDURE — 80048 BASIC METABOLIC PNL TOTAL CA: CPT

## 2019-10-18 PROCEDURE — 36415 COLL VENOUS BLD VENIPUNCTURE: CPT

## 2019-10-23 ENCOUNTER — OFFICE VISIT (OUTPATIENT)
Dept: CARDIOLOGY CLINIC | Facility: CLINIC | Age: 79
End: 2019-10-23
Payer: MEDICARE

## 2019-10-23 VITALS
HEART RATE: 63 BPM | SYSTOLIC BLOOD PRESSURE: 118 MMHG | DIASTOLIC BLOOD PRESSURE: 78 MMHG | WEIGHT: 199.4 LBS | OXYGEN SATURATION: 98 % | HEIGHT: 60 IN | BODY MASS INDEX: 39.15 KG/M2

## 2019-10-23 DIAGNOSIS — I51.89 GRADE II DIASTOLIC DYSFUNCTION: ICD-10-CM

## 2019-10-23 DIAGNOSIS — E11.40 TYPE 2 DIABETES MELLITUS WITH DIABETIC NEUROPATHY, WITHOUT LONG-TERM CURRENT USE OF INSULIN (HCC): ICD-10-CM

## 2019-10-23 DIAGNOSIS — I48.0 PAROXYSMAL ATRIAL FIBRILLATION (HCC): Primary | ICD-10-CM

## 2019-10-23 DIAGNOSIS — R79.89 ELEVATED BRAIN NATRIURETIC PEPTIDE (BNP) LEVEL: ICD-10-CM

## 2019-10-23 DIAGNOSIS — I10 ESSENTIAL HYPERTENSION: ICD-10-CM

## 2019-10-23 DIAGNOSIS — R60.0 EDEMA OF LEFT LOWER EXTREMITY: ICD-10-CM

## 2019-10-23 PROCEDURE — 93000 ELECTROCARDIOGRAM COMPLETE: CPT | Performed by: INTERNAL MEDICINE

## 2019-10-23 PROCEDURE — 99214 OFFICE O/P EST MOD 30 MIN: CPT | Performed by: INTERNAL MEDICINE

## 2019-10-23 RX ORDER — TORSEMIDE 20 MG/1
TABLET ORAL
Qty: 90 TABLET | Refills: 3 | Status: SHIPPED | OUTPATIENT
Start: 2019-10-23 | End: 2020-08-17 | Stop reason: SDUPTHER

## 2019-10-23 NOTE — PROGRESS NOTES
Cardiology Outpatient Follow-up                                                          Aria Silver  [id number]  1940      1  Paroxysmal atrial fibrillation (HCC)  POCT ECG    apixaban (ELIQUIS) 5 mg    torsemide (DEMADEX) 20 mg tablet   2  Type 2 diabetes mellitus with diabetic neuropathy, without long-term current use of insulin (HCC)  Comprehensive metabolic panel    Magnesium    NT-BNP PRO   3  Grade II diastolic dysfunction  apixaban (ELIQUIS) 5 mg    torsemide (DEMADEX) 20 mg tablet    Comprehensive metabolic panel    Magnesium    NT-BNP PRO   4  Edema of left lower extremity  apixaban (ELIQUIS) 5 mg    torsemide (DEMADEX) 20 mg tablet    Comprehensive metabolic panel    Magnesium    NT-BNP PRO   5  Elevated brain natriuretic peptide (BNP) level  apixaban (ELIQUIS) 5 mg    torsemide (DEMADEX) 20 mg tablet   6  Essential hypertension  apixaban (ELIQUIS) 5 mg       Discussion/Summary:  Afib/flutter-   Given age, htn, dm2- crld0drol 5  eliquis 5mg twice a day  Continue on metoprolol 100 mg twice a day  No further falls  No dizziness or light-headness  Acute on chronic diastolic hf- improved  She has had weight loss  Plan to change to torsemide 20 mg daily plus potassium supplementation [id number]-[id number]  She will continue on metoprolol 100 mg twice a day  She was found to be in atrial fibrillation  No prior history  Recheck labwork in one month  GERD- nexium 40mg daily    Fall- Completed physical thearpy    Htn- controlled  amlodipne + lisinopril    Dm2- atorvastatin      HPI:  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, hypertension with recent unfortunate mechanical fall found to be in atrial fibrillation  She also has a history of diastolic dysfunction and lower extremity edema chronic  She states being compliant with her diuretic but with poor response  She has been of plan with her medications    She denies having chest heaviness  She denies having any history of prior CVA  She denies having any major bleeding episodes  07/24/2019:  Her lower extremity edema is significantly improved  Her blood pressures been well controlled  Her heart rates have been controlled  She denies having any further falls  We reviewed through her last blood work  She denies feeling dizziness or lightheadedness  10/23/2019:  Her weight has been trending down  She denies having recurrence of lower extremity edema  She is in chronic atrial fibrillation and rate controlled  She denies feeling dizziness or lightheadedness  There has been no falls  She has no trouble with Eliquis therapy  There has been no major bleeding    Past Medical History:   Diagnosis Date    Arthritis     knees    Diabetes mellitus ([facility] [address] )     type 2    GERD (gastroesophageal reflux disease)     Hyperlipidemia     Hypertension     Urinary incontinence     Use of cane as ambulatory aid     Wears glasses      Social History     Socioeconomic History    Marital status: Single     Spouse name: Not on file    Number of children: Not on file    Years of education: Not on file    Highest education level: Not on file   Occupational History    Not on file   Social Needs    Financial resource strain: Not on file    Food insecurity:     Worry: Not on file     Inability: Not on file    Transportation needs:     Medical: Not on file     Non-medical: Not on file   Tobacco Use    Smoking status: Never Smoker    Smokeless tobacco: Never Used   Substance and Sexual Activity    Alcohol use: Yes     Comment: social    Drug use: No    Sexual activity: Not on file   Lifestyle    Physical activity:     Days per week: Not on file     Minutes per session: Not on file    Stress: Not on file   Relationships    Social connections:     Talks on phone: Not on file     Gets together: Not on file     Attends Baptism service: Not on file     Active member of club or organization: Not on file     Attends meetings of clubs or organizations: Not on file     Relationship status: Not on file    Intimate partner violence:     Fear of current or ex partner: Not on file     Emotionally abused: Not on file     Physically abused: Not on file     Forced sexual activity: Not on file   Other Topics Concern    Not on file   Social History Narrative    Not on file      Family History   Problem Relation Age of Onset    Breast cancer Mother     Diabetes type II Mother     Atrial fibrillation Mother     Diabetes Mother         diet controlled    Hypertension Mother     Lung cancer Father     Alcohol abuse Father     Cancer Father         lung-smoker     Past Surgical History:   Procedure Laterality Date    BREAST SURGERY Left     nothing was there     COLONOSCOPY      HYSTERECTOMY      complete-fibroid    JOINT REPLACEMENT Left     knee     [facility] CATARACT EXTRACAP,INSERT LENS Right 7/19/2018    Procedure: EXTRACTION EXTRACAPSULAR CATARACT PHACO INTRAOCULAR LENS (IOL); Surgeon: Kelly Pitts MD;  Location: [facility] MAIN OR;  Service: Ophthalmology     [facility] CATARACT EXTRACAP,INSERT LENS Left 8/9/2018    Procedure: EXTRACTION EXTRACAPSULAR CATARACT PHACO INTRAOCULAR LENS (IOL);   Surgeon: Kelly Pitts MD;  Location: [facility] MAIN OR;  Service: Ophthalmology       Current Outpatient Medications:     amLODIPine (NORVASC) 10 mg tablet, TAKE 1 TABLET BY MOUTH EVERY DAY, Disp: 90 tablet, Rfl: 3    apixaban (ELIQUIS) 5 mg, Take 1 tablet (5 mg total) by mouth 2 (two) times a day, Disp: 180 tablet, Rfl: 3    atorvastatin (LIPITOR) 40 mg tablet, Take 1 tablet (40 mg total) by mouth daily, Disp: 90 tablet, Rfl: 1    CINNAMON PO, Take 1,000 mg by mouth every morning, Disp: , Rfl:     esomeprazole (NEXIUM) 40 MG capsule, Take 40 mg by mouth as needed  , Disp: , Rfl:     lisinopril (ZESTRIL) 10 mg tablet, Take 1 tablet (10 mg total) by mouth daily, Disp: 90 tablet, Rfl: 1    metFORMIN (GLUCOPHAGE) 1000 MG tablet, Take 1 tablet by mouth twice a day, Disp: 180 tablet, Rfl: 3    methylPREDNISolone 4 MG tablet therapy pack, Use as directed on package, Disp: 21 each, Rfl: 0    metoprolol tartrate (LOPRESSOR) 100 mg tablet, Take 1 tablet (100 mg total) by mouth every 12 (twelve) hours, Disp: 180 tablet, Rfl: 1    Multiple Vitamins-Minerals (PRESERVISION AREDS PO), Take by mouth, Disp: , Rfl:     Potassium Chloride ER (K-TAB) 20 MEQ TBCR, Take one tablet each day with torsemide, Disp: 90 tablet, Rfl: 3    torsemide (DEMADEX) 20 mg tablet, Take one tablet Lyk-Gsy-Rbbffw morning, Disp: 90 tablet, Rfl: 3    Calcium Carb-Cholecalciferol (CALCIUM 600+D) 600-800 MG-UNIT TABS, Take by mouth every morning, Disp: , Rfl:   No Known Allergies  Vitals:    10/23/19 1353   BP: 118/78   BP Location: Right arm   Patient Position: Sitting   Cuff Size: Large   Pulse: 63   SpO2: 98%   Weight: 90 4 kg (199 lb 6 4 oz)   Height: 5' (1 524 m)       Review of Systems:  Review of Systems   Constitutional: Negative  Negative for activity change, appetite change, chills, diaphoresis, fatigue, fever and unexpected weight change  HENT: Negative  Negative for congestion, dental problem, drooling, ear discharge, ear pain, facial swelling, hearing loss, mouth sores, nosebleeds, postnasal drip, rhinorrhea, sinus pressure, sinus pain, sneezing, sore throat, tinnitus, trouble swallowing and voice change  Eyes: Negative  Negative for photophobia, pain, redness, itching and visual disturbance  Respiratory: Positive for shortness of breath  Negative for apnea, cough, choking, chest tightness, wheezing and stridor  Cardiovascular: Positive for leg swelling  Negative for chest pain and palpitations  Gastrointestinal: Negative  Negative for abdominal distention, abdominal pain, anal bleeding, blood in stool, constipation, diarrhea, nausea, rectal pain and vomiting  Endocrine: Negative    Negative for cold intolerance, heat intolerance, polydipsia, polyphagia and polyuria  Genitourinary: Negative  Negative for decreased urine volume, difficulty urinating, dyspareunia, dysuria, enuresis, flank pain, frequency, genital sores, hematuria, menstrual problem, pelvic pain, urgency, vaginal bleeding, vaginal discharge and vaginal pain  Musculoskeletal: Negative  Negative for arthralgias, back pain, gait problem, joint swelling, myalgias, neck pain and neck stiffness  Skin: Negative  Negative for color change, pallor, rash and wound  Allergic/Immunologic: Negative  Negative for environmental allergies, food allergies and immunocompromised state  Neurological: Negative  Negative for dizziness, tremors, seizures, syncope, facial asymmetry, speech difficulty, weakness, light-headedness, numbness and headaches  Hematological: Negative  Negative for adenopathy  Does not bruise/bleed easily  Psychiatric/Behavioral: Negative  Negative for agitation, behavioral problems, confusion, decreased concentration, dysphoric mood, hallucinations, self-injury, sleep disturbance and suicidal ideas  The patient is not nervous/anxious and is not hyperactive  All other systems reviewed and are negative  Vitals:    10/23/19 1353   BP: 118/78   BP Location: Right arm   Patient Position: Sitting   Cuff Size: Large   Pulse: 63   SpO2: 98%   Weight: 90 4 kg (199 lb 6 4 oz)   Height: 5' (1 524 m)     Physical Exam:  Physical Exam   Constitutional: She is oriented to person, place, and time  She appears well-developed and well-nourished  No distress  HENT:   Head: Normocephalic and atraumatic  Right Ear: External ear normal    Left Ear: External ear normal    Eyes: Pupils are equal, round, and reactive to light  Conjunctivae are normal  Right eye exhibits no discharge  Left eye exhibits no discharge  No scleral icterus  Neck: Normal range of motion  Neck supple  JVD present  No tracheal deviation present  No thyromegaly present  Cardiovascular: Normal rate and regular rhythm  Exam reveals no gallop and no friction rub  Murmur heard  Pulmonary/Chest: Effort normal and breath sounds normal  No stridor  No respiratory distress  She has no wheezes  She has no rales  She exhibits no tenderness  Abdominal: Soft  Bowel sounds are normal  She exhibits no distension and no mass  There is no tenderness  There is no rebound and no guarding  Musculoskeletal: Normal range of motion  She exhibits no tenderness or deformity  Left lower leg: She exhibits edema  Neurological: She is alert and oriented to person, place, and time  She has normal reflexes  She displays normal reflexes  No cranial nerve deficit  She exhibits normal muscle tone  Coordination normal    Skin: Skin is warm and dry  No rash noted  She is not diaphoretic  No erythema  No pallor  Psychiatric: She has a normal mood and affect  Her behavior is normal  Judgment and thought content normal        Labs:     Lab Results   Component Value Date    WBC 6 09 04/02/2019    HGB 10 7 (L) 04/02/2019    HCT 34 3 (L) 04/02/2019    MCV 82 04/02/2019     04/02/2019     Lab Results   Component Value Date    K 4 0 10/18/2019     (H) 10/18/2019    CO2 24 10/18/2019    BUN 24 10/18/2019    CREATININE 1 03 10/18/2019    GLUF 131 (H) 10/18/2019    CALCIUM 9 3 10/18/2019    AST 12 07/09/2019    ALT 17 07/09/2019    ALKPHOS 91 07/09/2019    EGFR 51 10/18/2019     No results found for: CHOL  Lab Results   Component Value Date    HDL 74 (H) 04/11/2016     Lab Results   Component Value Date    LDLCALC 62 04/11/2016     Lab Results   Component Value Date    TRIG 132 04/11/2016     Lab Results   Component Value Date    HGBA1C 6 7 (H) 04/02/2019     No results found for: TSH    Imaging & Testing   I have personally reviewed pertinent reports  EKG: Personally reviewed      Atrial fibrillation nonspecific st-t wave changes    Cardiac testing:   Results for orders placed during the hospital encounter of 18   Echo complete with contrast if indicated    Narrative [address]  [address] [facility]  [place] [phone]    Transthoracic Echocardiogram  2D, M-mode, Doppler, and Color Doppler    Study date:  04-Dec-2018    Patient: Tg Gilmore  MR number: [id number]  Account number: [de-identified]  : 1940  Age: 77 years  Gender: Female  Status: Outpatient  Location: Echo lab  Height: 61 in  Weight: 225 5 lb  BP: 147/ 97 mmHg    Indications: Dyspnea    Diagnoses: 794 31 - ABNORM ELECTROCARDIOGRAM    Sonographer:  AGAPITO Carmona  Primary Physician: Joann Dawn DO  Referring Physician:  Jean Gold:  Cristino Schulz [facility]  Interpreting Physician:  Clarisa Rojas MD    SUMMARY    LEFT VENTRICLE:  Systolic function was at the lower limits of normal  Ejection fraction was estimated in the range of 50 % to 55 % to be 55 %  There were no regional wall motion abnormalities  Wall thickness was mildly increased  Features were consistent with a pseudonormal left ventricular filling pattern, with concomitant abnormal relaxation and increased filling pressure (grade 2 diastolic dysfunction)  LEFT ATRIUM:  The atrium was mildly dilated  RIGHT ATRIUM:  The atrium was mildly dilated  AORTIC VALVE:  The valve was functionally bicuspid  Leaflets exhibited mildly to moderately increased thickness, mild to moderate calcification, mildly reduced cuspal separation, and sclerosis  Transaortic velocity was increased due to valvular stenosis  There was mild stenosis  Valve mean gradient was 15 mmHg  TRICUSPID VALVE:  There was trace regurgitation  The findings suggest mild pulmonary hypertension  HISTORY: PRIOR HISTORY: Diabetes, Edema,HTN,Hyperlipidemia,Gastroesophageal reflux disease    PROCEDURE: The procedure was performed in the echo lab  This was a routine study  The transthoracic approach was used   The study included complete 2D imaging, M-mode, complete spectral Doppler, and color Doppler  The heart rate was 67 bpm,  at the start of the study  Images were obtained from the parasternal, apical, subcostal, and suprasternal notch acoustic windows  Echocardiographic views were limited due to restricted patient mobility, poor patient compliance, poor  acoustic window availability, decreased penetration, and lung interference  This was a technically difficult study  LEFT VENTRICLE: Size was normal  Systolic function was at the lower limits of normal  Ejection fraction was estimated in the range of 50 % to 55 % to be 55 %  There were no regional wall motion abnormalities  Wall thickness was mildly  increased  No evidence of apical thrombus  DOPPLER: Features were consistent with a pseudonormal left ventricular filling pattern, with concomitant abnormal relaxation and increased filling pressure (grade 2 diastolic dysfunction)  RIGHT VENTRICLE: The size was normal  Systolic function was normal  Wall thickness was normal     LEFT ATRIUM: The atrium was mildly dilated  RIGHT ATRIUM: The atrium was mildly dilated  MITRAL VALVE: Valve structure was normal  There was normal leaflet separation  DOPPLER: The transmitral velocity was within the normal range  There was no evidence for stenosis  There was no significant regurgitation  AORTIC VALVE: The valve was functionally bicuspid  Leaflets exhibited mildly to moderately increased thickness, mild to moderate calcification, mildly reduced cuspal separation, and sclerosis  DOPPLER: Transaortic velocity was increased  due to valvular stenosis  There was mild stenosis  There was no significant regurgitation  TRICUSPID VALVE: The valve structure was normal  There was normal leaflet separation  DOPPLER: The transtricuspid velocity was within the normal range  There was no evidence for stenosis  There was trace regurgitation  Estimated peak PA  pressure was 38 mmHg   The findings suggest mild pulmonary hypertension  PULMONIC VALVE: Leaflets exhibited normal thickness, no calcification, and normal cuspal separation  DOPPLER: The transpulmonic velocity was within the normal range  There was no significant regurgitation  PERICARDIUM: There was no pericardial effusion  The pericardium was normal in appearance  AORTA: The root exhibited normal size  SYSTEMIC VEINS: IVC: The inferior vena cava was normal in size  MEASUREMENT TABLES    DOPPLER MEASUREMENTS  Aortic valve   (Reference normals)  Peak steve   250 cm/s   (--)  Peak gradient   26 mmHg   (--)  Mean gradient   15 mmHg   (--)    SYSTEM MEASUREMENT TABLES    2D mode  AoR Diam 2D: 3 2 cm  LA Diam (2D): 4 1 cm  LA/Ao (2D): 1 28  FS (2D Teich): 26 4 %  IVSd (2D): 1 31 cm  LVDEV: 72 1 cm³  LVEDV MOD BP: 102 cm³  LVESV: 34 4 cm³  LVIDd(2D): 4 05 cm  LVISd (2D): 2 98 cm  LVOT Area 2D: 3 14 cm squared  LVPWd (2D): 1 3 cm  SV (Teich): 37 7 cm³    Apical four chamber  LVEF A4C: 50 %    Apical two chamber  LVEF A2C: 52 %    Unspecified Scan Mode  DEVON Cont Eq (Peak Steve): 1 49 cm squared  LVOT Diam : 2 cm  LVOT Vmax: 1210 mm/s  LVOT Vmax; Mean: 1210 mm/s  Peak Grad ; Mean: 6 mm[Hg]  MV Peak A Steve: 291 mm/s  MV Peak E Steve  Mean: 1060 mm/s  MVA (PHT): 6 29 cm squared  PHT: 35 ms  Max P mm[Hg]  V Max: 2390 mm/s  Vmax: 2880 mm/s  RA Area: 21 5 cm squared  RA Volume: 65 8 cm³  TAPSE: 1 9 cm    [employer] Accredited Echocardiography Laboratory    Prepared and electronically signed by    Oneyda Olmstead MD  Signed 05-Dec-2018 11:11:08         Oneyda Olmstead MD [facility][1] [phone]  Please call with any questions or suggestions    Counseling :  A description of the counseling:   Goals and Barriers:  Patient's ability to self care:  Medication side effect reviewed with patient in detail and all their questions answered  "This note has been constructed using a voice recognition system  Therefore there may be syntax, spelling, and/or grammatical errors   Please call if you have any questions  "

## 2019-10-31 ENCOUNTER — OFFICE VISIT (OUTPATIENT)
Dept: FAMILY MEDICINE CLINIC | Facility: CLINIC | Age: 79
End: 2019-10-31
Payer: MEDICARE

## 2019-10-31 VITALS
HEIGHT: 60 IN | DIASTOLIC BLOOD PRESSURE: 80 MMHG | WEIGHT: 197.9 LBS | BODY MASS INDEX: 38.85 KG/M2 | SYSTOLIC BLOOD PRESSURE: 130 MMHG | HEART RATE: 76 BPM | OXYGEN SATURATION: 99 % | RESPIRATION RATE: 20 BRPM

## 2019-10-31 DIAGNOSIS — M54.31 RIGHT SIDED SCIATICA: ICD-10-CM

## 2019-10-31 DIAGNOSIS — G57.01 PIRIFORMIS SYNDROME OF RIGHT SIDE: Primary | ICD-10-CM

## 2019-10-31 PROCEDURE — 99213 OFFICE O/P EST LOW 20 MIN: CPT | Performed by: FAMILY MEDICINE

## 2019-10-31 RX ORDER — METHYLPREDNISOLONE 4 MG/1
TABLET ORAL
Qty: 21 EACH | Refills: 0 | Status: SHIPPED | OUTPATIENT
Start: 2019-10-31 | End: 2020-04-22

## 2019-11-12 NOTE — PROGRESS NOTES
Assessment/Plan:    No problem-specific Assessment & Plan notes found for this encounter  Diagnoses and all orders for this visit:    Piriformis syndrome of right side    Right sided sciatica  -     methylPREDNISolone 4 MG tablet therapy pack; Use as directed on package          Subjective:      Patient ID: Mauro Licea is a [de-identified] y o  female  Lorenza Warner is here for sciatic pain she was seen at urgent care care now she did get better but now it is where she feels her leg buckle underneath urine is at risk for fall pain is extreme at this point  No numbness mostly pain      The following portions of the patient's history were reviewed and updated as appropriate: current medications, past family history, past medical history, past social history, past surgical history and problem list     Review of Systems   Constitutional: Negative  HENT: Negative  Respiratory: Negative  Endocrine: Negative  Genitourinary: Negative  Objective:      /80 (BP Location: Left arm, Patient Position: Sitting, Cuff Size: Adult)   Pulse 76   Resp 20   Ht 5' (1 524 m)   Wt 89 8 kg (197 lb 14 4 oz)   SpO2 99%   BMI 38 65 kg/m²          Physical Exam   Constitutional: She appears well-developed and well-nourished  She appears distressed     Significant amount of pain   Musculoskeletal:   Exquisite pain in the piriformis area strength is decreased in hip flexion due to pain she does have sensation in the entire leg  Patella and Achilles reflexes intact

## 2019-11-27 ENCOUNTER — OFFICE VISIT (OUTPATIENT)
Dept: PHYSICAL THERAPY | Facility: CLINIC | Age: 79
End: 2019-11-27
Payer: MEDICARE

## 2019-11-27 DIAGNOSIS — M54.31 SCIATICA OF RIGHT SIDE: Primary | ICD-10-CM

## 2019-11-27 PROCEDURE — 97161 PT EVAL LOW COMPLEX 20 MIN: CPT

## 2019-11-27 PROCEDURE — 97110 THERAPEUTIC EXERCISES: CPT

## 2019-11-27 NOTE — PROGRESS NOTES
PT Evaluation     Today's date: 2019  Patient name: Mayr Corbett  : 1940  MRN: [id number]  Referring provider: Self, Referral  Dx:   Encounter Diagnosis     ICD-10-CM    1  Sciatica of right side M54 31                    Assessment  Assessment details: Mary Corbett is a [de-identified] y o  female who presents with signs and symptoms consistent with a diagnosis of right-sided sciatica  Patient presents with the following impairments: pain, decreased strength, decreased ROM, decreased sensation, antalgic gait, decreased static/dynamic balance and pain with radicular symptoms down to right foot  Due to these impairments, patient has difficulty performing the following: ADL's, recreational activities, work-related activities, engaging in social activities, ambulation, stair negotiation, lifting/carrying, transfers, reaching overhead, prolonged sitting, prolonged standing, bed mobility, squatting, bending forward, bending backward, putting on/taking off shoes/socks, household chores, yard work   Patient has been educated in home exercise program and plan of care  Patient would benefit from skilled physical therapy services to address the above functional limitations and progress towards prior level of function and independence with home exercise program   Impairments: abnormal gait, abnormal muscle firing, abnormal or restricted ROM, activity intolerance, impaired balance, impaired physical strength, lacks appropriate home exercise program, pain with function, safety issue and poor body mechanics  Understanding of Dx/Px/POC: good   Prognosis: good    Goals  Short Term Goals (3 weeks)  1  Patient will be independent with HEP  2  Patient will demonstrate an increase in lumbar AROM by 20%  3  Patient will demonstrate an increase in B/L LE strength of 1/2 grade on MMT  4  Patient will present with decreased pain intensity of 5/10 at worst     Long Term Goals (6 weeks)  1   Patient will demonstrate an increase in lumbar AROM to WNL in order to promote self-care activities pain-free  2  Patient will demonstrate an increase in B/L LE strength of 1 grade on MMT in order to promote improved stair ambulation  3  Patient will present with decreased pain intensity of 2/10 at worst   4  Patient will be able to ascend/descend 13 stairs reciprocally without pain  5  Patient will be able to consistently sleep through a night without increased radicular symptoms into right LE  Plan  Patient would benefit from: skilled physical therapy  Planned modality interventions: cryotherapy, electrical stimulation/Russian stimulation, TENS, ultrasound, thermotherapy: hydrocollator packs, unattended electrical stimulation, high voltage pulsed current: pain management, high voltage pulsed current: spasm management and traction  Planned therapy interventions: flexibility, functional ROM exercises, graded exercise, home exercise program, joint mobilization, manual therapy, neuromuscular re-education, patient education, strengthening, stretching, therapeutic exercise, therapeutic activities, Duffy taping, balance/weight bearing training, gait training, abdominal trunk stabilization, activity modification, balance, body mechanics training, graded activity, self care, postural training, IADL retraining, ADL training, ADL retraining, breathing training, behavior modification, therapeutic training, transfer training and muscle pump exercises  Frequency: 2x week  Duration in weeks: 6  Treatment plan discussed with: patient        Subjective Evaluation    History of Present Illness  Date of onset: 10/4/2019  Mechanism of injury: Pt reports she has had right buttock and hip pain that began about one month ago with insidious onset  Pt states she woke up one morning and had difficulty walking  Pt states pain radiates from right buttock region down toward right foot   According to pt's record she went to urgent care on 10/7/19 and was diagnosed with right-sided sciatic  Pt states she saw PCP around 10/31/19 and was diagnosed with right piriformis syndrome  Pt was a previous pt at this facility for a right UE fracture and wishes to be evaluated and treated for right-sided sciatica  Pain  Current pain ratin  At best pain ratin  At worst pain ratin  Location: Right hip buttock  Relieving factors: heat and rest  Aggravating factors: standing, walking, stair climbing and lifting  Progression: improved    Social Support  Steps to enter house: yes  4  Stairs in house: yes   7  Lives with: alone    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: right      Diagnostic Tests  No diagnostic tests performed  Treatments  No previous or current treatments  Patient Goals  Patient goals for therapy: decreased pain, improved balance, increased motion, increased strength, independence with ADLs/IADLs and return to sport/leisure activities          Objective     Concurrent Complaints  Positive for disturbed sleep and bladder dysfunction  Negative for night pain and bowel dysfunction    Strength/Myotome Testing     Left Hip   Planes of Motion   Flexion: 4  Abduction: 3+  Adduction: 3+    Right Hip   Planes of Motion   Flexion: 3+  Abduction: 3+  Adduction: 3+    Left Knee   Flexion: 3+  Extension: 4    Right Knee   Flexion: 3+  Extension: 4-    Tests     Lumbar     Right   Positive slump test      Ambulation   Weight-Bearing Status   Assistive device used: single point cane    Observational Gait   Gait: antalgic   Decreased walking speed and stride length  Additional Observational Gait Details  Forward trunk, decreased knee flexion bilaterally    Functional Assessment      Squat    Unable to perform   General Comments:      Lumbar Comments  Pt presents with difficulty tolerating supine position  Pt tolerates head elevated better         Lumbar AROM     Flexion Fingertips to mid shins   Extension 50% with inc  radicular symptoms   L lateral flexion LJL   R lateral flexion LJL with pain   L rotation WNL   R rotation 75%              Precautions: HTN, DM, neuropathy    Daily Treatment Diary     Manual  11/27                                                   Exercise Diary  EVAL       Flexion in sitting 10x5s       Slump nerve glide seated 20x5s       Clamshells Seated  10x3s  red                                                                                                                       Pt edu/HEP Performed               Time            Modalities

## 2019-12-02 ENCOUNTER — APPOINTMENT (OUTPATIENT)
Dept: PHYSICAL THERAPY | Facility: CLINIC | Age: 79
End: 2019-12-02
Payer: MEDICARE

## 2019-12-04 ENCOUNTER — OFFICE VISIT (OUTPATIENT)
Dept: PHYSICAL THERAPY | Facility: CLINIC | Age: 79
End: 2019-12-04
Payer: MEDICARE

## 2019-12-04 DIAGNOSIS — M54.31 SCIATICA OF RIGHT SIDE: Primary | ICD-10-CM

## 2019-12-04 PROCEDURE — 97112 NEUROMUSCULAR REEDUCATION: CPT

## 2019-12-04 PROCEDURE — 97110 THERAPEUTIC EXERCISES: CPT

## 2019-12-04 NOTE — PROGRESS NOTES
Daily Note      Today's date: 2019  Patient name: Kota Velasquez  : 1940  MRN: [id number]  Referring provider: Self, Referral  Dx:   Encounter Diagnosis     ICD-10-CM    1  Sciatica of right side M54 31        Subjective: Pt reports right hip feels good today  Objective: See treatment diary below    Assessment: Pt tolerated treatment well  Reviewed initial HEP with pt and pt demonstrated good understanding  Pt introduced to seated hip adduction, seated march, standing hip flex, ext, and abd, and sit-to-stand  Pt noted muscle fatigue following today's exercise program, but presented without radicular symptoms  Plan: Progress treatment as tolerated  Precautions: HTN, DM, neuropathy    Daily Treatment Diary     Manual                                                    Exercise Diary  EVAL       Nustep (seat, arms 8) --> 10'  L1              Flexion in sitting 10x5s 20x5s      Slump nerve glide seated 20x5s 20x      Clamshells Seated  10x3s  red Seated  2x10  red      Seated hip ADD squeeze --> 2x10 (5s)      Standing hip flex, abd, ext --> 10x ea        Sit-to-stand --> 2x10   Elevated mat                                                                                                      Pt edu/HEP Performed Updated              Time            Modalities

## 2019-12-09 ENCOUNTER — OFFICE VISIT (OUTPATIENT)
Dept: PHYSICAL THERAPY | Facility: CLINIC | Age: 79
End: 2019-12-09
Payer: MEDICARE

## 2019-12-09 DIAGNOSIS — M54.31 SCIATICA OF RIGHT SIDE: Primary | ICD-10-CM

## 2019-12-09 PROCEDURE — 97112 NEUROMUSCULAR REEDUCATION: CPT

## 2019-12-09 PROCEDURE — 97110 THERAPEUTIC EXERCISES: CPT

## 2019-12-09 NOTE — PROGRESS NOTES
Daily Note      Today's date: 2019  Patient name: Mary Corbett  : 1940  MRN: [id number]  Referring provider: Self, Referral  Dx:   Encounter Diagnosis     ICD-10-CM    1  Sciatica of right side M54 31        Subjective: Pt reports that she currently does not have right-sided symptoms at all, however had stomach discomfort since this morning  Objective: See treatment diary below    Assessment: Pt tolerated treatment well  Pt demonstrates functional improvement with sit-to-stand transfers and is able to do so with more smoothness of movement  Pt introduced to step ups on level 1 step and requires B/L UE support to complete this  Pt notes that she currently negotiates steps at home non-reciprocally, leading with LLE  Plan: Progress treatment as tolerated  Precautions: HTN, DM, neuropathy    Daily Treatment Diary     Manual                                                   Exercise Diary  EVAL      Nustep (seat 7, arms 8) --> 10'  L1 10'  L1             Flexion in sitting 10x5s 20x5s 20x5s     Slump nerve glide seated 20x5s 20x 20x     Clamshells Seated  10x3s  red Seated  2x10  red Seated  2x10  red     Seated hip ADD squeeze --> 2x10 (5s) 2x10 (5s)     Standing hip flex, abd, ext --> 10x ea  15x ea       Sit-to-stand --> 2x10   Elevated mat 2x10  Elevated mat     Step up  --> 10x B/L  4" step                                                                                             Pt edu/HEP Performed Updated Reviewed             Time            Modalities

## 2019-12-11 ENCOUNTER — OFFICE VISIT (OUTPATIENT)
Dept: PHYSICAL THERAPY | Facility: CLINIC | Age: 79
End: 2019-12-11
Payer: MEDICARE

## 2019-12-11 DIAGNOSIS — M54.31 SCIATICA OF RIGHT SIDE: Primary | ICD-10-CM

## 2019-12-11 PROCEDURE — 97112 NEUROMUSCULAR REEDUCATION: CPT

## 2019-12-11 PROCEDURE — 97110 THERAPEUTIC EXERCISES: CPT

## 2019-12-11 PROCEDURE — 97140 MANUAL THERAPY 1/> REGIONS: CPT

## 2019-12-11 NOTE — PROGRESS NOTES
Daily Note      Today's date: 2019  Patient name: Leo Martínez  : 1940  MRN: [id number]  Referring provider: Self, Referral  Dx:   Encounter Diagnosis     ICD-10-CM    1  Sciatica of right side M54 31        Subjective: Pt reports that she felt increased soreness of right hip when she woke up this morning and is unable to attribute it to certain activity  Pt states that with more movement throughout this morning, right hip began to feel better  Pt states she just came from a luncheon  Objective: See treatment diary below    Assessment: Pt tolerated treatment fair  Pt progressed to hip strengthening exercises in supine and presented with difficulty attaining the position and with bed mobility, however tolerated the exercise well  Pt presents with improved mobility while walking with cane and is able to ambulate with faster gait speed  Plan: Progress treatment as tolerated  Precautions: HTN, DM, neuropathy    Daily Treatment Diary     Manual      B/L hamstring stretching    Performed    Sciatic nerve glide    Performed                                Exercise Diary  EVAL     Nustep (seat 7, arms 8) --> 10'  L1 10'  L1 10'  L1            Flexion in sitting 10x5s 20x5s 20x5s 20x5s    Slump nerve glide seated 20x5s 20x 20x 20x    Clamshells Seated  10x3s  red Seated  2x10  red Seated  2x10  red Supine  2x10  green    Seated hip ADD squeeze --> 2x10 (5s) 2x10 (5s) Supine  2x10    Standing hip flex, abd, ext --> 10x ea  15x ea  15x ea      Sit-to-stand --> 2x10   Elevated mat 2x10  Elevated mat --    Step up  --> 10x B/L  4" step --                                                                                            Pt edu/HEP Performed Updated Reviewed Reviewed            Time            Modalities

## 2019-12-16 ENCOUNTER — OFFICE VISIT (OUTPATIENT)
Dept: PHYSICAL THERAPY | Facility: CLINIC | Age: 79
End: 2019-12-16
Payer: MEDICARE

## 2019-12-16 DIAGNOSIS — M54.31 SCIATICA OF RIGHT SIDE: Primary | ICD-10-CM

## 2019-12-16 PROCEDURE — 97112 NEUROMUSCULAR REEDUCATION: CPT

## 2019-12-16 PROCEDURE — 97110 THERAPEUTIC EXERCISES: CPT

## 2019-12-16 NOTE — PROGRESS NOTES
Daily Note      Today's date: 2019  Patient name: Mary Corbett  : 1940  MRN: [id number]  Referring provider: Self, Referral  Dx:   Encounter Diagnosis     ICD-10-CM    1  Sciatica of right side M54 31         Subjective: Pt reports that she woke up with increased pain in right side and she used a heating pad and Advil, none of which relieved symptoms  Objective: See treatment diary below    Assessment: Pt tolerated treatment fair  Pt presented with increased difficulty transferring to the plinth and completing bed mobility this visit  Pt introduced to glute sets and tolerated well  Pt presented with noted muscle fatigue after today's session  Plan: Progress treatment as tolerated  Precautions: HTN, DM, neuropathy    Daily Treatment Diary     Manual     B/L hamstring stretching    Performed --   Sciatic nerve glide    Performed --                               Exercise Diary  EVAL    Nustep (seat 7, arms 8) --> 10'  L1 10'  L1 10'  L1 10'  L1           Flexion in sitting 10x5s 20x5s 20x5s 20x5s 20x5s   Slump nerve glide seated 20x5s 20x 20x 20x 20x   Clamshells Seated  10x3s  red Seated  2x10  red Seated  2x10  red Supine  2x10  green Supine  2x10  green   Seated hip ADD squeeze --> 2x10 (5s) 2x10 (5s) Supine  2x10 Supine  20x (5s)   Standing hip flex, abd, ext --> 10x ea  15x ea  15x ea  10x ea     Sit-to-stand --> 2x10   Elevated mat 2x10  Elevated mat -- 2x10   Elevated mat   Step up  --> 10x B/L  4" step -- 10x B/L  4" step   Glute sets    --> 2x10 (3s)                                                                                   Pt edu/HEP Performed Updated Reviewed Reviewed            Time            Modalities

## 2019-12-18 ENCOUNTER — OFFICE VISIT (OUTPATIENT)
Dept: PHYSICAL THERAPY | Facility: CLINIC | Age: 79
End: 2019-12-18
Payer: MEDICARE

## 2019-12-18 DIAGNOSIS — M54.31 SCIATICA OF RIGHT SIDE: Primary | ICD-10-CM

## 2019-12-18 PROCEDURE — 97112 NEUROMUSCULAR REEDUCATION: CPT

## 2019-12-18 PROCEDURE — 97110 THERAPEUTIC EXERCISES: CPT

## 2019-12-18 NOTE — PROGRESS NOTES
Daily Note      Today's date: 2019  Patient name: Alexsander Hdez  : 1940  MRN: [id number]  Referring provider: Self, Referral  Dx:   Encounter Diagnosis     ICD-10-CM    1  Sciatica of right side M54 31        Subjective: Pt reports that she is having a good day today  Pt states she is able to move around more easily  Pt notes that her right shoulder is bothering her, but she is unsure why  Objective: See treatment diary below    Assessment: Pt tolerated treatment well  Pt progressed from flexion in sitting to double knees to chest using peanut  Pt tolerated well, however continues to have decreased flexion ROM  Pt presented with improved exercise endurance and required two rest breaks in today's session  Plan: Progress treatment as tolerated            Precautions: HTN, DM, neuropathy    Daily Treatment Diary     Manual         B/L hamstring stretching --       Sciatic nerve glide --                                   Exercise Diary         Nustep (seat 7, arms 8) 10'  L1               Flexion in sitting --       Slump nerve glide seated 20x5s       Clamshells 2x10  green       Hip ADD squeeze 20x (5s)       Standing hip flex, abd, ext 15x ea       Sit-to-stand 2x10  Elevated mat       Step up 10x B/L   4" step       Glute sets 2x10 (3s)       DKTC 20x w/ PB                                                                               Pt edu/HEP Performed               Time            Modalities

## 2019-12-23 ENCOUNTER — OFFICE VISIT (OUTPATIENT)
Dept: PHYSICAL THERAPY | Facility: CLINIC | Age: 79
End: 2019-12-23
Payer: MEDICARE

## 2019-12-23 DIAGNOSIS — M54.31 SCIATICA OF RIGHT SIDE: Primary | ICD-10-CM

## 2019-12-23 PROCEDURE — 97110 THERAPEUTIC EXERCISES: CPT

## 2019-12-23 PROCEDURE — 97112 NEUROMUSCULAR REEDUCATION: CPT

## 2019-12-23 NOTE — PROGRESS NOTES
Daily Note      Today's date: 2019  Patient name: Sage Segovia  : 1940  MRN: [id number]  Referring provider: Self, Referral  Dx:   Encounter Diagnosis     ICD-10-CM    1  Sciatica of right side M54 31        Subjective: Pt reports that low back feels good today, however has increased pain in upper back on the right side, near the shoulder  Objective: See treatment diary below    Assessment: Pt tolerated treatment well  Pt demonstrates improved muscle endurance and is able to work through exercise program at a faster pace  Pt introduced to mini squats at parallel bars and was able to squat to 1/4 depth without increased symptoms  Plan: Progress treatment as tolerated  Precautions: HTN, DM, neuropathy    Daily Treatment Diary     Manual        B/L hamstring stretching -- --      Sciatic nerve glide -- --                                  Exercise Diary        Nustep (seat 7, arms 8) 10'  L1 10'  L1      Flexion in sitting -- --      Slump nerve glide seated 20x5s 20x5s      Clamshells 2x10  green 20x  green      Hip ADD squeeze 20x (5s) 20x5s      Standing hip flex, abd, ext 15x ea 15x ea    Outside parallel bars      Sit-to-stand 2x10  Elevated mat 2x10  Elevated mat      Step up 10x B/L   4" step 10x B/L  4" step      Glute sets 2x10 (3s) 20x3s      DKTC 20x w/ PB 20x w/ PB      Mini squat   2x10   Outside parallel bars                                                                      Pt edu/HEP Performed Reviewed              Time            Modalities

## 2019-12-26 DIAGNOSIS — I10 ESSENTIAL HYPERTENSION: ICD-10-CM

## 2019-12-26 RX ORDER — LISINOPRIL 10 MG/1
10 TABLET ORAL DAILY
Qty: 90 TABLET | Refills: 1 | Status: SHIPPED | OUTPATIENT
Start: 2019-12-26 | End: 2020-06-26 | Stop reason: SDUPTHER

## 2019-12-30 ENCOUNTER — OFFICE VISIT (OUTPATIENT)
Dept: PHYSICAL THERAPY | Facility: CLINIC | Age: 79
End: 2019-12-30
Payer: MEDICARE

## 2019-12-30 DIAGNOSIS — M54.31 SCIATICA OF RIGHT SIDE: Primary | ICD-10-CM

## 2019-12-30 PROCEDURE — 97112 NEUROMUSCULAR REEDUCATION: CPT

## 2019-12-30 PROCEDURE — 97110 THERAPEUTIC EXERCISES: CPT

## 2019-12-30 NOTE — PROGRESS NOTES
PT Discharge    Today's date: 2019  Patient name: Jase Frank  : 1940  MRN: [id number]  Referring provider: Self, Referral  Dx:   Encounter Diagnosis     ICD-10-CM    1  Sciatica of right side M54 31                    Assessment  Assessment details: Jase Frank has been seen in skilled outpatient PT for 8 visits for the diagnosis of sciatica of right side  (primary encounter diagnosis)  Patient has received therapeutic exercises, neuromuscular re-education, and manual therapy for this condition  Patient presents with the following improvements noted: decreased pain, increased lumbar AROM, increased postural awareness, improved balance, increased hip A/PROM, increased knee A/PROM, increased LE strength and increased ambulation distance  Pt no longer has difficulty performing the following: ADL's, recreational activities, engaging in social activities, ambulation, stair negotiation, lifting/carrying, self-care activities, prolonged sitting, prolonged standing, bed mobility, bending forward, putting on/taking off shoes/socks, household chores, yard work  Pt has agreed to be discharged to [facility] due to all goals met     Impairments: impaired physical strength  Understanding of Dx/Px/POC: good   Prognosis: good    Goals  Short Term Goals (3 weeks)  1  (Goal Met) Patient will be independent with HEP    2  (Goal Met) Patient will demonstrate an increase in lumbar AROM by 20%  3  (Goal Met) Patient will demonstrate an increase in B/L LE strength of 1/2 grade on MMT  4  (Goal Met) Patient will present with decreased pain intensity of 5/10 at worst     Long Term Goals (6 weeks)  1  (Goal Met) Patient will demonstrate an increase in lumbar AROM to WNL in order to promote self-care activities pain-free  2  (Goal Met) Patient will demonstrate an increase in B/L LE strength of 1 grade on MMT in order to promote improved stair ambulation    3  (80% Met) Patient will present with decreased pain intensity of 2/10 at worst   4  (Goal Met) Patient will be able to ascend/descend 13 stairs reciprocally without pain  5  (Goal Met)Patient will be able to consistently sleep through a night without increased radicular symptoms into right LE  Plan  Planned therapy interventions: home exercise program  Treatment plan discussed with: patient        Subjective Evaluation    History of Present Illness  Date of onset: 10/4/2019  Mechanism of injury: Pt reports she has had right buttock and hip pain that began about one month ago with insidious onset  Pt states she woke up one morning and had difficulty walking  Pt states pain radiates from right buttock region down toward right foot  According to pt's record she went to urgent care on 10/7/19 and was diagnosed with right-sided sciatic  Pt states she saw PCP around 10/31/19 and was diagnosed with right piriformis syndrome  Pt was a previous pt at this facility for a right UE fracture and wishes to be evaluated and treated for right-sided sciatica  (19) Pt reports that she is pain-free in right hip and is able to complete all ADL's  Pt states she wishes to be discharged from PT and has agreed to continue HEP  Pain  Current pain ratin  At best pain ratin  At worst pain rating: 3  Location: Right hip buttock  Relieving factors: heat and rest  Aggravating factors: standing, walking, stair climbing and lifting  Progression: improved    Social Support  Steps to enter house: yes  4  Stairs in house: yes   7  Lives with: alone    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: right      Diagnostic Tests  No diagnostic tests performed  Treatments  No previous or current treatments  Patient Goals  Patient goals for therapy: decreased pain, improved balance, increased motion, increased strength, independence with ADLs/IADLs and return to sport/leisure activities          Objective     Concurrent Complaints  Positive for bladder dysfunction  Negative for night pain, disturbed sleep and bowel dysfunction    Strength/Myotome Testing     Left Hip   Planes of Motion   Flexion: 4  Abduction: 4  Adduction: 4    Right Hip   Planes of Motion   Flexion: 4  Abduction: 4  Adduction: 4    Left Knee   Flexion: 4  Extension: 4    Right Knee   Flexion: 4  Extension: 4    Tests     Lumbar     Right   Positive slump test      Ambulation   Weight-Bearing Status   Assistive device used: single point cane    Observational Gait   Gait: antalgic   Decreased walking speed and stride length  Additional Observational Gait Details  Improved trunk posture, slight limitation in B/L knee extension during stance phase    Functional Assessment      Squat    Left within functional limits and right within functional limits  Forward Step Up 6"   Left Leg  Increased contralateral push off  Right Leg  Increased contralateral push off  General Comments:      Lumbar Comments  Pt is now able to attain supine and is able to complete rolling to each side as well as supine to sit independently  Lumbar AROM     Flexion Fingertips to mid shins   Extension WNL   L lateral flexion Lateral joint line   R lateral flexion Lateral joint line   L rotation WNL   R rotation WNL              Precautions: HTN, DM, neuropathy    Daily Treatment Diary     Manual  12/18 12/23 12/30     B/L hamstring stretching -- -- --     Sciatic nerve glide -- -- --                                 Exercise Diary  12/18 12/23 12/30     Nustep (seat 7, arms 8) 10'  L1 10'  L1 10'  L1     Flexion in sitting -- --      Slump nerve glide seated 20x5s 20x5s 20x5s     Clamshells 2x10  green 20x  green 20x  green     Hip ADD squeeze 20x (5s) 20x5s 20x5s     Standing hip flex, abd, ext 15x ea 15x ea  Outside parallel bars 15x ea     Outside parallel bars     Sit-to-stand 2x10  Elevated mat 2x10  Elevated mat 2x10  Elevated mat     Step up 10x B/L   4" step 10x B/L  4" step --     Glute sets 2x10 (3s) 20x3s 20x3s DKTC 20x w/ PB 20x w/ PB 20x w/ PB     Mini squat   2x10   Outside parallel bars 20x   Outside parallel bars                                                                     Pt edu/HEP Performed Reviewed Discharge HEP reviewed             Time            Modalities

## 2020-01-06 DIAGNOSIS — E78.2 MIXED HYPERLIPIDEMIA: ICD-10-CM

## 2020-01-06 RX ORDER — ATORVASTATIN CALCIUM 40 MG/1
40 TABLET, FILM COATED ORAL DAILY
Qty: 90 TABLET | Refills: 1 | Status: SHIPPED | OUTPATIENT
Start: 2020-01-06 | End: 2020-07-06 | Stop reason: SDUPTHER

## 2020-04-14 DIAGNOSIS — I10 ESSENTIAL HYPERTENSION: ICD-10-CM

## 2020-04-15 RX ORDER — AMLODIPINE BESYLATE 10 MG/1
10 TABLET ORAL DAILY
Qty: 90 TABLET | Refills: 3 | OUTPATIENT
Start: 2020-04-15

## 2020-04-16 ENCOUNTER — APPOINTMENT (OUTPATIENT)
Dept: LAB | Facility: CLINIC | Age: 80
End: 2020-04-16
Payer: MEDICARE

## 2020-04-16 DIAGNOSIS — E11.40 TYPE 2 DIABETES MELLITUS WITH DIABETIC NEUROPATHY, WITHOUT LONG-TERM CURRENT USE OF INSULIN (HCC): ICD-10-CM

## 2020-04-16 DIAGNOSIS — R60.0 EDEMA OF LEFT LOWER EXTREMITY: ICD-10-CM

## 2020-04-16 DIAGNOSIS — I51.89 GRADE II DIASTOLIC DYSFUNCTION: ICD-10-CM

## 2020-04-16 LAB
ALBUMIN SERPL BCP-MCNC: 3.6 G/DL (ref 3.5–5)
ALP SERPL-CCNC: 84 U/L (ref 46–116)
ALT SERPL W P-5'-P-CCNC: 22 U/L (ref 12–78)
ANION GAP SERPL CALCULATED.3IONS-SCNC: 6 MMOL/L (ref 4–13)
AST SERPL W P-5'-P-CCNC: 14 U/L (ref 5–45)
BILIRUB SERPL-MCNC: 0.31 MG/DL (ref 0.2–1)
BUN SERPL-MCNC: 25 MG/DL (ref 5–25)
CALCIUM SERPL-MCNC: 9 MG/DL (ref 8.3–10.1)
CHLORIDE SERPL-SCNC: 108 MMOL/L (ref 100–108)
CO2 SERPL-SCNC: 26 MMOL/L (ref 21–32)
CREAT SERPL-MCNC: 1.12 MG/DL (ref 0.6–1.3)
GFR SERPL CREATININE-BSD FRML MDRD: 47 ML/MIN/1.73SQ M
GLUCOSE P FAST SERPL-MCNC: 117 MG/DL (ref 65–99)
MAGNESIUM SERPL-MCNC: 2.1 MG/DL (ref 1.6–2.6)
NT-PROBNP SERPL-MCNC: 1754 PG/ML
POTASSIUM SERPL-SCNC: 4 MMOL/L (ref 3.5–5.3)
PROT SERPL-MCNC: 7.6 G/DL (ref 6.4–8.2)
SODIUM SERPL-SCNC: 140 MMOL/L (ref 136–145)

## 2020-04-16 PROCEDURE — 80053 COMPREHEN METABOLIC PANEL: CPT

## 2020-04-16 PROCEDURE — 83880 ASSAY OF NATRIURETIC PEPTIDE: CPT

## 2020-04-16 PROCEDURE — 83735 ASSAY OF MAGNESIUM: CPT

## 2020-04-16 PROCEDURE — 36415 COLL VENOUS BLD VENIPUNCTURE: CPT

## 2020-04-17 ENCOUNTER — TELEPHONE (OUTPATIENT)
Dept: FAMILY MEDICINE CLINIC | Facility: CLINIC | Age: 80
End: 2020-04-17

## 2020-04-17 DIAGNOSIS — I10 ESSENTIAL HYPERTENSION: ICD-10-CM

## 2020-04-17 RX ORDER — AMLODIPINE BESYLATE 10 MG/1
10 TABLET ORAL DAILY
Qty: 90 TABLET | Refills: 3 | Status: SHIPPED | OUTPATIENT
Start: 2020-04-17 | End: 2021-04-21 | Stop reason: SDUPTHER

## 2020-04-22 ENCOUNTER — TELEMEDICINE (OUTPATIENT)
Dept: CARDIOLOGY CLINIC | Facility: CLINIC | Age: 80
End: 2020-04-22
Payer: MEDICARE

## 2020-04-22 DIAGNOSIS — I10 ESSENTIAL HYPERTENSION: ICD-10-CM

## 2020-04-22 DIAGNOSIS — I51.89 GRADE II DIASTOLIC DYSFUNCTION: ICD-10-CM

## 2020-04-22 DIAGNOSIS — R79.89 ELEVATED BRAIN NATRIURETIC PEPTIDE (BNP) LEVEL: ICD-10-CM

## 2020-04-22 DIAGNOSIS — I48.0 PAROXYSMAL ATRIAL FIBRILLATION (HCC): ICD-10-CM

## 2020-04-22 DIAGNOSIS — R60.0 EDEMA OF LEFT LOWER EXTREMITY: ICD-10-CM

## 2020-04-22 PROCEDURE — 99442 PR PHYS/QHP TELEPHONE EVALUATION 11-20 MIN: CPT | Performed by: INTERNAL MEDICINE

## 2020-04-22 RX ORDER — METOPROLOL TARTRATE 100 MG/1
100 TABLET ORAL EVERY 12 HOURS SCHEDULED
Qty: 180 TABLET | Refills: 1 | Status: SHIPPED | OUTPATIENT
Start: 2020-04-22 | End: 2020-11-30 | Stop reason: SDUPTHER

## 2020-04-30 DIAGNOSIS — E11.42 TYPE 2 DIABETES MELLITUS WITH DIABETIC POLYNEUROPATHY, WITHOUT LONG-TERM CURRENT USE OF INSULIN (HCC): ICD-10-CM

## 2020-06-02 ENCOUNTER — FOLLOW UP (OUTPATIENT)
Dept: URBAN - METROPOLITAN AREA CLINIC 27 | Facility: CLINIC | Age: 80
End: 2020-06-02

## 2020-06-02 DIAGNOSIS — H35.3132: ICD-10-CM

## 2020-06-02 DIAGNOSIS — E11.9: ICD-10-CM

## 2020-06-02 DIAGNOSIS — Z96.1: ICD-10-CM

## 2020-06-02 DIAGNOSIS — H43.813: ICD-10-CM

## 2020-06-02 PROCEDURE — 92014 COMPRE OPH EXAM EST PT 1/>: CPT

## 2020-06-02 PROCEDURE — 92134 CPTRZ OPH DX IMG PST SGM RTA: CPT

## 2020-06-02 ASSESSMENT — VISUAL ACUITY
OD_CC: 20/40
OS_CC: 20/50

## 2020-06-02 ASSESSMENT — TONOMETRY
OD_IOP_MMHG: 20
OS_IOP_MMHG: 20

## 2020-06-24 DIAGNOSIS — I10 ESSENTIAL HYPERTENSION: ICD-10-CM

## 2020-06-26 RX ORDER — LISINOPRIL 10 MG/1
10 TABLET ORAL DAILY
Qty: 90 TABLET | Refills: 1 | Status: SHIPPED | OUTPATIENT
Start: 2020-06-26 | End: 2020-12-22 | Stop reason: SDUPTHER

## 2020-07-06 DIAGNOSIS — E78.2 MIXED HYPERLIPIDEMIA: ICD-10-CM

## 2020-07-06 RX ORDER — ATORVASTATIN CALCIUM 40 MG/1
40 TABLET, FILM COATED ORAL DAILY
Qty: 90 TABLET | Refills: 1 | Status: SHIPPED | OUTPATIENT
Start: 2020-07-06 | End: 2020-12-29 | Stop reason: SDUPTHER

## 2020-07-08 ENCOUNTER — TELEPHONE (OUTPATIENT)
Dept: CARDIOLOGY CLINIC | Facility: CLINIC | Age: 80
End: 2020-07-08

## 2020-07-08 DIAGNOSIS — R60.0 EDEMA OF LEFT LOWER EXTREMITY: Primary | ICD-10-CM

## 2020-08-17 DIAGNOSIS — I48.0 PAROXYSMAL ATRIAL FIBRILLATION (HCC): ICD-10-CM

## 2020-08-17 DIAGNOSIS — R60.0 EDEMA OF LEFT LOWER EXTREMITY: ICD-10-CM

## 2020-08-17 DIAGNOSIS — I51.89 GRADE II DIASTOLIC DYSFUNCTION: ICD-10-CM

## 2020-08-17 DIAGNOSIS — R79.89 ELEVATED BRAIN NATRIURETIC PEPTIDE (BNP) LEVEL: ICD-10-CM

## 2020-08-17 RX ORDER — TORSEMIDE 20 MG/1
TABLET ORAL
Qty: 90 TABLET | Refills: 3 | Status: SHIPPED | OUTPATIENT
Start: 2020-08-17 | End: 2020-11-04

## 2020-08-19 ENCOUNTER — APPOINTMENT (OUTPATIENT)
Dept: LAB | Facility: CLINIC | Age: 80
End: 2020-08-19
Payer: MEDICARE

## 2020-08-19 DIAGNOSIS — R60.0 LOWER EXTREMITY EDEMA: Primary | ICD-10-CM

## 2020-08-19 LAB
ANION GAP SERPL CALCULATED.3IONS-SCNC: 6 MMOL/L (ref 4–13)
BUN SERPL-MCNC: 27 MG/DL (ref 5–25)
CALCIUM SERPL-MCNC: 9.1 MG/DL (ref 8.3–10.1)
CHLORIDE SERPL-SCNC: 109 MMOL/L (ref 100–108)
CO2 SERPL-SCNC: 25 MMOL/L (ref 21–32)
CREAT SERPL-MCNC: 1.14 MG/DL (ref 0.6–1.3)
GFR SERPL CREATININE-BSD FRML MDRD: 46 ML/MIN/1.73SQ M
GLUCOSE P FAST SERPL-MCNC: 112 MG/DL (ref 65–99)
MAGNESIUM SERPL-MCNC: 2.1 MG/DL (ref 1.6–2.6)
POTASSIUM SERPL-SCNC: 4.7 MMOL/L (ref 3.5–5.3)
SODIUM SERPL-SCNC: 140 MMOL/L (ref 136–145)

## 2020-08-19 PROCEDURE — 80048 BASIC METABOLIC PNL TOTAL CA: CPT

## 2020-08-19 PROCEDURE — 83735 ASSAY OF MAGNESIUM: CPT

## 2020-08-19 PROCEDURE — 36415 COLL VENOUS BLD VENIPUNCTURE: CPT

## 2020-08-25 ENCOUNTER — OFFICE VISIT (OUTPATIENT)
Dept: CARDIOLOGY CLINIC | Facility: CLINIC | Age: 80
End: 2020-08-25
Payer: MEDICARE

## 2020-08-25 VITALS
BODY MASS INDEX: 40.25 KG/M2 | HEIGHT: 60 IN | WEIGHT: 205 LBS | OXYGEN SATURATION: 98 % | TEMPERATURE: 97.5 F | DIASTOLIC BLOOD PRESSURE: 72 MMHG | SYSTOLIC BLOOD PRESSURE: 118 MMHG | HEART RATE: 58 BPM

## 2020-08-25 DIAGNOSIS — I48.0 PAROXYSMAL ATRIAL FIBRILLATION (HCC): Primary | ICD-10-CM

## 2020-08-25 DIAGNOSIS — R60.0 EDEMA OF LEFT LOWER EXTREMITY: ICD-10-CM

## 2020-08-25 DIAGNOSIS — I10 ESSENTIAL HYPERTENSION: ICD-10-CM

## 2020-08-25 DIAGNOSIS — I51.89 GRADE II DIASTOLIC DYSFUNCTION: ICD-10-CM

## 2020-08-25 DIAGNOSIS — E11.40 TYPE 2 DIABETES MELLITUS WITH DIABETIC NEUROPATHY, WITHOUT LONG-TERM CURRENT USE OF INSULIN (HCC): ICD-10-CM

## 2020-08-25 DIAGNOSIS — R79.89 ELEVATED BRAIN NATRIURETIC PEPTIDE (BNP) LEVEL: ICD-10-CM

## 2020-08-25 PROCEDURE — 1036F TOBACCO NON-USER: CPT | Performed by: INTERNAL MEDICINE

## 2020-08-25 PROCEDURE — 3008F BODY MASS INDEX DOCD: CPT | Performed by: INTERNAL MEDICINE

## 2020-08-25 PROCEDURE — 3074F SYST BP LT 130 MM HG: CPT | Performed by: INTERNAL MEDICINE

## 2020-08-25 PROCEDURE — 2022F DILAT RTA XM EVC RTNOPTHY: CPT | Performed by: INTERNAL MEDICINE

## 2020-08-25 PROCEDURE — 1160F RVW MEDS BY RX/DR IN RCRD: CPT | Performed by: INTERNAL MEDICINE

## 2020-08-25 PROCEDURE — 99214 OFFICE O/P EST MOD 30 MIN: CPT | Performed by: INTERNAL MEDICINE

## 2020-08-25 PROCEDURE — 4040F PNEUMOC VAC/ADMIN/RCVD: CPT | Performed by: INTERNAL MEDICINE

## 2020-08-25 PROCEDURE — 93000 ELECTROCARDIOGRAM COMPLETE: CPT | Performed by: INTERNAL MEDICINE

## 2020-08-25 PROCEDURE — 3078F DIAST BP <80 MM HG: CPT | Performed by: INTERNAL MEDICINE

## 2020-08-25 RX ORDER — AMOXICILLIN 500 MG/1
CAPSULE ORAL
COMMUNITY
Start: 2020-08-14

## 2020-08-25 NOTE — PROGRESS NOTES
Cardiology Outpatient Follow-up                                                          Luz Moore  [id number]  1940      1  Paroxysmal atrial fibrillation (HCC)  POCT ECG   2  Grade II diastolic dysfunction  POCT ECG   3  Edema of left lower extremity  POCT ECG   4  Essential hypertension  POCT ECG   5  Type 2 diabetes mellitus with diabetic neuropathy, without long-term current use of insulin (HCC)  POCT ECG   6  Elevated brain natriuretic peptide (BNP) level         Discussion/Summary:  Afib/flutter-   Given age, htn, dm2- [id number] 5  eliquis 5mg twice a day  Continue on metoprolol 100 mg twice a day  No further falls  No dizziness or light-headness  Acute on chronic diastolic hf- improved  She has had weight loss  Plan to change to torsemide 20 mg daily  Cut potassium supplementation  She will continue on metoprolol 100 mg twice a day  She was found to be in atrial fibrillation  No prior history  Recheck labwork in six months    GERD- nexium 40mg daily    Fall- Completed physical thearpy    Htn- controlled  amlodipne + lisinopril    Dm2- atorvastatin    Colonoscopy screening negative    rtc- 6 months      HPI:  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, hypertension with recent unfortunate mechanical fall found to be in atrial fibrillation  She also has a history of diastolic dysfunction and lower extremity edema chronic  She states being compliant with her diuretic but with poor response  She has been of plan with her medications  She denies having chest heaviness  She denies having any history of prior CVA  She denies having any major bleeding episodes  07/24/2019:  Her lower extremity edema is significantly improved  Her blood pressures been well controlled  Her heart rates have been controlled  She denies having any further falls  We reviewed through her last blood work    She denies feeling dizziness or lightheadedness  10/23/2019:  Her weight has been trending down  She denies having recurrence of lower extremity edema  She is in chronic atrial fibrillation and rate controlled  She denies feeling dizziness or lightheadedness  There has been no falls  She has no trouble with Eliquis therapy  There has been no major bleeding  08/25/2020:  She denies feeling dizziness or lightheadedness  Her weight has been stable  Her lower extremity swelling has been well controlled  She is compliant with diuretic  We reviewed her last blood work  Her potassium was mildly higher  We will discontinue potassium supplementation    Past Medical History:   Diagnosis Date    Arthritis     knees    Diabetes mellitus ([facility] [address] )     type 2    GERD (gastroesophageal reflux disease)     Hyperlipidemia     Hypertension     Urinary incontinence     Use of cane as ambulatory aid     Wears glasses      Social History     Socioeconomic History    Marital status: Single     Spouse name: Not on file    Number of children: Not on file    Years of education: Not on file    Highest education level: Not on file   Occupational History    Not on file   Social Needs    Financial resource strain: Not on file    Food insecurity     Worry: Not on file     Inability: Not on file    Transportation needs     Medical: Not on file     Non-medical: Not on file   Tobacco Use    Smoking status: Never Smoker    Smokeless tobacco: Never Used   Substance and Sexual Activity    Alcohol use: Yes     Comment: social    Drug use: No    Sexual activity: Not on file   Lifestyle    Physical activity     Days per week: Not on file     Minutes per session: Not on file    Stress: Not on file   Relationships    Social connections     Talks on phone: Not on file     Gets together: Not on file     Attends Anabaptism service: Not on file     Active member of club or organization: Not on file     Attends meetings of clubs or organizations: Not on file     Relationship status: Not on file    Intimate partner violence     Fear of current or ex partner: Not on file     Emotionally abused: Not on file     Physically abused: Not on file     Forced sexual activity: Not on file   Other Topics Concern    Not on file   Social History Narrative    Not on file      Family History   Problem Relation Age of Onset    Breast cancer Mother     Diabetes type II Mother     Atrial fibrillation Mother     Diabetes Mother         diet controlled    Hypertension Mother     Lung cancer Father     Alcohol abuse Father     Cancer Father         lung-smoker     Past Surgical History:   Procedure Laterality Date    BREAST SURGERY Left     nothing was there     COLONOSCOPY      HYSTERECTOMY      complete-fibroid    JOINT REPLACEMENT Left     knee    KS XCAPSL CTRC RMVL INSJ IO LENS PROSTH W/O ECP Right 7/19/2018    Procedure: EXTRACTION EXTRACAPSULAR CATARACT PHACO INTRAOCULAR LENS (IOL); Surgeon: Joe Casillas MD;  Location: [facility] MAIN OR;  Service: Ophthalmology    KS XCAPSL CTRC RMVL INSJ IO LENS PROSTH W/O ECP Left 8/9/2018    Procedure: EXTRACTION EXTRACAPSULAR CATARACT PHACO INTRAOCULAR LENS (IOL);   Surgeon: Joe Casillas MD;  Location: [facility] MAIN OR;  Service: Ophthalmology       Current Outpatient Medications:     amLODIPine (NORVASC) 10 mg tablet, Take 1 tablet (10 mg total) by mouth daily, Disp: 90 tablet, Rfl: 3    apixaban (ELIQUIS) 5 mg, Take 1 tablet (5 mg total) by mouth 2 (two) times a day, Disp: 180 tablet, Rfl: 3    atorvastatin (LIPITOR) 40 mg tablet, Take 1 tablet (40 mg total) by mouth daily, Disp: 90 tablet, Rfl: 1    CINNAMON PO, Take 1,000 mg by mouth every morning, Disp: , Rfl:     lisinopril (ZESTRIL) 10 mg tablet, Take 1 tablet (10 mg total) by mouth daily, Disp: 90 tablet, Rfl: 1    metFORMIN (GLUCOPHAGE) 1000 MG tablet, Take 1 tablet by mouth twice a day, Disp: 180 tablet, Rfl: 3    metoprolol tartrate (LOPRESSOR) 100 mg tablet, Take 1 tablet (100 mg total) by mouth every 12 (twelve) hours, Disp: 180 tablet, Rfl: 1    Multiple Vitamins-Minerals (PRESERVISION AREDS PO), Take by mouth, Disp: , Rfl:     Potassium Chloride ER (K-TAB) 20 MEQ TBCR, Take one tablet each day with torsemide, Disp: 90 tablet, Rfl: 3    torsemide (DEMADEX) 20 mg tablet, Take one tablet Rag-Sey-Rnnhlk morning, Disp: 90 tablet, Rfl: 3    amoxicillin (AMOXIL) 500 mg capsule, TAKE TWO CAPSULES IMMEDIATELY THEN TAKE ONE CAPSULE EVERY 8 HOURS UNTIL GONE, Disp: , Rfl:     Calcium Carb-Cholecalciferol (CALCIUM 600+D) 600-800 MG-UNIT TABS, Take by mouth every morning, Disp: , Rfl:     esomeprazole (NEXIUM) 40 MG capsule, Take 40 mg by mouth as needed  , Disp: , Rfl:   No Known Allergies  Vitals:    08/25/20 1359   BP: 118/72   BP Location: Right arm   Patient Position: Sitting   Cuff Size: Large   Pulse: 58   Temp: 97 5 °F (36 4 °C)   SpO2: 98%   Weight: 93 kg (205 lb)   Height: 5' (1 524 m)       Review of Systems:  Review of Systems   Constitutional: Negative  Negative for activity change, appetite change, chills, diaphoresis, fatigue, fever and unexpected weight change  HENT: Negative  Negative for congestion, dental problem, drooling, ear discharge, ear pain, facial swelling, hearing loss, mouth sores, nosebleeds, postnasal drip, rhinorrhea, sinus pressure, sinus pain, sneezing, sore throat, tinnitus, trouble swallowing and voice change  Eyes: Negative  Negative for photophobia, pain, redness, itching and visual disturbance  Respiratory: Positive for shortness of breath  Negative for apnea, cough, choking, chest tightness, wheezing and stridor  Cardiovascular: Positive for leg swelling  Negative for chest pain and palpitations  Gastrointestinal: Negative  Negative for abdominal distention, abdominal pain, anal bleeding, blood in stool, constipation, diarrhea, nausea, rectal pain and vomiting  Endocrine: Negative    Negative for cold intolerance, heat intolerance, polydipsia, polyphagia and polyuria  Genitourinary: Negative  Negative for decreased urine volume, difficulty urinating, dyspareunia, dysuria, enuresis, flank pain, frequency, genital sores, hematuria, menstrual problem, pelvic pain, urgency, vaginal bleeding, vaginal discharge and vaginal pain  Musculoskeletal: Negative  Negative for arthralgias, back pain, gait problem, joint swelling, myalgias, neck pain and neck stiffness  Skin: Negative  Negative for color change, pallor, rash and wound  Allergic/Immunologic: Negative  Negative for environmental allergies, food allergies and immunocompromised state  Neurological: Negative  Negative for dizziness, tremors, seizures, syncope, facial asymmetry, speech difficulty, weakness, light-headedness, numbness and headaches  Hematological: Negative  Negative for adenopathy  Does not bruise/bleed easily  Psychiatric/Behavioral: Negative  Negative for agitation, behavioral problems, confusion, decreased concentration, dysphoric mood, hallucinations, self-injury, sleep disturbance and suicidal ideas  The patient is not nervous/anxious and is not hyperactive  All other systems reviewed and are negative  Vitals:    08/25/20 1359   BP: 118/72   BP Location: Right arm   Patient Position: Sitting   Cuff Size: Large   Pulse: 58   Temp: 97 5 °F (36 4 °C)   SpO2: 98%   Weight: 93 kg (205 lb)   Height: 5' (1 524 m)     Physical Exam:  Physical Exam  Constitutional:       General: She is not in acute distress  Appearance: She is well-developed  She is not diaphoretic  HENT:      Head: Normocephalic and atraumatic  Right Ear: External ear normal       Left Ear: External ear normal    Eyes:      General: No scleral icterus  Right eye: No discharge  Left eye: No discharge  Conjunctiva/sclera: Conjunctivae normal       Pupils: Pupils are equal, round, and reactive to light     Neck:      Musculoskeletal: Normal range of motion and neck supple  Thyroid: No thyromegaly  Vascular: JVD present  Trachea: No tracheal deviation  Cardiovascular:      Rate and Rhythm: Normal rate and regular rhythm  Heart sounds: Murmur present  No friction rub  No gallop  Pulmonary:      Effort: Pulmonary effort is normal  No respiratory distress  Breath sounds: Normal breath sounds  No stridor  No wheezing or rales  Chest:      Chest wall: No tenderness  Abdominal:      General: Bowel sounds are normal  There is no distension  Palpations: Abdomen is soft  There is no mass  Tenderness: There is no abdominal tenderness  There is no guarding or rebound  Musculoskeletal: Normal range of motion  General: No tenderness or deformity  Left lower leg: Edema present  Skin:     General: Skin is warm and dry  Coloration: Skin is not pale  Findings: No erythema or rash  Neurological:      Mental Status: She is alert and oriented to person, place, and time  Cranial Nerves: No cranial nerve deficit  Motor: No abnormal muscle tone  Coordination: Coordination normal       Deep Tendon Reflexes: Reflexes are normal and symmetric  Reflexes normal    Psychiatric:         Behavior: Behavior normal          Thought Content:  Thought content normal          Judgment: Judgment normal          Labs:     Lab Results   Component Value Date    WBC 6 09 04/02/2019    HGB 10 7 (L) 04/02/2019    HCT 34 3 (L) 04/02/2019    MCV 82 04/02/2019     04/02/2019     Lab Results   Component Value Date    K 4 7 08/19/2020     (H) 08/19/2020    CO2 25 08/19/2020    BUN 27 (H) 08/19/2020    CREATININE 1 14 08/19/2020    GLUF 112 (H) 08/19/2020    CALCIUM 9 1 08/19/2020    AST 14 04/16/2020    ALT 22 04/16/2020    ALKPHOS 84 04/16/2020    EGFR 46 08/19/2020     No results found for: CHOL  Lab Results   Component Value Date    HDL 74 (H) 04/11/2016     Lab Results   Component Value Date    LDLCALC 62 2016     Lab Results   Component Value Date    TRIG 132 2016     Lab Results   Component Value Date    HGBA1C 6 7 (H) 2019     No results found for: TSH    Imaging & Testing   I have personally reviewed pertinent reports  EKG: Personally reviewed  Atrial fibrillation nonspecific st-t wave changes    Cardiac testing:   Results for orders placed during the hospital encounter of 18   Echo complete with contrast if indicated    Narrative [address]  [address] [facility] [phone]    Transthoracic Echocardiogram  2D, M-mode, Doppler, and Color Doppler    Study date:  04-Dec-2018    Patient: Ayla Toscano  MR number: [id number]  Account number: [de-identified]  : 1940  Age: 77 years  Gender: Female  Status: Outpatient  Location: Echo lab  Height: 61 in  Weight: 225 5 lb  BP: 147/ 97 mmHg    Indications: Dyspnea    Diagnoses: 794 31 - ABNORM ELECTROCARDIOGRAM    Sonographer:  AGAPITO Huffman  Primary Physician: Patria Stanton DO  Referring Physician:  Esteban Civil:  [facility]  Interpreting Physician:  Franky Blackburn MD    SUMMARY    LEFT VENTRICLE:  Systolic function was at the lower limits of normal  Ejection fraction was estimated in the range of 50 % to 55 % to be 55 %  There were no regional wall motion abnormalities  Wall thickness was mildly increased  Features were consistent with a pseudonormal left ventricular filling pattern, with concomitant abnormal relaxation and increased filling pressure (grade 2 diastolic dysfunction)  LEFT ATRIUM:  The atrium was mildly dilated  RIGHT ATRIUM:  The atrium was mildly dilated  AORTIC VALVE:  The valve was functionally bicuspid  Leaflets exhibited mildly to moderately increased thickness, mild to moderate calcification, mildly reduced cuspal separation, and sclerosis  Transaortic velocity was increased due to valvular stenosis    There was mild stenosis  Valve mean gradient was 15 mmHg  TRICUSPID VALVE:  There was trace regurgitation  The findings suggest mild pulmonary hypertension  HISTORY: PRIOR HISTORY: Diabetes, Edema,HTN,Hyperlipidemia,Gastroesophageal reflux disease    PROCEDURE: The procedure was performed in the echo lab  This was a routine study  The transthoracic approach was used  The study included complete 2D imaging, M-mode, complete spectral Doppler, and color Doppler  The heart rate was 67 bpm,  at the start of the study  Images were obtained from the parasternal, apical, subcostal, and suprasternal notch acoustic windows  Echocardiographic views were limited due to restricted patient mobility, poor patient compliance, poor  acoustic window availability, decreased penetration, and lung interference  This was a technically difficult study  LEFT VENTRICLE: Size was normal  Systolic function was at the lower limits of normal  Ejection fraction was estimated in the range of 50 % to 55 % to be 55 %  There were no regional wall motion abnormalities  Wall thickness was mildly  increased  No evidence of apical thrombus  DOPPLER: Features were consistent with a pseudonormal left ventricular filling pattern, with concomitant abnormal relaxation and increased filling pressure (grade 2 diastolic dysfunction)  RIGHT VENTRICLE: The size was normal  Systolic function was normal  Wall thickness was normal     LEFT ATRIUM: The atrium was mildly dilated  RIGHT ATRIUM: The atrium was mildly dilated  MITRAL VALVE: Valve structure was normal  There was normal leaflet separation  DOPPLER: The transmitral velocity was within the normal range  There was no evidence for stenosis  There was no significant regurgitation  AORTIC VALVE: The valve was functionally bicuspid  Leaflets exhibited mildly to moderately increased thickness, mild to moderate calcification, mildly reduced cuspal separation, and sclerosis   DOPPLER: Transaortic velocity was increased  due to valvular stenosis  There was mild stenosis  There was no significant regurgitation  TRICUSPID VALVE: The valve structure was normal  There was normal leaflet separation  DOPPLER: The transtricuspid velocity was within the normal range  There was no evidence for stenosis  There was trace regurgitation  Estimated peak PA  pressure was 38 mmHg  The findings suggest mild pulmonary hypertension  PULMONIC VALVE: Leaflets exhibited normal thickness, no calcification, and normal cuspal separation  DOPPLER: The transpulmonic velocity was within the normal range  There was no significant regurgitation  PERICARDIUM: There was no pericardial effusion  The pericardium was normal in appearance  AORTA: The root exhibited normal size  SYSTEMIC VEINS: IVC: The inferior vena cava was normal in size  MEASUREMENT TABLES    DOPPLER MEASUREMENTS  Aortic valve   (Reference normals)  Peak steve   250 cm/s   (--)  Peak gradient   26 mmHg   (--)  Mean gradient   15 mmHg   (--)    SYSTEM MEASUREMENT TABLES    2D mode  AoR Diam 2D: 3 2 cm  LA Diam (2D): 4 1 cm  LA/Ao (2D): 1 28  FS (2D Teich): 26 4 %  IVSd (2D): 1 31 cm  LVDEV: 72 1 cm³  LVEDV MOD BP: 102 cm³  LVESV: 34 4 cm³  LVIDd(2D): 4 05 cm  LVISd (2D): 2 98 cm  LVOT Area 2D: 3 14 cm squared  LVPWd (2D): 1 3 cm  SV (Teich): 37 7 cm³    Apical four chamber  LVEF A4C: 50 %    Apical two chamber  LVEF A2C: 52 %    Unspecified Scan Mode  DEVON Cont Eq (Peak Steve): 1 49 cm squared  LVOT Diam : 2 cm  LVOT Vmax: 1210 mm/s  LVOT Vmax; Mean: 1210 mm/s  Peak Grad ; Mean: 6 mm[Hg]  MV Peak A Steve: 291 mm/s  MV Peak E Steve   Mean: 1060 mm/s  MVA (PHT): 6 29 cm squared  PHT: 35 ms  Max P mm[Hg]  V Max: 2390 mm/s  Vmax: 2880 mm/s  RA Area: 21 5 cm squared  RA Volume: 65 8 cm³  TAPSE: 1 9 cm    [employer] Accredited Echocardiography Laboratory    Prepared and electronically signed by    Carissa Brown MD  Signed 05-Dec-2018 11:11:08         Carlos Tabby Robison MD [facility] [place]  [address] [phone]  Please call with any questions or suggestions    Counseling :  A description of the counseling:   Goals and Barriers:  Patient's ability to self care:  Medication side effect reviewed with patient in detail and all their questions answered  "This note has been constructed using a voice recognition system  Therefore there may be syntax, spelling, and/or grammatical errors   Please call if you have any questions  "

## 2020-08-25 NOTE — PATIENT INSTRUCTIONS
Potassium Content of Foods List   WHAT YOU NEED TO KNOW:   Potassium is a mineral that is found in most foods  Potassium helps to balance fluids and minerals in your body  It also helps your body maintain a normal blood pressure  Potassium helps your muscles contract and your nerves function normally  You may need to increase or decrease potassium if you have certain health conditions  DISCHARGE INSTRUCTIONS:   Why you may need to change the amount of potassium you eat:   · You may need more potassium  if you have hypokalemia (low potassium levels) or high blood pressure  You may also need more potassium if you are taking diuretics  Diuretics and certain medicines cause your body to lose potassium  · You may need less potassium  in your diet if you have hyperkalemia (high potassium levels) or kidney disease  Potassium content of fruit:  The amount of potassium in milligrams (mg) contained in each fruit or serving of fruit is listed beside the item    · High-potassium foods (more than 200 mg per serving):      ¨ 1 medium banana (425)    ¨ ½ of a papaya (390)    ¨ ½ cup of prune juice (370)    ¨ ¼ cup of raisins (270)    ¨ 1 medium emilie (325) or kiwi (240)    ¨ 1 small orange (240) or ½ cup of orange juice (235)    ¨ ½ cup of cubed cantaloupe (215) or diced honeydew melon (200)    ¨ 1 medium pear (200)    · Medium-potassium foods (50 to 200 mg per serving):      ¨ 1 medium peach (185)    ¨ 1 small apple or ½ cup of apple juice (150)    ¨ ½ cup of peaches canned in juice (120)    ¨ ½ cup of canned pineapple (100)    ¨ ½ cup of fresh, sliced strawberries (358)    ¨ ½ cup of watermelon (85)    · Low-potassium foods (less than 50 mg per serving):      ¨ ½ cup of cranberries (45) or cranberry juice cocktail (20)    ¨ ½ cup of nectar of papaya, emilie, or pear (35)  Potassium content of vegetables:   · High-potassium foods (more than 200 mg per serving):      ¨ 1 medium baked potato, with skin (925)    ¨ 1 baked medium sweet potato, with skin (450)    ¨ ½ cup of tomato or vegetable juice (275), or 1 medium raw tomato (290)    ¨ ½ cup of mushrooms (280)    ¨ ½ cup of fresh brussels sprouts (250)    ¨ ½ cup of cooked zucchini (220) or winter squash (250)    ¨ ¼ of a medium avocado (245)    ¨ ½ cup of broccoli (230)    · Medium-potassium foods (50 to 200 mg per serving):      ¨ ½ cup of corn (195)    ¨ ½ cup of fresh or cooked carrots (180)    ¨ ½ cup of fresh cauliflower (150)    ¨ ½ cup of asparagus (155)    ¨ ½ cup of canned peas (90)     ¨ 1 cup of lettuce, all types (100)    ¨ ½ cup of fresh green beans (90)    ¨ ½ cup of frozen green beans (85)    ¨ ½ cup of cucumber (80)  Potassium content of protein foods:   · High-potassium foods (more than 200 mg per serving):      ¨ ½ cup of cooked rangel beans (400) or lentils (365)    ¨ 1 cup of soy milk (300)    ¨ 3 ounces of baked or broiled salmon (319)    ¨ 3 ounces of roasted turkey, dark meat (250)    ¨ ¼ cup of sunflower seeds (241)    ¨ 3 ounces of cooked lean beef (224)    ¨ 2 tablespoons of smooth peanut butter (210)    · Medium-potassium foods (50 to 200 mg per serving):      ¨ 1 ounce of salted peanuts, almonds, or cashews (200)    ¨ 1 large egg (60 mg)  Potassium content of dairy foods:   · High-potassium foods (more than 200 mg per serving):      ¨ 6 ounces of yogurt (260 to 435)    ¨ 1 cup of nonfat, low-fat, or whole milk (350 to 380)    · Medium-potassium foods (50 to 200 mg per serving):      ¨ ½ cup of ricotta cheese (154)    ¨ ½ cup of vanilla ice cream (131)    ¨ ½ cup of low-fat (2%) cottage cheese (110)    · Low-potassium foods (less than 50 mg per serving):      ¨ 1 ounce of cheese (20 to 30)    Potassium content of grains:   · 1 slice of white bread (30)    · ½ cup of white or brown rice (50)    · ½ cup of spaghetti or macaroni (30)    · 1 flour or corn tortilla (50)    · 1 four-inch waffle (50)  Potassium content of other foods:   · 1 tablespoon of molasses (295)    · 1½ ounces of chocolate (165)    · Some salt substitutes may contain a high amount of potassium  Check the food label to find the amount of potassium it contains  © 2017 [address] [employer] Information is for End User's use only and may not be sold, redistributed or otherwise used for commercial purposes  All illustrations and images included in CareNotes® are the copyrighted property of [facility] , [employer]  or [employer] Alvarez  The above information is an  only  It is not intended as medical advice for individual conditions or treatments  Talk to your doctor, nurse or pharmacist before following any medical regimen to see if it is safe and effective for you

## 2020-09-11 ENCOUNTER — TELEPHONE (OUTPATIENT)
Dept: CARDIOLOGY CLINIC | Facility: CLINIC | Age: 80
End: 2020-09-11

## 2020-09-11 NOTE — TELEPHONE ENCOUNTER
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

## 2020-11-02 DIAGNOSIS — I51.89 GRADE II DIASTOLIC DYSFUNCTION: ICD-10-CM

## 2020-11-02 DIAGNOSIS — I48.0 PAROXYSMAL ATRIAL FIBRILLATION (HCC): ICD-10-CM

## 2020-11-02 DIAGNOSIS — R79.89 ELEVATED BRAIN NATRIURETIC PEPTIDE (BNP) LEVEL: ICD-10-CM

## 2020-11-02 DIAGNOSIS — R60.0 EDEMA OF LEFT LOWER EXTREMITY: ICD-10-CM

## 2020-11-04 RX ORDER — TORSEMIDE 20 MG/1
TABLET ORAL
Qty: 90 TABLET | Refills: 3 | Status: SHIPPED | OUTPATIENT
Start: 2020-11-04 | End: 2021-04-29 | Stop reason: SDUPTHER

## 2020-11-24 DIAGNOSIS — I10 ESSENTIAL HYPERTENSION: ICD-10-CM

## 2020-11-30 DIAGNOSIS — I10 ESSENTIAL HYPERTENSION: ICD-10-CM

## 2020-12-01 RX ORDER — METOPROLOL TARTRATE 100 MG/1
100 TABLET ORAL EVERY 12 HOURS SCHEDULED
Qty: 180 TABLET | Refills: 3 | Status: SHIPPED | OUTPATIENT
Start: 2020-12-01 | End: 2021-02-24 | Stop reason: SDUPTHER

## 2020-12-01 RX ORDER — METOPROLOL TARTRATE 100 MG/1
TABLET ORAL
Qty: 180 TABLET | Refills: 1 | Status: SHIPPED | OUTPATIENT
Start: 2020-12-01 | End: 2021-06-02

## 2020-12-22 DIAGNOSIS — I10 ESSENTIAL HYPERTENSION: ICD-10-CM

## 2020-12-22 RX ORDER — LISINOPRIL 10 MG/1
10 TABLET ORAL DAILY
Qty: 90 TABLET | Refills: 1 | Status: SHIPPED | OUTPATIENT
Start: 2020-12-22 | End: 2021-04-21 | Stop reason: SDUPTHER

## 2020-12-29 DIAGNOSIS — E78.2 MIXED HYPERLIPIDEMIA: ICD-10-CM

## 2020-12-29 RX ORDER — ATORVASTATIN CALCIUM 40 MG/1
40 TABLET, FILM COATED ORAL DAILY
Qty: 90 TABLET | Refills: 1 | Status: SHIPPED | OUTPATIENT
Start: 2020-12-29 | End: 2021-03-29 | Stop reason: SDUPTHER

## 2021-01-03 DIAGNOSIS — I10 ESSENTIAL HYPERTENSION: ICD-10-CM

## 2021-01-08 ENCOUNTER — IMMUNIZATIONS (OUTPATIENT)
Dept: FAMILY MEDICINE CLINIC | Facility: HOSPITAL | Age: 81
End: 2021-01-08

## 2021-01-08 DIAGNOSIS — Z23 ENCOUNTER FOR IMMUNIZATION: ICD-10-CM

## 2021-01-08 PROCEDURE — 0011A SARS-COV-2 / COVID-19 MRNA VACCINE (MODERNA) 100 MCG: CPT

## 2021-01-08 PROCEDURE — 91301 SARS-COV-2 / COVID-19 MRNA VACCINE (MODERNA) 100 MCG: CPT

## 2021-02-09 ENCOUNTER — IMMUNIZATIONS (OUTPATIENT)
Dept: FAMILY MEDICINE CLINIC | Facility: HOSPITAL | Age: 81
End: 2021-02-09

## 2021-02-09 DIAGNOSIS — Z23 ENCOUNTER FOR IMMUNIZATION: Primary | ICD-10-CM

## 2021-02-09 PROCEDURE — 91301 SARS-COV-2 / COVID-19 MRNA VACCINE (MODERNA) 100 MCG: CPT

## 2021-02-09 PROCEDURE — 0012A SARS-COV-2 / COVID-19 MRNA VACCINE (MODERNA) 100 MCG: CPT

## 2021-02-17 ENCOUNTER — LAB (OUTPATIENT)
Dept: LAB | Facility: CLINIC | Age: 81
End: 2021-02-17
Payer: MEDICARE

## 2021-02-17 DIAGNOSIS — E11.40 TYPE 2 DIABETES MELLITUS WITH DIABETIC NEUROPATHY, WITHOUT LONG-TERM CURRENT USE OF INSULIN (HCC): ICD-10-CM

## 2021-02-17 DIAGNOSIS — I10 ESSENTIAL HYPERTENSION: ICD-10-CM

## 2021-02-17 DIAGNOSIS — R79.89 ELEVATED BRAIN NATRIURETIC PEPTIDE (BNP) LEVEL: ICD-10-CM

## 2021-02-17 LAB
ANION GAP SERPL CALCULATED.3IONS-SCNC: 8 MMOL/L (ref 4–13)
BUN SERPL-MCNC: 23 MG/DL (ref 5–25)
CALCIUM SERPL-MCNC: 9.3 MG/DL (ref 8.3–10.1)
CHLORIDE SERPL-SCNC: 109 MMOL/L (ref 100–108)
CHOLEST SERPL-MCNC: 148 MG/DL (ref 50–200)
CO2 SERPL-SCNC: 25 MMOL/L (ref 21–32)
CREAT SERPL-MCNC: 1.12 MG/DL (ref 0.6–1.3)
GFR SERPL CREATININE-BSD FRML MDRD: 46 ML/MIN/1.73SQ M
GLUCOSE P FAST SERPL-MCNC: 115 MG/DL (ref 65–99)
HDLC SERPL-MCNC: 76 MG/DL
LDLC SERPL CALC-MCNC: 54 MG/DL (ref 0–100)
POTASSIUM SERPL-SCNC: 4.2 MMOL/L (ref 3.5–5.3)
SODIUM SERPL-SCNC: 142 MMOL/L (ref 136–145)
TRIGL SERPL-MCNC: 90 MG/DL

## 2021-02-17 PROCEDURE — 80048 BASIC METABOLIC PNL TOTAL CA: CPT

## 2021-02-17 PROCEDURE — 36415 COLL VENOUS BLD VENIPUNCTURE: CPT

## 2021-02-17 PROCEDURE — 80061 LIPID PANEL: CPT

## 2021-02-24 ENCOUNTER — OFFICE VISIT (OUTPATIENT)
Dept: CARDIOLOGY CLINIC | Facility: CLINIC | Age: 81
End: 2021-02-24
Payer: MEDICARE

## 2021-02-24 VITALS
HEART RATE: 54 BPM | WEIGHT: 215 LBS | TEMPERATURE: 98.4 F | SYSTOLIC BLOOD PRESSURE: 142 MMHG | DIASTOLIC BLOOD PRESSURE: 62 MMHG | OXYGEN SATURATION: 96 % | BODY MASS INDEX: 42.21 KG/M2 | HEIGHT: 60 IN

## 2021-02-24 DIAGNOSIS — R60.0 EDEMA OF LEFT LOWER EXTREMITY: ICD-10-CM

## 2021-02-24 DIAGNOSIS — I48.0 PAROXYSMAL ATRIAL FIBRILLATION (HCC): Primary | ICD-10-CM

## 2021-02-24 DIAGNOSIS — E11.40 TYPE 2 DIABETES MELLITUS WITH DIABETIC NEUROPATHY, WITHOUT LONG-TERM CURRENT USE OF INSULIN (HCC): ICD-10-CM

## 2021-02-24 DIAGNOSIS — I51.89 GRADE II DIASTOLIC DYSFUNCTION: ICD-10-CM

## 2021-02-24 DIAGNOSIS — I10 ESSENTIAL HYPERTENSION: ICD-10-CM

## 2021-02-24 PROCEDURE — 93000 ELECTROCARDIOGRAM COMPLETE: CPT | Performed by: INTERNAL MEDICINE

## 2021-02-24 PROCEDURE — 99214 OFFICE O/P EST MOD 30 MIN: CPT | Performed by: INTERNAL MEDICINE

## 2021-02-24 RX ORDER — METOPROLOL TARTRATE 100 MG/1
TABLET ORAL
Qty: 180 TABLET | Refills: 3
Start: 2021-02-24 | End: 2021-04-29 | Stop reason: SDUPTHER

## 2021-02-24 NOTE — PROGRESS NOTES
Cardiology Outpatient Follow-up                                                          Mauro Licea  [id number]  1940      1  Paroxysmal atrial fibrillation (HCC)  POCT ECG   2  Essential hypertension  POCT ECG   3  Grade II diastolic dysfunction  POCT ECG   4  Edema of left lower extremity  POCT ECG   5  Type 2 diabetes mellitus with diabetic neuropathy, without long-term current use of insulin (HCC)  POCT ECG       Discussion/Summary:  Afib/flutter-  she is noted to have atrial fibrillation with slow heart rate response  We will cut down her metoprolol to 100 mg in the morning and 50 mg at night  We will obtain a 3 day heart monitor to evaluate her resting heart rate and with activities  Given age, htn, dm2- [id number] 5  eliquis 5mg twice a day  No further falls  No dizziness or light-headness  Acute on chronic diastolic hf- improved  She has had weight loss  Plan to change to torsemide 20 mg daily  Cut potassium supplementation  She will continue on metoprolol 100 mg twice a day  GERD- nexium 40mg daily    Fall- Completed physical thearpy    Htn- controlled  amlodipne + lisinopril    Dm2- atorvastatin    Colonoscopy screening negative    rtc- 6 months      HPI:  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, hypertension with recent unfortunate mechanical fall found to be in atrial fibrillation  She also has a history of diastolic dysfunction and lower extremity edema chronic  She states being compliant with her diuretic but with poor response  She has been of plan with her medications  She denies having chest heaviness  She denies having any history of prior CVA  She denies having any major bleeding episodes  07/24/2019:  Her lower extremity edema is significantly improved  Her blood pressures been well controlled  Her heart rates have been controlled  She denies having any further falls    We reviewed through her last blood work  She denies feeling dizziness or lightheadedness  10/23/2019:  Her weight has been trending down  She denies having recurrence of lower extremity edema  She is in chronic atrial fibrillation and rate controlled  She denies feeling dizziness or lightheadedness  There has been no falls  She has no trouble with Eliquis therapy  There has been no major bleeding  08/25/2020:  She denies feeling dizziness or lightheadedness  Her weight has been stable  Her lower extremity swelling has been well controlled  She is compliant with diuretic  We reviewed her last blood work  Her potassium was mildly higher  We will discontinue potassium supplementation  02/24/2021:  She has received her COVID vaccine  Her blood pressures have been under control  She was noted to have a heart rate in the 50s  She denies feeling dizziness or lightheadedness  She is compliant with her medications  She feels some leg cramps if she does not take potassium      Past Medical History:   Diagnosis Date    Arthritis     knees    Diabetes mellitus ([facility] [address] )     type 2    GERD (gastroesophageal reflux disease)     Hyperlipidemia     Hypertension     Urinary incontinence     Use of cane as ambulatory aid     Wears glasses      Social History     Socioeconomic History    Marital status: Single     Spouse name: Not on file    Number of children: Not on file    Years of education: Not on file    Highest education level: Not on file   Occupational History    Not on file   Social Needs    Financial resource strain: Not on file    Food insecurity     Worry: Not on file     Inability: Not on file    Transportation needs     Medical: Not on file     Non-medical: Not on file   Tobacco Use    Smoking status: Never Smoker    Smokeless tobacco: Never Used   Substance and Sexual Activity    Alcohol use: Yes     Comment: social    Drug use: No    Sexual activity: Not on file   Lifestyle    Physical activity Days per week: Not on file     Minutes per session: Not on file    Stress: Not on file   Relationships    Social connections     Talks on phone: Not on file     Gets together: Not on file     Attends Synagogue service: Not on file     Active member of club or organization: Not on file     Attends meetings of clubs or organizations: Not on file     Relationship status: Not on file    Intimate partner violence     Fear of current or ex partner: Not on file     Emotionally abused: Not on file     Physically abused: Not on file     Forced sexual activity: Not on file   Other Topics Concern    Not on file   Social History Narrative    Not on file      Family History   Problem Relation Age of Onset    Breast cancer Mother     Diabetes type II Mother     Atrial fibrillation Mother     Diabetes Mother         diet controlled    Hypertension Mother     Lung cancer Father     Alcohol abuse Father     Cancer Father         lung-smoker     Past Surgical History:   Procedure Laterality Date    BREAST SURGERY Left     nothing was there     COLONOSCOPY      HYSTERECTOMY      complete-fibroid    JOINT REPLACEMENT Left     knee    MT XCAPSL CTRC RMVL INSJ IO LENS PROSTH W/O ECP Right 7/19/2018    Procedure: EXTRACTION EXTRACAPSULAR CATARACT PHACO INTRAOCULAR LENS (IOL); Surgeon: J Carlos Luque MD;  Location: [facility] MAIN OR;  Service: Ophthalmology    MT XCAPSL CTRC RMVL INSJ IO LENS PROSTH W/O ECP Left 8/9/2018    Procedure: EXTRACTION EXTRACAPSULAR CATARACT PHACO INTRAOCULAR LENS (IOL);   Surgeon: J Carlos Luque MD;  Location: [facility] MAIN OR;  Service: Ophthalmology       Current Outpatient Medications:     amLODIPine (NORVASC) 10 mg tablet, Take 1 tablet (10 mg total) by mouth daily, Disp: 90 tablet, Rfl: 3    amoxicillin (AMOXIL) 500 mg capsule, TAKE TWO CAPSULES IMMEDIATELY THEN TAKE ONE CAPSULE EVERY 8 HOURS UNTIL GONE, Disp: , Rfl:     apixaban (ELIQUIS) 5 mg, Take 1 tablet (5 mg total) by mouth 2 (two) times a day, Disp: 180 tablet, Rfl: 3    atorvastatin (LIPITOR) 40 mg tablet, Take 1 tablet (40 mg total) by mouth daily, Disp: 90 tablet, Rfl: 1    Calcium Carb-Cholecalciferol (CALCIUM 600+D) 600-800 MG-UNIT TABS, Take by mouth every morning, Disp: , Rfl:     CINNAMON PO, Take 1,000 mg by mouth every morning, Disp: , Rfl:     lisinopril (ZESTRIL) 10 mg tablet, Take 1 tablet (10 mg total) by mouth daily, Disp: 90 tablet, Rfl: 1    metFORMIN (GLUCOPHAGE) 1000 MG tablet, Take 1 tablet by mouth twice a day, Disp: 180 tablet, Rfl: 3    metoprolol tartrate (LOPRESSOR) 100 mg tablet, TAKE ONE TABLET BY MOUTH EVERY 12 HOURS, Disp: 180 tablet, Rfl: 1    metoprolol tartrate (LOPRESSOR) 100 mg tablet, Take 1 tablet (100 mg total) by mouth every 12 (twelve) hours, Disp: 180 tablet, Rfl: 3    Multiple Vitamins-Minerals (PRESERVISION AREDS PO), Take by mouth, Disp: , Rfl:     Potassium Chloride ER (K-TAB) 20 MEQ TBCR, Take one tablet each day with torsemide, Disp: 90 tablet, Rfl: 3    torsemide (DEMADEX) 20 mg tablet, TAKE 1 TABLET daily in morning, Disp: 90 tablet, Rfl: 3    esomeprazole (NEXIUM) 40 MG capsule, Take 40 mg by mouth as needed  , Disp: , Rfl:   No Known Allergies  Vitals:    02/24/21 1347   BP: 142/62   BP Location: Right arm   Patient Position: Sitting   Cuff Size: Large   Pulse: (!) 54   Temp: 98 4 °F (36 9 °C)   SpO2: 96%   Weight: 97 5 kg (215 lb)   Height: 5' (1 524 m)       Review of Systems:  Review of Systems   Constitutional: Negative  Negative for activity change, appetite change, chills, diaphoresis, fatigue, fever and unexpected weight change  HENT: Negative  Negative for congestion, dental problem, drooling, ear discharge, ear pain, facial swelling, hearing loss, mouth sores, nosebleeds, postnasal drip, rhinorrhea, sinus pressure, sinus pain, sneezing, sore throat, tinnitus, trouble swallowing and voice change  Eyes: Negative    Negative for photophobia, pain, redness, itching and visual disturbance  Respiratory: Positive for shortness of breath  Negative for apnea, cough, choking, chest tightness, wheezing and stridor  Cardiovascular: Positive for leg swelling  Negative for chest pain and palpitations  Gastrointestinal: Negative  Negative for abdominal distention, abdominal pain, anal bleeding, blood in stool, constipation, diarrhea, nausea, rectal pain and vomiting  Endocrine: Negative  Negative for cold intolerance, heat intolerance, polydipsia, polyphagia and polyuria  Genitourinary: Negative  Negative for decreased urine volume, difficulty urinating, dyspareunia, dysuria, enuresis, flank pain, frequency, genital sores, hematuria, menstrual problem, pelvic pain, urgency, vaginal bleeding, vaginal discharge and vaginal pain  Musculoskeletal: Negative  Negative for arthralgias, back pain, gait problem, joint swelling, myalgias, neck pain and neck stiffness  Skin: Negative  Negative for color change, pallor, rash and wound  Allergic/Immunologic: Negative  Negative for environmental allergies, food allergies and immunocompromised state  Neurological: Negative  Negative for dizziness, tremors, seizures, syncope, facial asymmetry, speech difficulty, weakness, light-headedness, numbness and headaches  Hematological: Negative  Negative for adenopathy  Does not bruise/bleed easily  Psychiatric/Behavioral: Negative  Negative for agitation, behavioral problems, confusion, decreased concentration, dysphoric mood, hallucinations, self-injury, sleep disturbance and suicidal ideas  The patient is not nervous/anxious and is not hyperactive  All other systems reviewed and are negative        Vitals:    02/24/21 1347   BP: 142/62   BP Location: Right arm   Patient Position: Sitting   Cuff Size: Large   Pulse: (!) 54   Temp: 98 4 °F (36 9 °C)   SpO2: 96%   Weight: 97 5 kg (215 lb)   Height: 5' (1 524 m)     Physical Exam:  Physical Exam  Constitutional:       General: She is not in acute distress  Appearance: She is well-developed  She is not diaphoretic  HENT:      Head: Normocephalic and atraumatic  Right Ear: External ear normal       Left Ear: External ear normal    Eyes:      General: No scleral icterus  Right eye: No discharge  Left eye: No discharge  Conjunctiva/sclera: Conjunctivae normal       Pupils: Pupils are equal, round, and reactive to light  Neck:      Musculoskeletal: Normal range of motion and neck supple  Thyroid: No thyromegaly  Vascular: JVD present  Trachea: No tracheal deviation  Cardiovascular:      Rate and Rhythm: Normal rate and regular rhythm  Heart sounds: Murmur present  No friction rub  No gallop  Pulmonary:      Effort: Pulmonary effort is normal  No respiratory distress  Breath sounds: Normal breath sounds  No stridor  No wheezing or rales  Chest:      Chest wall: No tenderness  Abdominal:      General: Bowel sounds are normal  There is no distension  Palpations: Abdomen is soft  There is no mass  Tenderness: There is no abdominal tenderness  There is no guarding or rebound  Musculoskeletal: Normal range of motion  General: No tenderness or deformity  Left lower leg: Edema present  Skin:     General: Skin is warm and dry  Coloration: Skin is not pale  Findings: No erythema or rash  Neurological:      Mental Status: She is alert and oriented to person, place, and time  Cranial Nerves: No cranial nerve deficit  Motor: No abnormal muscle tone  Coordination: Coordination normal       Deep Tendon Reflexes: Reflexes are normal and symmetric  Reflexes normal    Psychiatric:         Behavior: Behavior normal          Thought Content:  Thought content normal          Judgment: Judgment normal          Labs:     Lab Results   Component Value Date    WBC 6 09 04/02/2019    HGB 10 7 (L) 04/02/2019    HCT 34 3 (L) 04/02/2019    MCV 82 04/02/2019  2019     Lab Results   Component Value Date    K 4 2 2021     (H) 2021    CO2 25 2021    BUN 23 2021    CREATININE 1 12 2021    GLUF 115 (H) 2021    CALCIUM 9 3 2021    AST 14 2020    ALT 22 2020    ALKPHOS 84 2020    EGFR 46 2021     No results found for: CHOL  Lab Results   Component Value Date    HDL 76 2021    HDL 74 (H) 2016     Lab Results   Component Value Date    LDLCALC 54 2021    LDLCALC 62 2016     Lab Results   Component Value Date    TRIG 90 2021    TRIG 132 2016     Lab Results   Component Value Date    HGBA1C 6 7 (H) 2019     No results found for: TSH    Imaging & Testing   I have personally reviewed pertinent reports  EKG: Personally reviewed  Atrial fibrillation nonspecific st-t wave changes    Cardiac testing:   Results for orders placed during the hospital encounter of 18   Echo complete with contrast if indicated    Narrative [address]  [address] [facility] [address]  [phone]    Transthoracic Echocardiogram  2D, M-mode, Doppler, and Color Doppler    Study date:  04-Dec-2018    Patient: Jesus Foote  MR number: [id number]  Account number: [de-identified]  : 1940  Age: 77 years  Gender: Female  Status: Outpatient  Location: Echo lab  Height: 61 in  Weight: 225 5 lb  BP: 147/ 97 mmHg    Indications: Dyspnea    Diagnoses: 794 31 - ABNORM ELECTROCARDIOGRAM    Sonographer:  AGAPITO Vega  Primary Physician: Warner Loco DO  Referring Physician:  Lars Velazco:  Ayana Warren [facility]  Interpreting Physician:  Arnoldo Hess MD    SUMMARY    LEFT VENTRICLE:  Systolic function was at the lower limits of normal  Ejection fraction was estimated in the range of 50 % to 55 % to be 55 %  There were no regional wall motion abnormalities  Wall thickness was mildly increased    Features were consistent with a pseudonormal left ventricular filling pattern, with concomitant abnormal relaxation and increased filling pressure (grade 2 diastolic dysfunction)  LEFT ATRIUM:  The atrium was mildly dilated  RIGHT ATRIUM:  The atrium was mildly dilated  AORTIC VALVE:  The valve was functionally bicuspid  Leaflets exhibited mildly to moderately increased thickness, mild to moderate calcification, mildly reduced cuspal separation, and sclerosis  Transaortic velocity was increased due to valvular stenosis  There was mild stenosis  Valve mean gradient was 15 mmHg  TRICUSPID VALVE:  There was trace regurgitation  The findings suggest mild pulmonary hypertension  HISTORY: PRIOR HISTORY: Diabetes, Edema,HTN,Hyperlipidemia,Gastroesophageal reflux disease    PROCEDURE: The procedure was performed in the echo lab  This was a routine study  The transthoracic approach was used  The study included complete 2D imaging, M-mode, complete spectral Doppler, and color Doppler  The heart rate was 67 bpm,  at the start of the study  Images were obtained from the parasternal, apical, subcostal, and suprasternal notch acoustic windows  Echocardiographic views were limited due to restricted patient mobility, poor patient compliance, poor  acoustic window availability, decreased penetration, and lung interference  This was a technically difficult study  LEFT VENTRICLE: Size was normal  Systolic function was at the lower limits of normal  Ejection fraction was estimated in the range of 50 % to 55 % to be 55 %  There were no regional wall motion abnormalities  Wall thickness was mildly  increased  No evidence of apical thrombus  DOPPLER: Features were consistent with a pseudonormal left ventricular filling pattern, with concomitant abnormal relaxation and increased filling pressure (grade 2 diastolic dysfunction)      RIGHT VENTRICLE: The size was normal  Systolic function was normal  Wall thickness was normal     LEFT ATRIUM: The atrium was mildly dilated  RIGHT ATRIUM: The atrium was mildly dilated  MITRAL VALVE: Valve structure was normal  There was normal leaflet separation  DOPPLER: The transmitral velocity was within the normal range  There was no evidence for stenosis  There was no significant regurgitation  AORTIC VALVE: The valve was functionally bicuspid  Leaflets exhibited mildly to moderately increased thickness, mild to moderate calcification, mildly reduced cuspal separation, and sclerosis  DOPPLER: Transaortic velocity was increased  due to valvular stenosis  There was mild stenosis  There was no significant regurgitation  TRICUSPID VALVE: The valve structure was normal  There was normal leaflet separation  DOPPLER: The transtricuspid velocity was within the normal range  There was no evidence for stenosis  There was trace regurgitation  Estimated peak PA  pressure was 38 mmHg  The findings suggest mild pulmonary hypertension  PULMONIC VALVE: Leaflets exhibited normal thickness, no calcification, and normal cuspal separation  DOPPLER: The transpulmonic velocity was within the normal range  There was no significant regurgitation  PERICARDIUM: There was no pericardial effusion  The pericardium was normal in appearance  AORTA: The root exhibited normal size  SYSTEMIC VEINS: IVC: The inferior vena cava was normal in size      MEASUREMENT TABLES    DOPPLER MEASUREMENTS  Aortic valve   (Reference normals)  Peak zaire   250 cm/s   (--)  Peak gradient   26 mmHg   (--)  Mean gradient   15 mmHg   (--)    SYSTEM MEASUREMENT TABLES    2D mode  AoR Diam 2D: 3 2 cm  LA Diam (2D): 4 1 cm  LA/Ao (2D): 1 28  FS (2D Teich): 26 4 %  IVSd (2D): 1 31 cm  LVDEV: 72 1 cm³  LVEDV MOD BP: 102 cm³  LVESV: 34 4 cm³  LVIDd(2D): 4 05 cm  LVISd (2D): 2 98 cm  LVOT Area 2D: 3 14 cm squared  LVPWd (2D): 1 3 cm  SV (Teich): 37 7 cm³    Apical four chamber  LVEF A4C: 50 %    Apical two chamber  LVEF A2C: 52 %    Unspecified Scan Mode  DEVON Cont Eq (Peak Steve): 1 49 cm squared  LVOT Diam : 2 cm  LVOT Vmax: 1210 mm/s  LVOT Vmax; Mean: 1210 mm/s  Peak Grad ; Mean: 6 mm[Hg]  MV Peak A Steve: 291 mm/s  MV Peak E Steve  Mean: 1060 mm/s  MVA (PHT): 6 29 cm squared  PHT: 35 ms  Max P mm[Hg]  V Max: 2390 mm/s  Vmax: 2880 mm/s  RA Area: 21 5 cm squared  RA Volume: 65 8 cm³  TAPSE: 1 9 cm    [facility]    Prepared and electronically signed by    Gigi Harrison MD  Signed 05-Dec-2018 11:11:08         Gigi Gonzalez  Please call with any questions or suggestions    Counseling :  A description of the counseling:   Goals and Barriers:  Patient's ability to self care:  Medication side effect reviewed with patient in detail and all their questions answered  "This note has been constructed using a voice recognition system  Therefore there may be syntax, spelling, and/or grammatical errors   Please call if you have any questions  "

## 2021-02-25 ENCOUNTER — TELEPHONE (OUTPATIENT)
Dept: CARDIOLOGY CLINIC | Facility: CLINIC | Age: 81
End: 2021-02-25

## 2021-03-29 DIAGNOSIS — E78.2 MIXED HYPERLIPIDEMIA: ICD-10-CM

## 2021-03-29 RX ORDER — ATORVASTATIN CALCIUM 40 MG/1
40 TABLET, FILM COATED ORAL DAILY
Qty: 30 TABLET | Refills: 0 | Status: SHIPPED | OUTPATIENT
Start: 2021-03-29 | End: 2021-04-21 | Stop reason: SDUPTHER

## 2021-03-29 NOTE — TELEPHONE ENCOUNTER
Patient left message requesting refill on attached  Last visit 10/31/2019   Please advise  Thank you

## 2021-03-29 NOTE — TELEPHONE ENCOUNTER
A per Dr Shira Cole, "Please advise patient that she needs to be seen in the office for further refills"     Called and left patient a message to call back [phone]

## 2021-04-04 DIAGNOSIS — I10 ESSENTIAL HYPERTENSION: ICD-10-CM

## 2021-04-04 DIAGNOSIS — E11.42 TYPE 2 DIABETES MELLITUS WITH DIABETIC POLYNEUROPATHY, WITHOUT LONG-TERM CURRENT USE OF INSULIN (HCC): ICD-10-CM

## 2021-04-21 ENCOUNTER — OFFICE VISIT (OUTPATIENT)
Dept: FAMILY MEDICINE CLINIC | Facility: CLINIC | Age: 81
End: 2021-04-21
Payer: MEDICARE

## 2021-04-21 VITALS
HEIGHT: 60 IN | BODY MASS INDEX: 41.21 KG/M2 | DIASTOLIC BLOOD PRESSURE: 60 MMHG | SYSTOLIC BLOOD PRESSURE: 142 MMHG | OXYGEN SATURATION: 99 % | HEART RATE: 64 BPM | TEMPERATURE: 96.7 F | RESPIRATION RATE: 18 BRPM | WEIGHT: 209.9 LBS

## 2021-04-21 DIAGNOSIS — E78.2 MIXED HYPERLIPIDEMIA: ICD-10-CM

## 2021-04-21 DIAGNOSIS — I10 ESSENTIAL HYPERTENSION: ICD-10-CM

## 2021-04-21 DIAGNOSIS — E11.42 TYPE 2 DIABETES MELLITUS WITH DIABETIC POLYNEUROPATHY, WITHOUT LONG-TERM CURRENT USE OF INSULIN (HCC): ICD-10-CM

## 2021-04-21 PROCEDURE — G0439 PPPS, SUBSEQ VISIT: HCPCS | Performed by: FAMILY MEDICINE

## 2021-04-21 PROCEDURE — 1124F ACP DISCUSS-NO DSCNMKR DOCD: CPT | Performed by: FAMILY MEDICINE

## 2021-04-21 RX ORDER — ATORVASTATIN CALCIUM 40 MG/1
40 TABLET, FILM COATED ORAL DAILY
Qty: 90 TABLET | Refills: 3 | Status: SHIPPED | OUTPATIENT
Start: 2021-04-21 | End: 2022-04-25

## 2021-04-21 RX ORDER — AMLODIPINE BESYLATE 10 MG/1
10 TABLET ORAL DAILY
Qty: 90 TABLET | Refills: 3 | Status: SHIPPED | OUTPATIENT
Start: 2021-04-21 | End: 2022-04-25

## 2021-04-21 RX ORDER — LISINOPRIL 10 MG/1
10 TABLET ORAL DAILY
Qty: 90 TABLET | Refills: 3 | Status: SHIPPED | OUTPATIENT
Start: 2021-04-21 | End: 2022-04-25

## 2021-04-21 NOTE — PROGRESS NOTES
Assessment and Plan:     Problem List Items Addressed This Visit     None        BMI Counseling: Body mass index is 40 99 kg/m²  The BMI is above normal  Nutrition recommendations include decreasing portion sizes and decreasing fast food intake  Exercise recommendations include moderate physical activity 150 minutes/week  No pharmacotherapy was ordered  Preventive health issues were discussed with patient, and age appropriate screening tests were ordered as noted in patient's After Visit Summary  Personalized health advice and appropriate referrals for health education or preventive services given if needed, as noted in patient's After Visit Summary  History of Present Illness:     Patient presents for Medicare Annual Wellness visit    Patient Care Team:  Haider Rojas MD as PCP - General (Family Medicine)     Problem List:     Patient Active Problem List   Diagnosis    Essential hypertension    Anemia    Type 2 diabetes mellitus with diabetic neuropathy ([facility] [address] )    Edema of left lower extremity    Hypokalemia    Urinary incontinence    Wheezing    Grade II diastolic dysfunction      Past Medical and Surgical History:     Past Medical History:   Diagnosis Date    Arthritis     knees    Diabetes mellitus ([address] )     type 2    GERD (gastroesophageal reflux disease)     Hyperlipidemia     Hypertension     Urinary incontinence     Use of cane as ambulatory aid     Wears glasses      Past Surgical History:   Procedure Laterality Date    BREAST SURGERY Left     nothing was there     COLONOSCOPY      HYSTERECTOMY      complete-fibroid    JOINT REPLACEMENT Left     knee    CO XCAPSL CTRC RMVL INSJ IO LENS PROSTH W/O ECP Right 7/19/2018    Procedure: EXTRACTION EXTRACAPSULAR CATARACT PHACO INTRAOCULAR LENS (IOL);   Surgeon: Jessica Fernandez MD;  Location[facility] MAIN OR;  Service: Ophthalmology    CO XCAPSL CTRC RMVL INSJ IO LENS PROSTH W/O ECP Left 8/9/2018    Procedure: EXTRACTION EXTRACAPSULAR CATARACT PHACO INTRAOCULAR LENS (IOL);   Surgeon: Rica Bolaños MD;  Location: [facility] MAIN OR;  Service: Ophthalmology      Family History:     Family History   Problem Relation Age of Onset   Konstantin Larson Breast cancer Mother     Diabetes type II Mother     Atrial fibrillation Mother     Diabetes Mother         diet controlled    Hypertension Mother     Lung cancer Father     Alcohol abuse Father     Cancer Father         lung-smoker      Social History:     E-Cigarette/Vaping    E-Cigarette Use Never User      E-Cigarette/Vaping Substances    Nicotine No     THC No     CBD No      Social History     Socioeconomic History    Marital status: Single     Spouse name: Not on file    Number of children: Not on file    Years of education: Not on file    Highest education level: Not on file   Occupational History    Not on file   Social Needs    Financial resource strain: Not on file    Food insecurity     Worry: Not on file     Inability: Not on file    Transportation needs     Medical: Not on file     Non-medical: Not on file   Tobacco Use    Smoking status: Never Smoker    Smokeless tobacco: Never Used   Substance and Sexual Activity    Alcohol use: Yes     Comment: social    Drug use: No    Sexual activity: Not on file   Lifestyle    Physical activity     Days per week: Not on file     Minutes per session: Not on file    Stress: Not on file   Relationships    Social connections     Talks on phone: Not on file     Gets together: Not on file     Attends [employer] service: Not on file     Active member of club or organization: Not on file     Attends meetings of clubs or organizations: Not on file     Relationship status: Not on file    Intimate partner violence     Fear of current or ex partner: Not on file     Emotionally abused: Not on file     Physically abused: Not on file     Forced sexual activity: Not on file   Other Topics Concern    Not on file   Social History Narrative    Not on file Medications and Allergies:     Current Outpatient Medications   Medication Sig Dispense Refill    amLODIPine (NORVASC) 10 mg tablet Take 1 tablet (10 mg total) by mouth daily 90 tablet 3    apixaban (ELIQUIS) 5 mg Take 1 tablet (5 mg total) by mouth 2 (two) times a day 180 tablet 3    atorvastatin (LIPITOR) 40 mg tablet Take 1 tablet (40 mg total) by mouth daily 30 tablet 0    CINNAMON PO Take 1,000 mg by mouth every morning      esomeprazole (NEXIUM) 40 MG capsule Take 40 mg by mouth as needed        lisinopril (ZESTRIL) 10 mg tablet Take 1 tablet (10 mg total) by mouth daily 90 tablet 1    metFORMIN (GLUCOPHAGE) 1000 MG tablet Take 1 tablet by mouth twice a day 180 tablet 3    metoprolol tartrate (LOPRESSOR) 100 mg tablet TAKE ONE TABLET BY MOUTH EVERY 12 HOURS 180 tablet 1    Multiple Vitamins-Minerals (PRESERVISION AREDS PO) Take by mouth      Potassium Chloride ER (K-TAB) 20 MEQ TBCR Take one tablet each day with torsemide 90 tablet 3    torsemide (DEMADEX) 20 mg tablet TAKE 1 TABLET daily in morning 90 tablet 3    amoxicillin (AMOXIL) 500 mg capsule TAKE TWO CAPSULES IMMEDIATELY THEN TAKE ONE CAPSULE EVERY 8 HOURS UNTIL GONE      Calcium Carb-Cholecalciferol (CALCIUM 600+D) 600-800 MG-UNIT TABS Take by mouth every morning      metoprolol tartrate (LOPRESSOR) 100 mg tablet Take one tablet in morning and half tablet at night 180 tablet 3     No current facility-administered medications for this visit  No Known Allergies   Immunizations:     Immunization History   Administered Date(s) Administered    INFLUENZA 09/24/2015, 10/15/2018    Influenza Quadrivalent Preservative Free 3 years and older IM 09/24/2015, 11/01/2017    Pneumococcal Conjugate 13-Valent 02/22/2018    SARS-CoV-2 / COVID-19 mRNA IM (Moderna) 01/08/2021, 02/09/2021    Tdap 02/22/2018      Health Maintenance: There are no preventive care reminders to display for this patient        Topic Date Due    Pneumococcal Vaccine: 65+ Years (2 of 2 - PPSV23) 02/22/2019    Influenza Vaccine (1) 09/01/2020      Medicare Health Risk Assessment:     There were no vitals taken for this visit  Hector Rodriguez is here for her Subsequent Wellness visit  Health Risk Assessment:   Patient rates overall health as good  Patient feels that their physical health rating is slightly better  Patient is satisfied with their life  Eyesight was rated as slightly worse  Hearing was rated as slightly worse  Patient feels that their emotional and mental health rating is same  Patients states they are never, rarely angry  Patient states they are never, rarely unusually tired/fatigued  Pain experienced in the last 7 days has been none  Patient states that she has experienced no weight loss or gain in last 6 months  Depression Screening:   PHQ-2 Score: 0      Fall Risk Screening: In the past year, patient has experienced: no history of falling in past year      Urinary Incontinence Screening:   Patient has not leaked urine accidently in the last six months  Home Safety:  Patient has trouble with stairs inside or outside of their home  Patient has working smoke alarms and has working carbon monoxide detector  Home safety hazards include: none  Nutrition:   Current diet is Regular  Medications:   Patient is currently taking over-the-counter supplements  OTC medications include: see medication list  Patient is able to manage medications  Activities of Daily Living (ADLs)/Instrumental Activities of Daily Living (IADLs):   Walk and transfer into and out of bed and chair?: Yes  Dress and groom yourself?: Yes    Bathe or shower yourself?: Yes    Feed yourself?  Yes  Do your laundry/housekeeping?: Yes  Manage your money, pay your bills and track your expenses?: Yes  Make your own meals?: Yes    Do your own shopping?: Yes    Previous Hospitalizations:   Any hospitalizations or ED visits within the last 12 months?: No      PREVENTIVE SCREENINGS Cardiovascular Screening:    General: Screening Not Indicated and History Lipid Disorder      Diabetes Screening:     General: Screening Not Indicated and History Diabetes      Cervical Cancer Screening:    General: Screening Not Indicated      Lung Cancer Screening:     General: Screening Not Indicated    Screening, Brief Intervention, and Referral to Treatment (SBIRT)    Screening  Typical number of drinks in a day: 0  Typical number of drinks in a week: 0  Interpretation: Low risk drinking behavior      Single Item Drug Screening:  How often have you used an illegal drug (including marijuana) or a prescription medication for non-medical reasons in the past year? never    Single Item Drug Screen Score: 0  Interpretation: Negative screen for possible drug use disorder      Haider Rojas MD

## 2021-04-21 NOTE — PATIENT INSTRUCTIONS
Medicare Preventive Visit Patient Instructions  Thank you for completing your Welcome to Medicare Visit or Medicare Annual Wellness Visit today  Your next wellness visit will be due in one year (4/22/2022)  The screening/preventive services that you may require over the next 5-10 years are detailed below  Some tests may not apply to you based off risk factors and/or age  Screening tests ordered at today's visit but not completed yet may show as past due  Also, please note that scanned in results may not display below  Preventive Screenings:  Service Recommendations Previous Testing/Comments   Colorectal Cancer Screening  * Colonoscopy    * Fecal Occult Blood Test (FOBT)/Fecal Immunochemical Test (FIT)  * Fecal DNA/Cologuard Test  * Flexible Sigmoidoscopy Age: 54-65 years old   Colonoscopy: every 10 years (may be performed more frequently if at higher risk)  OR  FOBT/FIT: every 1 year  OR  Cologuard: every 3 years  OR  Sigmoidoscopy: every 5 years  Screening may be recommended earlier than age 48 if at higher risk for colorectal cancer  Also, an individualized decision between you and your healthcare provider will decide whether screening between the ages of 74-80 would be appropriate  Colonoscopy: Not on file  FOBT/FIT: Not on file  Cologuard: Not on file  Sigmoidoscopy: Not on file          Breast Cancer Screening Age: 36 years old  Frequency: every 1-2 years  Not required if history of left and right mastectomy Mammogram: 01/23/2018        Cervical Cancer Screening Between the ages of 21-29, pap smear recommended once every 3 years  Between the ages of 33-67, can perform pap smear with HPV co-testing every 5 years     Recommendations may differ for women with a history of total hysterectomy, cervical cancer, or abnormal pap smears in past  Pap Smear: Not on file    Screening Not Indicated   Hepatitis C Screening Once for adults born between [facility]  More frequently in patients at high risk for Hepatitis C Hep C Antibody: Not on file        Diabetes Screening 1-2 times per year if you're at risk for diabetes or have pre-diabetes Fasting glucose: 115 mg/dL   A1C: 6 7 %    Screening Not Indicated  History Diabetes   Cholesterol Screening Once every 5 years if you don't have a lipid disorder  May order more often based on risk factors  Lipid panel: 02/17/2021    Screening Not Indicated  History Lipid Disorder     Other Preventive Screenings Covered by Medicare:  1  Abdominal Aortic Aneurysm (AAA) Screening: covered once if your at risk  You're considered to be at risk if you have a family history of AAA  2  Lung Cancer Screening: covers low dose CT scan once per year if you meet all of the following conditions: (1) Age 50-69; (2) No signs or symptoms of lung cancer; (3) Current smoker or have quit smoking within the last 15 years; (4) You have a tobacco smoking history of at least 30 pack years (packs per day multiplied by number of years you smoked); (5) You get a written order from a healthcare provider  3  Glaucoma Screening: covered annually if you're considered high risk: (1) You have diabetes OR (2) Family history of glaucoma OR (3)  aged 48 and older OR (3)  American aged 72 and older  3  Osteoporosis Screening: covered every 2 years if you meet one of the following conditions: (1) You're estrogen deficient and at risk for osteoporosis based off medical history and other findings; (2) Have a vertebral abnormality; (3) On glucocorticoid therapy for more than 3 months; (4) Have primary hyperparathyroidism; (5) On osteoporosis medications and need to assess response to drug therapy  · Last bone density test (DXA Scan): 01/23/2018  5  HIV Screening: covered annually if you're between the age of 12-76  Also covered annually if you are younger than 13 and older than 72 with risk factors for HIV infection   For pregnant patients, it is covered up to 3 times per pregnancy  Immunizations:  Immunization Recommendations   Influenza Vaccine Annual influenza vaccination during flu season is recommended for all persons aged >= 6 months who do not have contraindications   Pneumococcal Vaccine (Prevnar and Pneumovax)  * Prevnar = PCV13  * Pneumovax = PPSV23   Adults 25-60 years old: 1-3 doses may be recommended based on certain risk factors  Adults 72 years old: Prevnar (PCV13) vaccine recommended followed by Pneumovax (PPSV23) vaccine  If already received PPSV23 since turning 65, then PCV13 recommended at least one year after PPSV23 dose  Hepatitis B Vaccine 3 dose series if at intermediate or high risk (ex: diabetes, end stage renal disease, liver disease)   Tetanus (Td) Vaccine - COST NOT COVERED BY MEDICARE PART B Following completion of primary series, a booster dose should be given every 10 years to maintain immunity against tetanus  Td may also be given as tetanus wound prophylaxis  Tdap Vaccine - COST NOT COVERED BY MEDICARE PART B Recommended at least once for all adults  For pregnant patients, recommended with each pregnancy  Shingles Vaccine (Shingrix) - COST NOT COVERED BY MEDICARE PART B  2 shot series recommended in those aged 48 and above     Health Maintenance Due:  There are no preventive care reminders to display for this patient  Immunizations Due:      Topic Date Due    Pneumococcal Vaccine: 65+ Years (2 of 2 - PPSV23) 02/22/2019    Influenza Vaccine (1) 09/01/2020     Advance Directives   What are advance directives? Advance directives are legal documents that state your wishes and plans for medical care  These plans are made ahead of time in case you lose your ability to make decisions for yourself  Advance directives can apply to any medical decision, such as the treatments you want, and if you want to donate organs  What are the types of advance directives?   There are many types of advance directives, and each state has rules about how to use them  You may choose a combination of any of the following:  · Living will: This is a written record of the treatment you want  You can also choose which treatments you do not want, which to limit, and which to stop at a certain time  This includes surgery, medicine, IV fluid, and tube feedings  · Durable power of  for healthcare [place] [facility]): This is a written record that states who you want to make healthcare choices for you when you are unable to make them for yourself  This person, called a proxy, is usually a family member or a friend  You may choose more than 1 proxy  · Do not resuscitate (DNR) order:  A DNR order is used in case your heart stops beating or you stop breathing  It is a request not to have certain forms of treatment, such as CPR  A DNR order may be included in other types of advance directives  · Medical directive: This covers the care that you want if you are in a coma, near death, or unable to make decisions for yourself  You can list the treatments you want for each condition  Treatment may include pain medicine, surgery, blood transfusions, dialysis, IV or tube feedings, and a ventilator (breathing machine)  · Values history: This document has questions about your views, beliefs, and how you feel and think about life  This information can help others choose the care that you would choose  Why are advance directives important? An advance directive helps you control your care  Although spoken wishes may be used, it is better to have your wishes written down  Spoken wishes can be misunderstood, or not followed  Treatments may be given even if you do not want them  An advance directive may make it easier for your family to make difficult choices about your care  Urinary Incontinence   Urinary incontinence (UI)  is when you lose control of your bladder  UI develops because your bladder cannot store or empty urine properly   The 3 most common types of UI are stress incontinence, urge incontinence, or both  Medicines:   · May be given to help strengthen your bladder control  Report any side effects of medication to your healthcare provider  Do pelvic muscle exercises often:  Your pelvic muscles help you stop urinating  Squeeze these muscles tight for 5 seconds, then relax for 5 seconds  Gradually work up to squeezing for 10 seconds  Do 3 sets of 15 repetitions a day, or as directed  This will help strengthen your pelvic muscles and improve bladder control  Train your bladder:  Go to the bathroom at set times, such as every 2 hours, even if you do not feel the urge to go  You can also try to hold your urine when you feel the urge to go  For example, hold your urine for 5 minutes when you feel the urge to go  As that becomes easier, hold your urine for 10 minutes  Self-care:   · Keep a UI record  Write down how often you leak urine and how much you leak  Make a note of what you were doing when you leaked urine  · Drink liquids as directed  You may need to limit the amount of liquid you drink to help control your urine leakage  Do not drink any liquid right before you go to bed  Limit or do not have drinks that contain caffeine or alcohol  · Prevent constipation  Eat a variety of high-fiber foods  Good examples are high-fiber cereals, beans, vegetables, and whole-grain breads  Walking is the best way to trigger your intestines to have a bowel movement  · Exercise regularly and maintain a healthy weight  Weight loss and exercise will decrease pressure on your bladder and help you control your leakage  · Use a catheter as directed  to help empty your bladder  A catheter is a tiny, plastic tube that is put into your bladder to drain your urine  · Go to behavior therapy as directed  Behavior therapy may be used to help you learn to control your urge to urinate      Weight Management   Why it is important to manage your weight:  Being overweight increases your risk of health conditions such as heart disease, high blood pressure, type 2 diabetes, and certain types of cancer  It can also increase your risk for osteoarthritis, sleep apnea, and other respiratory problems  Aim for a slow, steady weight loss  Even a small amount of weight loss can lower your risk of health problems  How to lose weight safely:  A safe and healthy way to lose weight is to eat fewer calories and get regular exercise  You can lose up about 1 pound a week by decreasing the number of calories you eat by 500 calories each day  Healthy meal plan for weight management:  A healthy meal plan includes a variety of foods, contains fewer calories, and helps you stay healthy  A healthy meal plan includes the following:  · Eat whole-grain foods more often  A healthy meal plan should contain fiber  Fiber is the part of grains, fruits, and vegetables that is not broken down by your body  Whole-grain foods are healthy and provide extra fiber in your diet  Some examples of whole-grain foods are whole-wheat breads and pastas, oatmeal, brown rice, and bulgur  · Eat a variety of vegetables every day  Include dark, leafy greens such as spinach, kale, donell greens, and mustard greens  Eat yellow and orange vegetables such as carrots, sweet potatoes, and winter squash  · Eat a variety of fruits every day  Choose fresh or canned fruit (canned in its own juice or light syrup) instead of juice  Fruit juice has very little or no fiber  · Eat low-fat dairy foods  Drink fat-free (skim) milk or 1% milk  Eat fat-free yogurt and low-fat cottage cheese  Try low-fat cheeses such as mozzarella and other reduced-fat cheeses  · Choose meat and other protein foods that are low in fat  Choose beans or other legumes such as split peas or lentils  Choose fish, skinless poultry (chicken or turkey), or lean cuts of red meat (beef or pork)  Before you cook meat or poultry, cut off any visible fat  · Use less fat and oil    Try baking foods instead of frying them  Add less fat, such as margarine, sour cream, regular salad dressing and mayonnaise to foods  Eat fewer high-fat foods  Some examples of high-fat foods include french fries, doughnuts, ice cream, and cakes  · Eat fewer sweets  Limit foods and drinks that are high in sugar  This includes candy, cookies, regular soda, and sweetened drinks  Exercise:  Exercise at least 30 minutes per day on most days of the week  Some examples of exercise include walking, biking, dancing, and swimming  You can also fit in more physical activity by taking the stairs instead of the elevator or parking farther away from stores  Ask your healthcare provider about the best exercise plan for you  © Copyright [employer] 2018 Information is for End User's use only and may not be sold, redistributed or otherwise used for commercial purposes   All illustrations and images included in CareNotes® are the copyrighted property of [employer]  or [address] [place] [address]

## 2021-04-29 ENCOUNTER — OFFICE VISIT (OUTPATIENT)
Dept: CARDIOLOGY CLINIC | Facility: CLINIC | Age: 81
End: 2021-04-29
Payer: MEDICARE

## 2021-04-29 VITALS
HEART RATE: 57 BPM | HEIGHT: 60 IN | BODY MASS INDEX: 41.43 KG/M2 | SYSTOLIC BLOOD PRESSURE: 132 MMHG | DIASTOLIC BLOOD PRESSURE: 66 MMHG | WEIGHT: 211 LBS | OXYGEN SATURATION: 98 % | TEMPERATURE: 98.1 F

## 2021-04-29 DIAGNOSIS — R60.0 EDEMA OF LEFT LOWER EXTREMITY: ICD-10-CM

## 2021-04-29 DIAGNOSIS — I48.0 PAROXYSMAL ATRIAL FIBRILLATION (HCC): Primary | ICD-10-CM

## 2021-04-29 DIAGNOSIS — I10 ESSENTIAL HYPERTENSION: ICD-10-CM

## 2021-04-29 DIAGNOSIS — R79.89 ELEVATED BRAIN NATRIURETIC PEPTIDE (BNP) LEVEL: ICD-10-CM

## 2021-04-29 DIAGNOSIS — E11.40 TYPE 2 DIABETES MELLITUS WITH DIABETIC NEUROPATHY, WITHOUT LONG-TERM CURRENT USE OF INSULIN (HCC): ICD-10-CM

## 2021-04-29 DIAGNOSIS — I51.89 GRADE II DIASTOLIC DYSFUNCTION: ICD-10-CM

## 2021-04-29 PROCEDURE — 93242 EXT ECG>48HR<7D RECORDING: CPT | Performed by: INTERNAL MEDICINE

## 2021-04-29 PROCEDURE — 93000 ELECTROCARDIOGRAM COMPLETE: CPT | Performed by: INTERNAL MEDICINE

## 2021-04-29 PROCEDURE — 99214 OFFICE O/P EST MOD 30 MIN: CPT | Performed by: INTERNAL MEDICINE

## 2021-04-29 RX ORDER — TORSEMIDE 20 MG/1
TABLET ORAL
Qty: 90 TABLET | Refills: 3 | Status: SHIPPED | OUTPATIENT
Start: 2021-04-29 | End: 2021-10-28 | Stop reason: SDUPTHER

## 2021-04-29 RX ORDER — POTASSIUM CHLORIDE 1500 MG/1
TABLET, FILM COATED, EXTENDED RELEASE ORAL
Qty: 90 TABLET | Refills: 3 | Status: SHIPPED | OUTPATIENT
Start: 2021-04-29 | End: 2022-04-14

## 2021-04-29 RX ORDER — METOPROLOL TARTRATE 100 MG/1
TABLET ORAL
Qty: 180 TABLET | Refills: 3 | Status: SHIPPED | OUTPATIENT
Start: 2021-04-29 | End: 2021-07-01 | Stop reason: SDUPTHER

## 2021-04-29 NOTE — PROGRESS NOTES
Cardiology Outpatient Follow-up                                                          Christian Patel  [id number]  1940      1  Paroxysmal atrial fibrillation (HCC)  POCT ECG    apixaban (ELIQUIS) 5 mg    torsemide (DEMADEX) 20 mg tablet   2  Essential hypertension  POCT ECG    metoprolol tartrate (LOPRESSOR) 100 mg tablet    apixaban (ELIQUIS) 5 mg   3  Grade II diastolic dysfunction  POCT ECG    apixaban (ELIQUIS) 5 mg    torsemide (DEMADEX) 20 mg tablet    Potassium Chloride ER (K-Tab) 20 MEQ TBCR   4  Edema of left lower extremity  POCT ECG    apixaban (ELIQUIS) 5 mg    torsemide (DEMADEX) 20 mg tablet    Potassium Chloride ER (K-Tab) 20 MEQ TBCR   5  Type 2 diabetes mellitus with diabetic neuropathy, without long-term current use of insulin (HCC)  POCT ECG   6  Elevated brain natriuretic peptide (BNP) level  apixaban (ELIQUIS) 5 mg    torsemide (DEMADEX) 20 mg tablet    Potassium Chloride ER (K-Tab) 20 MEQ TBCR       Discussion/Summary:  Afib/flutter-   100 mg in the morning and 50 mg at night  liquis 5mg twice a day  No further falls  No dizziness or light-headness  Acute on chronic diastolic hf- improved  She has had weight loss  Torsemide [id number]  potassium three times a week  She will continue on metoprolol 100 mg twice a day  GERD- nexium 40mg daily    Fall- Completed physical thearpy    Htn- controlled  amlodipne + lisinopril    Dm2- atorvastatin    Colonoscopy screening negative    rtc-6months      HPI:  This 78-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 mellitus, hypertension with recent unfortunate mechanical fall found to be in atrial fibrillation  She also has a history of diastolic dysfunction and lower extremity edema chronic  She states being compliant with her diuretic but with poor response  She has been of plan with her medications  She denies having chest heaviness  She denies having any history of prior CVA  She denies having any major bleeding episodes  07/24/2019:  Her lower extremity edema is significantly improved  Her blood pressures been well controlled  Her heart rates have been controlled  She denies having any further falls  We reviewed through her last blood work  She denies feeling dizziness or lightheadedness  10/23/2019:  Her weight has been trending down  She denies having recurrence of lower extremity edema  She is in chronic atrial fibrillation and rate controlled  She denies feeling dizziness or lightheadedness  There has been no falls  She has no trouble with Eliquis therapy  There has been no major bleeding  08/25/2020:  She denies feeling dizziness or lightheadedness  Her weight has been stable  Her lower extremity swelling has been well controlled  She is compliant with diuretic  We reviewed her last blood work  Her potassium was mildly higher  We will discontinue potassium supplementation  02/24/2021:  She has received her COVID vaccine  Her blood pressures have been under control  She was noted to have a heart rate in the 50s  She denies feeling dizziness or lightheadedness  She is compliant with her medications  She feels some leg cramps if she does not take potassium  04/29/2021:  Her heart rates are and the 50 to 60s  She denies having any dizziness or lightheadedness  She denies having chest heaviness  She is compliant with her medications  She denies having any major swelling  She received her COVID vaccine      Past Medical History:   Diagnosis Date    Arthritis     knees    Diabetes mellitus ([address] )     type 2    GERD (gastroesophageal reflux disease)     Hyperlipidemia     Hypertension     Urinary incontinence     Use of cane as ambulatory aid     Wears glasses      Social History     Socioeconomic History    Marital status: Single     Spouse name: Not on file    Number of children: Not on file    Years of education: Not on file    Highest education level: Not on file   Occupational History    Not on file   Social Needs    Financial resource strain: Not on file    Food insecurity     Worry: Not on file     Inability: Not on file    Transportation needs     Medical: Not on file     Non-medical: Not on file   Tobacco Use    Smoking status: Never Smoker    Smokeless tobacco: Never Used   Substance and Sexual Activity    Alcohol use: Yes     Comment: social    Drug use: No    Sexual activity: Not on file   Lifestyle    Physical activity     Days per week: Not on file     Minutes per session: Not on file    Stress: Not on file   Relationships    Social connections     Talks on phone: Not on file     Gets together: Not on file     Attends Religion service: Not on file     Active member of club or organization: Not on file     Attends meetings of clubs or organizations: Not on file     Relationship status: Not on file    Intimate partner violence     Fear of current or ex partner: Not on file     Emotionally abused: Not on file     Physically abused: Not on file     Forced sexual activity: Not on file   Other Topics Concern    Not on file   Social History Narrative    Not on file      Family History   Problem Relation Age of Onset    Breast cancer Mother     Diabetes type II Mother     Atrial fibrillation Mother     Diabetes Mother         diet controlled    Hypertension Mother     Lung cancer Father     Alcohol abuse Father     Cancer Father         lung-smoker     Past Surgical History:   Procedure Laterality Date    BREAST SURGERY Left     nothing was there     COLONOSCOPY      HYSTERECTOMY      complete-fibroid    JOINT REPLACEMENT Left     knee    WY XCAPSL CTRC RMVL INSJ IO LENS PROSTH W/O ECP Right 7/19/2018    Procedure: EXTRACTION EXTRACAPSULAR CATARACT PHACO INTRAOCULAR LENS (IOL);   Surgeon: Lillian Urbina MD;  Location: [facility] MAIN OR;  Service: Ophthalmology    WY XCAPSL CTRC RMVL INSJ IO LENS PROSTH W/O ECP Left 8/9/2018    Procedure: EXTRACTION EXTRACAPSULAR CATARACT PHACO INTRAOCULAR LENS (IOL); Surgeon: Merlin Sharp MD;  Location: [facility] MAIN OR;  Service: Ophthalmology       Current Outpatient Medications:     amLODIPine (NORVASC) 10 mg tablet, Take 1 tablet (10 mg total) by mouth daily, Disp: 90 tablet, Rfl: 3    apixaban (ELIQUIS) 5 mg, Take 1 tablet (5 mg total) by mouth 2 (two) times a day, Disp: 180 tablet, Rfl: 3    atorvastatin (LIPITOR) 40 mg tablet, Take 1 tablet (40 mg total) by mouth daily, Disp: 90 tablet, Rfl: 3    lisinopril (ZESTRIL) 10 mg tablet, Take 1 tablet (10 mg total) by mouth daily, Disp: 90 tablet, Rfl: 3    metFORMIN (GLUCOPHAGE) 1000 MG tablet, Take 1 tablet by mouth twice a day, Disp: 180 tablet, Rfl: 3    metoprolol tartrate (LOPRESSOR) 100 mg tablet, Take one tablet in morning and half tablet at night, Disp: 180 tablet, Rfl: 3    Multiple Vitamins-Minerals (PRESERVISION AREDS PO), Take by mouth, Disp: , Rfl:     Potassium Chloride ER (K-Tab) 20 MEQ TBCR, Take one tablet each day with torsemide, Disp: 90 tablet, Rfl: 3    torsemide (DEMADEX) 20 mg tablet, TAKE 1 TABLET daily in morning, Disp: 90 tablet, Rfl: 3    amoxicillin (AMOXIL) 500 mg capsule, TAKE TWO CAPSULES IMMEDIATELY THEN TAKE ONE CAPSULE EVERY 8 HOURS UNTIL GONE, Disp: , Rfl:     Calcium Carb-Cholecalciferol (CALCIUM 600+D) 600-800 MG-UNIT TABS, Take by mouth every morning, Disp: , Rfl:     CINNAMON PO, Take 1,000 mg by mouth every morning, Disp: , Rfl:     metoprolol tartrate (LOPRESSOR) 100 mg tablet, TAKE ONE TABLET BY MOUTH EVERY 12 HOURS (Patient not taking: Reported on 4/29/2021), Disp: 180 tablet, Rfl: 1  No Known Allergies  Vitals:    04/29/21 1249   BP: 132/66   BP Location: Right arm   Patient Position: Sitting   Cuff Size: Large   Pulse: 57   Temp: 98 1 °F (36 7 °C)   SpO2: 98%   Weight: 95 7 kg (211 lb)   Height: 5' (1 524 m)       Review of Systems:  Review of Systems   Constitutional: Negative  Negative for activity change, appetite change, chills, diaphoresis, fatigue, fever and unexpected weight change  HENT: Negative  Negative for congestion, dental problem, drooling, ear discharge, ear pain, facial swelling, hearing loss, mouth sores, nosebleeds, postnasal drip, rhinorrhea, sinus pressure, sinus pain, sneezing, sore throat, tinnitus, trouble swallowing and voice change  Eyes: Negative  Negative for photophobia, pain, redness, itching and visual disturbance  Respiratory: Positive for shortness of breath  Negative for apnea, cough, choking, chest tightness, wheezing and stridor  Cardiovascular: Positive for leg swelling  Negative for chest pain and palpitations  Gastrointestinal: Negative  Negative for abdominal distention, abdominal pain, anal bleeding, blood in stool, constipation, diarrhea, nausea, rectal pain and vomiting  Endocrine: Negative  Negative for cold intolerance, heat intolerance, polydipsia, polyphagia and polyuria  Genitourinary: Negative  Negative for decreased urine volume, difficulty urinating, dyspareunia, dysuria, enuresis, flank pain, frequency, genital sores, hematuria, menstrual problem, pelvic pain, urgency, vaginal bleeding, vaginal discharge and vaginal pain  Musculoskeletal: Negative  Negative for arthralgias, back pain, gait problem, joint swelling, myalgias, neck pain and neck stiffness  Skin: Negative  Negative for color change, pallor, rash and wound  Allergic/Immunologic: Negative  Negative for environmental allergies, food allergies and immunocompromised state  Neurological: Negative  Negative for dizziness, tremors, seizures, syncope, facial asymmetry, speech difficulty, weakness, light-headedness, numbness and headaches  Hematological: Negative  Negative for adenopathy  Does not bruise/bleed easily  Psychiatric/Behavioral: Negative    Negative for agitation, behavioral problems, confusion, decreased concentration, dysphoric mood, hallucinations, self-injury, sleep disturbance and suicidal ideas  The patient is not nervous/anxious and is not hyperactive  All other systems reviewed and are negative  Vitals:    04/29/21 1249   BP: 132/66   BP Location: Right arm   Patient Position: Sitting   Cuff Size: Large   Pulse: 57   Temp: 98 1 °F (36 7 °C)   SpO2: 98%   Weight: 95 7 kg (211 lb)   Height: 5' (1 524 m)     Physical Exam:  Physical Exam  Constitutional:       General: She is not in acute distress  Appearance: She is well-developed  She is not diaphoretic  HENT:      Head: Normocephalic and atraumatic  Right Ear: External ear normal       Left Ear: External ear normal    Eyes:      General: No scleral icterus  Right eye: No discharge  Left eye: No discharge  Conjunctiva/sclera: Conjunctivae normal       Pupils: Pupils are equal, round, and reactive to light  Neck:      Musculoskeletal: Normal range of motion and neck supple  Thyroid: No thyromegaly  Vascular: JVD present  Trachea: No tracheal deviation  Cardiovascular:      Rate and Rhythm: Normal rate and regular rhythm  Heart sounds: Murmur present  No friction rub  No gallop  Pulmonary:      Effort: Pulmonary effort is normal  No respiratory distress  Breath sounds: Normal breath sounds  No stridor  No wheezing or rales  Chest:      Chest wall: No tenderness  Abdominal:      General: Bowel sounds are normal  There is no distension  Palpations: Abdomen is soft  There is no mass  Tenderness: There is no abdominal tenderness  There is no guarding or rebound  Musculoskeletal: Normal range of motion  General: No tenderness or deformity  Left lower leg: Edema present  Skin:     General: Skin is warm and dry  Coloration: Skin is not pale  Findings: No erythema or rash  Neurological:      Mental Status: She is alert and oriented to person, place, and time  Cranial Nerves:  No cranial nerve deficit  Motor: No abnormal muscle tone  Coordination: Coordination normal       Deep Tendon Reflexes: Reflexes are normal and symmetric  Reflexes normal    Psychiatric:         Behavior: Behavior normal          Thought Content: Thought content normal          Judgment: Judgment normal          Labs:     Lab Results   Component Value Date    WBC 6 09 2019    HGB 10 7 (L) 2019    HCT 34 3 (L) 2019    MCV 82 2019     2019     Lab Results   Component Value Date    K 4 2 2021     (H) 2021    CO2 25 2021    BUN 23 2021    CREATININE 1 12 2021    GLUF 115 (H) 2021    CALCIUM 9 3 2021    AST 14 2020    ALT 22 2020    ALKPHOS 84 2020    EGFR 46 2021     No results found for: CHOL  Lab Results   Component Value Date    HDL 76 2021    HDL 74 (H) 2016     Lab Results   Component Value Date    LDLCALC 54 2021    LDLCALC 62 2016     Lab Results   Component Value Date    TRIG 90 2021    TRIG 132 2016     Lab Results   Component Value Date    HGBA1C 6 7 (H) 2019     No results found for: TSH    Imaging & Testing   I have personally reviewed pertinent reports  EKG: Personally reviewed      Atrial fibrillation nonspecific st-t wave changes    Cardiac testing:   Results for orders placed during the hospital encounter of 18   Echo complete with contrast if indicated    Narrative [address]  [address] [address]  [phone]    Transthoracic Echocardiogram  2D, M-mode, Doppler, and Color Doppler    Study date:  04-Dec-2018    Patient: Anna Resendez  MR number: [id number]  Account number: [de-identified]  : 1940  Age: 77 years  Gender: Female  Status: Outpatient  Location: Echo lab  Height: 61 in  Weight: 225 5 lb  BP: 147/ 97 mmHg    Indications: Dyspnea    Diagnoses: 794 31 - ABNORM ELECTROCARDIOGRAM    Sonographer:  AGAPITO Rangel  Primary Physician: Shannon Augustin DO  Referring Physician:  Anjel Vázquez:  [facility]  Interpreting Physician:  Karena Pierce MD    SUMMARY    LEFT VENTRICLE:  Systolic function was at the lower limits of normal  Ejection fraction was estimated in the range of 50 % to 55 % to be 55 %  There were no regional wall motion abnormalities  Wall thickness was mildly increased  Features were consistent with a pseudonormal left ventricular filling pattern, with concomitant abnormal relaxation and increased filling pressure (grade 2 diastolic dysfunction)  LEFT ATRIUM:  The atrium was mildly dilated  RIGHT ATRIUM:  The atrium was mildly dilated  AORTIC VALVE:  The valve was functionally bicuspid  Leaflets exhibited mildly to moderately increased thickness, mild to moderate calcification, mildly reduced cuspal separation, and sclerosis  Transaortic velocity was increased due to valvular stenosis  There was mild stenosis  Valve mean gradient was 15 mmHg  TRICUSPID VALVE:  There was trace regurgitation  The findings suggest mild pulmonary hypertension  HISTORY: PRIOR HISTORY: Diabetes, Edema,HTN,Hyperlipidemia,Gastroesophageal reflux disease    PROCEDURE: The procedure was performed in the echo lab  This was a routine study  The transthoracic approach was used  The study included complete 2D imaging, M-mode, complete spectral Doppler, and color Doppler  The heart rate was 67 bpm,  at the start of the study  Images were obtained from the parasternal, apical, subcostal, and suprasternal notch acoustic windows  Echocardiographic views were limited due to restricted patient mobility, poor patient compliance, poor  acoustic window availability, decreased penetration, and lung interference  This was a technically difficult study      LEFT VENTRICLE: Size was normal  Systolic function was at the lower limits of normal  Ejection fraction was estimated in the range of 50 % to 55 % to be 55 %  There were no regional wall motion abnormalities  Wall thickness was mildly  increased  No evidence of apical thrombus  DOPPLER: Features were consistent with a pseudonormal left ventricular filling pattern, with concomitant abnormal relaxation and increased filling pressure (grade 2 diastolic dysfunction)  RIGHT VENTRICLE: The size was normal  Systolic function was normal  Wall thickness was normal     LEFT ATRIUM: The atrium was mildly dilated  RIGHT ATRIUM: The atrium was mildly dilated  MITRAL VALVE: Valve structure was normal  There was normal leaflet separation  DOPPLER: The transmitral velocity was within the normal range  There was no evidence for stenosis  There was no significant regurgitation  AORTIC VALVE: The valve was functionally bicuspid  Leaflets exhibited mildly to moderately increased thickness, mild to moderate calcification, mildly reduced cuspal separation, and sclerosis  DOPPLER: Transaortic velocity was increased  due to valvular stenosis  There was mild stenosis  There was no significant regurgitation  TRICUSPID VALVE: The valve structure was normal  There was normal leaflet separation  DOPPLER: The transtricuspid velocity was within the normal range  There was no evidence for stenosis  There was trace regurgitation  Estimated peak PA  pressure was 38 mmHg  The findings suggest mild pulmonary hypertension  PULMONIC VALVE: Leaflets exhibited normal thickness, no calcification, and normal cuspal separation  DOPPLER: The transpulmonic velocity was within the normal range  There was no significant regurgitation  PERICARDIUM: There was no pericardial effusion  The pericardium was normal in appearance  AORTA: The root exhibited normal size  SYSTEMIC VEINS: IVC: The inferior vena cava was normal in size      MEASUREMENT TABLES    DOPPLER MEASUREMENTS  Aortic valve   (Reference normals)  Peak zaire   250 cm/s (--)  Peak gradient   26 mmHg   (--)  Mean gradient   15 mmHg   (--)    SYSTEM MEASUREMENT TABLES    2D mode  AoR Diam 2D: 3 2 cm  LA Diam (2D): 4 1 cm  LA/Ao (2D): 1 28  FS (2D Teich): 26 4 %  IVSd (2D): 1 31 cm  LVDEV: 72 1 cm³  LVEDV MOD BP: 102 cm³  LVESV: 34 4 cm³  LVIDd(2D): 4 05 cm  LVISd (2D): 2 98 cm  LVOT Area 2D: 3 14 cm squared  LVPWd (2D): 1 3 cm  SV (Teich): 37 7 cm³    Apical four chamber  LVEF A4C: 50 %    Apical two chamber  LVEF A2C: 52 %    Unspecified Scan Mode  DEVON Cont Eq (Peak Steve): 1 49 cm squared  LVOT Diam : 2 cm  LVOT Vmax: 1210 mm/s  LVOT Vmax; Mean: 1210 mm/s  Peak Grad ; Mean: 6 mm[Hg]  MV Peak A Steve: 291 mm/s  MV Peak E Steve  Mean: 1060 mm/s  MVA (PHT): 6 29 cm squared  PHT: 35 ms  Max P mm[Hg]  V Max: 2390 mm/s  Vmax: 2880 mm/s  RA Area: 21 5 cm squared  RA Volume: 65 8 cm³  TAPSE: 1 9 cm    [employer] Accredited Echocardiography [facility]    Prepared and electronically signed by    Kiara Wang MD  Signed 05-Dec-2018 11:11:08         Kiara Gonzalez  Please call with any questions or suggestions    Counseling :  A description of the counseling:   Goals and Barriers:  Patient's ability to self care:  Medication side effect reviewed with patient in detail and all their questions answered  "This note has been constructed using a voice recognition system  Therefore there may be syntax, spelling, and/or grammatical errors   Please call if you have any questions  "

## 2021-05-14 ENCOUNTER — CLINICAL SUPPORT (OUTPATIENT)
Dept: CARDIOLOGY CLINIC | Facility: CLINIC | Age: 81
End: 2021-05-14
Payer: MEDICARE

## 2021-05-14 ENCOUNTER — TELEPHONE (OUTPATIENT)
Dept: CARDIOLOGY CLINIC | Facility: CLINIC | Age: 81
End: 2021-05-14

## 2021-05-14 DIAGNOSIS — I48.0 PAROXYSMAL ATRIAL FIBRILLATION (HCC): ICD-10-CM

## 2021-05-14 DIAGNOSIS — E11.40 TYPE 2 DIABETES MELLITUS WITH DIABETIC NEUROPATHY, WITHOUT LONG-TERM CURRENT USE OF INSULIN (HCC): ICD-10-CM

## 2021-05-14 DIAGNOSIS — R60.0 EDEMA OF LEFT LOWER EXTREMITY: ICD-10-CM

## 2021-05-14 PROCEDURE — 93244 EXT ECG>48HR<7D REV&INTERPJ: CPT

## 2021-05-14 NOTE — TELEPHONE ENCOUNTER
Critical sabaso repert  A flutter, 38 beats per minute, for 60 seconds  Pt was in continuous a flutter with 100% burden the entire wear time  For 20hrs

## 2021-06-02 RX ORDER — METOPROLOL TARTRATE 100 MG/1
TABLET ORAL
Qty: 180 TABLET | Refills: 1 | Status: SHIPPED | OUTPATIENT
Start: 2021-06-02 | End: 2021-07-01 | Stop reason: DRUGHIGH

## 2021-06-14 RX ORDER — AMLODIPINE BESYLATE 10 MG/1
TABLET ORAL
Qty: 90 TABLET | Refills: 3 | OUTPATIENT
Start: 2021-06-14

## 2021-06-29 ENCOUNTER — 1 YEAR FOLLOW-UP (OUTPATIENT)
Dept: URBAN - METROPOLITAN AREA CLINIC 27 | Facility: CLINIC | Age: 81
End: 2021-06-29

## 2021-06-29 DIAGNOSIS — H35.3124: ICD-10-CM

## 2021-06-29 DIAGNOSIS — E11.9: ICD-10-CM

## 2021-06-29 DIAGNOSIS — H35.3113: ICD-10-CM

## 2021-06-29 DIAGNOSIS — Z96.1: ICD-10-CM

## 2021-06-29 DIAGNOSIS — H43.813: ICD-10-CM

## 2021-06-29 PROCEDURE — 92250 FUNDUS PHOTOGRAPHY W/I&R: CPT

## 2021-06-29 PROCEDURE — 92134 CPTRZ OPH DX IMG PST SGM RTA: CPT

## 2021-06-29 PROCEDURE — 92014 COMPRE OPH EXAM EST PT 1/>: CPT

## 2021-06-29 PROCEDURE — 92235 FLUORESCEIN ANGRPH MLTIFRAME: CPT

## 2021-06-29 ASSESSMENT — TONOMETRY
OD_IOP_MMHG: 19
OS_IOP_MMHG: 22

## 2021-06-29 ASSESSMENT — VISUAL ACUITY
OD_CC: 20/50
OS_CC: 20/200

## 2021-07-01 DIAGNOSIS — I10 ESSENTIAL HYPERTENSION: ICD-10-CM

## 2021-07-06 RX ORDER — METOPROLOL TARTRATE 100 MG/1
TABLET ORAL
Qty: 180 TABLET | Refills: 3 | Status: SHIPPED | OUTPATIENT
Start: 2021-07-06 | End: 2021-09-08 | Stop reason: SDUPTHER

## 2021-09-03 ENCOUNTER — TELEPHONE (OUTPATIENT)
Dept: CARDIOLOGY CLINIC | Facility: CLINIC | Age: 81
End: 2021-09-03

## 2021-09-03 NOTE — TELEPHONE ENCOUNTER
Patient was called in regards to follow up appointment  Transferred to back line due to seeking monitor results  Can you please advise? I don't see it resulted?

## 2021-10-28 ENCOUNTER — OFFICE VISIT (OUTPATIENT)
Dept: CARDIOLOGY CLINIC | Facility: CLINIC | Age: 81
End: 2021-10-28
Payer: MEDICARE

## 2021-10-28 VITALS
HEIGHT: 60 IN | SYSTOLIC BLOOD PRESSURE: 124 MMHG | OXYGEN SATURATION: 99 % | DIASTOLIC BLOOD PRESSURE: 82 MMHG | TEMPERATURE: 97.5 F | HEART RATE: 46 BPM | WEIGHT: 204 LBS | BODY MASS INDEX: 40.05 KG/M2

## 2021-10-28 DIAGNOSIS — E11.40 TYPE 2 DIABETES MELLITUS WITH DIABETIC NEUROPATHY, WITHOUT LONG-TERM CURRENT USE OF INSULIN (HCC): ICD-10-CM

## 2021-10-28 DIAGNOSIS — R79.89 ELEVATED BRAIN NATRIURETIC PEPTIDE (BNP) LEVEL: ICD-10-CM

## 2021-10-28 DIAGNOSIS — I10 ESSENTIAL HYPERTENSION: ICD-10-CM

## 2021-10-28 DIAGNOSIS — I48.0 PAROXYSMAL ATRIAL FIBRILLATION (HCC): Primary | ICD-10-CM

## 2021-10-28 DIAGNOSIS — I51.89 GRADE II DIASTOLIC DYSFUNCTION: ICD-10-CM

## 2021-10-28 DIAGNOSIS — R60.0 EDEMA OF LEFT LOWER EXTREMITY: ICD-10-CM

## 2021-10-28 PROCEDURE — 93000 ELECTROCARDIOGRAM COMPLETE: CPT | Performed by: INTERNAL MEDICINE

## 2021-10-28 PROCEDURE — 99214 OFFICE O/P EST MOD 30 MIN: CPT | Performed by: INTERNAL MEDICINE

## 2021-10-28 RX ORDER — TORSEMIDE 20 MG/1
TABLET ORAL
Qty: 90 TABLET | Refills: 3 | Status: SHIPPED | OUTPATIENT
Start: 2021-10-28 | End: 2022-04-14 | Stop reason: SDUPTHER

## 2021-11-04 ENCOUNTER — APPOINTMENT (OUTPATIENT)
Dept: LAB | Facility: CLINIC | Age: 81
End: 2021-11-04
Payer: MEDICARE

## 2021-11-04 DIAGNOSIS — E11.42 TYPE 2 DIABETES MELLITUS WITH DIABETIC POLYNEUROPATHY, WITHOUT LONG-TERM CURRENT USE OF INSULIN (HCC): ICD-10-CM

## 2021-11-04 DIAGNOSIS — I48.0 PAROXYSMAL ATRIAL FIBRILLATION (HCC): ICD-10-CM

## 2021-11-04 DIAGNOSIS — I51.89 GRADE II DIASTOLIC DYSFUNCTION: ICD-10-CM

## 2021-11-04 DIAGNOSIS — E11.40 TYPE 2 DIABETES MELLITUS WITH DIABETIC NEUROPATHY, WITHOUT LONG-TERM CURRENT USE OF INSULIN (HCC): ICD-10-CM

## 2021-11-04 LAB
ALBUMIN SERPL BCP-MCNC: 3.7 G/DL (ref 3.5–5)
ALP SERPL-CCNC: 84 U/L (ref 46–116)
ALT SERPL W P-5'-P-CCNC: 20 U/L (ref 12–78)
ANION GAP SERPL CALCULATED.3IONS-SCNC: 5 MMOL/L (ref 4–13)
AST SERPL W P-5'-P-CCNC: 14 U/L (ref 5–45)
BILIRUB SERPL-MCNC: 0.61 MG/DL (ref 0.2–1)
BUN SERPL-MCNC: 25 MG/DL (ref 5–25)
CALCIUM SERPL-MCNC: 9.2 MG/DL (ref 8.3–10.1)
CHLORIDE SERPL-SCNC: 107 MMOL/L (ref 100–108)
CHOLEST SERPL-MCNC: 150 MG/DL (ref 50–200)
CO2 SERPL-SCNC: 27 MMOL/L (ref 21–32)
CREAT SERPL-MCNC: 1.36 MG/DL (ref 0.6–1.3)
EST. AVERAGE GLUCOSE BLD GHB EST-MCNC: 126 MG/DL
GFR SERPL CREATININE-BSD FRML MDRD: 36 ML/MIN/1.73SQ M
GLUCOSE P FAST SERPL-MCNC: 118 MG/DL (ref 65–99)
HBA1C MFR BLD: 6 %
HDLC SERPL-MCNC: 81 MG/DL
LDLC SERPL CALC-MCNC: 53 MG/DL (ref 0–100)
POTASSIUM SERPL-SCNC: 3.9 MMOL/L (ref 3.5–5.3)
PROT SERPL-MCNC: 8.1 G/DL (ref 6.4–8.2)
SODIUM SERPL-SCNC: 139 MMOL/L (ref 136–145)
TRIGL SERPL-MCNC: 82 MG/DL

## 2021-11-04 PROCEDURE — 36415 COLL VENOUS BLD VENIPUNCTURE: CPT | Performed by: INTERNAL MEDICINE

## 2021-11-04 PROCEDURE — 80053 COMPREHEN METABOLIC PANEL: CPT | Performed by: INTERNAL MEDICINE

## 2021-11-04 PROCEDURE — 83036 HEMOGLOBIN GLYCOSYLATED A1C: CPT

## 2021-11-04 PROCEDURE — 80061 LIPID PANEL: CPT

## 2021-11-11 ENCOUNTER — TELEPHONE (OUTPATIENT)
Dept: CARDIOLOGY CLINIC | Facility: CLINIC | Age: 81
End: 2021-11-11

## 2021-11-29 ENCOUNTER — CLINICAL SUPPORT (OUTPATIENT)
Dept: CARDIOLOGY CLINIC | Facility: CLINIC | Age: 81
End: 2021-11-29
Payer: MEDICARE

## 2021-11-29 DIAGNOSIS — I48.0 PAROXYSMAL ATRIAL FIBRILLATION (HCC): ICD-10-CM

## 2021-11-29 PROCEDURE — 93242 EXT ECG>48HR<7D RECORDING: CPT

## 2021-12-10 ENCOUNTER — TELEPHONE (OUTPATIENT)
Dept: CARDIOLOGY CLINIC | Facility: CLINIC | Age: 81
End: 2021-12-10

## 2021-12-10 ENCOUNTER — CLINICAL SUPPORT (OUTPATIENT)
Dept: CARDIOLOGY CLINIC | Facility: CLINIC | Age: 81
End: 2021-12-10
Payer: MEDICARE

## 2021-12-10 DIAGNOSIS — I48.0 PAROXYSMAL ATRIAL FIBRILLATION (HCC): ICD-10-CM

## 2021-12-10 DIAGNOSIS — E11.40 TYPE 2 DIABETES MELLITUS WITH DIABETIC NEUROPATHY, WITHOUT LONG-TERM CURRENT USE OF INSULIN (HCC): ICD-10-CM

## 2021-12-10 PROCEDURE — 93244 EXT ECG>48HR<7D REV&INTERPJ: CPT | Performed by: INTERNAL MEDICINE

## 2022-01-10 ENCOUNTER — TELEPHONE (OUTPATIENT)
Dept: CARDIOLOGY CLINIC | Facility: CLINIC | Age: 82
End: 2022-01-10

## 2022-01-24 ENCOUNTER — TELEPHONE (OUTPATIENT)
Dept: CARDIOLOGY CLINIC | Facility: CLINIC | Age: 82
End: 2022-01-24

## 2022-01-24 DIAGNOSIS — I51.89 GRADE II DIASTOLIC DYSFUNCTION: ICD-10-CM

## 2022-01-24 DIAGNOSIS — E11.40 TYPE 2 DIABETES MELLITUS WITH DIABETIC NEUROPATHY, WITHOUT LONG-TERM CURRENT USE OF INSULIN (HCC): ICD-10-CM

## 2022-01-24 DIAGNOSIS — I48.0 PAROXYSMAL ATRIAL FIBRILLATION (HCC): ICD-10-CM

## 2022-04-12 ENCOUNTER — FOLLOW UP (OUTPATIENT)
Dept: URBAN - METROPOLITAN AREA CLINIC 27 | Facility: CLINIC | Age: 82
End: 2022-04-12

## 2022-04-12 DIAGNOSIS — H43.813: ICD-10-CM

## 2022-04-12 DIAGNOSIS — E11.9: ICD-10-CM

## 2022-04-12 DIAGNOSIS — H35.3124: ICD-10-CM

## 2022-04-12 DIAGNOSIS — H35.3113: ICD-10-CM

## 2022-04-12 DIAGNOSIS — Z96.1: ICD-10-CM

## 2022-04-12 PROCEDURE — 92014 COMPRE OPH EXAM EST PT 1/>: CPT

## 2022-04-12 PROCEDURE — 92134 CPTRZ OPH DX IMG PST SGM RTA: CPT

## 2022-04-12 ASSESSMENT — TONOMETRY
OD_IOP_MMHG: 19
OS_IOP_MMHG: 20

## 2022-04-12 ASSESSMENT — VISUAL ACUITY
OD_CC: 20/50-2
OS_CC: CF 3FT

## 2022-04-14 ENCOUNTER — OFFICE VISIT (OUTPATIENT)
Dept: CARDIOLOGY CLINIC | Facility: CLINIC | Age: 82
End: 2022-04-14
Payer: MEDICARE

## 2022-04-14 VITALS
SYSTOLIC BLOOD PRESSURE: 140 MMHG | HEIGHT: 60 IN | TEMPERATURE: 97.7 F | WEIGHT: 202 LBS | BODY MASS INDEX: 39.66 KG/M2 | DIASTOLIC BLOOD PRESSURE: 60 MMHG | HEART RATE: 61 BPM

## 2022-04-14 DIAGNOSIS — I48.0 PAROXYSMAL ATRIAL FIBRILLATION (HCC): Primary | ICD-10-CM

## 2022-04-14 DIAGNOSIS — I10 ESSENTIAL HYPERTENSION: ICD-10-CM

## 2022-04-14 DIAGNOSIS — R79.89 ELEVATED BRAIN NATRIURETIC PEPTIDE (BNP) LEVEL: ICD-10-CM

## 2022-04-14 DIAGNOSIS — I51.89 GRADE II DIASTOLIC DYSFUNCTION: ICD-10-CM

## 2022-04-14 DIAGNOSIS — R60.0 EDEMA OF LEFT LOWER EXTREMITY: ICD-10-CM

## 2022-04-14 DIAGNOSIS — N18.31 STAGE 3A CHRONIC KIDNEY DISEASE (HCC): ICD-10-CM

## 2022-04-14 DIAGNOSIS — E11.40 TYPE 2 DIABETES MELLITUS WITH DIABETIC NEUROPATHY, WITHOUT LONG-TERM CURRENT USE OF INSULIN (HCC): ICD-10-CM

## 2022-04-14 PROCEDURE — 99214 OFFICE O/P EST MOD 30 MIN: CPT | Performed by: INTERNAL MEDICINE

## 2022-04-14 PROCEDURE — 93000 ELECTROCARDIOGRAM COMPLETE: CPT | Performed by: INTERNAL MEDICINE

## 2022-04-14 RX ORDER — TORSEMIDE 20 MG/1
20 TABLET ORAL AS NEEDED
Qty: 90 TABLET | Refills: 3 | Status: SHIPPED | COMMUNITY
Start: 2022-04-14

## 2022-04-14 NOTE — PROGRESS NOTES
Cardiology Outpatient Follow-up                                                          Kisohre Correa  [id number]  1940      1  Paroxysmal atrial fibrillation (HCC)  POCT ECG   2  Type 2 diabetes mellitus with diabetic neuropathy, without long-term current use of insulin (HCC)  POCT ECG   3  Grade II diastolic dysfunction  POCT ECG   4  Essential hypertension  POCT ECG   5  Edema of left lower extremity  POCT ECG       Discussion/Summary:  Acute on chronic diastolic hf- improved  We will try jardiance 10mg daily  She will continue on metoprolol 25 mg in morning    Afib/flutter-  She was shown to have slow ventricular response  Her last holter monitor showed pauses  Decrease to metoprolol 25mg in the morning  eliquis 5mg twice a day  No further falls  No dizziness or light-headness  GERD- nexium 40mg daily    Fall- Completed physical thearpy    Htn- controlled  amlodipne + lisinopril    Dm2- atorvastatin    Colonoscopy screening negative    rtc-6months      HPI:  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, hypertension with recent unfortunate mechanical fall found to be in atrial fibrillation  She also has a history of diastolic dysfunction and lower extremity edema chronic  She states being compliant with her diuretic but with poor response  She has been of plan with her medications  She denies having chest heaviness  She denies having any history of prior CVA  She denies having any major bleeding episodes  07/24/2019:  Her lower extremity edema is significantly improved  Her blood pressures been well controlled  Her heart rates have been controlled  She denies having any further falls  We reviewed through her last blood work  She denies feeling dizziness or lightheadedness  10/23/2019:  Her weight has been trending down  She denies having recurrence of lower extremity edema    She is in chronic atrial fibrillation and rate controlled  She denies feeling dizziness or lightheadedness  There has been no falls  She has no trouble with Eliquis therapy  There has been no major bleeding  08/25/2020:  She denies feeling dizziness or lightheadedness  Her weight has been stable  Her lower extremity swelling has been well controlled  She is compliant with diuretic  We reviewed her last blood work  Her potassium was mildly higher  We will discontinue potassium supplementation  02/24/2021:  She has received her COVID vaccine  Her blood pressures have been under control  She was noted to have a heart rate in the 50s  She denies feeling dizziness or lightheadedness  She is compliant with her medications  She feels some leg cramps if she does not take potassium  04/29/2021:  Her heart rates are and the 50 to 60s  She denies having any dizziness or lightheadedness  She denies having chest heaviness  She is compliant with her medications  She denies having any major swelling  She received her COVID vaccine  10/28:  She hit her head  No chest pain  Compliant with meds    04/15/2022:  She is using the diuretic for lower extremity swelling  She denies having dizziness or lightheadedness      Past Medical History:   Diagnosis Date    Arthritis     knees    Diabetes mellitus ([facility] [address] )     type 2    GERD (gastroesophageal reflux disease)     Hyperlipidemia     Hypertension     Urinary incontinence     Use of cane as ambulatory aid     Wears glasses      Social History     Socioeconomic History    Marital status: Single     Spouse name: Not on file    Number of children: Not on file    Years of education: Not on file    Highest education level: Not on file   Occupational History    Not on file   Tobacco Use    Smoking status: Never Smoker    Smokeless tobacco: Never Used   Vaping Use    Vaping Use: Never used   Substance and Sexual Activity    Alcohol use: Yes     Comment: social    Drug use: No    Sexual activity: Not on file   Other Topics Concern    Not on file   Social History Narrative    Not on file     Social Determinants of Health     Financial Resource Strain: Not on file   Food Insecurity: Not on file   Transportation Needs: Not on file   Physical Activity: Not on file   Stress: Not on file   Social Connections: Not on file   Intimate Partner Violence: Not on file   Housing Stability: Not on file      Family History   Problem Relation Age of Onset    Breast cancer Mother     Diabetes type II Mother     Atrial fibrillation Mother     Diabetes Mother         diet controlled    Hypertension Mother     Lung cancer Father     Alcohol abuse Father     Cancer Father         lung-smoker     Past Surgical History:   Procedure Laterality Date    BREAST SURGERY Left     nothing was there     COLONOSCOPY      HYSTERECTOMY      complete-fibroid    JOINT REPLACEMENT Left     knee    IN XCAPSL CTRC RMVL INSJ IO LENS PROSTH W/O ECP Right 7/19/2018    Procedure: EXTRACTION EXTRACAPSULAR CATARACT PHACO INTRAOCULAR LENS (IOL); Surgeon: Albertina Cantu MD;  Location: [facility] MAIN OR;  Service: Ophthalmology    IN XCAPSL CTRC RMVL INSJ IO LENS PROSTH W/O ECP Left 8/9/2018    Procedure: EXTRACTION EXTRACAPSULAR CATARACT PHACO INTRAOCULAR LENS (IOL);   Surgeon: Albertina Cantu MD;  Location: [facility] MAIN OR;  Service: Ophthalmology       Current Outpatient Medications:     amLODIPine (NORVASC) 10 mg tablet, Take 1 tablet (10 mg total) by mouth daily, Disp: 90 tablet, Rfl: 3    amoxicillin (AMOXIL) 500 mg capsule, TAKE TWO CAPSULES IMMEDIATELY THEN TAKE ONE CAPSULE EVERY 8 HOURS UNTIL GONE, Disp: , Rfl:     apixaban (ELIQUIS) 5 mg, Take 1 tablet (5 mg total) by mouth 2 (two) times a day, Disp: 180 tablet, Rfl: 3    atorvastatin (LIPITOR) 40 mg tablet, Take 1 tablet (40 mg total) by mouth daily, Disp: 90 tablet, Rfl: 3    Calcium Carb-Cholecalciferol (CALCIUM 600+D) 600-800 MG-UNIT TABS, Take by mouth every morning, Disp: , Rfl:     CINNAMON PO, Take 1,000 mg by mouth every morning, Disp: , Rfl:     lisinopril (ZESTRIL) 10 mg tablet, Take 1 tablet (10 mg total) by mouth daily, Disp: 90 tablet, Rfl: 3    metFORMIN (GLUCOPHAGE) 1000 MG tablet, Take 1 tablet by mouth twice a day, Disp: 180 tablet, Rfl: 3    metoprolol tartrate (LOPRESSOR) 25 mg tablet, Take 1 tablet (25 mg total) by mouth in the morning, Disp: 180 tablet, Rfl: 1    Multiple Vitamins-Minerals (PRESERVISION AREDS PO), Take by mouth, Disp: , Rfl:     Potassium Chloride ER (K-Tab) 20 MEQ TBCR, Take one tablet each day with torsemide, Disp: 90 tablet, Rfl: 3    torsemide (DEMADEX) 20 mg tablet, TAKE 1 TABLET daily in morning, Disp: 90 tablet, Rfl: 3  No Known Allergies  Vitals:    04/14/22 1340   BP: 140/60   BP Location: Right arm   Patient Position: Sitting   Cuff Size: Large   Pulse: 61   Temp: 97 7 °F (36 5 °C)   Weight: 91 6 kg (202 lb)   Height: 5' (1 524 m)       Review of Systems:  Review of Systems   Constitutional: Negative  Negative for activity change, appetite change, chills, diaphoresis, fatigue, fever and unexpected weight change  HENT: Negative  Negative for congestion, dental problem, drooling, ear discharge, ear pain, facial swelling, hearing loss, mouth sores, nosebleeds, postnasal drip, rhinorrhea, sinus pressure, sinus pain, sneezing, sore throat, tinnitus, trouble swallowing and voice change  Eyes: Negative  Negative for photophobia, pain, redness, itching and visual disturbance  Respiratory: Positive for shortness of breath  Negative for apnea, cough, choking, chest tightness, wheezing and stridor  Cardiovascular: Positive for leg swelling  Negative for chest pain and palpitations  Gastrointestinal: Negative  Negative for abdominal distention, abdominal pain, anal bleeding, blood in stool, constipation, diarrhea, nausea, rectal pain and vomiting  Endocrine: Negative    Negative for cold intolerance, heat intolerance, polydipsia, polyphagia and polyuria  Genitourinary: Negative  Negative for decreased urine volume, difficulty urinating, dyspareunia, dysuria, enuresis, flank pain, frequency, genital sores, hematuria, menstrual problem, pelvic pain, urgency, vaginal bleeding, vaginal discharge and vaginal pain  Musculoskeletal: Negative  Negative for arthralgias, back pain, gait problem, joint swelling, myalgias, neck pain and neck stiffness  Skin: Negative  Negative for color change, pallor, rash and wound  Allergic/Immunologic: Negative  Negative for environmental allergies, food allergies and immunocompromised state  Neurological: Negative  Negative for dizziness, tremors, seizures, syncope, facial asymmetry, speech difficulty, weakness, light-headedness, numbness and headaches  Hematological: Negative  Negative for adenopathy  Does not bruise/bleed easily  Psychiatric/Behavioral: Negative  Negative for agitation, behavioral problems, confusion, decreased concentration, dysphoric mood, hallucinations, self-injury, sleep disturbance and suicidal ideas  The patient is not nervous/anxious and is not hyperactive  All other systems reviewed and are negative  Vitals:    04/14/22 1340   BP: 140/60   BP Location: Right arm   Patient Position: Sitting   Cuff Size: Large   Pulse: 61   Temp: 97 7 °F (36 5 °C)   Weight: 91 6 kg (202 lb)   Height: 5' (1 524 m)     Physical Exam:  Physical Exam  Constitutional:       General: She is not in acute distress  Appearance: She is well-developed  She is not diaphoretic  HENT:      Head: Normocephalic and atraumatic  Right Ear: External ear normal       Left Ear: External ear normal    Eyes:      General: No scleral icterus  Right eye: No discharge  Left eye: No discharge  Conjunctiva/sclera: Conjunctivae normal       Pupils: Pupils are equal, round, and reactive to light  Neck:      Thyroid: No thyromegaly        Vascular: JVD present  Trachea: No tracheal deviation  Cardiovascular:      Rate and Rhythm: Normal rate and regular rhythm  Heart sounds: Murmur heard  No friction rub  No gallop  Pulmonary:      Effort: Pulmonary effort is normal  No respiratory distress  Breath sounds: Normal breath sounds  No stridor  No wheezing or rales  Chest:      Chest wall: No tenderness  Abdominal:      General: Bowel sounds are normal  There is no distension  Palpations: Abdomen is soft  There is no mass  Tenderness: There is no abdominal tenderness  There is no guarding or rebound  Musculoskeletal:         General: Swelling present  No tenderness or deformity  Normal range of motion  Cervical back: Normal range of motion and neck supple  Skin:     General: Skin is warm and dry  Coloration: Skin is not pale  Findings: No erythema or rash  Neurological:      Mental Status: She is alert and oriented to person, place, and time  Cranial Nerves: No cranial nerve deficit  Motor: No abnormal muscle tone  Coordination: Coordination normal       Deep Tendon Reflexes: Reflexes are normal and symmetric  Reflexes normal    Psychiatric:         Behavior: Behavior normal          Thought Content:  Thought content normal          Judgment: Judgment normal          Labs:     Lab Results   Component Value Date    WBC 6 09 04/02/2019    HGB 10 7 (L) 04/02/2019    HCT 34 3 (L) 04/02/2019    MCV 82 04/02/2019     04/02/2019     Lab Results   Component Value Date    K 3 9 11/04/2021     11/04/2021    CO2 27 11/04/2021    BUN 25 11/04/2021    CREATININE 1 36 (H) 11/04/2021    GLUF 118 (H) 11/04/2021    CALCIUM 9 2 11/04/2021    AST 14 11/04/2021    ALT 20 11/04/2021    ALKPHOS 84 11/04/2021    EGFR 36 11/04/2021     No results found for: CHOL  Lab Results   Component Value Date    HDL 81 11/04/2021    HDL 76 02/17/2021    HDL 74 (H) 04/11/2016     Lab Results   Component Value Date LDLCALC 53 2021    LDLCALC 54 2021    LDLCALC 62 2016     Lab Results   Component Value Date    TRIG 82 2021    TRIG 90 2021    TRIG 132 2016     Lab Results   Component Value Date    HGBA1C 6 0 (H) 2021     No results found for: TSH    Imaging & Testing   I have personally reviewed pertinent reports  EKG: Personally reviewed  Atrial fibrillation nonspecific st-t wave changes    Cardiac testing:   Results for orders placed during the hospital encounter of 18   Echo complete with contrast if indicated    [address]  [phone]    Transthoracic Echocardiogram  2D, M-mode, Doppler, and Color Doppler    Study date:  04-Dec-2018    Patient: Marsha Coats  MR number: [id number]  Account number: [de-identified]  : 1940  Age: 77 years  Gender: Female  Status: Outpatient  Location: Echo lab  Height: 61 in  Weight: 225 5 lb  BP: 147/ 97 mmHg    Indications: Dyspnea    Diagnoses: 794 31 - ABNORM ELECTROCARDIOGRAM    Sonographer:  Jolynn Duane, RCS  Primary Physician: Jessica Dacosta DO  Referring Physician:  Abhilash Ackerman:  [facility]  Interpreting Physician:  Alaian Blount MD    SUMMARY    LEFT VENTRICLE:  Systolic function was at the lower limits of normal  Ejection fraction was estimated in the range of 50 % to 55 % to be 55 %  There were no regional wall motion abnormalities  Wall thickness was mildly increased  Features were consistent with a pseudonormal left ventricular filling pattern, with concomitant abnormal relaxation and increased filling pressure (grade 2 diastolic dysfunction)  LEFT ATRIUM:  The atrium was mildly dilated  RIGHT ATRIUM:  The atrium was mildly dilated  AORTIC VALVE:  The valve was functionally bicuspid   Leaflets exhibited mildly to moderately increased thickness, mild to moderate calcification, mildly reduced cuspal separation, and sclerosis  Transaortic velocity was increased due to valvular stenosis  There was mild stenosis  Valve mean gradient was 15 mmHg  TRICUSPID VALVE:  There was trace regurgitation  The findings suggest mild pulmonary hypertension  HISTORY: PRIOR HISTORY: Diabetes, Edema,HTN,Hyperlipidemia,Gastroesophageal reflux disease    PROCEDURE: The procedure was performed in the echo lab  This was a routine study  The transthoracic approach was used  The study included complete 2D imaging, M-mode, complete spectral Doppler, and color Doppler  The heart rate was 67 bpm,  at the start of the study  Images were obtained from the parasternal, apical, subcostal, and suprasternal notch acoustic windows  Echocardiographic views were limited due to restricted patient mobility, poor patient compliance, poor  acoustic window availability, decreased penetration, and lung interference  This was a technically difficult study  LEFT VENTRICLE: Size was normal  Systolic function was at the lower limits of normal  Ejection fraction was estimated in the range of 50 % to 55 % to be 55 %  There were no regional wall motion abnormalities  Wall thickness was mildly  increased  No evidence of apical thrombus  DOPPLER: Features were consistent with a pseudonormal left ventricular filling pattern, with concomitant abnormal relaxation and increased filling pressure (grade 2 diastolic dysfunction)  RIGHT VENTRICLE: The size was normal  Systolic function was normal  Wall thickness was normal     LEFT ATRIUM: The atrium was mildly dilated  RIGHT ATRIUM: The atrium was mildly dilated  MITRAL VALVE: Valve structure was normal  There was normal leaflet separation  DOPPLER: The transmitral velocity was within the normal range  There was no evidence for stenosis  There was no significant regurgitation  AORTIC VALVE: The valve was functionally bicuspid   Leaflets exhibited mildly to moderately increased thickness, mild to moderate calcification, mildly reduced cuspal separation, and sclerosis  DOPPLER: Transaortic velocity was increased  due to valvular stenosis  There was mild stenosis  There was no significant regurgitation  TRICUSPID VALVE: The valve structure was normal  There was normal leaflet separation  DOPPLER: The transtricuspid velocity was within the normal range  There was no evidence for stenosis  There was trace regurgitation  Estimated peak PA  pressure was 38 mmHg  The findings suggest mild pulmonary hypertension  PULMONIC VALVE: Leaflets exhibited normal thickness, no calcification, and normal cuspal separation  DOPPLER: The transpulmonic velocity was within the normal range  There was no significant regurgitation  PERICARDIUM: There was no pericardial effusion  The pericardium was normal in appearance  AORTA: The root exhibited normal size  SYSTEMIC VEINS: IVC: The inferior vena cava was normal in size  MEASUREMENT TABLES    DOPPLER MEASUREMENTS  Aortic valve   (Reference normals)  Peak steve   250 cm/s   (--)  Peak gradient   26 mmHg   (--)  Mean gradient   15 mmHg   (--)    SYSTEM MEASUREMENT TABLES    2D mode  AoR Diam 2D: 3 2 cm  LA Diam (2D): 4 1 cm  LA/Ao (2D): 1 28  FS (2D Teich): 26 4 %  IVSd (2D): 1 31 cm  LVDEV: 72 1 cm³  LVEDV MOD BP: 102 cm³  LVESV: 34 4 cm³  LVIDd(2D): 4 05 cm  LVISd (2D): 2 98 cm  LVOT Area 2D: 3 14 cm squared  LVPWd (2D): 1 3 cm  SV (Teich): 37 7 cm³    Apical four chamber  LVEF A4C: 50 %    Apical two chamber  LVEF A2C: 52 %    Unspecified Scan Mode  DEVON Cont Eq (Peak Steve): 1 49 cm squared  LVOT Diam : 2 cm  LVOT Vmax: 1210 mm/s  LVOT Vmax; Mean: 1210 mm/s  Peak Grad ; Mean: 6 mm[Hg]  MV Peak A Steve: 291 mm/s  MV Peak E Steve   Mean: 1060 mm/s  MVA (PHT): 6 29 cm squared  PHT: 35 ms  Max P mm[Hg]  V Max: 2390 mm/s  Vmax: 2880 mm/s  RA Area: 21 5 cm squared  RA Volume: 65 8 cm³  TAPSE: 1 9 cm    [facility]    Prepared and electronically signed by    Quinn Joyce MD  Signed 05-Dec-2018 11:11:08       afib with slow ventricular response    Quinn Gonzalez  Please call with any questions or suggestions    Counseling :  A description of the counseling:   Goals and Barriers:  Patient's ability to self care:  Medication side effect reviewed with patient in detail and all their questions answered  "This note has been constructed using a voice recognition system  Therefore there may be syntax, spelling, and/or grammatical errors   Please call if you have any questions  "

## 2022-04-25 DIAGNOSIS — E78.2 MIXED HYPERLIPIDEMIA: ICD-10-CM

## 2022-04-25 DIAGNOSIS — I10 ESSENTIAL HYPERTENSION: ICD-10-CM

## 2022-04-25 DIAGNOSIS — E11.42 TYPE 2 DIABETES MELLITUS WITH DIABETIC POLYNEUROPATHY, WITHOUT LONG-TERM CURRENT USE OF INSULIN (HCC): ICD-10-CM

## 2022-04-25 RX ORDER — AMLODIPINE BESYLATE 10 MG/1
10 TABLET ORAL DAILY
Qty: 90 TABLET | Refills: 3 | OUTPATIENT
Start: 2022-04-25

## 2022-04-25 RX ORDER — ATORVASTATIN CALCIUM 40 MG/1
40 TABLET, FILM COATED ORAL DAILY
Qty: 90 TABLET | Refills: 3 | OUTPATIENT
Start: 2022-04-25

## 2022-04-25 RX ORDER — LISINOPRIL 10 MG/1
10 TABLET ORAL DAILY
Qty: 30 TABLET | Refills: 0 | Status: SHIPPED | OUTPATIENT
Start: 2022-04-25 | End: 2022-07-14 | Stop reason: SDUPTHER

## 2022-04-25 RX ORDER — LISINOPRIL 10 MG/1
10 TABLET ORAL DAILY
Qty: 90 TABLET | Refills: 3 | OUTPATIENT
Start: 2022-04-25

## 2022-04-25 RX ORDER — ATORVASTATIN CALCIUM 40 MG/1
40 TABLET, FILM COATED ORAL DAILY
Qty: 30 TABLET | Refills: 0 | Status: SHIPPED | OUTPATIENT
Start: 2022-04-25 | End: 2022-05-23 | Stop reason: SDUPTHER

## 2022-04-25 RX ORDER — AMLODIPINE BESYLATE 10 MG/1
10 TABLET ORAL DAILY
Qty: 30 TABLET | Refills: 0 | Status: SHIPPED | OUTPATIENT
Start: 2022-04-25 | End: 2022-05-16 | Stop reason: SDUPTHER

## 2022-04-25 NOTE — TELEPHONE ENCOUNTER
Pt scheduled appt for 5/16 with Dr Keke Kay   Pt is concerned about not having medication for that long, requesting a partial prescription

## 2022-04-25 NOTE — TELEPHONE ENCOUNTER
Patient last had these medications filled one year ago  Please call patient to schedule and appointment

## 2022-05-06 ENCOUNTER — RA CDI HCC (OUTPATIENT)
Dept: OTHER | Facility: HOSPITAL | Age: 82
End: 2022-05-06

## 2022-05-06 NOTE — PROGRESS NOTES
E66 01  E11 [id number]  [facility]  coding opportunities          Chart Reviewed number of suggestions sent to Provider: 2     Patients Insurance     Medicare Insurance: [employer]

## 2022-05-08 DIAGNOSIS — R60.0 EDEMA OF LEFT LOWER EXTREMITY: ICD-10-CM

## 2022-05-08 DIAGNOSIS — I48.0 PAROXYSMAL ATRIAL FIBRILLATION (HCC): ICD-10-CM

## 2022-05-08 DIAGNOSIS — I51.89 GRADE II DIASTOLIC DYSFUNCTION: ICD-10-CM

## 2022-05-08 DIAGNOSIS — R79.89 ELEVATED BRAIN NATRIURETIC PEPTIDE (BNP) LEVEL: ICD-10-CM

## 2022-05-08 DIAGNOSIS — I10 ESSENTIAL HYPERTENSION: ICD-10-CM

## 2022-05-10 RX ORDER — APIXABAN 5 MG/1
TABLET, FILM COATED ORAL
Qty: 180 TABLET | Refills: 3 | Status: SHIPPED | OUTPATIENT
Start: 2022-05-10

## 2022-05-16 ENCOUNTER — OFFICE VISIT (OUTPATIENT)
Dept: FAMILY MEDICINE CLINIC | Facility: CLINIC | Age: 82
End: 2022-05-16
Payer: MEDICARE

## 2022-05-16 VITALS
HEIGHT: 61 IN | TEMPERATURE: 97.6 F | DIASTOLIC BLOOD PRESSURE: 72 MMHG | SYSTOLIC BLOOD PRESSURE: 148 MMHG | OXYGEN SATURATION: 98 % | BODY MASS INDEX: 38.33 KG/M2 | RESPIRATION RATE: 16 BRPM | WEIGHT: 203 LBS | HEART RATE: 75 BPM

## 2022-05-16 DIAGNOSIS — Z12.31 ENCOUNTER FOR SCREENING MAMMOGRAM FOR MALIGNANT NEOPLASM OF BREAST: ICD-10-CM

## 2022-05-16 DIAGNOSIS — I10 ESSENTIAL HYPERTENSION: ICD-10-CM

## 2022-05-16 DIAGNOSIS — Z00.00 MEDICARE ANNUAL WELLNESS VISIT, SUBSEQUENT: Primary | ICD-10-CM

## 2022-05-16 PROCEDURE — G0438 PPPS, INITIAL VISIT: HCPCS | Performed by: FAMILY MEDICINE

## 2022-05-16 RX ORDER — AMLODIPINE BESYLATE 10 MG/1
10 TABLET ORAL DAILY
Qty: 90 TABLET | Refills: 3 | Status: SHIPPED | OUTPATIENT
Start: 2022-05-16 | End: 2022-08-14

## 2022-05-16 NOTE — PROGRESS NOTES
Assessment and Plan:     Problem List Items Addressed This Visit        Other    Medicare annual wellness visit, subsequent - Primary          Patient with no concerns or complaints today  Did note during medication reviewed the patient is on full-dose Eliquis 5 milligram b i d   Measured sent to cardiologist as they are the prescribing physician to see if this is appropriate  Preventive health issues were discussed with patient, and age appropriate screening tests were ordered as noted in patient's After Visit Summary  Personalized health advice and appropriate referrals for health education or preventive services given if needed, as noted in patient's After Visit Summary       History of Present Illness:     Patient presents for Medicare Annual Wellness visit    Patient Care Team:  Yifan Ace MD as PCP - General (Family Medicine)     Problem List:     Patient Active Problem List   Diagnosis    Essential hypertension    Anemia    Type 2 diabetes mellitus with diabetic neuropathy ([facility]    Edema of left lower extremity    Hypokalemia    Urinary incontinence    Wheezing    Grade II diastolic dysfunction    Body mass index (BMI) 45 0-49 9, adult ([facility] )    [facility]    Medicare annual wellness visit, subsequent      Past Medical and Surgical History:     Past Medical History:   Diagnosis Date    Arthritis     knees    Diabetes mellitus ([facility] )     type 2    GERD (gastroesophageal reflux disease)     Hyperlipidemia     Hypertension     Stage 3a chronic kidney disease ([facility] ) 4/14/2022    Urinary incontinence     Use of cane as ambulatory aid     Wears glasses      Past Surgical History:   Procedure Laterality Date    BREAST SURGERY Left     nothing was there     COLONOSCOPY      HYSTERECTOMY      complete-fibroid    JOINT REPLACEMENT Left     knee    GA XCAPSL CTRC RMVL INSJ IO LENS PROSTH W/O ECP Right 7/19/2018    Procedure: EXTRACTION EXTRACAPSULAR CATARACT PHACO INTRAOCULAR LENS (IOL); Surgeon: Pablo Rodgers MD;  Location: [facility] MAIN OR;  Service: Ophthalmology    [place] XCAPSL CTRC RMVL INSJ IO LENS PROSTH W/O ECP Left 8/9/2018    Procedure: EXTRACTION EXTRACAPSULAR CATARACT PHACO INTRAOCULAR LENS (IOL);   Surgeon: Pablo Rodgers MD;  Location: [facility] MAIN OR;  Service: Ophthalmology      Family History:     Family History   Problem Relation Age of Onset   Dario Shoshana Breast cancer Mother     Diabetes type II Mother     Atrial fibrillation Mother     Diabetes Mother         diet controlled    Hypertension Mother     Lung cancer Father     Alcohol abuse Father     Cancer Father         lung-smoker      Social History:     Social History     Socioeconomic History    Marital status: Single     Spouse name: Not on file    Number of children: Not on file    Years of education: Not on file    Highest education level: Not on file   Occupational History    Not on file   Tobacco Use    Smoking status: Never Smoker    Smokeless tobacco: Never Used   Vaping Use    Vaping Use: Never used   Substance and Sexual Activity    Alcohol use: Yes     Comment: social    Drug use: No    Sexual activity: Not on file   Other Topics Concern    Not on file   Social History Narrative    Not on file     Social Determinants of Health     Financial Resource Strain: Not on file   Food Insecurity: Not on file   Transportation Needs: Not on file   Physical Activity: Not on file   Stress: Not on file   Social Connections: Not on file   Intimate Partner Violence: Not on file   Housing Stability: Not on file      Medications and Allergies:     Current Outpatient Medications   Medication Sig Dispense Refill    amLODIPine (NORVASC) 10 mg tablet Take 1 tablet (10 mg total) by mouth daily 30 tablet 0    atorvastatin (LIPITOR) 40 mg tablet Take 1 tablet (40 mg total) by mouth daily 30 tablet 0    CINNAMON PO Take 1,000 mg by mouth every morning      Eliquis 5 MG TAKE ONE TABLET BY MOUTH TWICE A  tablet 3    Empagliflozin (Jardiance) 10 MG TABS Take 1 tablet (10 mg total) by mouth every morning 30 tablet 6    lisinopril (ZESTRIL) 10 mg tablet Take 1 tablet (10 mg total) by mouth daily 30 tablet 0    metoprolol tartrate (LOPRESSOR) 25 mg tablet Take 1 tablet (25 mg total) by mouth in the morning 90 tablet 1    Multiple Vitamins-Minerals (PRESERVISION AREDS PO) Take by mouth      torsemide (DEMADEX) 20 mg tablet Take 1 tablet (20 mg total) by mouth as needed (leg swelling) TAKE 1 TABLET as needed 90 tablet 3    amoxicillin (AMOXIL) 500 mg capsule TAKE TWO CAPSULES IMMEDIATELY THEN TAKE ONE CAPSULE EVERY 8 HOURS UNTIL GONE (Patient not taking: Reported on 5/16/2022)      Calcium Carb-Cholecalciferol 600-800 MG-UNIT TABS Take by mouth every morning (Patient not taking: Reported on 5/16/2022)      metFORMIN (GLUCOPHAGE) 1000 MG tablet Take 1 tablet by mouth twice a day 60 tablet 0     No current facility-administered medications for this visit  Allergies   Allergen Reactions    Other Allergic Rhinitis     Seasonal/pollen      Immunizations:     Immunization History   Administered Date(s) Administered    COVID-19 MODERNA VACC 0 5 ML IM 01/08/2021, 02/09/2021    INFLUENZA 09/24/2015, 10/15/2018    Influenza Quadrivalent Preservative Free 3 years and older IM 09/24/2015, 11/01/2017    Pneumococcal Conjugate 13-Valent 02/22/2018    Tdap 02/22/2018      Health Maintenance:         Topic Date Due    Breast Cancer Screening: Mammogram  01/23/2019         Topic Date Due    DTaP,Tdap,and Td Vaccines (1 - Tdap) 02/22/2018    Pneumococcal Vaccine: 65+ Years (2 - PPSV23 or PCV20) 02/22/2019    COVID-19 Vaccine (3 - Booster for Moderna series) 07/09/2021      Medicare Health Risk Assessment:     /72   Pulse 75   Temp 97 6 °F (36 4 °C)   Resp 16   Ht 5' 1" (1 549 m)   Wt 92 1 kg (203 lb)   SpO2 98%   BMI 38 36 kg/m²      Brian Calderon is here for her Subsequent Wellness visit   Last Medicare Wellness visit information reviewed, patient interviewed, no change since last AWV  Health Risk Assessment:   Patient rates overall health as good  Patient feels that their physical health rating is same  Patient is satisfied with their life  Eyesight was rated as much worse  Hearing was rated as same  Patient feels that their emotional and mental health rating is same  Patients states they are never, rarely angry  Patient states they are sometimes unusually tired/fatigued  Pain experienced in the last 7 days has been none  Patient states that she has experienced no weight loss or gain in last 6 months  Fall Risk Screening: In the past year, patient has experienced: no history of falling in past year      Urinary Incontinence Screening:   Patient has leaked urine accidently in the last six months  Home Safety:  Patient does not have trouble with stairs inside or outside of their home  Patient has working smoke alarms and has working carbon monoxide detector  Home safety hazards include: none  Nutrition:   Current diet is Diabetic  Medications:   Patient is currently taking over-the-counter supplements  OTC medications include: see medication list  Patient is able to manage medications  Activities of Daily Living (ADLs)/Instrumental Activities of Daily Living (IADLs):   Walk and transfer into and out of bed and chair?: Yes  Dress and groom yourself?: Yes    Bathe or shower yourself?: Yes    Feed yourself?  Yes  Do your laundry/housekeeping?: Yes  Manage your money, pay your bills and track your expenses?: Yes  Make your own meals?: Yes    Do your own shopping?: Yes    Previous Hospitalizations:   Any hospitalizations or ED visits within the last 12 months?: No      Advance Care Planning:   Living will: Yes    Durable POA for healthcare: No    Advanced directive: Yes      PREVENTIVE SCREENINGS      Cardiovascular Screening:    General: Screening Not Indicated and History Lipid Disorder Diabetes Screening:     General: Screening Not Indicated and History Diabetes      Cervical Cancer Screening:    General: Screening Not Indicated      Lung Cancer Screening:     General: Screening Not Indicated    Screening, Brief Intervention, and Referral to Treatment (SBIRT)    Screening  Typical number of drinks in a day: 0  Typical number of drinks in a week: 0  Interpretation: Low risk drinking behavior      Single Item Drug Screening:  How often have you used an illegal drug (including marijuana) or a prescription medication for non-medical reasons in the past year? never    Single Item Drug Screen Score: 0  Interpretation: Negative screen for possible drug use disorder      Julito Recinos MD

## 2022-05-23 DIAGNOSIS — E11.42 TYPE 2 DIABETES MELLITUS WITH DIABETIC POLYNEUROPATHY, WITHOUT LONG-TERM CURRENT USE OF INSULIN (HCC): ICD-10-CM

## 2022-05-23 DIAGNOSIS — E78.2 MIXED HYPERLIPIDEMIA: ICD-10-CM

## 2022-05-23 RX ORDER — ATORVASTATIN CALCIUM 40 MG/1
40 TABLET, FILM COATED ORAL DAILY
Qty: 30 TABLET | Refills: 0 | Status: SHIPPED | OUTPATIENT
Start: 2022-05-23 | End: 2022-06-22 | Stop reason: SDUPTHER

## 2022-06-22 DIAGNOSIS — E78.2 MIXED HYPERLIPIDEMIA: ICD-10-CM

## 2022-06-22 RX ORDER — ATORVASTATIN CALCIUM 40 MG/1
40 TABLET, FILM COATED ORAL DAILY
Qty: 30 TABLET | Refills: 0 | Status: SHIPPED | OUTPATIENT
Start: 2022-06-22 | End: 2022-07-14 | Stop reason: SDUPTHER

## 2022-06-22 NOTE — TELEPHONE ENCOUNTER
Metformin 1,000 mg, PO, BID refilled with two additional refills (90 day refills)       Dr Jabier Lemon

## 2022-07-07 NOTE — PROGRESS NOTES
Daily Note     Today's date: 2019  Patient name: Pippa Ferris  : 1940  MRN: [id number]  Referring provider: Eli Stewart  Dx:   Encounter Diagnosis     ICD-10-CM    1  Other closed nondisplaced fracture of proximal end of right humerus, initial encounter S42 [id number]        Start Time: 1425  Stop Time: 1525  Total time in clinic (min): 60 minutes    Subjective: Patient denies pain right shldr; reaching overhead to her shelves is improving; less fatigued today         Objective: Pt needed min cueing to ensure that her shoulder did not hike during the AAROM with cane and help of therapist during scaption and slow ecc  Pt became quite fatigued by the end of the first set of 10        Precautions: standard       Manual    FOTO/PN    7-16   DTM and TPR  X 10 min to R UT/LS  X 8min X 10 min to R UT/LS  no no   Shoulder mobs                                     Exercise Diary  7 8 9 5 6   PROM Semi reclined x 3 min  Semi- reclined x 2 min - no  X 5 min semi reclined  X 5-7 min semi reclined X 5 min semi-reclined    IR/ER isos X 20 ea supine(manual )  X 20 ea seated - no  X 15 ea in semi recl - no  2x10 ea in semi recl No    pulleys Flexion, scaption & ER 3 min Flexion,  scaption and ER x 20 ea  Not today   Flexion, scaption & ER x 20 ea    Rows and ext GTB 2 x 10  GTB 2 x 10 ea   Seated  GTB  2 x 10 ea  Y tubing 2x10 ea GTB rows & ext 2 x 10 ea seated   Active IR/ER YTB 2x10 YTB 2x10 RTB  2 x 10 ea  YTB 2x10 ea YTB 2 x 10 ea    scaption & ER  AA with cane and therapist- slow ecc  1x10 Stance  AA cane scaption x 10  w/ ecc lowering  Stance AA cane scaption x 10 w ecc lowering  Tried self scaption but stopped s/t pain AA scaption with cane seated x 10    Shelf reach no  Biceps 3 lb 2 positions x 15 ea; triceps BTB x 20   Supine flex 1 lb x 10    Active scaption         IR/ext  w cane   x 10 ea  X 10 ea - no  IR - active x 10      Flexion    Overhead reach to 1st cabinet shelf w 1 lb x 10 Overhead reach to 1st cabinet shelf w 1 lb 2 x 10  Trial 2 lb NV  Semi reclined flexion w wand    x10     Elbow flex/ext iso  2x10 ea Wall slides x 10 - no  No  Reviewed how to do ext/ rows for HEP and gave YTB NS arms only lvl1 x 7 min                                 Biceps 2 lb x 20                                                 Modalities 7 8 9  6   heat X 10 min pre  X 10 min  X 10 min  10 min seated No- Nustep above    Ice  X 10 min post  10 min post  X 10 min  10 min seated 10 min semi rec                       Assessment: Tolerated treatment well  Patient would benefit from continued PT;patient's strength improving with overhead reach - increased reps with 1lb - trial 2 lb next session ;  functional IR remains painful/limited       Plan: Continue per plan of care  [Mother] : mother [Formula ___ oz/feed] : [unfilled] oz of formula per feed [Fruit] : fruit [Vegetables] : vegetables [Meat] : meat [Dairy] : dairy [Finger food] : finger food [___ stools per day] : [unfilled]  stools per day [Normal] : Normal [Brushing teeth] : Brushing teeth [Yes] : Patient goes to dentist yearly [Playtime] : Playtime  [No] : Not at  exposure [Up to date] : Up to date [Water heater temperature set at <120 degrees F] : Water heater temperature not set at <120 degrees F [Car seat in back seat] : No car seat in back seat [Smoke Detectors] : No smoke detectors [Gun in Home] : No gun in home [Exposure to electronic nicotine delivery system] : No exposure to electronic nicotine delivery system [Carbon Monoxide Detectors] : No carbon monoxide detectors [At risk for exposure to TB] : Not at risk for exposure to Tuberculosis [FreeTextEntry1] : 12 M Old F with no pertenent PMH Presents for 1 year old well check\par Welsh spots noted on physical examination\par no problems with stooling ,eating sleeping\par advised to go to dentist as all teeth have come in \par

## 2022-07-14 ENCOUNTER — HOSPITAL ENCOUNTER (OUTPATIENT)
Dept: RADIOLOGY | Facility: HOSPITAL | Age: 82
Discharge: HOME | End: 2022-07-14
Payer: MEDICARE

## 2022-07-14 VITALS — WEIGHT: 203 LBS | BODY MASS INDEX: 38.33 KG/M2 | HEIGHT: 61 IN

## 2022-07-14 DIAGNOSIS — E78.2 MIXED HYPERLIPIDEMIA: ICD-10-CM

## 2022-07-14 DIAGNOSIS — E11.42 TYPE 2 DIABETES MELLITUS WITH DIABETIC POLYNEUROPATHY, WITHOUT LONG-TERM CURRENT USE OF INSULIN (HCC): ICD-10-CM

## 2022-07-14 DIAGNOSIS — I10 ESSENTIAL HYPERTENSION: ICD-10-CM

## 2022-07-14 DIAGNOSIS — Z12.31 ENCOUNTER FOR SCREENING MAMMOGRAM FOR MALIGNANT NEOPLASM OF BREAST: ICD-10-CM

## 2022-07-14 PROCEDURE — 77067 SCR MAMMO BI INCL CAD: CPT

## 2022-07-14 PROCEDURE — 77063 BREAST TOMOSYNTHESIS BI: CPT

## 2022-07-20 RX ORDER — LISINOPRIL 10 MG/1
10 TABLET ORAL DAILY
Qty: 90 TABLET | Refills: 1 | Status: SHIPPED | OUTPATIENT
Start: 2022-07-20

## 2022-07-20 RX ORDER — ATORVASTATIN CALCIUM 40 MG/1
40 TABLET, FILM COATED ORAL DAILY
Qty: 90 TABLET | Refills: 1 | Status: SHIPPED | OUTPATIENT
Start: 2022-07-20

## 2022-07-20 NOTE — TELEPHONE ENCOUNTER
Lipitor 40mg  Metoformin 1000mg twice daily  Lisinopril 10mg   Refilled (90 day supply and an additional refill)    Dr Conchis Larson

## 2022-07-20 NOTE — TELEPHONE ENCOUNTER
Patient called for update on refills  She is now out of meds  Needs ASAP  Confirmed to send to [facility]  No white team 3 members in office  Routing to Dr Sheila Lawton- only white team member in office       Also sending her TT

## 2022-09-11 ENCOUNTER — HOSPITAL ENCOUNTER (EMERGENCY)
Facility: HOSPITAL | Age: 82
Discharge: HOME | End: 2022-09-11
Attending: EMERGENCY MEDICINE
Payer: MEDICARE

## 2022-09-11 ENCOUNTER — APPOINTMENT (EMERGENCY)
Dept: RADIOLOGY | Facility: HOSPITAL | Age: 82
End: 2022-09-11
Payer: MEDICARE

## 2022-09-11 VITALS
HEIGHT: 61 IN | HEART RATE: 60 BPM | SYSTOLIC BLOOD PRESSURE: 187 MMHG | RESPIRATION RATE: 18 BRPM | BODY MASS INDEX: 38.33 KG/M2 | WEIGHT: 203 LBS | OXYGEN SATURATION: 99 % | DIASTOLIC BLOOD PRESSURE: 84 MMHG | TEMPERATURE: 98.7 F

## 2022-09-11 DIAGNOSIS — M79.2 NEUROPATHIC PAIN: Primary | ICD-10-CM

## 2022-09-11 LAB — GLUCOSE SERPL-MCNC: 119 MG/DL (ref 65–140)

## 2022-09-11 PROCEDURE — 72125 CT NECK SPINE W/O DYE: CPT

## 2022-09-11 PROCEDURE — 99284 EMERGENCY DEPT VISIT MOD MDM: CPT | Performed by: EMERGENCY MEDICINE

## 2022-09-11 PROCEDURE — 82948 REAGENT STRIP/BLOOD GLUCOSE: CPT

## 2022-09-11 PROCEDURE — 99284 EMERGENCY DEPT VISIT MOD MDM: CPT

## 2022-09-11 PROCEDURE — 93005 ELECTROCARDIOGRAM TRACING: CPT

## 2022-09-11 RX ORDER — GABAPENTIN 300 MG/1
300 CAPSULE ORAL ONCE
Status: COMPLETED | OUTPATIENT
Start: 2022-09-11 | End: 2022-09-11

## 2022-09-11 RX ORDER — GABAPENTIN 300 MG/1
CAPSULE ORAL
Qty: 23 CAPSULE | Refills: 0 | Status: SHIPPED | OUTPATIENT
Start: 2022-09-11 | End: 2022-09-14 | Stop reason: SDUPTHER

## 2022-09-11 RX ORDER — GABAPENTIN 100 MG/1
100 CAPSULE ORAL ONCE
Status: DISCONTINUED | OUTPATIENT
Start: 2022-09-11 | End: 2022-09-11

## 2022-09-11 RX ADMIN — GABAPENTIN 300 MG: 300 CAPSULE ORAL at 12:53

## 2022-09-11 NOTE — ED PROVIDER NOTES
History  Chief Complaint   Patient presents with    Arm Pain     Patient c/o "soreness" in b/l "fingertips, hands, and arms into my neck" for about two weeks  States she started taking Jardiance about a month ago and thought it may be related to that so stopped taking it about one week ago  Patient presents for evaluation of soreness and pain in the bilateral fingertips  Pain also radiated up into her hands and arms  She also has a little bit pain in her neck  She has been experiencing these symptoms for 2 weeks  She started taking Jardiance for diabetes and month ago  She stopped this toe because she thought might be related to her symptoms about 1 week ago  Patient denies any fall or trauma  There is no weakness in the hands  She does not experience any joint pain or swelling in the hands majority of the pain is in the finger tips  States she could not take it anymore so she came in today  History provided by:  Patient   used: No    Arm Pain  Associated symptoms: no abdominal pain, no chest pain, no cough, no ear pain, no fever, no headaches, no rash, no shortness of breath, no sore throat and no vomiting        Prior to Admission Medications   Prescriptions Last Dose Informant Patient Reported? Taking? CINNAMON PO  Self Yes No   Sig: Take 1,000 mg by mouth every morning   Calcium Carb-Cholecalciferol 600-800 MG-UNIT TABS  Self Yes No   Sig: Take by mouth every morning   Patient not taking: Reported on 5/16/2022   Eliquis 5 MG   No No   Sig: TAKE ONE TABLET BY MOUTH TWICE A DAY   Empagliflozin (Jardiance) 10 MG TABS   No No   Sig: Take 1 tablet (10 mg total) by mouth every morning   Multiple Vitamins-Minerals (PRESERVISION AREDS PO)  Self Yes No   Sig: Take by mouth   amLODIPine (NORVASC) 10 mg tablet   No No   Sig: Take 1 tablet (10 mg total) by mouth in the morning     amoxicillin (AMOXIL) 500 mg capsule  Self Yes No   Sig: TAKE TWO CAPSULES IMMEDIATELY THEN TAKE ONE CAPSULE EVERY 8 HOURS UNTIL GONE   Patient not taking: Reported on 5/16/2022   atorvastatin (LIPITOR) 40 mg tablet   No No   Sig: Take 1 tablet (40 mg total) by mouth daily   lisinopril (ZESTRIL) 10 mg tablet   No No   Sig: Take 1 tablet (10 mg total) by mouth daily   metFORMIN (GLUCOPHAGE) 1000 MG tablet   No No   Sig: Take 1 tablet by mouth twice a day   metoprolol tartrate (LOPRESSOR) 25 mg tablet   No No   Sig: Take 1 tablet (25 mg total) by mouth in the morning   torsemide (DEMADEX) 20 mg tablet   No No   Sig: Take 1 tablet (20 mg total) by mouth as needed (leg swelling) TAKE 1 TABLET as needed      Facility-Administered Medications: None       Past Medical History:   Diagnosis Date    Arthritis     knees    Diabetes mellitus ([facility] )     type 2    GERD (gastroesophageal reflux disease)     Hyperlipidemia     Hypertension     Stage 3a [facility] ) 4/14/2022    Urinary incontinence     Use of cane as ambulatory aid     Wears glasses        Past Surgical History:   Procedure Laterality Date    BREAST BIOPSY Left 2015    benign    BREAST SURGERY Left     nothing was there     COLONOSCOPY      HYSTERECTOMY      complete-fibroid    JOINT REPLACEMENT Left     knee    TX XCAPSL CTRC RMVL INSJ IO LENS PROSTH W/O ECP Right 07/19/2018    Procedure: EXTRACTION EXTRACAPSULAR CATARACT PHACO INTRAOCULAR LENS (IOL); Surgeon: Joe Casillas MD;  Location: [facility] MAIN OR;  Service: Ophthalmology    TX XCAPSL CTRC RMVL INSJ IO LENS PROSTH W/O ECP Left 08/09/2018    Procedure: EXTRACTION EXTRACAPSULAR CATARACT PHACO INTRAOCULAR LENS (IOL);   Surgeon: Joe Casillas MD;  Location: [facility] MAIN OR;  Service: Ophthalmology       Family History   Problem Relation Age of Onset   Chris Hoover Breast cancer Mother     Diabetes type II Mother     Atrial fibrillation Mother     Diabetes Mother         diet controlled    Hypertension Mother     Lung cancer Father     Alcohol abuse Father     Cancer Father lung-smoker     I have reviewed and agree with the history as documented  E-Cigarette/Vaping    E-Cigarette Use Never User      E-Cigarette/Vaping Substances    Nicotine No     THC No     CBD No      Social History     Tobacco Use    Smoking status: Never Smoker    Smokeless tobacco: Never Used   Vaping Use    Vaping Use: Never used   Substance Use Topics    Alcohol use: Yes     Comment: social    Drug use: No       Review of Systems   Constitutional: Negative for chills and fever  HENT: Negative for ear pain and sore throat  Eyes: Negative for pain and visual disturbance  Respiratory: Negative for cough and shortness of breath  Cardiovascular: Negative for chest pain and palpitations  Gastrointestinal: Negative for abdominal pain and vomiting  Genitourinary: Negative for dysuria and hematuria  Musculoskeletal: Negative for arthralgias and back pain  Skin: Negative for color change and rash  Neurological: Negative for seizures, syncope, weakness, numbness and headaches  All other systems reviewed and are negative  Physical Exam  Physical Exam  Vitals and nursing note reviewed  Constitutional:       General: She is not in acute distress  HENT:      Head: Atraumatic  Right Ear: External ear normal       Left Ear: External ear normal       Nose: Nose normal       Mouth/Throat:      Mouth: Mucous membranes are moist       Pharynx: Oropharynx is clear  Eyes:      General: No scleral icterus  Extraocular Movements: Extraocular movements intact  Conjunctiva/sclera: Conjunctivae normal       Pupils: Pupils are equal, round, and reactive to light  Neck:     Cardiovascular:      Rate and Rhythm: Normal rate and regular rhythm  Pulses: Normal pulses  Pulmonary:      Effort: Pulmonary effort is normal  No respiratory distress  Breath sounds: Normal breath sounds  Abdominal:      General: Abdomen is flat  Bowel sounds are normal  There is no distension  Palpations: Abdomen is soft  Tenderness: There is no abdominal tenderness  There is no guarding or rebound  Musculoskeletal:         General: No deformity  Normal range of motion  Cervical back: Normal range of motion  Tenderness present  Skin:     Capillary Refill: Capillary refill takes less than 2 seconds  Findings: No rash  Neurological:      General: No focal deficit present  Mental Status: She is alert and oriented to person, place, and time  Cranial Nerves: No cranial nerve deficit  Sensory: No sensory deficit  Motor: No weakness  Coordination: Coordination normal          Vital Signs  ED Triage Vitals [09/11/22 1101]   Temperature Pulse Respirations Blood Pressure SpO2   98 7 °F (37 1 °C) 70 19 (!) 185/81 98 %      Temp Source Heart Rate Source Patient Position - Orthostatic VS BP Location FiO2 (%)   Oral Monitor Sitting Left arm --      Pain Score       --           Vitals:    09/11/22 1101 09/11/22 1239   BP: (!) 185/81 (!) 187/84   Pulse: 70 60   Patient Position - Orthostatic VS: Sitting          Visual Acuity      ED Medications  Medications   gabapentin (NEURONTIN) capsule 300 mg (300 mg Oral Given 9/11/22 1253)       Diagnostic Studies  Results Reviewed     Procedure Component Value Units Date/Time    Fingerstick Glucose (POCT) [[id number]]  (Normal) Collected: 09/11/22 1133    Lab Status: Final result Updated: 09/11/22 1134     POC Glucose 119 mg/dl                  CT cervical spine without contrast   Final Result by Choco Alonso MD (09/11 1215)         1  No acute fracture or subluxation  2   Multilevel spondylosis most pronounced at C5-C6 and C6-C7  3   Rounded calcification posteriorly in the spinal canal at the C2-C3 level possibly dystrophic calcification related to ligamentum flavum infolding  Differential diagnosis of a calcified meningioma or other calcified lesion not excluded    There is    mild narrowing of the central canal  Further clinical assessment advised  Workstation performed: [id number]                    Procedures  Procedures         ED Course                               SBIRT 22yo+    Flowsheet Row Most Recent Value   SBIRT (23 yo +)    In order to provide better care to our patients, we are screening all of our patients for alcohol and drug use  Would it be okay to ask you these screening questions? No Filed at: 09/11/2022 1236                    [facility]  Number of Diagnoses or Management Options  Neuropathic pain  Diagnosis management comments: Pulse ox 99% on room air indicating adequate oxygenation  CT scan done and discussed with patient at bedside  Follow-up outpatient with her doctor  Patient states she would not p o  Tolerate MRI because of her claustrophobia  Unsure the exact cause of patient's symptoms appears to be more nerve pain will try gabapentin  Will hold off on trying any steroids for a radiculopathy symptoms because patient is diabetic and is likely cause problem with her sugar  Amount and/or Complexity of Data Reviewed  Tests in the radiology section of CPT®: ordered and reviewed  Decide to obtain previous medical records or to obtain history from someone other than the patient: yes  Review and summarize past medical records: yes    Patient Progress  Patient progress: stable      Disposition  Final diagnoses:   Neuropathic pain     Time reflects when diagnosis was documented in both MDM as applicable and the Disposition within this note     Time User Action Codes Description Comment    9/11/2022 12:48 PM Yanira Santizo Add [M79 2] Neuropathic pain       ED Disposition     ED Disposition   Discharge    Condition   Stable    Date/Time   Sun Sep 11, 2022 12:47 PM    [address] discharge to home/self care                 Follow-up Information     Follow up With Specialties Details Why Contact Info Additional Information    [employer]  In 1 week [phone]         [address] Emergency Department Emergency Medicine  If symptoms worsen [address] [address]  [address] [address] Emergency Department, [place], [place], [address]          Discharge Medication List as of 9/11/2022 12:50 PM      START taking these medications    Details   gabapentin (Neurontin) 300 mg capsule Multiple Dosages:Starting Sun 9/11/2022, Until Sun 9/11/2022 at 2359, THEN Starting Mon 9/12/2022, Until Sun 9/18/2022 at 2359Take 1 capsule (300 mg total) by mouth 2 (two) times a day for 1 day, THEN 1 capsule (300 mg total) 3 (three) times a day for  7 days  For post-herpetic neuralgia: Take 1 tablet on day 1,  Then take 2 tablets on day 2, Then take 3 tablets on day 3 and every day after that as instructed by your doctor   , Normal         CONTINUE these medications which have NOT CHANGED    Details   amLODIPine (NORVASC) 10 mg tablet Take 1 tablet (10 mg total) by mouth in the morning , Starting Mon 5/16/2022, Until Sun 8/14/2022, Normal      amoxicillin (AMOXIL) 500 mg capsule TAKE TWO CAPSULES IMMEDIATELY THEN TAKE ONE CAPSULE EVERY 8 HOURS UNTIL GONE, Historical Med      atorvastatin (LIPITOR) 40 mg tablet Take 1 tablet (40 mg total) by mouth daily, Starting Wed 7/20/2022, Normal      Calcium Carb-Cholecalciferol 600-800 MG-UNIT TABS Take by mouth every morning, Historical Med      CINNAMON PO Take 1,000 mg by mouth every morning, Historical Med      Eliquis 5 MG TAKE ONE TABLET BY MOUTH TWICE A DAY, Normal      Empagliflozin (Jardiance) 10 MG TABS Take 1 tablet (10 mg total) by mouth every morning, Starting Thu 4/14/2022, Normal      lisinopril (ZESTRIL) 10 mg tablet Take 1 tablet (10 mg total) by mouth daily, Starting Wed 7/20/2022, Normal      metFORMIN (GLUCOPHAGE) 1000 MG tablet Take 1 tablet by mouth twice a day, Normal      metoprolol tartrate (LOPRESSOR) 25 mg tablet Take 1 tablet (25 mg total) by mouth in the morning, Starting Thu 4/14/2022, Normal      Multiple Vitamins-Minerals (PRESERVISION AREDS PO) Take by mouth, Historical Med      torsemide (DEMADEX) 20 mg tablet Take 1 tablet (20 mg total) by mouth as needed (leg swelling) TAKE 1 TABLET as needed, Starting u 4/14/2022, Sample             No discharge procedures on file      PDMP Review     None          ED Provider  Electronically Signed by           Krysten Lam DO  09/11/22 2451

## 2022-09-11 NOTE — ED PROCEDURE NOTE
PROCEDURE  ECG 12 Lead Documentation Only    Date/Time: 9/11/2022 12:11 PM  Performed by: Raul Berry DO  Authorized by: Raul Berry DO     ECG reviewed by me, the ED Provider: yes    Patient location:  ED  Interpretation:     Interpretation: abnormal    Rate:     ECG rate:  56    ECG rate assessment: bradycardic    Rhythm:     Rhythm: atrial fibrillation    Ectopy:     Ectopy: none    ST segments:     ST segments:  Normal  T waves:     T waves: normal           Raul Berry DO  09/11/22 1211

## 2022-09-12 LAB
QRS AXIS: 21 DEGREES
QRSD INTERVAL: 82 MS
QT INTERVAL: 432 MS
QTC INTERVAL: 416 MS
T WAVE AXIS: 21 DEGREES
VENTRICULAR RATE: 56 BPM

## 2022-09-12 PROCEDURE — 93010 ELECTROCARDIOGRAM REPORT: CPT | Performed by: INTERNAL MEDICINE

## 2022-09-14 ENCOUNTER — OFFICE VISIT (OUTPATIENT)
Dept: FAMILY MEDICINE CLINIC | Facility: CLINIC | Age: 82
End: 2022-09-14
Payer: MEDICARE

## 2022-09-14 VITALS
HEART RATE: 82 BPM | RESPIRATION RATE: 16 BRPM | WEIGHT: 200 LBS | DIASTOLIC BLOOD PRESSURE: 62 MMHG | TEMPERATURE: 98 F | SYSTOLIC BLOOD PRESSURE: 130 MMHG | BODY MASS INDEX: 37.79 KG/M2

## 2022-09-14 DIAGNOSIS — E11.40 TYPE 2 DIABETES MELLITUS WITH DIABETIC NEUROPATHY, WITHOUT LONG-TERM CURRENT USE OF INSULIN (HCC): ICD-10-CM

## 2022-09-14 DIAGNOSIS — M79.2 NEUROPATHIC PAIN: Primary | ICD-10-CM

## 2022-09-14 DIAGNOSIS — M19.90 ARTHRITIS: ICD-10-CM

## 2022-09-14 PROCEDURE — 99214 OFFICE O/P EST MOD 30 MIN: CPT | Performed by: NURSE PRACTITIONER

## 2022-09-14 RX ORDER — GABAPENTIN 300 MG/1
CAPSULE ORAL
Qty: 23 CAPSULE | Refills: 0 | Status: SHIPPED | OUTPATIENT
Start: 2022-09-14 | End: 2022-09-28 | Stop reason: DRUGHIGH

## 2022-09-14 RX ORDER — SENNOSIDES 8.6 MG
650 CAPSULE ORAL EVERY 8 HOURS PRN
Qty: 30 TABLET | Refills: 0 | Status: SHIPPED | OUTPATIENT
Start: 2022-09-14

## 2022-09-14 NOTE — PROGRESS NOTES
Name: Carline Almanza      : 1940      MRN: [id number]  Encounter Provider: ELBA Correa  Encounter Date: 2022   Encounter department: [facility]    Assessment & Plan     Problem List Items Addressed This Visit        Endocrine    Type 2 diabetes mellitus with diabetic neuropathy (HCC)       Musculoskeletal and Integument    Arthritis    Relevant Medications    acetaminophen (TYLENOL) 650 mg CR tablet    Other Relevant Orders    XR hand 3+ vw left    XR hand 3+ vw right    Ambulatory Referral to Orthopedic Surgery       Other    Neuropathic pain - Primary    Relevant Medications    gabapentin (Neurontin) 300 mg capsule          Patient's shoes and socks removed  Right Foot/Ankle   Right Foot Inspection  Skin Exam: skin normal and skin intact  No dry skin, no warmth, no callus, no erythema, no maceration, no abnormal color, no pre-ulcer, no ulcer and no callus  Toe Exam: ROM and strength within normal limits  Left Foot/Ankle  Left Foot Inspection  Skin Exam: skin normal and skin intact  No dry skin, no warmth, no erythema, no maceration, normal color, no pre-ulcer, no ulcer and no callus  Toe Exam: ROM and strength within normal limits  Assign Risk Category  No deformity present  No loss of protective sensation  No weak pulses  Risk: 0  ete this SmartLink)}  BMI Counseling: Body mass index is 37 79 kg/m²  The BMI is above normal  Nutrition recommendations include decreasing portion sizes and encouraging healthy choices of fruits and vegetables  Exercise recommendations include exercising 3-5 times per week  No pharmacotherapy was ordered  Rationale for BMI follow-up plan is due to patient being overweight or obese  Subjective      Patient c/o pain both hands starting to resolve slowly with gabapentin  Still c/o issues with opening and closing hands could be arthritis  B/L feet checked-educated to go to podiatry frequently  Educated on gabapentin  Will check hands for arthritis via xray  In addition has diagnosis of calcification in the spinal canal C-2-C-3 that needs follow up-found in ER visit 9-11-22  Will continue use of gabapentin  Educated patient on diabetic meds and checking BG  Review of Systems   Constitutional: Negative  HENT: Negative  Eyes: Negative  Respiratory: Negative  Cardiovascular: Negative  Gastrointestinal: Negative  Endocrine: Negative  Genitourinary: Negative  Musculoskeletal: Negative  Skin: Negative  Allergic/Immunologic: Negative  Neurological: Positive for numbness  Hematological: Negative  Psychiatric/Behavioral: Negative  Current Outpatient Medications on File Prior to Visit   Medication Sig    amLODIPine (NORVASC) 10 mg tablet Take 1 tablet (10 mg total) by mouth in the morning   atorvastatin (LIPITOR) 40 mg tablet Take 1 tablet (40 mg total) by mouth daily    CINNAMON PO Take 1,000 mg by mouth every morning    Eliquis 5 MG TAKE ONE TABLET BY MOUTH TWICE A DAY    Empagliflozin (Jardiance) 10 MG TABS Take 1 tablet (10 mg total) by mouth every morning    lisinopril (ZESTRIL) 10 mg tablet Take 1 tablet (10 mg total) by mouth daily    metFORMIN (GLUCOPHAGE) 1000 MG tablet Take 1 tablet by mouth twice a day    metoprolol tartrate (LOPRESSOR) 25 mg tablet Take 1 tablet (25 mg total) by mouth in the morning    Multiple Vitamins-Minerals (PRESERVISION AREDS PO) Take by mouth    torsemide (DEMADEX) 20 mg tablet Take 1 tablet (20 mg total) by mouth as needed (leg swelling) TAKE 1 TABLET as needed       Objective     /62   Pulse 82   Temp 98 °F (36 7 °C)   Resp 16   Wt 90 7 kg (200 lb)   BMI 37 79 kg/m²     Physical Exam  Constitutional:       General: She is not in acute distress  Appearance: Normal appearance  She is not ill-appearing or toxic-appearing  HENT:      Head: Normocephalic and atraumatic        Right Ear: Tympanic membrane normal       Left Ear: Tympanic membrane normal       Nose: Nose normal       Mouth/Throat:      Mouth: Mucous membranes are moist    Eyes:      Extraocular Movements: Extraocular movements intact  Cardiovascular:      Rate and Rhythm: Normal rate and regular rhythm  Pulses: Normal pulses  no weak pulses     Heart sounds: Normal heart sounds  Pulmonary:      Effort: Pulmonary effort is normal  No respiratory distress  Breath sounds: Normal breath sounds  No wheezing, rhonchi or rales  Abdominal:      General: Bowel sounds are normal       Palpations: Abdomen is soft  Musculoskeletal:         General: Normal range of motion  Cervical back: Normal range of motion and neck supple  Right lower leg: No edema  Left lower leg: No edema  Feet:      Right foot:      Skin integrity: No ulcer, skin breakdown, erythema, warmth, callus or dry skin  Left foot:      Skin integrity: No ulcer, skin breakdown, erythema, warmth, callus or dry skin  Skin:     General: Skin is warm and dry  Findings: No lesion  Neurological:      General: No focal deficit present  Mental Status: She is alert and oriented to person, place, and time  Motor: No weakness  Gait: Gait normal    Psychiatric:         Mood and Affect: Mood normal          Behavior: Behavior normal          Thought Content:  Thought content normal          Judgment: Judgment normal        Tuesday ELBA Carey

## 2022-09-15 PROBLEM — M19.90 ARTHRITIS: Status: ACTIVE | Noted: 2022-09-15

## 2022-09-15 PROBLEM — M79.2 NEUROPATHIC PAIN: Status: ACTIVE | Noted: 2022-09-15

## 2022-09-16 ENCOUNTER — HOSPITAL ENCOUNTER (OUTPATIENT)
Dept: RADIOLOGY | Facility: HOSPITAL | Age: 82
Discharge: HOME | End: 2022-09-16
Payer: MEDICARE

## 2022-09-16 DIAGNOSIS — M19.90 ARTHRITIS: ICD-10-CM

## 2022-09-16 PROCEDURE — 73130 X-RAY EXAM OF HAND: CPT

## 2022-09-20 ENCOUNTER — RA CDI HCC (OUTPATIENT)
Dept: OTHER | Facility: HOSPITAL | Age: 82
End: 2022-09-20

## 2022-09-20 NOTE — PROGRESS NOTES
E11 22  E66 01  [facility]  coding opportunities          Chart Reviewed number of suggestions sent to Provider: 2     Patients Insurance     Medicare Insurance: [employer]

## 2022-09-28 ENCOUNTER — OFFICE VISIT (OUTPATIENT)
Dept: FAMILY MEDICINE CLINIC | Facility: CLINIC | Age: 82
End: 2022-09-28
Payer: MEDICARE

## 2022-09-28 VITALS
DIASTOLIC BLOOD PRESSURE: 76 MMHG | WEIGHT: 194.31 LBS | BODY MASS INDEX: 36.69 KG/M2 | HEART RATE: 98 BPM | SYSTOLIC BLOOD PRESSURE: 128 MMHG | OXYGEN SATURATION: 98 % | HEIGHT: 61 IN | TEMPERATURE: 97.6 F | RESPIRATION RATE: 18 BRPM

## 2022-09-28 DIAGNOSIS — E11.40 TYPE 2 DIABETES MELLITUS WITH DIABETIC NEUROPATHY, WITHOUT LONG-TERM CURRENT USE OF INSULIN (HCC): ICD-10-CM

## 2022-09-28 DIAGNOSIS — Z23 ENCOUNTER FOR IMMUNIZATION: Primary | ICD-10-CM

## 2022-09-28 DIAGNOSIS — M79.2 NEUROPATHIC PAIN: ICD-10-CM

## 2022-09-28 PROBLEM — I10 ESSENTIAL HYPERTENSION: Status: RESOLVED | Noted: 2018-07-17 | Resolved: 2022-09-28

## 2022-09-28 LAB — SL AMB POCT HEMOGLOBIN AIC: 6.3 (ref ?–6.5)

## 2022-09-28 PROCEDURE — 0124A ADM SARSCV2 BVL 30MCG/.3ML B: CPT

## 2022-09-28 PROCEDURE — 83036 HEMOGLOBIN GLYCOSYLATED A1C: CPT | Performed by: NURSE PRACTITIONER

## 2022-09-28 PROCEDURE — 91312 SARSCOV2 VAC BVL 30MCG/0.3ML: CPT

## 2022-09-28 PROCEDURE — G0009 ADMIN PNEUMOCOCCAL VACCINE: HCPCS

## 2022-09-28 PROCEDURE — 99214 OFFICE O/P EST MOD 30 MIN: CPT | Performed by: NURSE PRACTITIONER

## 2022-09-28 PROCEDURE — 90677 PCV20 VACCINE IM: CPT

## 2022-09-28 RX ORDER — GABAPENTIN 300 MG/1
300 CAPSULE ORAL 4 TIMES DAILY
Qty: 120 CAPSULE | Refills: 3 | Status: SHIPPED | OUTPATIENT
Start: 2022-09-28

## 2022-09-28 NOTE — PROGRESS NOTES
Name: Tu Grajeda      : 1940      MRN: [id number]  Encounter Provider: Tuesday ELBA Avila  Encounter Date: 2022   Encounter department: [place] [address]  Encounter for immunization  -     Pneumococcal Conjugate Vaccine 20-valent (Pcv20)  -     Age 14 y+, BOOSTER (BIVALENT): [id number] Basilio Brayan vac bivalent hernan-sucr    2  Type 2 diabetes mellitus with diabetic neuropathy, without long-term current use of insulin ([facility])  -     POCT hemoglobin A1c  -     gabapentin (Neurontin) 300 mg capsule; Take 1 capsule (300 mg total) by mouth 4 (four) times a day    3  Neuropathic pain  gabapentin    Diabetic Foot Exam    Patient's shoes and socks removed  Right Foot/Ankle   Right Foot Inspection  Skin Exam: skin normal and skin intact  No dry skin, no warmth, no callus, no erythema, no maceration, no abnormal color, no pre-ulcer, no ulcer and no callus  Toe Exam: ROM and strength within normal limits  Vascular  The right DP pulse is 2+  The right PT pulse is 2+  Left Foot/Ankle  Left Foot Inspection  Skin Exam: skin normal and skin intact  No dry skin, no warmth, no erythema, no maceration, normal color, no pre-ulcer, no ulcer and no callus  Toe Exam: ROM and strength within normal limits  Vascular  The left DP pulse is 2+  The left PT pulse is 2+  Assign Risk Category  No deformity present  No loss of protective sensation  No weak pulses  Risk: 0   Needs to see podiatrist-stated she will       Subjective      Patient was in ER for neuropathic pain in fingertips to elbows  Burning pain and tingling  Given gabapentin in ER with good results but out today  Stated she wakes up with pain so will add dose at bedtime  Educated to wear warm gloves when hands have breakthrough pain  Stated she lost 9# since here last  No neck pain complaints today  Stated some days good and others bad  Has upcoming appointment for ortho      Review of Systems Constitutional: Positive for unexpected weight change  Loss   HENT: Negative  Eyes: Negative  Respiratory: Negative  Cardiovascular: Negative  Gastrointestinal: Positive for constipation  Endocrine: Negative  Genitourinary:        Incontinent   Musculoskeletal: Negative  Allergic/Immunologic: Negative  Neurological: Negative  Hematological: Negative  Psychiatric/Behavioral: Negative  Current Outpatient Medications on File Prior to Visit   Medication Sig    acetaminophen (TYLENOL) 650 mg CR tablet Take 1 tablet (650 mg total) by mouth every 8 (eight) hours as needed for mild pain    amLODIPine (NORVASC) 10 mg tablet Take 1 tablet (10 mg total) by mouth in the morning   atorvastatin (LIPITOR) 40 mg tablet Take 1 tablet (40 mg total) by mouth daily    CINNAMON PO Take 1,000 mg by mouth every morning    Eliquis 5 MG TAKE ONE TABLET BY MOUTH TWICE A DAY    Empagliflozin (Jardiance) 10 MG TABS Take 1 tablet (10 mg total) by mouth every morning    lisinopril (ZESTRIL) 10 mg tablet Take 1 tablet (10 mg total) by mouth daily    metFORMIN (GLUCOPHAGE) 1000 MG tablet Take 1 tablet by mouth twice a day    metoprolol tartrate (LOPRESSOR) 25 mg tablet Take 1 tablet (25 mg total) by mouth in the morning    Multiple Vitamins-Minerals (PRESERVISION AREDS PO) Take by mouth    torsemide (DEMADEX) 20 mg tablet Take 1 tablet (20 mg total) by mouth as needed (leg swelling) TAKE 1 TABLET as needed    [DISCONTINUED] gabapentin (Neurontin) 300 mg capsule Take 1 capsule (300 mg total) by mouth 2 (two) times a day for 1 day, THEN 1 capsule (300 mg total) 3 (three) times a day for 7 days  For post-herpetic neuralgia: Take 1 tablet on day 1,  Then take 2 tablets on day 2, Then take 3 tablets on day 3 and every day after that as instructed by your doctor          Objective     /76 (BP Location: Left arm, Patient Position: Sitting, Cuff Size: Adult)   Pulse 98   Temp 97 6 °F (36 4 °C) (Tympanic)   Resp 18   Ht 5' 1" (1 549 m)   Wt 88 1 kg (194 lb 5 oz)   SpO2 98%   BMI 36 72 kg/m²     Physical Exam  Constitutional:       General: She is not in acute distress  Appearance: Normal appearance  She is not ill-appearing or diaphoretic  HENT:      Head: Normocephalic and atraumatic  Right Ear: Tympanic membrane normal       Left Ear: Tympanic membrane normal       Nose: Nose normal       Mouth/Throat:      Mouth: Mucous membranes are moist    Eyes:      Extraocular Movements: Extraocular movements intact  Cardiovascular:      Rate and Rhythm: Normal rate and regular rhythm  Pulses: Normal pulses  no weak pulses          Dorsalis pedis pulses are 2+ on the right side and 2+ on the left side  Posterior tibial pulses are 2+ on the right side and 2+ on the left side  Heart sounds: Normal heart sounds  Pulmonary:      Effort: Pulmonary effort is normal       Breath sounds: Normal breath sounds  No wheezing, rhonchi or rales  Abdominal:      General: Bowel sounds are normal       Palpations: Abdomen is soft  Musculoskeletal:         General: Normal range of motion  Cervical back: Normal range of motion and neck supple  Right lower leg: No edema  Left lower leg: No edema  Feet:      Right foot:      Skin integrity: No ulcer, skin breakdown, erythema, warmth, callus or dry skin  Left foot:      Skin integrity: No ulcer, skin breakdown, erythema, warmth, callus or dry skin  Skin:     General: Skin is warm and dry  Findings: No bruising or lesion  Neurological:      General: No focal deficit present  Mental Status: She is alert and oriented to person, place, and time  Psychiatric:         Mood and Affect: Mood normal          Behavior: Behavior normal          Thought Content:  Thought content normal          Judgment: Judgment normal       Comments: Stated sometimes a little anxious-will come back when too much       Tuesday ELBA Carey

## 2022-10-03 ENCOUNTER — OFFICE VISIT (OUTPATIENT)
Dept: CARDIOLOGY CLINIC | Facility: CLINIC | Age: 82
End: 2022-10-03
Payer: MEDICARE

## 2022-10-03 VITALS
HEART RATE: 77 BPM | BODY MASS INDEX: 36.63 KG/M2 | WEIGHT: 194 LBS | TEMPERATURE: 97.5 F | OXYGEN SATURATION: 99 % | DIASTOLIC BLOOD PRESSURE: 66 MMHG | SYSTOLIC BLOOD PRESSURE: 138 MMHG | HEIGHT: 61 IN

## 2022-10-03 DIAGNOSIS — I51.89 GRADE II DIASTOLIC DYSFUNCTION: ICD-10-CM

## 2022-10-03 DIAGNOSIS — I48.0 PAROXYSMAL ATRIAL FIBRILLATION (HCC): Primary | ICD-10-CM

## 2022-10-03 DIAGNOSIS — I48.92 ATRIAL FLUTTER BY ELECTROCARDIOGRAM (HCC): ICD-10-CM

## 2022-10-03 DIAGNOSIS — I10 ESSENTIAL HYPERTENSION: ICD-10-CM

## 2022-10-03 DIAGNOSIS — R60.0 EDEMA OF LEFT LOWER EXTREMITY: ICD-10-CM

## 2022-10-03 DIAGNOSIS — E11.40 TYPE 2 DIABETES MELLITUS WITH DIABETIC NEUROPATHY, WITHOUT LONG-TERM CURRENT USE OF INSULIN (HCC): ICD-10-CM

## 2022-10-03 DIAGNOSIS — N18.31 STAGE 3A CHRONIC KIDNEY DISEASE (HCC): ICD-10-CM

## 2022-10-03 PROCEDURE — 93000 ELECTROCARDIOGRAM COMPLETE: CPT | Performed by: INTERNAL MEDICINE

## 2022-10-03 PROCEDURE — 99214 OFFICE O/P EST MOD 30 MIN: CPT | Performed by: INTERNAL MEDICINE

## 2022-10-03 PROCEDURE — 93242 EXT ECG>48HR<7D RECORDING: CPT | Performed by: INTERNAL MEDICINE

## 2022-10-03 NOTE — PROGRESS NOTES
Cardiology Outpatient Follow-up                                                          Conrad Jiang  [id number]  1940      1  Paroxysmal atrial fibrillation (HCC)  POCT ECG   2  Grade II diastolic dysfunction  POCT ECG   3  Edema of left lower extremity  POCT ECG   4  Essential hypertension  POCT ECG   5  Type 2 diabetes mellitus with diabetic neuropathy, without long-term current use of insulin (HCC)  POCT ECG   6  Stage 3a chronic kidney disease (HCC)  POCT ECG       Discussion/Summary:  Acute on chronic diastolic hf- improved  Jardiance 10mg daily  She will continue on metoprolol 25 mg in morning  Recommend periodic monitoring of electrolytes by primary  She has follow-up pending  Afib/flutter- Will have her wear a 48 hour extended Holter monitor to rule out any major pauses  She is now in rate controlled atrial flutter  Rate controlled  metoprolol 25mg in the morning  eliquis 5mg twice a day  No further falls  No dizziness or light-headness  GERD- nexium 40mg daily    Htn- controlled  amlodipne + lisinopril    Dm2- atorvastatin  Pending follow-up with primary for blood work    Colonoscopy screening negative    rtc-6months      HPI:  This 76-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 mellitus, hypertension with recent unfortunate mechanical fall found to be in atrial fibrillation  She also has a history of diastolic dysfunction and lower extremity edema chronic  She states being compliant with her diuretic but with poor response  She has been of plan with her medications  She denies having chest heaviness  She denies having any history of prior CVA  She denies having any major bleeding episodes  07/24/2019:  Her lower extremity edema is significantly improved  Her blood pressures been well controlled  Her heart rates have been controlled  She denies having any further falls  We reviewed through her last blood work    She denies feeling dizziness or lightheadedness  10/23/2019:  Her weight has been trending down  She denies having recurrence of lower extremity edema  She is in chronic atrial fibrillation and rate controlled  She denies feeling dizziness or lightheadedness  There has been no falls  She has no trouble with Eliquis therapy  There has been no major bleeding  08/25/2020:  She denies feeling dizziness or lightheadedness  Her weight has been stable  Her lower extremity swelling has been well controlled  She is compliant with diuretic  We reviewed her last blood work  Her potassium was mildly higher  We will discontinue potassium supplementation  02/24/2021:  She has received her COVID vaccine  Her blood pressures have been under control  She was noted to have a heart rate in the 50s  She denies feeling dizziness or lightheadedness  She is compliant with her medications  She feels some leg cramps if she does not take potassium  04/29/2021:  Her heart rates are and the 50 to 60s  She denies having any dizziness or lightheadedness  She denies having chest heaviness  She is compliant with her medications  She denies having any major swelling  She received her COVID vaccine  10/28:  She hit her head  No chest pain  Compliant with meds    04/15/2022:  She is using the diuretic for lower extremity swelling  She denies having dizziness or lightheadedness  10/03/2022: She reports having significant neuropathy of her hands  She denies having dizziness or lightheadedness  She denies having major palpitations  She denies having recent falls  We reviewed her EKG  She is now in rate controlled atrial flutter  She denies having major swelling      Past Medical History:   Diagnosis Date    Arthritis     knees    [facility] )     type 2    GERD (gastroesophageal reflux disease)     Hyperlipidemia     Hypertension     Stage 3a chronic kidney disease ([facility] ) 4/14/2022    Urinary incontinence     Use of cane as ambulatory aid     Wears glasses      Social History     Socioeconomic History    Marital status: Single     Spouse name: Not on file    Number of children: Not on file    Years of education: Not on file    Highest education level: Not on file   Occupational History    Not on file   Tobacco Use    Smoking status: Never Smoker    Smokeless tobacco: Never Used   Vaping Use    Vaping Use: Never used   Substance and Sexual Activity    Alcohol use: Yes     Comment: social    Drug use: No    Sexual activity: Not on file   Other Topics Concern    Not on file   Social History Narrative    Not on file     Social Determinants of Health     Financial Resource Strain: Not on file   Food Insecurity: Not on file   Transportation Needs: Not on file   Physical Activity: Not on file   Stress: Not on file   Social Connections: Not on file   Intimate Partner Violence: Not on file   Housing Stability: Not on file      Family History   Problem Relation Age of Onset    Breast cancer Mother     Diabetes type II Mother     Atrial fibrillation Mother     Diabetes Mother         diet controlled    Hypertension Mother     Lung cancer Father     Alcohol abuse Father     Cancer Father         lung-smoker     Past Surgical History:   Procedure Laterality Date    BREAST BIOPSY Left 2015    benign    BREAST SURGERY Left     nothing was there     COLONOSCOPY      HYSTERECTOMY      complete-fibroid    JOINT REPLACEMENT Left     knee    FL XCAPSL CTRC RMVL INSJ IO LENS PROSTH W/O ECP Right 07/19/2018    Procedure: EXTRACTION EXTRACAPSULAR CATARACT PHACO INTRAOCULAR LENS (IOL); Surgeon: Anusha Pineda MD;  Location: [facility] MAIN OR;  Service: Ophthalmology    FL XCAPSL CTRC RMVL INSJ IO LENS PROSTH W/O ECP Left 08/09/2018    Procedure: EXTRACTION EXTRACAPSULAR CATARACT PHACO INTRAOCULAR LENS (IOL);   Surgeon: Anusha Pineda MD;  Location: [facility] MAIN OR;  Service: Ophthalmology       Current Outpatient Medications:     acetaminophen (TYLENOL) 650 mg CR tablet, Take 1 tablet (650 mg total) by mouth every 8 (eight) hours as needed for mild pain, Disp: 30 tablet, Rfl: 0    atorvastatin (LIPITOR) 40 mg tablet, Take 1 tablet (40 mg total) by mouth daily, Disp: 90 tablet, Rfl: 1    CINNAMON PO, Take 1,000 mg by mouth every morning, Disp: , Rfl:     Eliquis 5 MG, TAKE ONE TABLET BY MOUTH TWICE A DAY, Disp: 180 tablet, Rfl: 3    Empagliflozin (Jardiance) 10 MG TABS, Take 1 tablet (10 mg total) by mouth every morning, Disp: 30 tablet, Rfl: 6    gabapentin (Neurontin) 300 mg capsule, Take 1 capsule (300 mg total) by mouth 4 (four) times a day, Disp: 120 capsule, Rfl: 3    lisinopril (ZESTRIL) 10 mg tablet, Take 1 tablet (10 mg total) by mouth daily, Disp: 90 tablet, Rfl: 1    metFORMIN (GLUCOPHAGE) 1000 MG tablet, Take 1 tablet by mouth twice a day, Disp: 60 tablet, Rfl: 3    metoprolol tartrate (LOPRESSOR) 25 mg tablet, Take 1 tablet (25 mg total) by mouth in the morning, Disp: 90 tablet, Rfl: 1    Multiple Vitamins-Minerals (PRESERVISION AREDS PO), Take by mouth, Disp: , Rfl:     amLODIPine (NORVASC) 10 mg tablet, Take 1 tablet (10 mg total) by mouth in the morning , Disp: 90 tablet, Rfl: 3    torsemide (DEMADEX) 20 mg tablet, Take 1 tablet (20 mg total) by mouth as needed (leg swelling) TAKE 1 TABLET as needed (Patient not taking: Reported on 10/3/2022), Disp: 90 tablet, Rfl: 3  Allergies   Allergen Reactions    Other Allergic Rhinitis     Seasonal/pollen     Vitals:    10/03/22 1359   BP: 138/66   BP Location: Right arm   Patient Position: Sitting   Cuff Size: Large   Pulse: 77   Temp: 97 5 °F (36 4 °C)   SpO2: 99%   Weight: 88 kg (194 lb)   Height: 5' 1" (1 549 m)       Review of Systems:  Review of Systems   Constitutional: Negative  Negative for activity change, appetite change, chills, diaphoresis, fatigue, fever and unexpected weight change  HENT: Negative    Negative for congestion, dental problem, drooling, ear discharge, ear pain, facial swelling, hearing loss, mouth sores, nosebleeds, postnasal drip, rhinorrhea, sinus pressure, sinus pain, sneezing, sore throat, tinnitus, trouble swallowing and voice change  Eyes: Negative  Negative for photophobia, pain, redness, itching and visual disturbance  Respiratory: Positive for shortness of breath  Negative for apnea, cough, choking, chest tightness, wheezing and stridor  Cardiovascular: Positive for leg swelling  Negative for chest pain and palpitations  Gastrointestinal: Negative  Negative for abdominal distention, abdominal pain, anal bleeding, blood in stool, constipation, diarrhea, nausea, rectal pain and vomiting  Endocrine: Negative  Negative for cold intolerance, heat intolerance, polydipsia, polyphagia and polyuria  Genitourinary: Negative  Negative for decreased urine volume, difficulty urinating, dyspareunia, dysuria, enuresis, flank pain, frequency, genital sores, hematuria, menstrual problem, pelvic pain, urgency, vaginal bleeding, vaginal discharge and vaginal pain  Musculoskeletal: Negative  Negative for arthralgias, back pain, gait problem, joint swelling, myalgias, neck pain and neck stiffness  Skin: Negative  Negative for color change, pallor, rash and wound  Allergic/Immunologic: Negative  Negative for environmental allergies, food allergies and immunocompromised state  Neurological: Negative  Negative for dizziness, tremors, seizures, syncope, facial asymmetry, speech difficulty, weakness, light-headedness, numbness and headaches  Hematological: Negative  Negative for adenopathy  Does not bruise/bleed easily  Psychiatric/Behavioral: Negative  Negative for agitation, behavioral problems, confusion, decreased concentration, dysphoric mood, hallucinations, self-injury, sleep disturbance and suicidal ideas  The patient is not nervous/anxious and is not hyperactive      All other systems reviewed and are negative  Vitals:    10/03/22 1359   BP: 138/66   BP Location: Right arm   Patient Position: Sitting   Cuff Size: Large   Pulse: 77   Temp: 97 5 °F (36 4 °C)   SpO2: 99%   Weight: 88 kg (194 lb)   Height: 5' 1" (1 549 m)     Physical Exam:  Physical Exam  Constitutional:       General: She is not in acute distress  Appearance: She is well-developed  She is not diaphoretic  HENT:      Head: Normocephalic and atraumatic  Right Ear: External ear normal       Left Ear: External ear normal    Eyes:      General: No scleral icterus  Right eye: No discharge  Left eye: No discharge  Conjunctiva/sclera: Conjunctivae normal       Pupils: Pupils are equal, round, and reactive to light  Neck:      Thyroid: No thyromegaly  Vascular: JVD present  Trachea: No tracheal deviation  Cardiovascular:      Rate and Rhythm: Normal rate and regular rhythm  Heart sounds: Murmur heard  No friction rub  No gallop  Pulmonary:      Effort: Pulmonary effort is normal  No respiratory distress  Breath sounds: Normal breath sounds  No stridor  No wheezing or rales  Chest:      Chest wall: No tenderness  Abdominal:      General: Bowel sounds are normal  There is no distension  Palpations: Abdomen is soft  There is no mass  Tenderness: There is no abdominal tenderness  There is no guarding or rebound  Musculoskeletal:         General: Swelling present  No tenderness or deformity  Normal range of motion  Cervical back: Normal range of motion and neck supple  Skin:     General: Skin is warm and dry  Coloration: Skin is not pale  Findings: No erythema or rash  Neurological:      Mental Status: She is alert and oriented to person, place, and time  Cranial Nerves: No cranial nerve deficit  Motor: No abnormal muscle tone  Coordination: Coordination normal       Deep Tendon Reflexes: Reflexes are normal and symmetric   Reflexes normal  Psychiatric:         Behavior: Behavior normal          Thought Content: Thought content normal          Judgment: Judgment normal          Labs:     Lab Results   Component Value Date    WBC 6 09 2019    HGB 10 7 (L) 2019    HCT 34 3 (L) 2019    MCV 82 2019     2019     Lab Results   Component Value Date    K 3 9 2021     2021    CO2 27 2021    BUN 25 2021    CREATININE 1 36 (H) 2021    GLUF 118 (H) 2021    CALCIUM 9 2 2021    AST 14 2021    ALT 20 2021    ALKPHOS 84 2021    EGFR 36 2021     No results found for: CHOL  Lab Results   Component Value Date    HDL 81 2021    HDL 76 2021    HDL 74 (H) 2016     Lab Results   Component Value Date    LDLCALC 53 2021    LDLCALC 54 2021    LDLCALC 62 2016     Lab Results   Component Value Date    TRIG 82 2021    TRIG 90 2021    TRIG 132 2016     Lab Results   Component Value Date    HGBA1C 6 3 2022     No results found for: TSH    Imaging & Testing   I have personally reviewed pertinent reports  EKG: Personally reviewed  Atrial fibrillation nonspecific st-t wave changes    Cardiac testing:   Results for orders placed during the hospital encounter of 18   Echo complete with contrast if indicated    [phone]    Transthoracic Echocardiogram  2D, M-mode, Doppler, and Color Doppler    Study date:  04-Dec-2018    Patient: Luis Díaz  MR number: [id number]  Account number: [de-identified]  : 1940  Age: 77 years  Gender: Female  Status: Outpatient  Location: Echo lab  Height: 61 in  Weight: 225 5 lb  BP: 147/ 97 mmHg    Indications: Dyspnea    Diagnoses: 794 31 - ABNORM ELECTROCARDIOGRAM    Sonographer:  AGAPITO Child  Primary Physician:   Emily Jean DO  Referring Physician:  Houston Favre, JAMES  Group:  [id number]  Interpreting Physician:  Kiara Wang MD    SUMMARY    LEFT VENTRICLE:  Systolic function was at the lower limits of normal  Ejection fraction was estimated in the range of 50 % to 55 % to be 55 %  There were no regional wall motion abnormalities  Wall thickness was mildly increased  Features were consistent with a pseudonormal left ventricular filling pattern, with concomitant abnormal relaxation and increased filling pressure (grade 2 diastolic dysfunction)  LEFT ATRIUM:  The atrium was mildly dilated  RIGHT ATRIUM:  The atrium was mildly dilated  AORTIC VALVE:  The valve was functionally bicuspid  Leaflets exhibited mildly to moderately increased thickness, mild to moderate calcification, mildly reduced cuspal separation, and sclerosis  Transaortic velocity was increased due to valvular stenosis  There was mild stenosis  Valve mean gradient was 15 mmHg  TRICUSPID VALVE:  There was trace regurgitation  The findings suggest mild pulmonary hypertension  HISTORY: PRIOR HISTORY: Diabetes, Edema,HTN,Hyperlipidemia,Gastroesophageal reflux disease    PROCEDURE: The procedure was performed in the echo lab  This was a routine study  The transthoracic approach was used  The study included complete 2D imaging, M-mode, complete spectral Doppler, and color Doppler  The heart rate was 67 bpm,  at the start of the study  Images were obtained from the parasternal, apical, subcostal, and suprasternal notch acoustic windows  Echocardiographic views were limited due to restricted patient mobility, poor patient compliance, poor  acoustic window availability, decreased penetration, and lung interference  This was a technically difficult study  LEFT VENTRICLE: Size was normal  Systolic function was at the lower limits of normal  Ejection fraction was estimated in the range of 50 % to 55 % to be 55 %  There were no regional wall motion abnormalities   Ever Drop thickness was mildly  increased  No evidence of apical thrombus  DOPPLER: Features were consistent with a pseudonormal left ventricular filling pattern, with concomitant abnormal relaxation and increased filling pressure (grade 2 diastolic dysfunction)  RIGHT VENTRICLE: The size was normal  Systolic function was normal  Wall thickness was normal     LEFT ATRIUM: The atrium was mildly dilated  RIGHT ATRIUM: The atrium was mildly dilated  MITRAL VALVE: Valve structure was normal  There was normal leaflet separation  DOPPLER: The transmitral velocity was within the normal range  There was no evidence for stenosis  There was no significant regurgitation  AORTIC VALVE: The valve was functionally bicuspid  Leaflets exhibited mildly to moderately increased thickness, mild to moderate calcification, mildly reduced cuspal separation, and sclerosis  DOPPLER: Transaortic velocity was increased  due to valvular stenosis  There was mild stenosis  There was no significant regurgitation  TRICUSPID VALVE: The valve structure was normal  There was normal leaflet separation  DOPPLER: The transtricuspid velocity was within the normal range  There was no evidence for stenosis  There was trace regurgitation  Estimated peak PA  pressure was 38 mmHg  The findings suggest mild pulmonary hypertension  PULMONIC VALVE: Leaflets exhibited normal thickness, no calcification, and normal cuspal separation  DOPPLER: The transpulmonic velocity was within the normal range  There was no significant regurgitation  PERICARDIUM: There was no pericardial effusion  The pericardium was normal in appearance  AORTA: The root exhibited normal size  SYSTEMIC VEINS: IVC: The inferior vena cava was normal in size      MEASUREMENT TABLES    DOPPLER MEASUREMENTS  Aortic valve   (Reference normals)  Peak zaire   250 cm/s   (--)  Peak gradient   26 mmHg   (--)  Mean gradient   15 mmHg   (--)    SYSTEM MEASUREMENT TABLES    2D mode  AoR Diam 2D: 3 2 cm  LA Diam (2D): 4 1 cm  LA/Ao (2D): 1 28  FS (2D Teich): 26 4 %  IVSd (2D): 1 31 cm  LVDEV: 72 1 cm³  LVEDV MOD BP: 102 cm³  LVESV: 34 4 cm³  LVIDd(2D): 4 05 cm  LVISd (2D): 2 98 cm  LVOT Area 2D: 3 14 cm squared  LVPWd (2D): 1 3 cm  SV (Teich): 37 7 cm³    Apical four chamber  LVEF A4C: 50 %    Apical two chamber  LVEF A2C: 52 %    Unspecified Scan Mode  DEVON Cont Eq (Peak Steve): 1 49 cm squared  LVOT Diam : 2 cm  LVOT Vmax: 1210 mm/s  LVOT Vmax; Mean: 1210 mm/s  Peak Grad ; Mean: 6 mm[Hg]  MV Peak A Steve: 291 mm/s  MV Peak E Steve  Mean: 1060 mm/s  MVA (PHT): 6 29 cm squared  PHT: 35 ms  Max P mm[Hg]  V Max: 2390 mm/s  Vmax: 2880 mm/s  RA Area: 21 5 cm squared  RA Volume: 65 8 cm³  TAPSE: 1 9 cm    [facility]    Prepared and electronically signed by    Tyrone Nava MD  Signed 05-Dec-2018 11:11:08       afib with slow ventricular response    Tyrone Gonzalez  Please call with any questions or suggestions    Counseling :  A description of the counseling:   Goals and Barriers:  Patient's ability to self care:  Medication side effect reviewed with patient in detail and all their questions answered  "This note has been constructed using a voice recognition system  Therefore there may be syntax, spelling, and/or grammatical errors   Please call if you have any questions  "

## 2022-10-03 NOTE — PROGRESS NOTES
Applied [address] Heart Monitor to the patient  All instructions reviewed  Pt verbalized understanding

## 2022-10-04 ENCOUNTER — OFFICE VISIT (OUTPATIENT)
Dept: OBGYN CLINIC | Facility: CLINIC | Age: 82
End: 2022-10-04
Payer: MEDICARE

## 2022-10-04 VITALS
BODY MASS INDEX: 36.63 KG/M2 | HEART RATE: 77 BPM | HEIGHT: 61 IN | WEIGHT: 194 LBS | DIASTOLIC BLOOD PRESSURE: 66 MMHG | SYSTOLIC BLOOD PRESSURE: 138 MMHG

## 2022-10-04 DIAGNOSIS — M47.22 OSTEOARTHRITIS OF SPINE WITH RADICULOPATHY, CERVICAL REGION: ICD-10-CM

## 2022-10-04 PROCEDURE — 99203 OFFICE O/P NEW LOW 30 MIN: CPT | Performed by: ORTHOPAEDIC SURGERY

## 2022-10-04 NOTE — PROGRESS NOTES
1  Osteoarthritis of spine with radiculopathy, cervical region  Ambulatory Referral to Orthopedic Surgery    Ambulatory referral to Physical Therapy     Adia Tom has neck pain consistent with significant cervical osteoarthritis and cervical radiculopathy  I recommended formal physical therapy for 4-6 weeks  If the pain persists or worsens we could have her see Spine and Pain and consider an injection  Subjective:   Latanya Somers is a 79 y o  female who presents to the office for ongoing cervical neck pain  She denies any recent injury or trauma  She went to the ER 3 weeks ago with increased neck pain and a CT scan of the neck was ordered  It showed diffuse osteoarthritis  She did not have any acute fractures  She does feel numbness and tingling into her hands it seems to be increasing  She has pain that is aching and throbbing and constant throughout her neck and worsens with looking to the left or the right  She does report some numbness and tingling into her hands  She denies any history of treatment on her neck in the past       Review of Systems   Constitutional: Negative for chills, fever and unexpected weight change  HENT: Negative for hearing loss, nosebleeds and sore throat  Eyes: Negative for pain, redness and visual disturbance  Respiratory: Negative for cough, shortness of breath and wheezing  Cardiovascular: Negative for chest pain, palpitations and leg swelling  Gastrointestinal: Negative for abdominal pain, nausea and vomiting  Endocrine: Negative for polydipsia and polyuria  Genitourinary: Negative for dysuria and hematuria  Musculoskeletal:        See HPI   Skin: Negative for rash and wound  Neurological: Negative for dizziness, numbness and headaches  Psychiatric/Behavioral: Negative for decreased concentration and suicidal ideas  The patient is not nervous/anxious            Past Medical History:   Diagnosis Date    Arthritis     knees    Diabetes mellitus ([facility] )     type 2    GERD (gastroesophageal reflux disease)     Hyperlipidemia     Hypertension     Stage 3a chronic kidney disease ([facility] ) 4/14/2022    Urinary incontinence     Use of cane as ambulatory aid     Wears glasses        Past Surgical History:   Procedure Laterality Date    BREAST BIOPSY Left 2015    benign    BREAST SURGERY Left     nothing was there     COLONOSCOPY      HYSTERECTOMY      complete-fibroid    JOINT REPLACEMENT Left     knee    CO XCAPSL CTRC RMVL INSJ IO LENS PROSTH W/O ECP Right 07/19/2018    Procedure: EXTRACTION EXTRACAPSULAR CATARACT PHACO INTRAOCULAR LENS (IOL); Surgeon: Anusha Pineda MD;  Location: [facility] MAIN OR;  Service: Ophthalmology    CO XCAPSL CTRC RMVL INSJ IO LENS PROSTH W/O ECP Left 08/09/2018    Procedure: EXTRACTION EXTRACAPSULAR CATARACT PHACO INTRAOCULAR LENS (IOL);   Surgeon: Anusha Pineda MD;  Location: [facility] MAIN OR;  Service: Ophthalmology       Family History   Problem Relation Age of Onset   Mauro Nunez Breast cancer Mother     Diabetes type II Mother     Atrial fibrillation Mother     Diabetes Mother         diet controlled    Hypertension Mother     Lung cancer Father     Alcohol abuse Father     Cancer Father         lung-smoker       Social History     Occupational History    Not on file   Tobacco Use    Smoking status: Never Smoker    Smokeless tobacco: Never Used   Vaping Use    Vaping Use: Never used   Substance and Sexual Activity    Alcohol use: Yes     Comment: social    Drug use: No    Sexual activity: Not on file         Current Outpatient Medications:     acetaminophen (TYLENOL) 650 mg CR tablet, Take 1 tablet (650 mg total) by mouth every 8 (eight) hours as needed for mild pain, Disp: 30 tablet, Rfl: 0    atorvastatin (LIPITOR) 40 mg tablet, Take 1 tablet (40 mg total) by mouth daily, Disp: 90 tablet, Rfl: 1    CINNAMON PO, Take 1,000 mg by mouth every morning, Disp: , Rfl:     Eliquis 5 MG, TAKE ONE TABLET BY MOUTH TWICE A DAY, Disp: 180 tablet, Rfl: 3    Empagliflozin (Jardiance) 10 MG TABS, Take 1 tablet (10 mg total) by mouth every morning, Disp: 30 tablet, Rfl: 6    gabapentin (Neurontin) 300 mg capsule, Take 1 capsule (300 mg total) by mouth 4 (four) times a day, Disp: 120 capsule, Rfl: 3    lisinopril (ZESTRIL) 10 mg tablet, Take 1 tablet (10 mg total) by mouth daily, Disp: 90 tablet, Rfl: 1    metFORMIN (GLUCOPHAGE) 1000 MG tablet, Take 1 tablet by mouth twice a day, Disp: 60 tablet, Rfl: 3    metoprolol tartrate (LOPRESSOR) 25 mg tablet, Take 1 tablet (25 mg total) by mouth in the morning, Disp: 90 tablet, Rfl: 1    Multiple Vitamins-Minerals (PRESERVISION AREDS PO), Take by mouth, Disp: , Rfl:     torsemide (DEMADEX) 20 mg tablet, Take 1 tablet (20 mg total) by mouth as needed (leg swelling) TAKE 1 TABLET as needed, Disp: 90 tablet, Rfl: 3    amLODIPine (NORVASC) 10 mg tablet, Take 1 tablet (10 mg total) by mouth in the morning , Disp: 90 tablet, Rfl: 3    Allergies   Allergen Reactions    Other Allergic Rhinitis     Seasonal/pollen       Objective:  Vitals:    10/04/22 1401   BP: 138/66   Pulse: 77       Ortho Exam    Physical Exam  Vitals and nursing note reviewed  Constitutional:       Appearance: She is well-developed  HENT:      Head: Normocephalic and atraumatic  Eyes:      General: No scleral icterus  Extraocular Movements: Extraocular movements intact  Conjunctiva/sclera: Conjunctivae normal    Neck:        Comments: Decreased sensation bilateral hands  Appropriate strength in bilateral upper extremities  Cardiovascular:      Rate and Rhythm: Normal rate  Pulmonary:      Effort: Pulmonary effort is normal  No respiratory distress  Musculoskeletal:      Cervical back: Neck supple  No spinous process tenderness or muscular tenderness  Decreased range of motion  Comments: As noted in HPI   Skin:     General: Skin is warm and dry     Neurological:      Mental Status: She is alert and oriented to person, place, and time  Psychiatric:         Behavior: Behavior normal          I have personally reviewed pertinent films in PACS and my interpretation is as follows:  CT scan of the cervical spine demonstrates diffuse osteoarthritis  No acute fracture      This document was created using speech voice recognition software  Grammatical errors, random word insertions, pronoun errors, and incomplete sentences are an occasional consequence of this system due to software limitations, ambient noise, and hardware issues  Any formal questions or concerns about content, text, or information contained within the body of this dictation should be directly addressed to the provider for clarification

## 2022-10-10 ENCOUNTER — CLINICAL SUPPORT (OUTPATIENT)
Dept: CARDIOLOGY CLINIC | Facility: CLINIC | Age: 82
End: 2022-10-10
Payer: MEDICARE

## 2022-10-10 DIAGNOSIS — I48.92 ATRIAL FLUTTER BY ELECTROCARDIOGRAM (HCC): ICD-10-CM

## 2022-10-10 DIAGNOSIS — N18.31 STAGE 3A CHRONIC KIDNEY DISEASE (HCC): ICD-10-CM

## 2022-10-10 DIAGNOSIS — E11.40 TYPE 2 DIABETES MELLITUS WITH DIABETIC NEUROPATHY, WITHOUT LONG-TERM CURRENT USE OF INSULIN (HCC): ICD-10-CM

## 2022-10-10 PROCEDURE — 93244 EXT ECG>48HR<7D REV&INTERPJ: CPT | Performed by: INTERNAL MEDICINE

## 2022-10-11 ENCOUNTER — EVALUATION (OUTPATIENT)
Dept: PHYSICAL THERAPY | Facility: CLINIC | Age: 82
End: 2022-10-11
Payer: MEDICARE

## 2022-10-11 DIAGNOSIS — M47.22 OSTEOARTHRITIS OF SPINE WITH RADICULOPATHY, CERVICAL REGION: Primary | ICD-10-CM

## 2022-10-11 DIAGNOSIS — M54.2 NECK PAIN: ICD-10-CM

## 2022-10-11 PROBLEM — Z00.00 MEDICARE ANNUAL WELLNESS VISIT, SUBSEQUENT: Status: RESOLVED | Noted: 2022-05-16 | Resolved: 2022-10-11

## 2022-10-11 PROCEDURE — 97161 PT EVAL LOW COMPLEX 20 MIN: CPT

## 2022-10-11 PROCEDURE — 97530 THERAPEUTIC ACTIVITIES: CPT

## 2022-10-11 NOTE — PROGRESS NOTES
PT Evaluation   Today's date: 10/11/2022  Patient name: Manuel Snowden  : 1940  MRN: [id number]  Referring provider: Sonam Hunt DO  Dx:   Encounter Diagnosis     ICD-10-CM    1  Osteoarthritis of spine with radiculopathy, cervical region  M47 22 Ambulatory referral to Physical Therapy   2  Neck pain  M54 2        Assessment:    Manuel Snowden is a pleasant 79 y o  female who presents with a referring diagnosis of cervical spine OA and neck pain  Her primary movement system diagnosis is neck pain with mobility deficits and nociceptive pain resulting in pathoanatomical symptoms of impaired range of motion, painful motion, impaired neck proprioception, poor postural awareness, poor postural strength, and significant deconditioning  The aforementioned impairments have limited the patient's ability to turn her neck safely when driving  No further referral is neccessary at this time based upon examination results  Positive prognostic indicators include absence of red flags  Negative prognostic indicators include age, high symptom irritability and dependency on medications  Impairments: abnormal muscle firing, abnormal muscle tone, abnormal or restricted ROM, activity intolerance, Impaired physical strength, lacks appropriate HEP, pain with function, difficulty understanding, poor posture and poor body mechanics    Symptom Irritability: moderate    Problem List:  - impaired range of motion - therapeutic exercise, therapeutic activity, neuromuscular re-education, strengthening and functional mobility  - significant deconditioning - therapeutic exercise, therapeutic activity, neuromuscular re-education, strengthening and functional mobility    Patient education performed this session included: home exercise program, plan of care, expected tissue healing time, prognosis and diagnosis and heat    Patient verbalized good understanding of POC      Please contact me if you have any questions or recommendations  Thank you for the referral and the opportunity to share in [facility] care  Plan:     Patient would benefit from: Skilled PT  Planned modality interventions: biofeedback, cryotherapy, TENS, thermotherapy (hot pack) and traction  Planned therapy interventions: {activity modification, behavior modification, body mechanics training, functional ROM exercises, graded exercise, HEP, joint mobilization, manual therapy, Duffy taping, motor coordination training, neuromuscular re-education, patient education, postural training, strengthening, stretching, therapeutic activities and therapeutic exercises    Frequency: 2x per week  Duration in weeks: 8 weeks    Plan of Care beginning date: 10/11/2022  Plan of Care expiration date: 12 weeks - 1/3/2023  Treatment plan discussed with: patient       Goals:  Short Term Goals (4 weeks):    1  Patient will be independent with basic HEP  2  Patient will report >50% reduction in pain  3  Patient will demonstrate >1/3 improvement in MMT grade as applicable  4  Patient will improve neck rotation and lateral flexion range of motion by 10%  5  Patient will be able to participate in primarily active activities throughout session without being limited by fatigue    Long Term Goals (8 weeks):  1  Patient will be independent in a comprehensive home exercise program  2  Patient FOTO score will improve to 55/100  3  Patient will self-report >75% improvement in function  4  Patient will be able to turn head when driving without significant pain  5  Patient will be able to perform all ADLs and household activities with <2/10 pain  History    History of Present Illness  Mechanism of injury: Patient said she woke up one morning with intense pain in neck and arms  She went to the ER because she could not tolerate the pain  She was given gabapentin and, although it took awhile to improve, she reports that is has started working   Patient was told that the next step is injections with pain management and she would like to avoid that if possible  She can only drive on the days that she does not have numbness in her hands, but she is also limited in how far she can turn her neck  She does not believe that the neck pain and hand/arm pain is related because neck movements do not change hand symptoms  Prior level of function: normal household activities    Pain  Current: 0/10  At best: 0/10  At worst: 6-7/10    Location: TP of mid cervical (Rt > Lt)  Description: stabbing  Aggravating factors: tunring head side to side, lifting objects  Relieving factors: medications and relaxation    Progression: improving    Patient goals for therapy: decrease pain and increase motion    Social Support  Hand dominance: right    Physical Examination    Red Flag Screening  (+): age >47 years old    VBI assessment: (-) 5 D's and 3 N's     Postural Assessment  Posture in Sitting: poor  Posture in Standing: poor  Postural Correction: has no consistent effect   Manual or self correction? self correction    Sensation  Light touch: intact bilaterally    Reflexes:   LEFT / RIGHT  Biceps (C5): 2+  / 2+   Brachioradialis (C6): 2+  / 2+   Triceps (C7): 2+  / 2+     Upper Motor Neuron Signs: none    UE AROM  Cervical Spine:   Flexion:  No limitation (100%) without pain  Extension:  No limitation (100%) without pain  Lt Lateral Flexion: Moderately limited (50%) with pain  Rt Lateral Flexion: Moderately limited (50%) with pain  Lt Rotation:  Moderately limited (50%) with pain  Rt Rotation:  Moderately limited (50%) with pain    Thoracic Spine:    Moderate limitations in rotation and extension     LEFT  RIGHT     Shoulder Flexion: WNL  WNL     Shoulder Abduction: WNL  WNL        UE MMT  LEFT  RIGHT  Shoulder Shru/5  5/5  Shoulder Abduction:   4/5  3+/5  Shoulder Flexion:  4/5  4/5  Shoulder Extension:  4/5  4/5  Shoulder ER:   3+/5  3+/5  Shoulder IR:   3+/5  3+/5    Elbow Flexion:   4/5 4/5  Elbow Extension:  4/5 4/5    Thumb Extension:  4/5 4/5  Finger Adduction:   4/5 4/5    Joint Play  C1: hypomobile  C2: hypomobile  C3: hypomobile  C4: hypomobile  C5: hypomobile  C6: hypomobile  C7: hypomobile  CTJ: hypomobile  Mid-Thoracic Spine: hypomobile  Lower Thoracic Spine: hypomobile    Palpation  (+) TTP of bilateral UT and cervical paraspinals    Special Tests Performed  (+): distraction   (-): Spurling's A for radicular symptoms             Insurance:  AMA/CMS Eval/ Re-eval POC expires Topher Hsiehmpf #/ Referral # Total units  Start date  Expiration date Extension  Visit limitation? PT only or  PT+OT?  Co-Insurance   CMS ([facility]) 10/11 1/3/23 34 No auth needed     BOMN                                                                  Date 10/11              Units:  Used 1              Authed:  Remaining                     Date               Units:  Used               Authed:  Remaining                  Precautions:   Past Medical History:   Diagnosis Date   • Arthritis     knees   • Diabetes mellitus ([facility] )     type 2   • GERD (gastroesophageal reflux disease)    • Hyperlipidemia    • Hypertension    • Stage 3a chronic kidney disease ([facility] ) 4/14/2022   • Urinary incontinence    • Use of cane as ambulatory aid    • Wears glasses        Date 10/11/2022        Visit Number IE        FOTO Tracking Intake Survey     Follow-up #1   Manual         Cervical mobs         Cervical STM         Thoracic mobs                           TherEx         SNAG Rotation x10 each                                                                       Neuro Re-Ed         Chin tuck         DNF lift         Postural strengthening                                                      TherAct         Patient education HEP and POC x 10 min                                            Gait Training                                    Modalities         Heat

## 2022-10-13 ENCOUNTER — OFFICE VISIT (OUTPATIENT)
Dept: PHYSICAL THERAPY | Facility: CLINIC | Age: 82
End: 2022-10-13
Payer: MEDICARE

## 2022-10-13 DIAGNOSIS — M47.22 OSTEOARTHRITIS OF SPINE WITH RADICULOPATHY, CERVICAL REGION: Primary | ICD-10-CM

## 2022-10-13 DIAGNOSIS — M54.2 NECK PAIN: ICD-10-CM

## 2022-10-13 PROCEDURE — 97140 MANUAL THERAPY 1/> REGIONS: CPT

## 2022-10-13 PROCEDURE — 97110 THERAPEUTIC EXERCISES: CPT

## 2022-10-13 PROCEDURE — 97112 NEUROMUSCULAR REEDUCATION: CPT

## 2022-10-13 NOTE — PROGRESS NOTES
Daily Note     Today's date: 10/13/2022  Patient name: Velma Landrum  : 1940  MRN: [id number]  Referring provider: Travis Lindsey DO  Dx:   Encounter Diagnosis     ICD-10-CM    1  Osteoarthritis of spine with radiculopathy, cervical region  M47 22    2  Neck pain  M54 2                   Subjective: Patient reports a 6/10 discomfort level coming into clinic today  Patient reported difficulty doing any exercises where she had to use her hands  Objective: See treatment diary below      Assessment: Tolerated treatment well  Patient would benefit from continued PT in order to increase neck mobility and increase stamina  Patient was able to get down to supine on powered table and responded well to gentle bilateral neck mobilizations, cervical distraction and side bends  Patient responded with dizziness with therapist cervical side rotation to right  Patient needed to take breaks between sets due to generalized deconditioning  Plan: Continue per plan of care  Insurance:  AMA/CMS Eval/ Re-eval POC expires Mady High #/ Referral # Total units  Start date  Expiration date Extension  Visit limitation? PT only or  PT+OT?  Co-Insurance   CMS ([facility]) 10/11 1/3/23 34 No auth needed     BOMN                                                                  Date 10/11              Units:  Used 1              Authed:  Remaining                     Date               Units:  Used               Authed:  Remaining                  Precautions:   Past Medical History:   Diagnosis Date   • Arthritis     knees   • Diabetes mellitus ([facility] )     type 2   • GERD (gastroesophageal reflux disease)    • Hyperlipidemia    • Hypertension    • Stage 3a chronic kidney disease ([facility] ) 2022   • Urinary incontinence    • Use of cane as ambulatory aid    • Wears glasses        Date 10/11/2022 10/13/22       Visit Number IE        FOTO Tracking Intake Survey     Follow-up #1   Manual         Cervical mobs  Lateral C3-C7 b/l 5 min       Cervical STM         Thoracic mobs         STM   B/l upper traps                TherEx         SNAG Rotation x10 each                                                                       Neuro Re-Ed         Chin tuck  2*10 into pillow semifowler,  2*10 supine into pillow       DNF lift         Postural strengthening         Modified open book  2*10 b/l in semifowler       Modified thoracic extension  semifowler over foam roller 2*10                                  TherAct         Patient education HEP and POC x 10 min                                            Gait Training                                    Modalities         Heat

## 2022-10-17 ENCOUNTER — OFFICE VISIT (OUTPATIENT)
Dept: PHYSICAL THERAPY | Facility: CLINIC | Age: 82
End: 2022-10-17
Payer: MEDICARE

## 2022-10-17 DIAGNOSIS — M47.22 OSTEOARTHRITIS OF SPINE WITH RADICULOPATHY, CERVICAL REGION: Primary | ICD-10-CM

## 2022-10-17 DIAGNOSIS — M54.2 NECK PAIN: ICD-10-CM

## 2022-10-17 PROCEDURE — 97140 MANUAL THERAPY 1/> REGIONS: CPT

## 2022-10-17 PROCEDURE — 97112 NEUROMUSCULAR REEDUCATION: CPT

## 2022-10-17 PROCEDURE — 97110 THERAPEUTIC EXERCISES: CPT

## 2022-10-17 NOTE — PROGRESS NOTES
[facility] Daily Note     Today's date: 10/17/2022  Patient name: Conrad Jiang  : 1940  MRN: [id number]  Referring provider: Rodriguez Montez DO  Dx:   Encounter Diagnosis     ICD-10-CM    1  Osteoarthritis of spine with radiculopathy, cervical region  M47 22    2  Neck pain  M54 2        Start Time: 1545  Stop Time: 1630  Total time in clinic (min): 45 minutes    Subjective: Patient reports mild soreness following last session  Objective: See treatment diary below      Assessment: Tolerated treatment fair  Continued advancement of mobility and postural strengthening activities as tolerated  Patient was able to participate fully within confines of supine and semi-porras positions  Limited in ability to strengthen postural muscles due to inability to hold bands during sessions due to pain  Despite difficulty and mild pain with activities, patient reports overall feeling better to end session  Patient demonstrated fatigue post treatment and would benefit from continued PT to address functional deficits and improve postural endurance in order to return to PLOF  Plan: Continue per plan of care  Plan to advance postural strengthening activities as tolerated and without use of hands  Insurance:  AMA/CMS Eval/ Re-eval POC expires Doc Flatten #/ Referral # Total units  Start date  Expiration date Extension  Visit limitation? PT only or  PT+OT?  Co-Insurance   CMS ([facility]) 10/11 1/3/23 34 No auth needed     BOMN                                                                  Date 10/11 10/13 10/17            Units:  Used 1 2 3            Authed:  Remaining                     Date               Units:  Used               Authed:  Remaining                  Precautions:   Past Medical History:   Diagnosis Date   • Arthritis     knees   • Diabetes mellitus ([facility] )     type 2   • GERD (gastroesophageal reflux disease)    • Hyperlipidemia    • Hypertension    • Stage [facility]a [facility] ) 2022 • Urinary incontinence    • Use of cane as ambulatory aid    • Wears glasses        Date 10/11/2022 10/13/22 10/17/22      Visit Number IE 2 3      FOTO Tracking Intake Survey     Follow-up #1   Manual         Cervical mobs  Lateral C3-C7 b/l 5 min Lateral glides lower cervical B/L x 5 min    UPA entire cspine gr 1-2B/L x 5 min       Cervical STM         Thoracic mobs         STM   B/l upper traps B/L upper traps x 5 min               TherEx         SNAG Rotation x10 each                                                                       Neuro Re-Ed         Chin tuck  2*10 into pillow semifowler,  2*10 supine into pillow 2*10 into pillow semifowler,  2*10 supine into pillow      DNF lift         Postural strengthening   No money in supine (YTB) x20          Modified open book  2*10 b/l in semifowler 2*10 B/L in semifowler      Modified thoracic extension  semifowler over foam roller 2*10 semifowler over half foam roller 2*10                                 TherAct         Patient education HEP and POC x 10 min                                            Gait Training                                    Modalities         Heat

## 2022-10-18 ENCOUNTER — 6 MONTH FOLLOW UP (OUTPATIENT)
Dept: URBAN - METROPOLITAN AREA CLINIC 27 | Facility: CLINIC | Age: 82
End: 2022-10-18

## 2022-10-18 ENCOUNTER — APPOINTMENT (OUTPATIENT)
Dept: PHYSICAL THERAPY | Facility: CLINIC | Age: 82
End: 2022-10-18

## 2022-10-18 DIAGNOSIS — H35.3124: ICD-10-CM

## 2022-10-18 DIAGNOSIS — H43.813: ICD-10-CM

## 2022-10-18 DIAGNOSIS — H35.3113: ICD-10-CM

## 2022-10-18 DIAGNOSIS — E11.9: ICD-10-CM

## 2022-10-18 DIAGNOSIS — Z96.1: ICD-10-CM

## 2022-10-18 PROCEDURE — 92134 CPTRZ OPH DX IMG PST SGM RTA: CPT

## 2022-10-18 PROCEDURE — 92014 COMPRE OPH EXAM EST PT 1/>: CPT

## 2022-10-18 ASSESSMENT — VISUAL ACUITY
OD_CC: 20/40
OS_CC: CF 6FT

## 2022-10-18 ASSESSMENT — TONOMETRY
OS_IOP_MMHG: 15
OD_IOP_MMHG: 14

## 2022-10-20 ENCOUNTER — OFFICE VISIT (OUTPATIENT)
Dept: PHYSICAL THERAPY | Facility: CLINIC | Age: 82
End: 2022-10-20
Payer: MEDICARE

## 2022-10-20 DIAGNOSIS — M54.2 NECK PAIN: ICD-10-CM

## 2022-10-20 DIAGNOSIS — M47.22 OSTEOARTHRITIS OF SPINE WITH RADICULOPATHY, CERVICAL REGION: Primary | ICD-10-CM

## 2022-10-20 PROCEDURE — 97140 MANUAL THERAPY 1/> REGIONS: CPT

## 2022-10-20 PROCEDURE — 97112 NEUROMUSCULAR REEDUCATION: CPT

## 2022-10-20 PROCEDURE — 97110 THERAPEUTIC EXERCISES: CPT

## 2022-10-20 NOTE — PROGRESS NOTES
[facility] Daily Note     Today's date: 10/20/2022  Patient name: Danica Peterson  : 1940  MRN: [id number]  Referring provider: Alex Wagner DO  Dx:   Encounter Diagnosis     ICD-10-CM    1  Osteoarthritis of spine with radiculopathy, cervical region  M47 22    2  Neck pain  M54 2        Start Time: 1505  Stop Time: 1521  Total time in clinic (min): 40 minutes    Subjective: Patient reports that she had an incident of lightheadedness after last session when she got home  She is unsure if it is related to lying supine at PT or because she didn't eat lunch, but she has requested not lying supine in future sessions  Objective: See treatment diary below      Assessment: Tolerated treatment fair  Discoutninued activities previosuly performed in supine due to patient's fear of lying flat and getting lightheaded  Despite limitation in options of exercises due to positioning issues, patient reports overall improvement in neck pain  Patient demonstrated fatigue post treatment and would benefit from continued PT to address functional deficits and improve postural endurance in order to return to PLOF  Plan: Continue per plan of care  Plan to advance postural strengthening activities as tolerated and without use of hands  Insurance:  AMA/CMS Eval/ Re-eval POC expires Victoriano Urbina #/ Referral # Total units  Start date  Expiration date Extension  Visit limitation? PT only or  PT+OT?  Co-Insurance   CMS ([facility]) 10/11 1/3/23 34 No auth needed     BOMN                                                                  Date 10/11 10/13 10/17 10/20           Units:  Used 1 2 3 4           Authed:  Remaining                     Date               Units:  Used               Authed:  Remaining                  Precautions:   Past Medical History:   Diagnosis Date   • Arthritis     knees   • Diabetes mellitus ([facility] )     type 2   • GERD (gastroesophageal reflux disease)    • Hyperlipidemia    • Hypertension    • Stage 3a chronic kidney disease ([facility] ) 4/14/2022   • Urinary incontinence    • Use of cane as ambulatory aid    • Wears glasses        Date 10/11/2022 10/13/22 10/17/22 10/20/22     Visit Number IE 2 3 4     FOTO Tracking Intake Survey     Follow-up #1   Manual         Cervical mobs  Lateral C3-C7 b/l 5 min Lateral glides lower cervical B/L x 5 min    UPA entire cspine gr 1-2B/L x 5 min       Cervical STM         Thoracic mobs         STM   B/l upper traps B/L upper traps x 5 min B/L upper traps x 5 min         Manual seated posture correction x 5 min     TherEx         SNAG Rotation x10 each   Active rotation x 20 each                                                                    Neuro Re-Ed         Chin tuck  2*10 into pillow semifowler,  2*10 supine into pillow 2*10 into pillow semifowler,  2*10 supine into pillow 2*10 into pillow semifowler     DNF lift         Postural strengthening   No money in supine (YTB) x20     No money in supine (YTB) x20     Modified open book  2*10 b/l in semifowler 2*10 B/L in semifowler 2*10 B/L in semifowler     Modified thoracic extension  semifowler over foam roller 2*10 semifowler over half foam roller 2*10 Patient deferred indefinitely                                 TherAct         Patient education HEP and POC x 10 min                                            Gait Training                                    Modalities         Heat

## 2022-10-25 ENCOUNTER — OFFICE VISIT (OUTPATIENT)
Dept: PHYSICAL THERAPY | Facility: CLINIC | Age: 82
End: 2022-10-25
Payer: MEDICARE

## 2022-10-25 DIAGNOSIS — M54.2 NECK PAIN: ICD-10-CM

## 2022-10-25 DIAGNOSIS — M47.22 OSTEOARTHRITIS OF SPINE WITH RADICULOPATHY, CERVICAL REGION: Primary | ICD-10-CM

## 2022-10-25 PROCEDURE — 97112 NEUROMUSCULAR REEDUCATION: CPT

## 2022-10-25 PROCEDURE — 97140 MANUAL THERAPY 1/> REGIONS: CPT

## 2022-10-25 PROCEDURE — 97110 THERAPEUTIC EXERCISES: CPT

## 2022-10-25 NOTE — PROGRESS NOTES
Daily Note     Today's date: 10/25/2022  Patient name: Daljit Zepeda  : 1940  MRN: [id number]  Referring provider: Violeta Irwin DO  Dx:   Encounter Diagnosis     ICD-10-CM    1  Osteoarthritis of spine with radiculopathy, cervical region  M47 22    2  Neck pain  M54 2        Start Time: 1500  Stop Time: 1545  Total time in clinic (min): 45 minutes    Subjective: Patient reports feeling "okay" today, she has had some neck soreness, but overall feels PT is improving symptoms  Objective: See treatment diary below      Assessment: Tolerated treatment well  Advanced thoracic mobility and neck neuromuscular control activities into seated with patient tolerating well  Able to resume thoracic extension over half foam roll in order to promote greater thoracic motion  Patient reported improvement in upper back and neck soreness to end session  Patient demonstrated fatigue post treatment and would benefit from continued PT to improve functional mobility and reduce pain in order to return to PLOF  Plan: Continue per plan of care  Continue to advance as tolerated by patient  Insurance:  AMA/CMS Eval/ Re-eval POC expires Raven Childers #/ Referral # Total units  Start date  Expiration date Extension  Visit limitation? PT only or  PT+OT?  Co-Insurance   CMS ([facility]) 10/11 1/3/23 34 No auth needed     BOMN                                                                  Date 10/11 10/13 10/17 10/20 10/25          Units:  Used 1 2 3 4 5          Authed:  Remaining                     Date               Units:  Used               Authed:  Remaining                  Precautions:   Past Medical History:   Diagnosis Date   • Arthritis     knees   • [facility] )     type 2   • GERD (gastroesophageal reflux disease)    • Hyperlipidemia    • Hypertension    • Stage 3a chronic kidney disease ([facility] ) 2022   • Urinary incontinence    • Use of cane as ambulatory aid    • Wears glasses        Date 10/11/2022 10/13/22 10/17/22 10/20/22 10/25/22    Visit Number IE 2 3 4 5    FOTO Tracking Intake Survey     Follow-up #1   Manual         Cervical mobs  Lateral C3-C7 b/l 5 min Lateral glides lower cervical B/L x 5 min    UPA entire cspine gr 1-2B/L x 5 min       Cervical STM         Thoracic mobs         STM   B/l upper traps B/L upper traps x 5 min B/L upper traps x 5 min B/L upper traps and cspine paraspinals x 10 min        Manual seated posture correction x 5 min     TherEx         SNAG Rotation x10 each   Active rotation x 20 each Active rotation x 20 each         Manual seated posture correction x 5 min             Neuro Re-Ed         Chin tuck  2*10 into pillow semifowler,  2*10 supine into pillow 2*10 into pillow semifowler,  2*10 supine into pillow 2*10 into pillow semifowler 2*10 into pillow semifowler; x20 in seated    DNF lift         Postural strengthening   No money in supine (YTB) x20     No money in supine (YTB) x20 scap squeezes in seated x20    Modified open book  2*10 b/l in semifowler 2*10 B/L in semifowler 2*10 B/L in semifowler 2*10 B/L in semifowler    Modified thoracic extension  semifowler over foam roller 2*10 semifowler over half foam roller 2*10 Patient deferred indefinitely  Seated thoracic rotation x 10 each way    Seated thoracic ext over chair with half foam x20                               TherAct         Patient education HEP and POC x 10 min                                            Gait Training                                    Modalities         Heat

## 2022-10-26 ENCOUNTER — OFFICE VISIT (OUTPATIENT)
Dept: FAMILY MEDICINE CLINIC | Facility: CLINIC | Age: 82
End: 2022-10-26

## 2022-10-26 VITALS
HEART RATE: 57 BPM | WEIGHT: 196.7 LBS | OXYGEN SATURATION: 99 % | SYSTOLIC BLOOD PRESSURE: 180 MMHG | RESPIRATION RATE: 20 BRPM | HEIGHT: 61 IN | DIASTOLIC BLOOD PRESSURE: 68 MMHG | TEMPERATURE: 97.1 F | BODY MASS INDEX: 37.14 KG/M2

## 2022-10-26 DIAGNOSIS — E78.5 HYPERLIPIDEMIA, UNSPECIFIED HYPERLIPIDEMIA TYPE: ICD-10-CM

## 2022-10-26 DIAGNOSIS — I51.89 GRADE II DIASTOLIC DYSFUNCTION: ICD-10-CM

## 2022-10-26 DIAGNOSIS — D64.9 ANEMIA, UNSPECIFIED TYPE: ICD-10-CM

## 2022-10-26 DIAGNOSIS — M19.90 ARTHRITIS: ICD-10-CM

## 2022-10-26 DIAGNOSIS — M25.542 PAIN OF JOINT OF BOTH HANDS: ICD-10-CM

## 2022-10-26 DIAGNOSIS — M25.541 PAIN OF JOINT OF BOTH HANDS: ICD-10-CM
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
## 2022-10-26 NOTE — PROGRESS NOTES
Name: Shabana Allen      : 1940      MRN: [id number]  Encounter Provider: ELBA Yoo  Encounter Date: 10/26/2022   Encounter department: [facility]     1  Pain of joint of both hands  Referral to pain management****    2  Arthritis  -     Ambulatory Referral to Pain Management; Future    3  Fall (on) (from) other stairs and steps, initial encounter    4  Grade II diastolic dysfunction  -     Comprehensive metabolic panel; Future    5  Hyperlipidemia, unspecified hyperlipidemia type  -     Lipid panel; Future    6  Anemia, unspecified type  -     CBC and differential; Future           Subjective      Patient in office today for c/o pains in hands and sometimes arms  Pains in hands always  Describes pain as constant ache  Left wrist palpated and yelled when left hand turned upward and downward-not side to side  Right wrist no pain on ROM  C/O aching wrist to fingers  She has been going to PT for almost 4 weeks for cervical osteoarthritis with improvement in neck pain but no improvement in hand pain  Patient stated she fell last week in front of apartment door  Stated she bent down on stairs to get [facility] bag and got dizzy and fell on buttocks  Stated no injury and had no lunch and she felt that was cause of dizziness  Will dc gabapentin as not helpful for pain and refer to pain management  Annual blood work ordered    Review of Systems   Constitutional: Negative  HENT: Negative  Eyes: Negative  Respiratory: Negative  Cardiovascular: Negative  Gastrointestinal: Negative  Endocrine: Negative  Genitourinary: Negative  Musculoskeletal:        B/L hand pain   Skin: Negative  Allergic/Immunologic: Negative  Neurological: Negative  Hematological: Negative  Psychiatric/Behavioral: Negative          Current Outpatient Medications on File Prior to Visit   Medication Sig   • acetaminophen (TYLENOL) 650 mg CR tablet Take 1 tablet (650 mg total) by mouth every 8 (eight) hours as needed for mild pain   • atorvastatin (LIPITOR) 40 mg tablet Take 1 tablet (40 mg total) by mouth daily   • CINNAMON PO Take 1,000 mg by mouth every morning   • Eliquis 5 MG TAKE ONE TABLET BY MOUTH TWICE A DAY   • Empagliflozin (Jardiance) 10 MG TABS Take 1 tablet (10 mg total) by mouth every morning   • gabapentin (Neurontin) 300 mg capsule Take 1 capsule (300 mg total) by mouth 4 (four) times a day   • lisinopril (ZESTRIL) 10 mg tablet Take 1 tablet (10 mg total) by mouth daily   • metFORMIN (GLUCOPHAGE) 1000 MG tablet Take 1 tablet by mouth twice a day   • metoprolol tartrate (LOPRESSOR) 25 mg tablet Take 1 tablet (25 mg total) by mouth in the morning   • Multiple Vitamins-Minerals (PRESERVISION AREDS PO) Take by mouth   • amLODIPine (NORVASC) 10 mg tablet Take 1 tablet (10 mg total) by mouth in the morning  Objective     BP (!) 180/68 (BP Location: Left arm, Patient Position: Sitting, Cuff Size: Adult)   Pulse 57   Temp (!) 97 1 °F (36 2 °C) (Tympanic)   Resp 20   Ht 5' 1" (1 549 m)   Wt 89 2 kg (196 lb 11 2 oz)   SpO2 99%   BMI 37 17 kg/m²     Physical Exam  Constitutional:       General: She is not in acute distress  Appearance: Normal appearance  She is obese  She is not ill-appearing or toxic-appearing  HENT:      Head: Normocephalic and atraumatic  Right Ear: Tympanic membrane normal       Left Ear: Tympanic membrane normal       Nose: Nose normal       Mouth/Throat:      Mouth: Mucous membranes are moist    Eyes:      Extraocular Movements: Extraocular movements intact  Cardiovascular:      Rate and Rhythm: Normal rate and regular rhythm  Heart sounds: Normal heart sounds  Pulmonary:      Effort: Pulmonary effort is normal  No respiratory distress  Breath sounds: Normal breath sounds  No wheezing, rhonchi or rales  Abdominal:      General: Bowel sounds are normal       Palpations: Abdomen is soft  Musculoskeletal:         General: Tenderness present  Cervical back: Normal range of motion and neck supple  No rigidity  Right lower leg: No edema  Left lower leg: No edema  Comments: Left hand pain greater than right with movement of hand upward and downward-hands ache constantly per patient   Skin:     General: Skin is warm and dry  Neurological:      General: No focal deficit present  Mental Status: She is alert and oriented to person, place, and time  Motor: No weakness  Gait: Gait abnormal    Psychiatric:         Mood and Affect: Mood normal          Behavior: Behavior normal          Thought Content:  Thought content normal          Judgment: Judgment normal        Tuesday ELBA Carey

## 2022-10-27 ENCOUNTER — OFFICE VISIT (OUTPATIENT)
Dept: PHYSICAL THERAPY | Facility: CLINIC | Age: 82
End: 2022-10-27
Payer: MEDICARE

## 2022-10-27 DIAGNOSIS — M47.22 OSTEOARTHRITIS OF SPINE WITH RADICULOPATHY, CERVICAL REGION: ICD-10-CM

## 2022-10-27 DIAGNOSIS — M54.2 NECK PAIN: Primary | ICD-10-CM

## 2022-10-27 PROCEDURE — 97140 MANUAL THERAPY 1/> REGIONS: CPT | Performed by: PHYSICAL THERAPIST

## 2022-10-27 PROCEDURE — 97112 NEUROMUSCULAR REEDUCATION: CPT | Performed by: PHYSICAL THERAPIST

## 2022-10-27 PROCEDURE — 97110 THERAPEUTIC EXERCISES: CPT | Performed by: PHYSICAL THERAPIST

## 2022-10-27 NOTE — PROGRESS NOTES
Name: Tara Busch      : 1940      MRN: [id number]  Encounter Provider: ELBA Lala  Encounter Date: 10/26/2022   Encounter department: [facility]     1  Pain of joint of both hands  Referral to pain management    2  Arthritis  -     Ambulatory Referral to Pain Management; Future    3  Fall (on) (from) other stairs and steps, initial encounter    4  Grade II diastolic dysfunction  -     Comprehensive metabolic panel; Future    5  Hyperlipidemia, unspecified hyperlipidemia type  -     Lipid panel; Future    6  Anemia, unspecified type  -     CBC and differential; Future           Subjective      Patient in office today for c/o pains in hands and sometimes arms  Pains in hands always  Describes pain as constant ache  Left wrist palpated and yelled when left hand turned upward and downward-not side to side  Right wrist no pain on ROM  C/O aching wrist to fingers  She has been going to PT for almost 4 weeks for cervical osteoarthritis with improvement in neck pain but no improvement in hand pain  Patient stated she fell last week in front of apartment door  Stated she bent down on stairs to get [facility] bag and got dizzy and fell on buttocks  Stated no injury and had no lunch and she felt that was cause of dizziness  Will dc gabapentin as not helpful for pain and refer to pain management  Annual blood work ordered    Review of Systems   Constitutional: Negative  HENT: Negative  Eyes: Negative  Respiratory: Negative  Cardiovascular: Negative  Gastrointestinal: Negative  Endocrine: Negative  Genitourinary: Negative  Musculoskeletal:        B/L hand pain   Skin: Negative  Allergic/Immunologic: Negative  Neurological: Negative  Hematological: Negative  Psychiatric/Behavioral: Negative          Current Outpatient Medications on File Prior to Visit   Medication Sig   • acetaminophen (TYLENOL) 650 mg CR tablet Take 1 tablet (650 mg total) by mouth every 8 (eight) hours as needed for mild pain   • atorvastatin (LIPITOR) 40 mg tablet Take 1 tablet (40 mg total) by mouth daily   • CINNAMON PO Take 1,000 mg by mouth every morning   • Eliquis 5 MG TAKE ONE TABLET BY MOUTH TWICE A DAY   • Empagliflozin (Jardiance) 10 MG TABS Take 1 tablet (10 mg total) by mouth every morning   • gabapentin (Neurontin) 300 mg capsule Take 1 capsule (300 mg total) by mouth 4 (four) times a day   • lisinopril (ZESTRIL) 10 mg tablet Take 1 tablet (10 mg total) by mouth daily   • metFORMIN (GLUCOPHAGE) 1000 MG tablet Take 1 tablet by mouth twice a day   • metoprolol tartrate (LOPRESSOR) 25 mg tablet Take 1 tablet (25 mg total) by mouth in the morning   • Multiple Vitamins-Minerals (PRESERVISION AREDS PO) Take by mouth   • amLODIPine (NORVASC) 10 mg tablet Take 1 tablet (10 mg total) by mouth in the morning  Objective     BP (!) 180/68 (BP Location: Left arm, Patient Position: Sitting, Cuff Size: Adult)   Pulse 57   Temp (!) 97 1 °F (36 2 °C) (Tympanic)   Resp 20   Ht 5' 1" (1 549 m)   Wt 89 2 kg (196 lb 11 2 oz)   SpO2 99%   BMI 37 17 kg/m²     Physical Exam  Constitutional:       General: She is not in acute distress  Appearance: Normal appearance  She is obese  She is not ill-appearing or toxic-appearing  HENT:      Head: Normocephalic and atraumatic  Right Ear: Tympanic membrane normal       Left Ear: Tympanic membrane normal       Nose: Nose normal       Mouth/Throat:      Mouth: Mucous membranes are moist    Eyes:      Extraocular Movements: Extraocular movements intact  Cardiovascular:      Rate and Rhythm: Normal rate and regular rhythm  Heart sounds: Normal heart sounds  Pulmonary:      Effort: Pulmonary effort is normal  No respiratory distress  Breath sounds: Normal breath sounds  No wheezing, rhonchi or rales  Abdominal:      General: Bowel sounds are normal       Palpations: Abdomen is soft     Musculoskeletal: General: Tenderness present  Cervical back: Normal range of motion and neck supple  No rigidity  Right lower leg: No edema  Left lower leg: No edema  Comments: Left hand pain greater than right with movement of hand upward and downward-hands ache constantly per patient   Skin:     General: Skin is warm and dry  Neurological:      General: No focal deficit present  Mental Status: She is alert and oriented to person, place, and time  Motor: No weakness  Gait: Gait abnormal    Psychiatric:         Mood and Affect: Mood normal          Behavior: Behavior normal          Thought Content:  Thought content normal          Judgment: Judgment normal        Tuesday ELBA Carey

## 2022-10-27 NOTE — PROGRESS NOTES
Daily Note     Today's date: 10/27/2022  Patient name: Marion Froeman  : 1940  MRN: [id number]  Referring provider: Alice Pedraza DO  Dx:   Encounter Diagnosis     ICD-10-CM    1  Neck pain  M54 2    2  Osteoarthritis of spine with radiculopathy, cervical region  M47 22                   Subjective: Pt reports that overall she is doing relatively well  She does feel she has ongoing limitations  She has had a recent change in her primary MD, she will be seeking pain management consult, and is now weaning Gabapentin as of the last few days and reports she feels her hands have actually been doing better over the last few days  She does feel the neck symptoms and he hand symptoms are related  Objective: See treatment diary below  Able to perform SNAGs today with tolerable   FOTO performed today  HEP issued in written format, scap ret and cervical retractions  Assessment: Tolerated treatment well  Patient steady progression toward est goals and while she remains symtomatic and limited, she demo good overall tolerance with change in medicaiton and peristsing therex  Plan: Continue per plan of care  Insurance:  AMA/CMS Eval/ Re-eval POC expires Christopher Dark #/ Referral # Total units  Start date  Expiration date Extension  Visit limitation? PT only or  PT+OT?  Co-Insurance   CMS ([facility]) 10/11 1/3/23 34 No auth needed     BOMN                                                                  Date 10/11 10/13 10/17 10/20 10/25 10/27         Units:  Used 1 2 3 4 5 6         Authed:  Remaining                     Date               Units:  Used               Authed:  Remaining                  Precautions:   Past Medical History:   Diagnosis Date   • Arthritis     knees   • Diabetes mellitus ([facility] )     type 2   • GERD (gastroesophageal reflux disease)    • Hyperlipidemia    • Hypertension    • Stage 3a chronic kidney disease ([facility] ) 2022   • Urinary incontinence    • Use of cane as ambulatory aid    • Wears glasses        Date 10/11/2022 10/13/22 10/17/22 10/20/22 10/25/22 10/27   Visit Number IE 2 3 4 5 6   FOTO Tracking Intake Survey     Follow-up #1   Manual         Cervical mobs  Lateral C3-C7 b/l 5 min Lateral glides lower cervical B/L x 5 min    UPA entire cspine gr 1-2B/L x 5 min       Cervical STM         Thoracic mobs         STM   B/l upper traps B/L upper traps x 5 min B/L upper traps x 5 min B/L upper traps and cspine paraspinals x 10 min B/L upper traps and cspine paraspinals x 10 min       Manual seated posture correction x 5 min     TherEx         SNAG Rotation x10 each   Active rotation x 20 each Active rotation x 20 each SNAG 5x ea, Active CS Rot 5x ea        Manual seated posture correction x 5 min Rev            Neuro Re-Ed         Chin tuck  2*10 into pillow semifowler,  2*10 supine into pillow 2*10 into pillow semifowler,  2*10 supine into pillow 2*10 into pillow semifowler 2*10 into pillow semifowler; x20 in seated 2x10 into pillow; x20 in seated   DNF lift         Postural strengthening   No money in supine (YTB) x20     No money in supine (YTB) x20 scap squeezes in seated x20 Scap sq x20 unsupported sitting   Modified open book  2*10 b/l in semifowler 2*10 B/L in semifowler 2*10 B/L in semifowler 2*10 B/L in semifowler 2x10 ea   Modified thoracic extension  semifowler over foam roller 2*10 semifowler over half foam roller 2*10 Patient deferred indefinitely  Seated thoracic rotation x 10 each way    Seated thoracic ext over chair with half foam x20 Seated thoracic rotation x 10 each way    Seated thoracic ext over chair with half foam x20                              TherAct         Patient education HEP and POC x 10 min                                            Gait Training                                    Modalities         Heat

## 2022-11-01 ENCOUNTER — OFFICE VISIT (OUTPATIENT)
Dept: PHYSICAL THERAPY | Facility: CLINIC | Age: 82
End: 2022-11-01

## 2022-11-01 DIAGNOSIS — M54.2 NECK PAIN: Primary | ICD-10-CM

## 2022-11-01 DIAGNOSIS — M47.22 OSTEOARTHRITIS OF SPINE WITH RADICULOPATHY, CERVICAL REGION: ICD-10-CM

## 2022-11-01 NOTE — PROGRESS NOTES
Daily Note     Today's date: 2022  Patient name: Jaz Warner  : 1940  MRN: [id number]  Referring provider: Deb Daniel DO  Dx:   Encounter Diagnosis     ICD-10-CM    1  Neck pain  M54 2    2  Osteoarthritis of spine with radiculopathy, cervical region  M47 22        Start Time: 1500  Stop Time: 1545  Total time in clinic (min): 45 minutes    Subjective: Patient reports improvement in neck pain, feeling 60% back to normal        Objective: See treatment diary below      Assessment: Tolerated treatment fair  Continued with established POC since patient has seen significant improvement since beginning care  Patient reported pain in Lt hand with bilateral open book  She was able to achieve near full painfree cervical rotation in both seated and supine positions which is significantly better than movement at IE  Her primary limitation remains muscular tightness/pain likely in relation to poor posture so finished session today with moist heat on Lt side neck musculature to reduce trigger points and relax muscles  Patient demonstrated fatigue post treatment and would benefit from continued PT to improve functional mobility and posture in order to reduce pain and return to PLOF  Plan: Continue per plan of care  Insurance:  AMA/CMS Eval/ Re-eval POC expires Arelis Washington #/ Referral # Total units  Start date  Expiration date Extension  Visit limitation? PT only or  PT+OT?  Co-Insurance   CMS ([facility]) 10/11 1/3/23 34 No auth needed     BOMN                                                                  Date 10/11 10/13 10/17 10/20 10/25 10/27 11/1        Units:  Used 1 2 3 4 5 6 7        Authed:  Remaining                     Date               Units:  Used               Authed:  Remaining                  Precautions:   Past Medical History:   Diagnosis Date   • Arthritis     knees   • Diabetes mellitus ([facility] [address] )     type 2   • GERD (gastroesophageal reflux disease)    • Hyperlipidemia    • Hypertension    • Stage 3a chronic kidney disease ([facility] ) 4/14/2022   • Urinary incontinence    • Use of cane as ambulatory aid    • Wears glasses        Date 10/13/22 10/17/22 10/20/22 10/25/22 10/27/22 11/1/22   Visit Number 2 3 4 5 6 7   FOTO Tracking     Follow-up #1    Manual         Cervical mobs Lateral C3-C7 b/l 5 min Lateral glides lower cervical B/L x 5 min    UPA entire cspine gr 1-2B/L x 5 min        Cervical STM         Thoracic mobs         STM  B/l upper traps B/L upper traps x 5 min B/L upper traps x 5 min B/L upper traps and cspine paraspinals x 10 min B/L upper traps and cspine paraspinals x 10 min B/L upper traps and cspine paraspinals x 10 min      Manual seated posture correction x 5 min      TherEx         SNAG   Active rotation x 20 each Active rotation x 20 each SNAG 5x ea, Active CS Rot 5x ea Active rotation x 20 each in semifowler;  Active CS Rot 5x ea    No SNAG since painfree rotation       Manual seated posture correction x 5 min Rev             Neuro Re-Ed         Chin tuck 2*10 into pillow semifowler,  2*10 supine into pillow 2*10 into pillow semifowler,  2*10 supine into pillow 2*10 into pillow semifowler 2*10 into pillow semifowler; x20 in seated 2x10 into pillow; x20 in seated 2x10 into pillow; x20 in seated   DNF lift         Postural strengthening  No money in supine (YTB) x20     No money in supine (YTB) x20 scap squeezes in seated x20 Scap sq x20 unsupported sitting Scap sq x20 unsupported sitting   Modified open book 2*10 b/l in semifowler 2*10 B/L in semifowler 2*10 B/L in semifowler 2*10 B/L in semifowler 2x10 ea 2x10 ea   Modified thoracic extension semifowler over foam roller 2*10 semifowler over half foam roller 2*10 Patient deferred indefinitely  Seated thoracic rotation x 10 each way    Seated thoracic ext over chair with half foam x20 Seated thoracic rotation x 10 each way    Seated thoracic ext over chair with half foam x20 Seated thoracic rotation x 10 each way    Seated thoracic ext over chair with half foam x20                              TherAct         Patient education                                             Gait Training                                    Modalities         Heat       x8 min post

## 2022-11-04 ENCOUNTER — APPOINTMENT (OUTPATIENT)
Dept: LAB | Facility: CLINIC | Age: 82
End: 2022-11-04

## 2022-11-04 DIAGNOSIS — I51.89 GRADE II DIASTOLIC DYSFUNCTION: ICD-10-CM

## 2022-11-04 DIAGNOSIS — E78.5 HYPERLIPIDEMIA, UNSPECIFIED HYPERLIPIDEMIA TYPE: ICD-10-CM

## 2022-11-04 DIAGNOSIS — D64.9 ANEMIA, UNSPECIFIED TYPE: ICD-10-CM

## 2022-11-04 LAB
ALBUMIN SERPL BCP-MCNC: 3.4 G/DL (ref 3.5–5)
ALP SERPL-CCNC: 84 U/L (ref 46–116)
ALT SERPL W P-5'-P-CCNC: 24 U/L (ref 12–78)
ANION GAP SERPL CALCULATED.3IONS-SCNC: 9 MMOL/L (ref 4–13)
AST SERPL W P-5'-P-CCNC: 14 U/L (ref 5–45)
BASOPHILS # BLD AUTO: 0.05 THOUSANDS/ÂΜL (ref 0–0.1)
BASOPHILS NFR BLD AUTO: 1 % (ref 0–1)
BILIRUB SERPL-MCNC: 0.57 MG/DL (ref 0.2–1)
BUN SERPL-MCNC: 17 MG/DL (ref 5–25)
CALCIUM ALBUM COR SERPL-MCNC: 10.1 MG/DL (ref 8.3–10.1)
CALCIUM SERPL-MCNC: 9.6 MG/DL (ref 8.3–10.1)
CHLORIDE SERPL-SCNC: 109 MMOL/L (ref 96–108)
CHOLEST SERPL-MCNC: 145 MG/DL
CO2 SERPL-SCNC: 20 MMOL/L (ref 21–32)
CREAT SERPL-MCNC: 1.21 MG/DL (ref 0.6–1.3)
EOSINOPHIL # BLD AUTO: 0.08 THOUSAND/ÂΜL (ref 0–0.61)
EOSINOPHIL NFR BLD AUTO: 1 % (ref 0–6)
ERYTHROCYTE [DISTWIDTH] IN BLOOD BY AUTOMATED COUNT: 17.9 % (ref 11.6–15.1)
GFR SERPL CREATININE-BSD FRML MDRD: 41 ML/MIN/1.73SQ M
GLUCOSE P FAST SERPL-MCNC: 146 MG/DL (ref 65–99)
HCT VFR BLD AUTO: 37.4 % (ref 34.8–46.1)
HDLC SERPL-MCNC: 90 MG/DL
HGB BLD-MCNC: 11 G/DL (ref 11.5–15.4)
IMM GRANULOCYTES # BLD AUTO: 0.02 THOUSAND/UL (ref 0–0.2)
IMM GRANULOCYTES NFR BLD AUTO: 0 % (ref 0–2)
LDLC SERPL CALC-MCNC: 36 MG/DL (ref 0–100)
LYMPHOCYTES # BLD AUTO: 2.46 THOUSANDS/ÂΜL (ref 0.6–4.47)
LYMPHOCYTES NFR BLD AUTO: 39 % (ref 14–44)
MCH RBC QN AUTO: 24.1 PG (ref 26.8–34.3)
MCHC RBC AUTO-ENTMCNC: 29.4 G/DL (ref 31.4–37.4)
MCV RBC AUTO: 82 FL (ref 82–98)
MONOCYTES # BLD AUTO: 0.42 THOUSAND/ÂΜL (ref 0.17–1.22)
MONOCYTES NFR BLD AUTO: 7 % (ref 4–12)
NEUTROPHILS # BLD AUTO: 3.35 THOUSANDS/ÂΜL (ref 1.85–7.62)
NEUTS SEG NFR BLD AUTO: 52 % (ref 43–75)
NONHDLC SERPL-MCNC: 55 MG/DL
NRBC BLD AUTO-RTO: 0 /100 WBCS
PLATELET # BLD AUTO: 315 THOUSANDS/UL (ref 149–390)
PMV BLD AUTO: 10.8 FL (ref 8.9–12.7)
POTASSIUM SERPL-SCNC: 4.1 MMOL/L (ref 3.5–5.3)
PROT SERPL-MCNC: 8.2 G/DL (ref 6.4–8.4)
RBC # BLD AUTO: 4.57 MILLION/UL (ref 3.81–5.12)
SODIUM SERPL-SCNC: 138 MMOL/L (ref 135–147)
TRIGL SERPL-MCNC: 95 MG/DL
WBC # BLD AUTO: 6.38 THOUSAND/UL (ref 4.31–10.16)

## 2022-11-08 ENCOUNTER — OFFICE VISIT (OUTPATIENT)
Dept: PHYSICAL THERAPY | Facility: CLINIC | Age: 82
End: 2022-11-08

## 2022-11-08 DIAGNOSIS — M47.22 OSTEOARTHRITIS OF SPINE WITH RADICULOPATHY, CERVICAL REGION: ICD-10-CM

## 2022-11-08 DIAGNOSIS — M54.2 NECK PAIN: Primary | ICD-10-CM

## 2022-11-08 NOTE — PROGRESS NOTES
Daily Note     Today's date: 2022  Patient name: Kota Velasquez  : 1940  MRN: [id number]  Referring provider: Yeyo Valentine DO  Dx:   Encounter Diagnosis     ICD-10-CM    1  Neck pain  M54 2    2  Osteoarthritis of spine with radiculopathy, cervical region  M47 22        Start Time: 1500  Stop Time: 1545  Total time in clinic (min): 45 minutes    Subjective: Patient reports improvement in neck pain, feeling 60% back to normal        Objective: See treatment diary below      Assessment: Tolerated treatment fair  Continued with established POC since patient has seen significant improvement since beginning care  Patient reported pain in Lt hand with bilateral open book  She was able to achieve near full painfree cervical rotation in both seated and supine positions which is significantly better than movement at IE  Her primary limitation remains muscular tightness/pain likely in relation to poor posture so finished session today with moist heat on Lt side neck musculature to reduce trigger points and relax muscles  Patient demonstrated fatigue post treatment and would benefit from continued PT to improve functional mobility and posture in order to reduce pain and return to PLOF  Plan: Continue per plan of care  Insurance:  AMA/CMS Eval/ Re-eval POC expires Jonita Dire #/ Referral # Total units  Start date  Expiration date Extension  Visit limitation? PT only or  PT+OT?  Co-Insurance   CMS ([facility]) 10/11 1/3/23 34 No auth needed     BOMN                                                                  Date 10/11 10/13 10/17 10/20 10/25 10/27 11/1        Units:  Used 1 2 3 4 5 6 7        Authed:  Remaining                     Date               Units:  Used               Authed:  Remaining                  Precautions:   Past Medical History:   Diagnosis Date   • Arthritis     knees   • Diabetes mellitus ([facility] [address] )     type 2   • GERD (gastroesophageal reflux disease)    • Hyperlipidemia    • Hypertension    • Stage 3a chronic kidney disease ([facility] [address] ) 4/14/2022   • Urinary incontinence    • Use of cane as ambulatory aid    • Wears glasses        Date 10/13/22 10/17/22 10/20/22 10/25/22 10/27/22 11/1/22   Visit Number 2 3 4 5 6 7   FOTO Tracking     Follow-up #1    Manual         Cervical mobs Lateral C3-C7 b/l 5 min Lateral glides lower cervical B/L x 5 min    UPA entire cspine gr 1-2B/L x 5 min        Cervical STM         Thoracic mobs         STM  B/l upper traps B/L upper traps x 5 min B/L upper traps x 5 min B/L upper traps and cspine paraspinals x 10 min B/L upper traps and cspine paraspinals x 10 min B/L upper traps and cspine paraspinals x 10 min      Manual seated posture correction x 5 min      TherEx         SNAG   Active rotation x 20 each Active rotation x 20 each SNAG 5x ea, Active CS Rot 5x ea Active rotation x 20 each in semifowler;  Active CS Rot 5x ea    No SNAG since painfree rotation       Manual seated posture correction x 5 min Rev             Neuro Re-Ed         Chin tuck 2*10 into pillow semifowler,  2*10 supine into pillow 2*10 into pillow semifowler,  2*10 supine into pillow 2*10 into pillow semifowler 2*10 into pillow semifowler; x20 in seated 2x10 into pillow; x20 in seated 2x10 into pillow; x20 in seated   DNF lift         Postural strengthening  No money in supine (YTB) x20     No money in supine (YTB) x20 scap squeezes in seated x20 Scap sq x20 unsupported sitting Scap sq x20 unsupported sitting   Modified open book 2*10 b/l in semifowler 2*10 B/L in semifowler 2*10 B/L in semifowler 2*10 B/L in semifowler 2x10 ea 2x10 ea   Modified thoracic extension semifowler over foam roller 2*10 semifowler over half foam roller 2*10 Patient deferred indefinitely  Seated thoracic rotation x 10 each way    Seated thoracic ext over chair with half foam x20 Seated thoracic rotation x 10 each way    Seated thoracic ext over chair with half foam x20 Seated thoracic rotation x 10 each way    Seated thoracic ext over chair with half foam x20                              TherAct         Patient education                                             Gait Training                                    Modalities         Heat       x8 min post                  Daily Note     Today's date: 2022  Patient name: Kishore Correa  : 1940  MRN: [id number]  Referring provider: Cha North DO  Dx:   Encounter Diagnosis     ICD-10-CM    1  Neck pain  M54 2    2  Osteoarthritis of spine with radiculopathy, cervical region  M47 22        Start Time: 1500  Stop Time: 1545  Total time in clinic (min): 45 minutes    Subjective: Patient reports significant improvement in her neck and hand pain since last session  Objective: See treatment diary below      Assessment: Tolerated treatment fair  Continued with established POC since patient has seen significant improvement since beginning care  Discussed need for reassessment next session and potential for D/C if patient continues to have painfree motion and feels independent with HEP  Patient acknowledged understanding and will be prepared to discuss options next session  Patient demonstrated fatigue post treatment and would benefit from continued PT to improve functional mobility and posture in order to reduce pain and return to PLOF  Plan: Continue per plan of care  Reassessment next visit  Insurance:  AMA/CMS Eval/ Re-eval POC expires Jenifer Palm #/ Referral # Total units  Start date  Expiration date Extension  Visit limitation? PT only or  PT+OT?  Co-Insurance   CMS ([facility]) 10/11 1/3/23 34 No auth needed     BOMN                                                                  Date 10/11 10/13 10/17 10/20 10/25 10/27 11/1 11/8       Units:  Used 1 2 3 4 5 6 7 8       Authed:  Remaining                     Date               Units:  Used               Authed:  Remaining                  Precautions:   Past Medical History:   Diagnosis Date   • Arthritis     knees   • Diabetes mellitus ([facility] )     type 2   • GERD (gastroesophageal reflux disease)    • Hyperlipidemia    • Hypertension    • Stage 3a chronic kidney disease ([facility] ) 4/14/2022   • Urinary incontinence    • Use of cane as ambulatory aid    • Wears glasses        Date 10/17/22 10/20/22 10/25/22 10/27/22 11/1/22 11/8/22   Visit Number 3 4 5 6 7 8   FOTO Tracking    Follow-up #1     Manual         Cervical mobs Lateral glides lower cervical B/L x 5 min    UPA entire cspine gr 1-2B/L x 5 min         Cervical STM         Thoracic mobs         STM  B/L upper traps x 5 min B/L upper traps x 5 min B/L upper traps and cspine paraspinals x 10 min B/L upper traps and cspine paraspinals x 10 min B/L upper traps and cspine paraspinals x 10 min B/L upper traps and cspine paraspinals x 10 min     Manual seated posture correction x 5 min       TherEx         SNAG  Active rotation x 20 each Active rotation x 20 each SNAG 5x ea, Active CS Rot 5x ea Active rotation x 20 each in semifowler; Active CS Rot 5x ea    No SNAG since painfree rotation Active rotation x 20 each in semifowler;  Active CS Rot 5x ea    No SNAG since painfree rotation      Manual seated posture correction x 5 min Rev              Neuro Re-Ed         Chin tuck 2*10 into pillow semifowler,  2*10 supine into pillow 2*10 into pillow semifowler 2*10 into pillow semifowler; x20 in seated 2x10 into pillow; x20 in seated 2x10 into pillow; x20 in seated 2x10 into pillow; x20 in seated   DNF lift         Postural strengthening No money in supine (YTB) x20     No money in supine (YTB) x20 scap squeezes in seated x20 Scap sq x20 unsupported sitting Scap sq x20 unsupported sitting Scap sq x20 unsupported sitting   Modified open book 2*10 B/L in semifowler 2*10 B/L in semifowler 2*10 B/L in semifowler 2x10 ea 2x10 ea 2x10 ea   Modified thoracic extension semifowler over half foam roller 2*10 Patient deferred indefinitely  Seated thoracic rotation x 10 each way    Seated thoracic ext over chair with half foam x20 Seated thoracic rotation x 10 each way    Seated thoracic ext over chair with half foam x20 Seated thoracic rotation x 10 each way    Seated thoracic ext over chair with half foam x20 Seated thoracic rotation x 10 each way    Seated thoracic ext over chair with half foam x20                              TherAct         Patient education                                             Gait Training                                    Modalities         Heat      x8 min post

## 2022-11-10 ENCOUNTER — EVALUATION (OUTPATIENT)
Dept: PHYSICAL THERAPY | Facility: CLINIC | Age: 82
End: 2022-11-10

## 2022-11-10 DIAGNOSIS — M47.22 OSTEOARTHRITIS OF SPINE WITH RADICULOPATHY, CERVICAL REGION: ICD-10-CM

## 2022-11-10 DIAGNOSIS — M54.2 NECK PAIN: Primary | ICD-10-CM

## 2022-11-10 NOTE — PROGRESS NOTES
Daily Note     Today's date: 11/10/2022  Patient name: Alexsander Hdez  : 1940  MRN: [id number]  Referring provider: Rudi Crow DO  Dx:   Encounter Diagnosis     ICD-10-CM    1  Neck pain  M54 2    2  Osteoarthritis of spine with radiculopathy, cervical region  M47 22        Start Time: 1500  Stop Time: 1545  Total time in clinic (min): 45 minutes    Subjective: Patient reports improvement in neck pain, feeling 60% back to normal        Objective: See treatment diary below      Assessment: Tolerated treatment fair  Continued with established POC since patient has seen significant improvement since beginning care  Patient reported pain in Lt hand with bilateral open book  She was able to achieve near full painfree cervical rotation in both seated and supine positions which is significantly better than movement at IE  Her primary limitation remains muscular tightness/pain likely in relation to poor posture so finished session today with moist heat on Lt side neck musculature to reduce trigger points and relax muscles  Patient demonstrated fatigue post treatment and would benefit from continued PT to improve functional mobility and posture in order to reduce pain and return to PLOF  Plan: Continue per plan of care  Insurance:  AMA/CMS Eval/ Re-eval POC expires Blossom Fong #/ Referral # Total units  Start date  Expiration date Extension  Visit limitation? PT only or  PT+OT?  Co-Insurance   CMS ([facility]) 10/11 1/3/23 34 No auth needed     BOMN                                                                  Date 10/11 10/13 10/17 10/20 10/25 10/27 11/1        Units:  Used 1 2 3 4 5 6 7        Authed:  Remaining                     Date               Units:  Used               Authed:  Remaining                  Precautions:   Past Medical History:   Diagnosis Date   • Arthritis     knees   • Diabetes mellitus ([facility] [address] )     type 2   • GERD (gastroesophageal reflux disease)    • Hyperlipidemia    • Hypertension    • Stage 3a chronic kidney disease ([facility] [address] ) 4/14/2022   • Urinary incontinence    • Use of cane as ambulatory aid    • Wears glasses        Date 10/13/22 10/17/22 10/20/22 10/25/22 10/27/22 11/1/22   Visit Number 2 3 4 5 6 7   FOTO Tracking     Follow-up #1    Manual         Cervical mobs Lateral C3-C7 b/l 5 min Lateral glides lower cervical B/L x 5 min    UPA entire cspine gr 1-2B/L x 5 min        Cervical STM         Thoracic mobs         STM  B/l upper traps B/L upper traps x 5 min B/L upper traps x 5 min B/L upper traps and cspine paraspinals x 10 min B/L upper traps and cspine paraspinals x 10 min B/L upper traps and cspine paraspinals x 10 min      Manual seated posture correction x 5 min      TherEx         SNAG   Active rotation x 20 each Active rotation x 20 each SNAG 5x ea, Active CS Rot 5x ea Active rotation x 20 each in semifowler;  Active CS Rot 5x ea    No SNAG since painfree rotation       Manual seated posture correction x 5 min Rev             Neuro Re-Ed         Chin tuck 2*10 into pillow semifowler,  2*10 supine into pillow 2*10 into pillow semifowler,  2*10 supine into pillow 2*10 into pillow semifowler 2*10 into pillow semifowler; x20 in seated 2x10 into pillow; x20 in seated 2x10 into pillow; x20 in seated   DNF lift         Postural strengthening  No money in supine (YTB) x20     No money in supine (YTB) x20 scap squeezes in seated x20 Scap sq x20 unsupported sitting Scap sq x20 unsupported sitting   Modified open book 2*10 b/l in semifowler 2*10 B/L in semifowler 2*10 B/L in semifowler 2*10 B/L in semifowler 2x10 ea 2x10 ea   Modified thoracic extension semifowler over foam roller 2*10 semifowler over half foam roller 2*10 Patient deferred indefinitely  Seated thoracic rotation x 10 each way    Seated thoracic ext over chair with half foam x20 Seated thoracic rotation x 10 each way    Seated thoracic ext over chair with half foam x20 Seated thoracic rotation x 10 each way    Seated thoracic ext over chair with half foam x20                              TherAct         Patient education                                             Gait Training                                    Modalities         Heat       x8 min post                  Daily Note     Today's date: 11/10/2022  Patient name: Pia Hoover  : 1940  MRN: [id number]  Referring provider: Gilbert Santizo DO  Dx:   Encounter Diagnosis     ICD-10-CM    1  Neck pain  M54 2    2  Osteoarthritis of spine with radiculopathy, cervical region  M47 22        Start Time: 1500  Stop Time: 1545  Total time in clinic (min): 45 minutes    Subjective: Patient reports 60% improvement in her neck and hand symptoms  Her hand symptoms have significantly improved at rest, but she continues to have pain with gripping  Objective: See treatment diary below  Goals:  Short Term Goals (4 weeks):  MET all 11/10/22  1  Patient will be independent with basic HEP  2  Patient will report >50% reduction in pain  3  Patient will demonstrate >1/3 improvement in MMT grade as applicable  4  Patient will improve neck rotation and lateral flexion range of motion by 10%  5  Patient will be able to participate in primarily active activities throughout session without being limited by fatigue    Long Term Goals (8 weeks):  1  Patient will be independent in a comprehensive home exercise program PROGRESSING  2  Patient FOTO score will improve to 55/100 MET  3  Patient will self-report >75% improvement in function PROGRESSING  4  Patient will be able to turn head when driving without significant pain PROGRESSING  5  Patient will be able to perform all ADLs and household activities with <2/10 pain   PROGRESSING    Pain  Current: 0/10  At best: 0/10  At worst: 4-5/10     Physical Examination     Postural Assessment  Posture in Sitting: poor, improving awareness  Posture in Standing: poor  Postural Correction: has no consistent effect   Manual or self correction? self correction    UE AROM  Cervical Spine:   Flexion:  No limitation (100%) without pain  Extension:  No limitation (100%) without pain  Lt Lateral Flexion:  Minimally limited (75%) without pain  Rt Lateral Flexion:  Minimally limited (7%) without pain  Lt Rotation:  Minimally limited (75%) without pain  Rt Rotation:  Minimally limited (75%) without pain    Thoracic Spine: Moderate limitations in rotation and extension     LEFT  RIGHT     Shoulder Flexion: WNL  WNL     Shoulder Abduction: WNL  WNL        UE MMT  LEFT  RIGHT  Shoulder Shru/5  5/5  Shoulder Abduction:   4/5  3+/5  Shoulder Flexion:  4/5  4/5  Shoulder Extension:  45  4/5  Shoulder ER:   3+/5  3+/5  Shoulder IR:   3+/5  3+/5    Elbow Flexion:   45  4/5  Elbow Extension:    4/5    Thumb Extension:  5  4/5  Finger Adduction:   /5  4/5    Joint Play  C1: hypomobile  C2: hypomobile  C3: hypomobile  C4: hypomobile  C5: hypomobile  C6: hypomobile  C7: hypomobile  CTJ: hypomobile  Mid-Thoracic Spine: hypomobile  Lower Thoracic Spine: hypomobile    Palpation  (+) TTP of bilateral UT and cervical paraspinals    Special Tests Performed  (+): distraction   (-): Spurling's A for radicular symptoms      Assessment: Patient has attended 9 sessions of outpatient PT over 4 weeks  She has made notable improvements in painfree cervical range of motion and functional mobility  She is progressing appropriately and has met all short term goals at this point  She continues to be limited in her functional strength, segmental mobility, cervical end range mobility, and postural awareness  These impairments contribute to her continued functional deficits and difficulty performing both light and heavy household tasks  She would benefit from continued physical therapy at a frequency of 2x per week for 4 weeks in order to optimize functional mobility and strength  Plan: Continue per plan of care at 2x per week for 4 weeks   Progressively wean manuals  Insurance:  AMA/CMS Eval/ Re-eval POC expires  Mayor #/ Referral # Total units  Start date  Expiration date Extension  Visit limitation? PT only or  PT+OT? Co-Insurance   CMS ([facility]) 10/11 1/3/23 34 No auth needed     BOMN                                                                  Date 10/11 10/13 10/17 10/20 10/25 10/27 11/1 11/8 11/10      Units:  Used 1 2 3 4 5 6 7 8 9      Authed:  Remaining                     Date               Units:  Used               Authed:  Remaining                  Precautions:   Past Medical History:   Diagnosis Date   • Arthritis     knees   • Diabetes mellitus ([facility] )     type 2   • GERD (gastroesophageal reflux disease)    • Hyperlipidemia    • Hypertension    • Stage 3a chronic kidney disease ([facility] ) 4/14/2022   • Urinary incontinence    • Use of cane as ambulatory aid    • Wears glasses        Date 10/20/22 10/25/22 10/27/22 11/1/22 11/8/22 11/10/22   Visit Number 4 5 6 7 8 9   FOTO Tracking   Follow-up #1      Manual         Cervical mobs         Cervical STM         Thoracic mobs         STM  B/L upper traps x 5 min B/L upper traps and cspine paraspinals x 10 min B/L upper traps and cspine paraspinals x 10 min B/L upper traps and cspine paraspinals x 10 min B/L upper traps and cspine paraspinals x 10 min B/L upper traps and cspine paraspinals x 8 min    Manual seated posture correction x 5 min        TherEx         SNAG Active rotation x 20 each Active rotation x 20 each SNAG 5x ea, Active CS Rot 5x ea Active rotation x 20 each in semifowler; Active CS Rot 5x ea    No SNAG since painfree rotation Active rotation x 20 each in semifowler; Active CS Rot 5x ea    No SNAG since painfree rotation Active rotation x 20 each in semifowler;  Active CS Rot 5x ea     Manual seated posture correction x 5 min Rev               Neuro Re-Ed         Chin tuck 2*10 into pillow semifowler 2*10 into pillow semifowler; x20 in seated 2x10 into pillow; x20 in seated 2x10 into pillow; x20 in seated 2x10 into pillow; x20 in seated 2x10 into pillow; x20 in seated   DNF lift         Postural strengthening No money in supine (YTB) x20 scap squeezes in seated x20 Scap sq x20 unsupported sitting Scap sq x20 unsupported sitting Scap sq x20 unsupported sitting Scap sq x20 unsupported sitting   Modified open book 2*10 B/L in semifowler 2*10 B/L in semifowler 2x10 ea 2x10 ea 2x10 ea 2x10 ea   Modified thoracic extension Patient deferred indefinitely  Seated thoracic rotation x 10 each way    Seated thoracic ext over chair with half foam x20 Seated thoracic rotation x 10 each way    Seated thoracic ext over chair with half foam x20 Seated thoracic rotation x 10 each way    Seated thoracic ext over chair with half foam x20 Seated thoracic rotation x 10 each way    Seated thoracic ext over chair with half foam x20 Seated thoracic rotation x 10 each way    Seated thoracic ext over chair with half foam x20   Rows and extensions      x20 each (YTB)                     TherAct         Patient education      Reassess x 15 min                                       Gait Training                                    Modalities         Heat     x8 min post

## 2022-11-15 ENCOUNTER — OFFICE VISIT (OUTPATIENT)
Dept: PHYSICAL THERAPY | Facility: CLINIC | Age: 82
End: 2022-11-15

## 2022-11-15 DIAGNOSIS — M47.22 OSTEOARTHRITIS OF SPINE WITH RADICULOPATHY, CERVICAL REGION: ICD-10-CM

## 2022-11-15 DIAGNOSIS — M54.2 NECK PAIN: Primary | ICD-10-CM

## 2022-11-15 NOTE — PROGRESS NOTES
Daily Note     Today's date: 11/15/2022  Patient name: Juan Giordano  : 1940  MRN: [id number]  Referring provider: Nemo Michaud DO  Dx:   Encounter Diagnosis     ICD-10-CM    1  Neck pain  M54 2    2  Osteoarthritis of spine with radiculopathy, cervical region  M47 22        Start Time: 1500  Stop Time: 1545  Total time in clinic (min): 45 minutes    Subjective: Patient reports that she is concerned over her safety at home because of her fatigue and lessening functional mobility  She states that she is going to an elderly [place] office tomorrow to discuss possible transition into assisted living  Objective: See treatment diary below  Assessment: Tolerated session well  She is considering transitioning into assisted living facility due to difficulty living at home independently and worsening ability to drive  Despite this, she feels continued improvement in her neck  Added previous irritating activities attempted in previous session such as lateral flexion and no money with patient being able to perform all without pain  Patient would benefit from continued PT to improve functional mobility and promote transition into independent management through HEP  Plan: Continue per plan of care  Patient may need a hold depending on transportation/decision regarding          Insurance:  AMA/CMS Eval/ Re-eval POC expires Lin Townsend #/ Referral # Total units  Start date  Expiration date Extension  Visit limitation? PT only or  PT+OT?  Co-Insurance   CMS ([facility]) 10/11 1/3/23 34 No auth needed     BOMN                                                                  Date 10/11 10/13 10/17 10/20 10/25 10/27 11/1 11/8 11/10 11/15     Units:  Used 1 2 3 4 5 6 7 8 9 10     Authed:  Remaining                     Date               Units:  Used               Authed:  Remaining                  Precautions:   Past Medical History:   Diagnosis Date   • Arthritis     knees   • Diabetes mellitus ([facility] [address] ) type 2   • GERD (gastroesophageal reflux disease)    • Hyperlipidemia    • Hypertension    • Stage 3a chronic kidney disease ([facility] [address] ) 4/14/2022   • Urinary incontinence    • Use of cane as ambulatory aid    • Wears glasses        Date 10/25/22 10/27/22 11/1/22 11/8/22 11/10/22 11/15/22   Visit Number 5 6 7 8 9 10   FOTO Tracking  Follow-up #1       Manual         Cervical mobs         Cervical STM         Thoracic mobs         STM  B/L upper traps and cspine paraspinals x 10 min B/L upper traps and cspine paraspinals x 10 min B/L upper traps and cspine paraspinals x 10 min B/L upper traps and cspine paraspinals x 10 min B/L upper traps and cspine paraspinals x 8 min B/L upper traps and cspine paraspinals x 5 min            TherEx         SNAG Active rotation x 20 each SNAG 5x ea, Active CS Rot 5x ea Active rotation x 20 each in semifowler; Active CS Rot 5x ea    No SNAG since painfree rotation Active rotation x 20 each in semifowler; Active CS Rot 5x ea    No SNAG since painfree rotation Active rotation x 20 each in semifowler;  Active CS Rot 5x ea Active rotation (supine and seated) x 20 each    Lateral flexion (supine) x 20 each     Manual seated posture correction x 5 min Rev                Neuro Re-Ed         Chin tuck 2*10 into pillow semifowler; x20 in seated 2x10 into pillow; x20 in seated 2x10 into pillow; x20 in seated 2x10 into pillow; x20 in seated 2x10 into pillow; x20 in seated 2x10 into pillow; x20 in seated   DNF lift         Postural strengthening scap squeezes in seated x20 Scap sq x20 unsupported sitting Scap sq x20 unsupported sitting Scap sq x20 unsupported sitting Scap sq x20 unsupported sitting Scap sq x20 unsupported sitting   Modified open book 2*10 B/L in semifowler 2x10 ea 2x10 ea 2x10 ea 2x10 ea 2x10 ea   Modified thoracic extension Seated thoracic rotation x 10 each way    Seated thoracic ext over chair with half foam x20 Seated thoracic rotation x 10 each way    Seated thoracic ext over chair with half foam x20 Seated thoracic rotation x 10 each way    Seated thoracic ext over chair with half foam x20 Seated thoracic rotation x 10 each way    Seated thoracic ext over chair with half foam x20 Seated thoracic rotation x 10 each way    Seated thoracic ext over chair with half foam x20 Seated thoracic rotation x 10 each way    Seated thoracic ext over chair with half foam x20   Rows and extensions     x20 each (YTB) x20 each (YTB)         No money x20 (YTB)            TherAct         Patient education     Reassess x 15 min                                        Gait Training                                    Modalities         Heat    x8 min post   x8 min post

## 2022-11-17 ENCOUNTER — OFFICE VISIT (OUTPATIENT)
Dept: PHYSICAL THERAPY | Facility: CLINIC | Age: 82
End: 2022-11-17

## 2022-11-17 DIAGNOSIS — M47.22 OSTEOARTHRITIS OF SPINE WITH RADICULOPATHY, CERVICAL REGION: ICD-10-CM

## 2022-11-17 DIAGNOSIS — M54.2 NECK PAIN: Primary | ICD-10-CM

## 2022-11-17 NOTE — PROGRESS NOTES
Daily Note     Today's date: 2022  Patient name: Pam Hernández  : 1940  MRN: [id number]  Referring provider: Adair Reid DO  Dx:   Encounter Diagnosis     ICD-10-CM    1  Neck pain  M54 2       2  Osteoarthritis of spine with radiculopathy, cervical region  M47 22                      Subjective: Patient reports that she had a meeting at an elderly support office yesterday and was given a lot of information regarding options for assisted living, but she will have to take the time to go through all the information and make the calls herself  Objective: See treatment diary below  Assessment: Patient tolerated session well today  Demonstrated difficulty with proper mechanics for lateral flexion in supine position, despite use of verbal and tactile cueing  Encouraged isometric holds with postural strengthening exercises today to maximize neuromuscular control  Improved mechanics for seated chin tucks with use of finger as tactile cue  Patient would benefit from continued PT to improve functional mobility and promote transition into independent management through HEP  Plan: Continue per plan of care  Patient may need a hold depending on transportation/decision regarding          Insurance:  AMA/CMS Eval/ Re-eval POC expires Solange Keene #/ Referral # Total units  Start date  Expiration date Extension  Visit limitation? PT only or  PT+OT?  Co-Insurance   CMS ([facility]) 10/11 1/3/23 34 No auth needed     BOMN                                                                  Date 10/11 10/13 10/17 10/20 10/25 10/27 11/1 11/8 11/10 11/15 11/17    Units:  Used 1 2 3 4 5 6 7 8 9 10 11    Authed:  Remaining                     Date               Units:  Used               Authed:  Remaining                  Precautions:   Past Medical History:   Diagnosis Date   • Arthritis     knees   • Diabetes mellitus ([facility] [address] )     type 2   • GERD (gastroesophageal reflux disease)    • Hyperlipidemia    • Hypertension    • Stage 3a chronic kidney disease ([facility] [address] ) 4/14/2022   • Urinary incontinence    • Use of cane as ambulatory aid    • Wears glasses        Date 10/25/22 10/27/22 11/1/22 11/8/22 11/10/22 11/15/22 11/17/22   Visit Number 5 6 7 8 9 10 11   FOTO Tracking  Follow-up #1        Manual          Cervical mobs          Cervical STM          Thoracic mobs          STM  B/L upper traps and cspine paraspinals x 10 min B/L upper traps and cspine paraspinals x 10 min B/L upper traps and cspine paraspinals x 10 min B/L upper traps and cspine paraspinals x 10 min B/L upper traps and cspine paraspinals x 8 min B/L upper traps and cspine paraspinals x 5 min B/L upper traps and cspine paraspinals x 5 min             TherEx          SNAG Active rotation x 20 each SNAG 5x ea, Active CS Rot 5x ea Active rotation x 20 each in semifowler; Active CS Rot 5x ea    No SNAG since painfree rotation Active rotation x 20 each in semifowler; Active CS Rot 5x ea    No SNAG since painfree rotation Active rotation x 20 each in semifowler;  Active CS Rot 5x ea Active rotation (supine and seated) x 20 each    Lateral flexion (supine) x 20 each  Active rotation (supine and seated) x 20 each    Lateral flexion (supine) x 20    Manual seated posture correction x 5 min Rev                  Neuro Re-Ed          Chin tuck 2*10 into pillow semifowler; x20 in seated 2x10 into pillow; x20 in seated 2x10 into pillow; x20 in seated 2x10 into pillow; x20 in seated 2x10 into pillow; x20 in seated 2x10 into pillow; x20 in seated 2x10 into pillow; x20 in seated   DNF lift          Postural strengthening scap squeezes in seated x20 Scap sq x20 unsupported sitting Scap sq x20 unsupported sitting Scap sq x20 unsupported sitting Scap sq x20 unsupported sitting Scap sq x20 unsupported sitting Scap sq x20 unsupported sitting   Modified open book 2*10 B/L in semifowler 2x10 ea 2x10 ea 2x10 ea 2x10 ea 2x10 ea 2x10 ea   Modified thoracic extension Seated thoracic rotation x 10 each way    Seated thoracic ext over chair with half foam x20 Seated thoracic rotation x 10 each way    Seated thoracic ext over chair with half foam x20 Seated thoracic rotation x 10 each way    Seated thoracic ext over chair with half foam x20 Seated thoracic rotation x 10 each way    Seated thoracic ext over chair with half foam x20 Seated thoracic rotation x 10 each way    Seated thoracic ext over chair with half foam x20 Seated thoracic rotation x 10 each way    Seated thoracic ext over chair with half foam x20 Seated thoracic rotation x 10 each way    Seated thoracic ext over chair with half foam x20   Rows and extensions     x20 each (YTB) x20 each (YTB) x20 each (YTB)         No money x20 (YTB) No money x20 (YTB)             TherAct          Patient education     Reassess x 15 min                                             Gait Training                                        Modalities          Heat    x8 min post   x8 min post x8 min post

## 2022-11-22 ENCOUNTER — OFFICE VISIT (OUTPATIENT)
Dept: PHYSICAL THERAPY | Facility: CLINIC | Age: 82
End: 2022-11-22

## 2022-11-22 DIAGNOSIS — M54.2 NECK PAIN: Primary | ICD-10-CM

## 2022-11-22 DIAGNOSIS — M47.22 OSTEOARTHRITIS OF SPINE WITH RADICULOPATHY, CERVICAL REGION: ICD-10-CM

## 2022-11-22 NOTE — PROGRESS NOTES
Daily Note     Today's date: 2022  Patient name: Ajit Hall  : 1940  MRN: [id number]  Referring provider: Tiffanie Rose DO  Dx:   Encounter Diagnosis     ICD-10-CM    1  Neck pain  M54 2       2  Osteoarthritis of spine with radiculopathy, cervical region  M47 22           Start Time: 1500  Stop Time: 1545  Total time in clinic (min): 45 minutes    Subjective: Patient reports that she has not made progress in terms of transitioning into assisted living  She states that she has not issues with her neck currently  Objective: See treatment diary below  Assessment: Patient tolerated session well today  Patient continues to have difficulty with postural awareness throughout activities, but this is improving as more sessions occur  Patient's neck pain continues to be well managed by current POC  Patient would benefit from continued PT to improve functional mobility and promote transition into independent management through HEP  Plan: Continue per plan of care  Reduce frequency to 1x per week for 2-3 weeks to promote carryover into independent management of HEP  Insurance:  AMA/CMS Eval/ Re-eval POC expires Jeremiah Cheng #/ Referral # Total units  Start date  Expiration date Extension  Visit limitation? PT only or  PT+OT?  Co-Insurance   CMS ([facility]) 10/11 1/3/23 34 No auth needed     BOMN                                                                  Date 10/11 10/13 10/17 10/20 10/25 10/27 11/1 11/8 11/10 11/15 11/17 11/22   Units:  Used 1 2 3 4 5 6 7 8 9 10 11 12   Authed:  Remaining                     Date               Units:  Used               Authed:  Remaining                  Precautions:   Past Medical History:   Diagnosis Date   • Arthritis     knees   • Diabetes mellitus ([facility] [address] )     type 2   • GERD (gastroesophageal reflux disease)    • Hyperlipidemia    • Hypertension    • Stage 3a chronic kidney disease ([facility] [address] ) 2022   • Urinary incontinence    • Use of cane as ambulatory aid    • Wears glasses        Date 11/1/22 11/8/22 11/10/22 11/15/22 11/17/22 11/22/22   Visit Number 7 8 9 10 11 12   FOTO Tracking         Manual         Cervical mobs         Cervical STM         Thoracic mobs         STM  B/L upper traps and cspine paraspinals x 10 min B/L upper traps and cspine paraspinals x 10 min B/L upper traps and cspine paraspinals x 8 min B/L upper traps and cspine paraspinals x 5 min B/L upper traps and cspine paraspinals x 5 min B/L upper traps and cspine paraspinals x 5 min            TherEx         SNAG Active rotation x 20 each in semifowler; Active CS Rot 5x ea    No SNAG since painfree rotation Active rotation x 20 each in semifowler; Active CS Rot 5x ea    No SNAG since painfree rotation Active rotation x 20 each in semifowler;  Active CS Rot 5x ea Active rotation (supine and seated) x 20 each    Lateral flexion (supine) x 20 each  Active rotation (supine and seated) x 20 each    Lateral flexion (supine) x 20 Active rotation (supine and seated) x 20 each    Lateral flexion (supine) x 20                     Neuro Re-Ed         Chin tuck 2x10 into pillow; x20 in seated 2x10 into pillow; x20 in seated 2x10 into pillow; x20 in seated 2x10 into pillow; x20 in seated 2x10 into pillow; x20 in seated 2x10 into pillow; x20 in seated   DNF lift         Postural strengthening Scap sq x20 unsupported sitting Scap sq x20 unsupported sitting Scap sq x20 unsupported sitting Scap sq x20 unsupported sitting Scap sq x20 unsupported sitting Scap sq and shrug x20 each unsupported sitting   Modified open book 2x10 ea 2x10 ea 2x10 ea 2x10 ea 2x10 ea 2x10 ea   Modified thoracic extension Seated thoracic rotation x 10 each way    Seated thoracic ext over chair with half foam x20 Seated thoracic rotation x 10 each way    Seated thoracic ext over chair with half foam x20 Seated thoracic rotation x 10 each way    Seated thoracic ext over chair with half foam x20 Seated thoracic rotation x 10 each way    Seated thoracic ext over chair with half foam x20 Seated thoracic rotation x 10 each way    Seated thoracic ext over chair with half foam x20 Seated thoracic rotation x 10 each way    Seated thoracic ext over chair with half foam x20   Rows and extensions   x20 each (YTB) x20 each (YTB) x20 each (YTB) x20 each (YTB)       No money x20 (YTB) No money x20 (YTB) No money x20 (YTB)            TherAct         Patient education   Reassess x 15 min                                          Gait Training                                    Modalities         Heat  x8 min post   x8 min post x8 min post

## 2022-11-28 ENCOUNTER — RA CDI HCC (OUTPATIENT)
Dept: OTHER | Facility: HOSPITAL | Age: 82
End: 2022-11-28

## 2022-11-28 NOTE — PROGRESS NOTES
E11 22  E66 01  [facility]  coding opportunities          Chart Reviewed number of suggestions sent to Provider: 2     Patients Insurance     Medicare Insurance: [employer]

## 2022-12-01 ENCOUNTER — OFFICE VISIT (OUTPATIENT)
Dept: FAMILY MEDICINE CLINIC | Facility: CLINIC | Age: 82
End: 2022-12-01

## 2022-12-01 VITALS
WEIGHT: 187.5 LBS | HEIGHT: 61 IN | OXYGEN SATURATION: 99 % | SYSTOLIC BLOOD PRESSURE: 102 MMHG | DIASTOLIC BLOOD PRESSURE: 60 MMHG | RESPIRATION RATE: 18 BRPM | BODY MASS INDEX: 35.4 KG/M2 | HEART RATE: 62 BPM | TEMPERATURE: 97.4 F

## 2022-12-01 DIAGNOSIS — M25.541 PAIN OF JOINT OF BOTH HANDS: ICD-10-CM

## 2022-12-01 DIAGNOSIS — E78.5 HYPERLIPIDEMIA, UNSPECIFIED HYPERLIPIDEMIA TYPE: ICD-10-CM

## 2022-12-01 DIAGNOSIS — H35.30 MACULAR DEGENERATION OF LEFT EYE, UNSPECIFIED TYPE: ICD-10-CM

## 2022-12-01 DIAGNOSIS — M25.542 PAIN OF JOINT OF BOTH HANDS: ICD-10-CM

## 2022-12-01 DIAGNOSIS — M19.90 ARTHRITIS: Primary | ICD-10-CM

## 2022-12-01 DIAGNOSIS — M79.2 NEUROPATHIC PAIN: ICD-10-CM

## 2022-12-01 PROBLEM — I48.20 CHRONIC ATRIAL FIBRILLATION (HCC): Status: ACTIVE | Noted: 2022-12-01

## 2022-12-01 NOTE — PROGRESS NOTES
[facility] Office visit    Assessment/Plan:     1  Arthritis  Comments:  R knee pain and L knee s/p total knee replacement  Pain in neck and hands has resolved with physical therapy and discontinuation of gabapentin  -c/w PT     2  Pain of joint of both hands  Comments:  Neuropathic pain in  both hands has resolved with PT and discontinuation of gabapentin  Patient has been compliant with metformin and Jardiance  3  Hyperlipidemia, unspecified hyperlipidemia type    4  Neuropathic pain  -     Diclofenac Sodium (VOLTAREN) 1 %; Apply 2 g topically 4 (four) times a day    5  Macular degeneration of left eye, unspecified type  Comments:  Pt has been in a trial for a dry macular degeneration medication for 10 years  Recently feels as though her vision is worsening  No follow-ups on file  Subjective:   JAIME Peguero is a 79 y o  female presenting for follow up of chronic conditions  Patient noted that the neck pain and numbness/tingling she was experiencing on her hands has resolved with physical therapy and discontinuation of gabapentin  Patient also noted that her dry macular degeneration has worsened recently  She has been on a trial for an oral medication through her eye doctor for almost 10 years  Patient has been researching different assisted living facilities with her family because she does not feel comfortable living alone anymore  Review of Systems   Constitutional: Positive for fatigue  Negative for activity change, appetite change and fever  HENT: Negative for postnasal drip and rhinorrhea  Eyes: Negative  Respiratory: Negative for cough and shortness of breath  Cardiovascular: Negative for chest pain and leg swelling  Gastrointestinal: Negative for constipation, diarrhea, nausea and vomiting  Endocrine: Negative  Genitourinary: Negative  Musculoskeletal: Positive for arthralgias (R knee pain  L knee s/p total knee replacement)   Negative for joint swelling, neck pain and neck stiffness  Allergic/Immunologic: Negative  Neurological: Negative  Negative for light-headedness and headaches  Hematological: Negative  Psychiatric/Behavioral: Negative  Objective:     /60 (BP Location: Left arm, Patient Position: Sitting, Cuff Size: Large)   Pulse 62   Temp (!) 97 4 °F (36 3 °C) (Tympanic)   Resp 18   Ht 5' 1" (1 549 m)   Wt 85 kg (187 lb 8 oz)   SpO2 99%   BMI 35 43 kg/m²      Physical Exam  Vitals and nursing note reviewed  HENT:      Head: Normocephalic and atraumatic  Nose: Nose normal       Mouth/Throat:      Mouth: Mucous membranes are moist       Pharynx: Oropharynx is clear  Eyes:      Extraocular Movements: Extraocular movements intact  Conjunctiva/sclera: Conjunctivae normal       Pupils: Pupils are equal, round, and reactive to light  Cardiovascular:      Rate and Rhythm: Normal rate and regular rhythm  Pulses: Normal pulses  Heart sounds: Normal heart sounds  No murmur heard  Pulmonary:      Effort: Pulmonary effort is normal       Breath sounds: Normal breath sounds  Abdominal:      General: Abdomen is flat  Bowel sounds are normal  There is no distension  Palpations: Abdomen is soft  Tenderness: There is no abdominal tenderness  Musculoskeletal:         General: Tenderness (b/l shin and calf tenderness ) present  No swelling  Normal range of motion  Cervical back: Normal range of motion and neck supple  Skin:     General: Skin is warm and dry  Findings: No erythema or rash  Neurological:      General: No focal deficit present  Mental Status: She is alert and oriented to person, place, and time     Psychiatric:         Mood and Affect: Mood normal          Behavior: Behavior normal         ** Please Note: This note has been constructed using a voice recognition system **     Nelson Parish MD  12/01/22  5:13 PM

## 2022-12-02 DIAGNOSIS — E11.42 TYPE 2 DIABETES MELLITUS WITH DIABETIC POLYNEUROPATHY, WITHOUT LONG-TERM CURRENT USE OF INSULIN (HCC): ICD-10-CM

## 2022-12-06 ENCOUNTER — OFFICE VISIT (OUTPATIENT)
Dept: PHYSICAL THERAPY | Facility: CLINIC | Age: 82
End: 2022-12-06

## 2022-12-06 DIAGNOSIS — M47.22 OSTEOARTHRITIS OF SPINE WITH RADICULOPATHY, CERVICAL REGION: ICD-10-CM

## 2022-12-06 DIAGNOSIS — M54.2 NECK PAIN: Primary | ICD-10-CM

## 2022-12-06 NOTE — PROGRESS NOTES
Daily Note     Today's date: 2022  Patient name: Ghulam Frye  : 1940  MRN: [id number]  Referring provider: Cayla Palma DO  Dx:   Encounter Diagnosis     ICD-10-CM    1  Neck pain  M54 2       2  Osteoarthritis of spine with radiculopathy, cervical region  M47 22           Start Time: 1500  Stop Time: 1545  Total time in clinic (min): 45 minutes    Subjective: Patient reports that she is making progress with transitioning into assisted living and the "ball is rolling in the right direction"  She states that she does not have any pain in her neck currently and has been doing well since she was last at PT  Objective: See treatment diary below  Assessment: Patient tolerated session well today  Weaned manuals this session with patient demonstrating continued carryover in improvement between sessions  Advanced UE strengthening activities in order to further improve functional strength in order to complete functional tasks and maintain independence as long as possible  Patient would benefit from continued PT to improve functional mobility and promote transition into independent management through HEP  Plan: Continue per plan of care  Continue at reduced frequency of 1x per week for the month of December to promote carryover into independent management of HEP  Reassess next session  Insurance:  AMA/CMS Eval/ Re-eval POC expires Dannie Alonso #/ Referral # Total units  Start date  Expiration date Extension  Visit limitation? PT only or  PT+OT?  Co-Insurance   CMS ([facility]) 10/11 1/3/23 34 No auth needed     BOMN                                                                  Date 10/11 10/13 10/17 10/20 10/25 10/27 11/1 11/8 11/10 11/15 11/17 11/22   Units:  Used 1 2 3 4 5 6 7 8 9 10 11 12   Authed:  Remaining                     Date               Units:  Used 13              Authed:  Remaining                  Precautions:   Past Medical History:   Diagnosis Date   • Arthritis     knees   • Diabetes mellitus ([facility] )     type 2   • GERD (gastroesophageal reflux disease)    • Hyperlipidemia    • Hypertension    • Stage 3a chronic kidney disease ([place] [id number] ) 4/14/2022   • Urinary incontinence    • Use of cane as ambulatory aid    • Wears glasses        Date 11/8/22 11/10/22 11/15/22 11/17/22 11/22/22 12/6/22   Visit Number 8 9 10 11 12 13   FOTO Tracking         Manual         Cervical mobs         Cervical STM         Thoracic mobs         STM  B/L upper traps and cspine paraspinals x 10 min B/L upper traps and cspine paraspinals x 8 min B/L upper traps and cspine paraspinals x 5 min B/L upper traps and cspine paraspinals x 5 min B/L upper traps and cspine paraspinals x 5 min             TherEx         SNAG Active rotation x 20 each in semifowler; Active CS Rot 5x ea    No SNAG since painfree rotation Active rotation x 20 each in semifowler;  Active CS Rot 5x ea Active rotation (supine and seated) x 20 each    Lateral flexion (supine) x 20 each  Active rotation (supine and seated) x 20 each    Lateral flexion (supine) x 20 Active rotation (supine and seated) x 20 each    Lateral flexion (supine) x 20 Active rotation (supine and seated) x 20 each    Lateral flexion (supine) x 20   Biceps curls      x10 (3#)            Neuro Re-Ed         Chin tuck 2x10 into pillow; x20 in seated 2x10 into pillow; x20 in seated 2x10 into pillow; x20 in seated 2x10 into pillow; x20 in seated 2x10 into pillow; x20 in seated 2x10 into pillow; x20 in seated   DNF lift         Postural strengthening Scap sq x20 unsupported sitting Scap sq x20 unsupported sitting Scap sq x20 unsupported sitting Scap sq x20 unsupported sitting Scap sq and shrug x20 each unsupported sitting Scap sq and shrug x20 each unsupported sitting   Modified open book 2x10 ea 2x10 ea 2x10 ea 2x10 ea 2x10 ea 2x10 ea   Modified thoracic extension Seated thoracic rotation x 10 each way    Seated thoracic ext over chair with half foam x20 Seated thoracic rotation x 10 each way    Seated thoracic ext over chair with half foam x20 Seated thoracic rotation x 10 each way    Seated thoracic ext over chair with half foam x20 Seated thoracic rotation x 10 each way    Seated thoracic ext over chair with half foam x20 Seated thoracic rotation x 10 each way    Seated thoracic ext over chair with half foam x20 Seated thoracic rotation x 10 each way    Seated thoracic ext over chair with half foam x20   Rows and extensions  x20 each (YTB) x20 each (YTB) x20 each (YTB) x20 each (YTB) x20 each (RTB)      No money x20 (YTB) No money x20 (YTB) No money x20 (YTB) No money x20 (RTB)    Horizontal abduction x20 (RTB)   Shoulder flexion      Supine shoulder flexion 2x10 (weighted stick)            TherAct         Patient education  Reassess x 15 min                                           Gait Training                                    Modalities         Heat    x8 min post x8 min post

## 2022-12-13 ENCOUNTER — EVALUATION (OUTPATIENT)
Dept: PHYSICAL THERAPY | Facility: CLINIC | Age: 82
End: 2022-12-13

## 2022-12-13 DIAGNOSIS — M47.22 OSTEOARTHRITIS OF SPINE WITH RADICULOPATHY, CERVICAL REGION: ICD-10-CM

## 2022-12-13 DIAGNOSIS — M54.2 NECK PAIN: Primary | ICD-10-CM

## 2022-12-13 NOTE — PROGRESS NOTES
Daily Note     Today's date: 2022  Patient name: Burney Oppenheim  : 1940  MRN: [id number]  Referring provider: Kaila Keane DO  Dx:   Encounter Diagnosis     ICD-10-CM    1  Neck pain  M54 2       2  Osteoarthritis of spine with radiculopathy, cervical region  M47 22           Start Time: 1500  Stop Time: 1545  Total time in clinic (min): 45 minutes    Subjective: Patient reports that she would like today to be her last appt as she has no pain  She states 100% improvement in symptoms and return to PLOF  She also says that she will be moving to [place] in the new year to live with family as she no longer feels independent enough to live alone  Goals:  Short Term Goals (4 weeks):  MET all 11/10/22  1  Patient will be independent with basic HEP  2  Patient will report >50% reduction in pain  3  Patient will demonstrate >1/3 improvement in MMT grade as applicable  4  Patient will improve neck rotation and lateral flexion range of motion by 10%  5  Patient will be able to participate in primarily active activities throughout session without being limited by fatigue    Long Term Goals (8 weeks): MET all 22  1  Patient will be independent in a comprehensive home exercise program   2  Patient FOTO score will improve to 55/100   3  Patient will self-report >75% improvement in function   4  Patient will be able to turn head when driving without significant pain   5  Patient will be able to perform all ADLs and household activities with <2/10 pain  Pain  Current: 0/10  At best: 0/10  At worst: 0/10     Objective: See treatment diary below  Reassessment performed today        Postural Assessment  Posture in Sitting: improved  Posture in Standing: improved  Postural Correction: has no consistent effect   Manual or self correction? self correction    UE AROM  Cervical Spine:   Flexion:  No limitation (100%) without pain  Extension:  No limitation (100%) without pain  Lt Lateral Flexion: Minimally limited (75%) without pain  Rt Lateral Flexion:  Minimally limited (7%) without pain  Lt Rotation:  Minimally limited (75%) without pain  Rt Rotation:  Minimally limited (75%) without pain    Thoracic Spine: Moderate limitations in rotation and extension     LEFT  RIGHT     Shoulder Flexion: WNL  WNL     Shoulder Abduction: WNL  WNL        UE MMT  LEFT  RIGHT  Shoulder Shru/5  5/5  Shoulder Abduction:   4/  4/5  Shoulder Flexion:  4/  4/5  Shoulder Extension:    4/5  Shoulder ER:     4/5  Shoulder IR:     4/5    Elbow Flexion:   4+/5  4+/5  Elbow Extension:    4/5    Thumb Extension:    5/5  Finger Adduction:     5/5    Joint Play  C1: hypomobile  C2: hypomobile  C3: hypomobile  C4: hypomobile  C5: hypomobile  C6: hypomobile  C7: hypomobile  CTJ: hypomobile  Mid-Thoracic Spine: hypomobile  Lower Thoracic Spine: hypomobile    Palpation  No TTP noted    Assessment: Patient has attended 14 sessions over 9 weeks  She has made notable improvements in painfree cervical mobility as well as functional UE strength and postural awareness  She has met all goals established at beginning of plan of care  At this time, patient is independent with HEP and would benefit from D/C from skilled PT at this time  Plan: D/C skilled PT at this time  Insurance:  AMA/CMS Eval/ Re-eval POC expires [employer] #/ Referral # Total units  Start date  Expiration date Extension  Visit limitation? PT only or  PT+OT?  Co-Insurance   CMS ([facility]) 10/11 1/3/23 34 No auth needed     BOMN                                                                  Date 10/11 10/13 10/17 10/20 10/25 10/27 11/1 11/8 11/10 11/15 11/17 11/22   Units:  Used 1 2 3 4 5 6 7 8 9 10 11 12   Authed:  Remaining                     Date              Units:  Used              Authed:  Remaining                  Precautions:   Past Medical History:   Diagnosis Date   • Arthritis     knees   • Diabetes mellitus ([facility] [address] ) type 2   • GERD (gastroesophageal reflux disease)    • Hyperlipidemia    • Hypertension    • Stage 3a chronic kidney disease ([facility] [address] ) 4/14/2022   • Urinary incontinence    • Use of cane as ambulatory aid    • Wears glasses        Date 11/10/22 11/15/22 11/17/22 11/22/22 12/6/22 12/13/22   Visit Number 9 10 11 12 13 14   FOTO Tracking         Manual         Cervical mobs         Cervical STM         Thoracic mobs         STM  B/L upper traps and cspine paraspinals x 8 min B/L upper traps and cspine paraspinals x 5 min B/L upper traps and cspine paraspinals x 5 min B/L upper traps and cspine paraspinals x 5 min              TherEx         SNAG Active rotation x 20 each in semifowler;  Active CS Rot 5x ea Active rotation (supine and seated) x 20 each    Lateral flexion (supine) x 20 each  Active rotation (supine and seated) x 20 each    Lateral flexion (supine) x 20 Active rotation (supine and seated) x 20 each    Lateral flexion (supine) x 20 Active rotation (supine and seated) x 20 each    Lateral flexion (supine) x 20 Active rotation (supine and seated) x 20 each    Lateral flexion (supine) x 20   Biceps curls     x10 (3#) 2x10 (3#)            Neuro Re-Ed         Chin tuck 2x10 into pillow; x20 in seated 2x10 into pillow; x20 in seated 2x10 into pillow; x20 in seated 2x10 into pillow; x20 in seated 2x10 into pillow; x20 in seated 2x10 into pillow; x20 in seated   DNF lift         Postural strengthening Scap sq x20 unsupported sitting Scap sq x20 unsupported sitting Scap sq x20 unsupported sitting Scap sq and shrug x20 each unsupported sitting Scap sq and shrug x20 each unsupported sitting Scap sq and shrug x20 each unsupported sitting   Modified open book 2x10 ea 2x10 ea 2x10 ea 2x10 ea 2x10 ea 2x10 ea   Modified thoracic extension Seated thoracic rotation x 10 each way    Seated thoracic ext over chair with half foam x20 Seated thoracic rotation x 10 each way    Seated thoracic ext over chair with half foam x20 Seated thoracic rotation x 10 each way    Seated thoracic ext over chair with half foam x20 Seated thoracic rotation x 10 each way    Seated thoracic ext over chair with half foam x20 Seated thoracic rotation x 10 each way    Seated thoracic ext over chair with half foam x20 Seated thoracic rotation x 10 each way    Seated thoracic ext over chair with half foam x20   Rows and extensions x20 each (YTB) x20 each (YTB) x20 each (YTB) x20 each (YTB) x20 each (RTB) x20 each (RTB)     No money x20 (YTB) No money x20 (YTB) No money x20 (YTB) No money x20 (RTB)    Horizontal abduction x20 (RTB) No money x20 (RTB)    Horizontal abduction x20 (RTB)   Shoulder flexion     Supine shoulder flexion 2x10 (weighted stick) Supine shoulder flexion 2x10 (weighted stick)            TherAct         Patient education Reassess x 15 min     Reassess x 10 min                                       Gait Training                                    Modalities         Heat   x8 min post x8 min post

## 2022-12-21 ENCOUNTER — APPOINTMENT (OUTPATIENT)
Dept: PHYSICAL THERAPY | Facility: CLINIC | Age: 82
End: 2022-12-21

## 2022-12-27 ENCOUNTER — APPOINTMENT (OUTPATIENT)
Dept: PHYSICAL THERAPY | Facility: CLINIC | Age: 82
End: 2022-12-27

## 2023-01-11 DIAGNOSIS — E11.40 TYPE 2 DIABETES MELLITUS WITH DIABETIC NEUROPATHY, WITHOUT LONG-TERM CURRENT USE OF INSULIN (HCC): ICD-10-CM

## 2023-01-11 DIAGNOSIS — I51.89 GRADE II DIASTOLIC DYSFUNCTION: ICD-10-CM

## 2023-01-11 DIAGNOSIS — I48.0 PAROXYSMAL ATRIAL FIBRILLATION (HCC): ICD-10-CM

## 2023-01-19 DIAGNOSIS — I10 ESSENTIAL HYPERTENSION: ICD-10-CM

## 2023-01-19 DIAGNOSIS — E78.2 MIXED HYPERLIPIDEMIA: ICD-10-CM

## 2023-01-19 RX ORDER — ATORVASTATIN CALCIUM 40 MG/1
40 TABLET, FILM COATED ORAL DAILY
Qty: 90 TABLET | Refills: 1 | Status: SHIPPED | OUTPATIENT
Start: 2023-01-19

## 2023-01-19 RX ORDER — LISINOPRIL 10 MG/1
10 TABLET ORAL DAILY
Qty: 90 TABLET | Refills: 1 | Status: SHIPPED | OUTPATIENT
Start: 2023-01-19

## 2023-01-24 DIAGNOSIS — R60.0 EDEMA OF LEFT LOWER EXTREMITY: ICD-10-CM

## 2023-01-24 DIAGNOSIS — N18.31 STAGE 3A CHRONIC KIDNEY DISEASE (HCC): ICD-10-CM

## 2023-01-24 DIAGNOSIS — I51.89 GRADE II DIASTOLIC DYSFUNCTION: ICD-10-CM

## 2023-01-24 DIAGNOSIS — R79.89 ELEVATED BRAIN NATRIURETIC PEPTIDE (BNP) LEVEL: ICD-10-CM

## 2023-01-24 DIAGNOSIS — E11.40 TYPE 2 DIABETES MELLITUS WITH DIABETIC NEUROPATHY, WITHOUT LONG-TERM CURRENT USE OF INSULIN (HCC): ICD-10-CM

## 2023-01-24 DIAGNOSIS — I48.0 PAROXYSMAL ATRIAL FIBRILLATION (HCC): ICD-10-CM

## 2023-01-25 RX ORDER — EMPAGLIFLOZIN 10 MG/1
TABLET, FILM COATED ORAL
Qty: 30 TABLET | Refills: 6 | Status: SHIPPED | OUTPATIENT
Start: 2023-01-25

## 2023-02-06 ENCOUNTER — OFFICE VISIT (OUTPATIENT)
Dept: FAMILY MEDICINE CLINIC | Facility: CLINIC | Age: 83
End: 2023-02-06

## 2023-02-06 VITALS
HEART RATE: 75 BPM | HEIGHT: 61 IN | RESPIRATION RATE: 18 BRPM | WEIGHT: 197 LBS | OXYGEN SATURATION: 99 % | SYSTOLIC BLOOD PRESSURE: 156 MMHG | DIASTOLIC BLOOD PRESSURE: 80 MMHG | BODY MASS INDEX: 37.19 KG/M2

## 2023-02-06 DIAGNOSIS — M79.2 NEUROPATHIC PAIN: ICD-10-CM

## 2023-02-06 DIAGNOSIS — M25.561 RIGHT KNEE PAIN, UNSPECIFIED CHRONICITY: Primary | ICD-10-CM

## 2023-02-06 NOTE — PROGRESS NOTES
[facility] Office visit    Assessment/Plan:      1  Right knee pain, unspecified chronicity  -     XR knee 3 vw right non injury; Future; Expected date: 02/06/2023  -     Ambulatory Referral to Orthopedic Surgery; Future    2  Neuropathic pain  -     Diclofenac Sodium (VOLTAREN) 1 %; Apply 2 g topically 4 (four) times a day          No follow-ups on file  Subjective:   HPI  Marifer Mackey is a 79 y o  female who presents with right knee pain  The right knee pain has been mild for several months, but has progressively worsened and has been more severe for two weeks and is worsened with activity  She denies any injury to the area or any locking, catching or giving out  Of note she had her left knee replaced 2008 and takes tylenol for arthritis pain  She is on Eliquis and was advised by her cardiologist not to take NSAIDs  Review of Systems   Constitutional: Negative for chills and fever  HENT: Negative for congestion  Respiratory: Negative for shortness of breath  Cardiovascular: Negative for chest pain  Gastrointestinal: Negative for diarrhea, nausea and vomiting  Musculoskeletal: Positive for arthralgias (right knee)  Skin: Negative for rash  Neurological: Negative for dizziness, weakness, numbness and headaches  Objective:     /80 (BP Location: Left arm, Patient Position: Sitting, Cuff Size: Large)   Pulse 75   Resp 18   Ht 5' 1" (1 549 m)   Wt 89 4 kg (197 lb)   SpO2 99%   BMI 37 22 kg/m²      Physical Exam  Constitutional:       General: She is not in acute distress  Appearance: She is not ill-appearing, toxic-appearing or diaphoretic  HENT:      Head: Normocephalic  Cardiovascular:      Rate and Rhythm: Normal rate and regular rhythm  Heart sounds: Normal heart sounds  No murmur heard  Pulmonary:      Effort: Pulmonary effort is normal       Breath sounds: Normal breath sounds     Musculoskeletal:      Right knee: Normal  No swelling, deformity or ecchymosis  Normal range of motion  No tenderness  No LCL laxity, MCL laxity, ACL laxity or PCL laxity  Instability Tests: Anterior drawer test negative  Posterior drawer test negative  Medial Rehana test negative and lateral Rehana test negative  Neurological:      Mental Status: She is alert and oriented to person, place, and time            ** Please Note: This note has been constructed using a voice recognition system **     Anayeli Nicole MD  02/11/23  12:59 PM

## 2023-02-08 DIAGNOSIS — E11.42 TYPE 2 DIABETES MELLITUS WITH DIABETIC POLYNEUROPATHY, WITHOUT LONG-TERM CURRENT USE OF INSULIN (HCC): ICD-10-CM

## 2023-02-15 ENCOUNTER — OFFICE VISIT (OUTPATIENT)
Dept: OBGYN CLINIC | Facility: CLINIC | Age: 83
End: 2023-02-15

## 2023-02-15 ENCOUNTER — APPOINTMENT (OUTPATIENT)
Dept: RADIOLOGY | Facility: CLINIC | Age: 83
End: 2023-02-15

## 2023-02-15 VITALS
BODY MASS INDEX: 37.08 KG/M2 | WEIGHT: 196.4 LBS | HEART RATE: 76 BPM | SYSTOLIC BLOOD PRESSURE: 122 MMHG | HEIGHT: 61 IN | DIASTOLIC BLOOD PRESSURE: 66 MMHG | TEMPERATURE: 98.2 F

## 2023-02-15 DIAGNOSIS — Z96.652 HISTORY OF KNEE REPLACEMENT PROCEDURE OF LEFT KNEE: ICD-10-CM

## 2023-02-15 DIAGNOSIS — Z01.89 ENCOUNTER FOR LOWER EXTREMITY COMPARISON IMAGING STUDY: ICD-10-CM

## 2023-02-15 DIAGNOSIS — M17.11 PRIMARY OSTEOARTHRITIS OF RIGHT KNEE: Primary | ICD-10-CM

## 2023-02-15 DIAGNOSIS — M25.561 RIGHT KNEE PAIN, UNSPECIFIED CHRONICITY: ICD-10-CM

## 2023-02-15 NOTE — LETTER
February 15, 2023     [address]  [address] [facility]    Patient: Haley Moody   YOB: 1940   Date of Visit: 2/15/2023     Dear Dr Augie Carrillo      Thank you for referring Russ Kaiser to me for evaluation  Below are the relevant portions of my assessment and plan of care  If you have questions, please do not hesitate to call me  I look forward to following Stephanie Cleaning along with you           Sincerely,        Elías Sagastume MD        CC: No Recipients    Progress Notes:

## 2023-02-15 NOTE — PROGRESS NOTES
Assessment/Plan:  1  Primary osteoarthritis of right knee  Ambulatory Referral to Orthopedic Surgery    XR knee 3 vw right non injury    Injection Procedure Prior Authorization      2  History of knee replacement procedure of left knee  XR knee 1 or 2 vw left        Scribe Attestation    I,:  Dmitry Ge am acting as a scribe while in the presence of the attending physician :       I,:  Rudy Kayser, MD personally performed the services described in this documentation    as scribed in my presence :           Rowdy Robledo on examination and review of the x-rays of the right knee does demonstrate severe tricompartmental osteoarthritis  She does demonstrate an effusion to the knee as well as point tenderness at the medial lateral aspect  I did discuss further delineation of care for her knee today in regards to injections such as steroid injections versus viscosupplementation  I did remark that the history of Eliquis would require us to use a smaller gauge needle minimize bleeding risk  Rowdy Robledo verbalized understanding and wished to pursue viscosupplementation injections as she would like to avoid steroid injection given her history of diabetes  She may utilize heat in the morning and ice in the evening to help manage her swelling  Order was placed in her chart for the viscosupplementation injections  She will be contacted by my office once authorization is obtained  Additionally, she demonstrates a 2+ pitting edema into the right lower extremity as well as venous stasis changes in the left lower extremity  I did encourage her to continue close monitoring with her primary care physician in regards to these findings  Subjective:   Dawna Kussmaul is a 79 y o  female who presents for evaluation of her right knee  She is referred to me today by Dr Raymon Sparrow  She has been experiencing ongoing pain to the medial lateral aspect of her knee over the past several months    However, notes that over the past several weeks she has had an increase in worsening symptoms  Her pain is a 5 out of 10  She states that her pain is exacerbated with prolonged weightbearing activities and is better while at rest   She does experience start of pain when she goes from a sitting to standing position  She does have a history of kidney disease as well as use of Eliquis  She states that she has utilized Aleve despite these contraindications and notes that this has provided her with symptomatic relief  Denies any recent traumatic injury that has resulted in her painful symptoms  She does also appreciate swelling of the right knee into the right lower extremity when compared to the contralateral side  She notes that she does have a history of diabetes with her last A1c in September 2022 being 6 3  She is currently on metformin to control this  She does have a history of a total knee arthroplasty on the contralateral side performed 2008 and states that this is doing quite well  Today she denies any distal paresthesias  Review of Systems   Constitutional: Negative for chills, fever and unexpected weight change  HENT: Negative for hearing loss, nosebleeds and sore throat  Eyes: Negative for pain, redness and visual disturbance  Respiratory: Negative for cough, shortness of breath and wheezing  Cardiovascular: Negative for chest pain, palpitations and leg swelling  Gastrointestinal: Negative for abdominal pain, nausea and vomiting  Endocrine: Negative for polydipsia and polyuria  Genitourinary: Negative for dysuria and hematuria  Musculoskeletal: Positive for arthralgias and myalgias  See HPI   Skin: Negative for rash and wound  Neurological: Negative for dizziness, numbness and headaches  Psychiatric/Behavioral: Negative for decreased concentration and suicidal ideas  The patient is not nervous/anxious            Past Medical History:   Diagnosis Date   • Arthritis     knees   • Diabetes mellitus ([facility] [address] )     type 2   • GERD (gastroesophageal reflux disease)    • Hyperlipidemia    • Hypertension    • Stage 3a chronic kidney disease ([facility] [address] ) 4/14/2022   • Urinary incontinence    • Use of cane as ambulatory aid    • Wears glasses        Past Surgical History:   Procedure Laterality Date   • BREAST BIOPSY Left 2015    benign   • BREAST SURGERY Left     nothing was there    • COLONOSCOPY     • HYSTERECTOMY      complete-fibroid   • JOINT REPLACEMENT Left     knee   • KS XCAPSL CTRC RMVL INSJ IO LENS PROSTH W/O ECP Right 07/19/2018    Procedure: EXTRACTION EXTRACAPSULAR CATARACT PHACO INTRAOCULAR LENS (IOL); Surgeon: Raymon Ceron MD;  Location: [facility] MAIN OR;  Service: Ophthalmology   • KS XCAPSL CTRC RMVL INSJ IO LENS PROSTH W/O ECP Left 08/09/2018    Procedure: EXTRACTION EXTRACAPSULAR CATARACT PHACO INTRAOCULAR LENS (IOL);   Surgeon: Raymon Ceron MD;  Location: [facility] MAIN OR;  Service: Ophthalmology       Family History   Problem Relation Age of Onset   • Breast cancer Mother    • Diabetes type II Mother    • Atrial fibrillation Mother    • Diabetes Mother         diet controlled   • Hypertension Mother    • Lung cancer Father    • Alcohol abuse Father    • Cancer Father         lung-smoker       Social History     Occupational History   • Not on file   Tobacco Use   • Smoking status: Never   • Smokeless tobacco: Never   Vaping Use   • Vaping Use: Never used   Substance and Sexual Activity   • Alcohol use: Yes     Comment: social   • Drug use: No   • Sexual activity: Not on file         Current Outpatient Medications:   •  acetaminophen (TYLENOL) 650 mg CR tablet, Take 1 tablet (650 mg total) by mouth every 8 (eight) hours as needed for mild pain, Disp: 30 tablet, Rfl: 0  •  atorvastatin (LIPITOR) 40 mg tablet, Take 1 tablet (40 mg total) by mouth daily, Disp: 90 tablet, Rfl: 1  •  CINNAMON PO, Take 1,000 mg by mouth every morning, Disp: , Rfl:   •  Diclofenac Sodium (VOLTAREN) 1 %, Apply 2 g topically 4 (four) times a day, Disp: 2 g, Rfl: 1  •  Eliquis 5 MG, TAKE ONE TABLET BY MOUTH TWICE A DAY, Disp: 180 tablet, Rfl: 3  •  Jardiance 10 MG TABS tablet, TAKE ONE TABLET BY MOUTH EVERY MORNING, Disp: 30 tablet, Rfl: 6  •  lisinopril (ZESTRIL) 10 mg tablet, Take 1 tablet (10 mg total) by mouth daily, Disp: 90 tablet, Rfl: 1  •  metFORMIN (GLUCOPHAGE) 1000 MG tablet, Take 1 tablet by mouth twice a day, Disp: 90 tablet, Rfl: 1  •  metoprolol tartrate (LOPRESSOR) 25 mg tablet, TAKE ONE TABLET BY MOUTH EVERY MORNING, Disp: 90 tablet, Rfl: 1  •  Multiple Vitamins-Minerals (PRESERVISION AREDS PO), Take by mouth, Disp: , Rfl:   •  amLODIPine (NORVASC) 10 mg tablet, Take 1 tablet (10 mg total) by mouth in the morning , Disp: 90 tablet, Rfl: 3  •  gabapentin (Neurontin) 300 mg capsule, Take 1 capsule (300 mg total) by mouth 4 (four) times a day (Patient not taking: Reported on 12/1/2022), Disp: 120 capsule, Rfl: 3    Allergies   Allergen Reactions   • Other Allergic Rhinitis     Seasonal/pollen       Objective:  Vitals:    02/15/23 1419   BP: 122/66   Pulse: 76   Temp: 98 2 °F (36 8 °C)       Right Knee Exam     Tenderness   The patient is experiencing tenderness in the medial joint line and lateral joint line  Range of Motion   Extension: 0   Flexion: 110     Other   Erythema: absent  Scars: absent  Sensation: normal  Pulse: present  Effusion: effusion present    Comments:  2+ pitting edema  blanching      Left Knee Exam     Comments:  1+  venoustasis changes of the skin at the left lower leg          Observations     Right Knee   Positive for effusion  Physical Exam  Vitals reviewed  HENT:      Head: Normocephalic and atraumatic  Eyes:      General:         Right eye: No discharge  Left eye: No discharge  Conjunctiva/sclera: Conjunctivae normal       Pupils: Pupils are equal, round, and reactive to light  Cardiovascular:      Rate and Rhythm: Normal rate     Pulmonary:      Effort: Pulmonary effort is normal  No respiratory distress  Musculoskeletal:      Cervical back: Normal range of motion and neck supple  Right knee: Effusion present  Skin:     General: Skin is warm and dry  Comments: As noted in HPI   Neurological:      Mental Status: She is alert and oriented to person, place, and time  I have personally reviewed pertinent films in PACS and my interpretation is as follows:    Xrays of the right knee demonstrate severe tricompartmental osteoarthritis with joint line narrowing, subchondral sclerosis, osteophyte formation and subchondral cystic change  No acute fractures demonstrated  This document was created using speech voice recognition software  Grammatical errors, random word insertions, pronoun errors, and incomplete sentences are an occasional consequence of this system due to software limitations, ambient noise, and hardware issues  Any formal questions or concerns about content, text, or information contained within the body of this dictation should be directly addressed to the provider for clarification

## 2023-03-01 ENCOUNTER — PROCEDURE VISIT (OUTPATIENT)
Dept: OBGYN CLINIC | Facility: CLINIC | Age: 83
End: 2023-03-01

## 2023-03-01 VITALS
WEIGHT: 196 LBS | HEIGHT: 61 IN | HEART RATE: 76 BPM | DIASTOLIC BLOOD PRESSURE: 78 MMHG | SYSTOLIC BLOOD PRESSURE: 133 MMHG | BODY MASS INDEX: 37 KG/M2 | TEMPERATURE: 98.3 F

## 2023-03-01 DIAGNOSIS — M17.11 PRIMARY OSTEOARTHRITIS OF RIGHT KNEE: Primary | ICD-10-CM

## 2023-03-01 RX ORDER — HYALURONATE SODIUM 10 MG/ML
20 SYRINGE (ML) INTRAARTICULAR
Status: COMPLETED | OUTPATIENT
Start: 2023-03-01 | End: 2023-03-01

## 2023-03-01 RX ADMIN — Medication 20 MG: at 16:00

## 2023-03-01 NOTE — PROGRESS NOTES
Assessment/Plan:  1  Primary osteoarthritis of right knee  Large joint arthrocentesis: R knee        Scribe Attestation    I,:  Magdalenerebecca Pnieda am acting as a scribe while in the presence of the attending physician :       I,:  Nasima Reardon MD personally performed the services described in this documentation    as scribed in my presence :         Brooke Dudley tolerated her first right knee Euflexxa injection well without complication  Post injection instructions were provided to her today  She will follow up in 1 week for right knee Euflexxa injection #2  Large joint arthrocentesis: R knee  Universal Protocol:  Consent: Verbal consent obtained  Risks and benefits: risks, benefits and alternatives were discussed  Consent given by: patient  Time out: Immediately prior to procedure a "time out" was called to verify the correct patient, procedure, equipment, support staff and site/side marked as required  Timeout called at: 3/1/2023 3:59 PM   Patient understanding: patient states understanding of the procedure being performed  Site marked: the operative site was marked  Patient identity confirmed: verbally with patient    Supporting Documentation  Indications: pain   Procedure Details  Location: knee - R knee  Preparation: Patient was prepped and draped in the usual sterile fashion  Needle size: 22 G  Ultrasound guidance: no  Approach: anterolateral  Medications administered: 20 mg Sodium Hyaluronate 20 MG/2ML    Patient tolerance: patient tolerated the procedure well with no immediate complications  Dressing:  Sterile dressing applied            Subjective:   Patito Donis is a 79 y o  female who presents for right knee Euflexxa injection #1   Patient has tried and failed at least three months of conservative therapy/nonpharmacologic therapy such as but not limited to home exercise program, education, weight loss, formal physical therapy if indicated; and If not contraindicated, simple analgesics and NSAIDs  The patient has a contraindication to a corticosteroid injection therapy due to history of diabetes  The patient is symptomatic with pain that interferes with ADL's and sleep  She also experiences crepitus, and knee effusion  The patient's pain score is 8/10 today  I believe it would be beneficial to to start patient on viscosupplementation for their osteoarthritis  Review of Systems      Past Medical History:   Diagnosis Date   • Arthritis     knees   • Diabetes mellitus ([facility] )     type 2   • GERD (gastroesophageal reflux disease)    • Hyperlipidemia    • Hypertension    • Stage 3a chronic kidney disease ([facility] ) 4/14/2022   • Urinary incontinence    • Use of cane as ambulatory aid    • Wears glasses        Past Surgical History:   Procedure Laterality Date   • BREAST BIOPSY Left 2015    benign   • BREAST SURGERY Left     nothing was there    • COLONOSCOPY     • HYSTERECTOMY      complete-fibroid   • JOINT REPLACEMENT Left     knee   • NM XCAPSL CTRC RMVL INSJ IO LENS PROSTH W/O ECP Right 07/19/2018    Procedure: EXTRACTION EXTRACAPSULAR CATARACT PHACO INTRAOCULAR LENS (IOL); Surgeon: Alexander Purcell MD;  Location: [facility] OR;  Service: Ophthalmology   • NM XCAPSL CTRC RMVL INSJ IO LENS PROSTH W/O ECP Left 08/09/2018    Procedure: EXTRACTION EXTRACAPSULAR CATARACT PHACO INTRAOCULAR LENS (IOL);   Surgeon: Alexander Purcell MD;  Location: [facility] OR;  Service: Ophthalmology       Family History   Problem Relation Age of Onset   • Breast cancer Mother    • Diabetes type II Mother    • Atrial fibrillation Mother    • Diabetes Mother         diet controlled   • Hypertension Mother    • Lung cancer Father    • Alcohol abuse Father    • Cancer Father         lung-smoker       Social History     Occupational History   • Not on file   Tobacco Use   • Smoking status: Never   • Smokeless tobacco: Never   Vaping Use   • Vaping Use: Never used   Substance and Sexual Activity   • Alcohol use: Yes     Comment: social • Drug use: No   • Sexual activity: Not on file         Current Outpatient Medications:   •  acetaminophen (TYLENOL) 650 mg CR tablet, Take 1 tablet (650 mg total) by mouth every 8 (eight) hours as needed for mild pain, Disp: 30 tablet, Rfl: 0  •  atorvastatin (LIPITOR) 40 mg tablet, Take 1 tablet (40 mg total) by mouth daily, Disp: 90 tablet, Rfl: 1  •  CINNAMON PO, Take 1,000 mg by mouth every morning, Disp: , Rfl:   •  Diclofenac Sodium (VOLTAREN) 1 %, Apply 2 g topically 4 (four) times a day, Disp: 2 g, Rfl: 1  •  Eliquis 5 MG, TAKE ONE TABLET BY MOUTH TWICE A DAY, Disp: 180 tablet, Rfl: 3  •  gabapentin (Neurontin) 300 mg capsule, Take 1 capsule (300 mg total) by mouth 4 (four) times a day, Disp: 120 capsule, Rfl: 3  •  Jardiance 10 MG TABS tablet, TAKE ONE TABLET BY MOUTH EVERY MORNING, Disp: 30 tablet, Rfl: 6  •  lisinopril (ZESTRIL) 10 mg tablet, Take 1 tablet (10 mg total) by mouth daily, Disp: 90 tablet, Rfl: 1  •  metFORMIN (GLUCOPHAGE) 1000 MG tablet, Take 1 tablet by mouth twice a day, Disp: 90 tablet, Rfl: 1  •  metoprolol tartrate (LOPRESSOR) 25 mg tablet, TAKE ONE TABLET BY MOUTH EVERY MORNING, Disp: 90 tablet, Rfl: 1  •  Multiple Vitamins-Minerals (PRESERVISION AREDS PO), Take by mouth, Disp: , Rfl:   •  amLODIPine (NORVASC) 10 mg tablet, Take 1 tablet (10 mg total) by mouth in the morning , Disp: 90 tablet, Rfl: 3    Allergies   Allergen Reactions   • Other Allergic Rhinitis     Seasonal/pollen       Objective:  Vitals:    03/01/23 1551   BP: 133/78   Pulse: 76   Temp: 98 3 °F (36 8 °C)       Ortho Exam    Physical Exam        This document was created using speech voice recognition software  Grammatical errors, random word insertions, pronoun errors, and incomplete sentences are an occasional consequence of this system due to software limitations, ambient noise, and hardware issues     Any formal questions or concerns about content, text, or information contained within the body of this dictation should be directly addressed to the provider for clarification

## 2023-03-08 ENCOUNTER — PROCEDURE VISIT (OUTPATIENT)
Dept: OBGYN CLINIC | Facility: CLINIC | Age: 83
End: 2023-03-08

## 2023-03-08 DIAGNOSIS — M17.11 PRIMARY OSTEOARTHRITIS OF RIGHT KNEE: Primary | ICD-10-CM

## 2023-03-08 RX ORDER — HYALURONATE SODIUM 10 MG/ML
20 SYRINGE (ML) INTRAARTICULAR
Status: COMPLETED | OUTPATIENT
Start: 2023-03-08 | End: 2023-03-08

## 2023-03-08 RX ADMIN — Medication 20 MG: at 16:14

## 2023-03-08 NOTE — PROGRESS NOTES
Assessment/Plan:  1  Primary osteoarthritis of right knee  Large joint arthrocentesis: R knee        Scribe Attestation    I,:  Ama Skinner MA am acting as a scribe while in the presence of the attending physician :       I,:  Jean Claude Wright MD personally performed the services described in this documentation    as scribed in my presence :             Guy Nunez consented and underwent right knee Euflexxa injection #2 in the office today without any complications  Post injection instructions were provided  She will follow up in 1 week for her 3rd and finial right knee Euflexxa injection  Large joint arthrocentesis: R knee  Universal Protocol:  Consent: Verbal consent obtained  Consent given by: patient  Patient identity confirmed: verbally with patient    Supporting Documentation  Indications: pain   Procedure Details  Location: knee - R knee  Preparation: Patient was prepped and draped in the usual sterile fashion  Needle size: 25 G  Ultrasound guidance: no  Medications administered: 20 mg Sodium Hyaluronate 20 MG/2ML    Patient tolerance: patient tolerated the procedure well with no immediate complications  Dressing:  Sterile dressing applied            Subjective:   Surekha Urias is a 79 y o  female who presents to the office today for Euflexxa injection #2 right knee to teat right knee osteoarthritis  She tolerated her 1st injection well         Review of Systems      Past Medical History:   Diagnosis Date   • Arthritis     knees   • Diabetes mellitus ([address] )     type 2   • GERD (gastroesophageal reflux disease)    • Hyperlipidemia    • Hypertension    • Stage 3a chronic kidney disease ([address] ) 4/14/2022   • Urinary incontinence    • Use of cane as ambulatory aid    • Wears glasses        Past Surgical History:   Procedure Laterality Date   • BREAST BIOPSY Left 2015    benign   • BREAST SURGERY Left     nothing was there    • COLONOSCOPY     • HYSTERECTOMY      complete-fibroid   • JOINT REPLACEMENT Left     knee   • AK XCAPSL CTRC RMVL INSJ IO LENS PROSTH W/O ECP Right 07/19/2018    Procedure: EXTRACTION EXTRACAPSULAR CATARACT PHACO INTRAOCULAR LENS (IOL); Surgeon: Aleena Cardenas MD;  Location: [facility] MAIN OR;  Service: Ophthalmology   • AK XCAPSL CTRC RMVL INSJ IO LENS PROSTH W/O ECP Left 08/09/2018    Procedure: EXTRACTION EXTRACAPSULAR CATARACT PHACO INTRAOCULAR LENS (IOL);   Surgeon: Aleena Cardenas MD;  Location: [facility] MAIN OR;  Service: Ophthalmology       Family History   Problem Relation Age of Onset   • Breast cancer Mother    • Diabetes type II Mother    • Atrial fibrillation Mother    • Diabetes Mother         diet controlled   • Hypertension Mother    • Lung cancer Father    • Alcohol abuse Father    • Cancer Father         lung-smoker       Social History     Occupational History   • Not on file   Tobacco Use   • Smoking status: Never   • Smokeless tobacco: Never   Vaping Use   • Vaping Use: Never used   Substance and Sexual Activity   • Alcohol use: Yes     Comment: social   • Drug use: No   • Sexual activity: Not on file         Current Outpatient Medications:   •  acetaminophen (TYLENOL) 650 mg CR tablet, Take 1 tablet (650 mg total) by mouth every 8 (eight) hours as needed for mild pain, Disp: 30 tablet, Rfl: 0  •  amLODIPine (NORVASC) 10 mg tablet, Take 1 tablet (10 mg total) by mouth in the morning , Disp: 90 tablet, Rfl: 3  •  atorvastatin (LIPITOR) 40 mg tablet, Take 1 tablet (40 mg total) by mouth daily, Disp: 90 tablet, Rfl: 1  •  CINNAMON PO, Take 1,000 mg by mouth every morning, Disp: , Rfl:   •  Diclofenac Sodium (VOLTAREN) 1 %, Apply 2 g topically 4 (four) times a day, Disp: 2 g, Rfl: 1  •  Eliquis 5 MG, TAKE ONE TABLET BY MOUTH TWICE A DAY, Disp: 180 tablet, Rfl: 3  •  gabapentin (Neurontin) 300 mg capsule, Take 1 capsule (300 mg total) by mouth 4 (four) times a day, Disp: 120 capsule, Rfl: 3  •  Jardiance 10 MG TABS tablet, TAKE ONE TABLET BY MOUTH EVERY MORNING, Disp: 30 tablet, Rfl: 6  •  lisinopril (ZESTRIL) 10 mg tablet, Take 1 tablet (10 mg total) by mouth daily, Disp: 90 tablet, Rfl: 1  •  metFORMIN (GLUCOPHAGE) 1000 MG tablet, Take 1 tablet by mouth twice a day, Disp: 90 tablet, Rfl: 1  •  metoprolol tartrate (LOPRESSOR) 25 mg tablet, TAKE ONE TABLET BY MOUTH EVERY MORNING, Disp: 90 tablet, Rfl: 1  •  Multiple Vitamins-Minerals (PRESERVISION AREDS PO), Take by mouth, Disp: , Rfl:     Allergies   Allergen Reactions   • Other Allergic Rhinitis     Seasonal/pollen       Objective: There were no vitals filed for this visit  Ortho Exam    Physical Exam    I have personally reviewed pertinent films in PACS and my interpretation is as follows: No new images reviewed today  This document was created using speech voice recognition software  Grammatical errors, random word insertions, pronoun errors, and incomplete sentences are an occasional consequence of this system due to software limitations, ambient noise, and hardware issues  Any formal questions or concerns about content, text, or information contained within the body of this dictation should be directly addressed to the provider for clarification

## 2023-03-15 ENCOUNTER — PROCEDURE VISIT (OUTPATIENT)
Dept: OBGYN CLINIC | Facility: CLINIC | Age: 83
End: 2023-03-15

## 2023-03-15 DIAGNOSIS — M17.11 PRIMARY OSTEOARTHRITIS OF RIGHT KNEE: Primary | ICD-10-CM

## 2023-03-15 RX ORDER — HYALURONATE SODIUM 10 MG/ML
20 SYRINGE (ML) INTRAARTICULAR
Status: COMPLETED | OUTPATIENT
Start: 2023-03-15 | End: 2023-03-15

## 2023-03-15 RX ADMIN — Medication 20 MG: at 16:05

## 2023-03-15 NOTE — PROGRESS NOTES
Assessment/Plan:  1  Primary osteoarthritis of right knee  Large joint arthrocentesis: R knee    Ambulatory Referral to Physical Therapy        Scribe Attestation    I,:  Ermelinda Gaspar am acting as a scribe while in the presence of the attending physician :       I,:  Daysi Mcrae MD personally performed the services described in this documentation    as scribed in my presence :         Carol Orantes consented and received her final right knee Euflexxa injection in the office today  She tolerated the procedure well without complications  Postinjection instructions were provided  She may follow-up on an as-needed basis  Large joint arthrocentesis: R knee  Universal Protocol:  Consent: Verbal consent obtained  Risks and benefits: risks, benefits and alternatives were discussed  Consent given by: patient  Time out: Immediately prior to procedure a "time out" was called to verify the correct patient, procedure, equipment, support staff and site/side marked as required  Timeout called at: 3/15/2023 4:03 PM   Patient understanding: patient states understanding of the procedure being performed  Site marked: the operative site was marked  Patient identity confirmed: verbally with patient    Supporting Documentation  Indications: pain   Procedure Details  Location: knee - R knee  Preparation: Patient was prepped and draped in the usual sterile fashion  Needle size: 22 G  Ultrasound guidance: no  Approach: anterolateral  Medications administered: 20 mg Sodium Hyaluronate 20 MG/2ML    Patient tolerance: patient tolerated the procedure well with no immediate complications  Dressing:  Sterile dressing applied          Subjective:   Hoa Harper is a 79 y o  female who presents for her final right knee Euflexxa injection today  She has tolerated her first 2 injections well without complications         Review of Systems      Past Medical History:   Diagnosis Date   • Arthritis     knees   • Diabetes mellitus [facility])     type 2   • GERD (gastroesophageal reflux disease)    • Hyperlipidemia    • Hypertension    • Stage 3a chronic kidney disease ([facility] ) 4/14/2022   • Urinary incontinence    • Use of cane as ambulatory aid    • Wears glasses        Past Surgical History:   Procedure Laterality Date   • BREAST BIOPSY Left 2015    benign   • BREAST SURGERY Left     nothing was there    • COLONOSCOPY     • HYSTERECTOMY      complete-fibroid   • JOINT REPLACEMENT Left     knee   • LA XCAPSL CTRC RMVL INSJ IO LENS PROSTH W/O ECP Right 07/19/2018    Procedure: EXTRACTION EXTRACAPSULAR CATARACT PHACO INTRAOCULAR LENS (IOL); Surgeon: Radames Laureano MD;  Location: [facility] MAIN OR;  Service: Ophthalmology   • LA XCAPSL CTRC RMVL INSJ IO LENS PROSTH W/O ECP Left 08/09/2018    Procedure: EXTRACTION EXTRACAPSULAR CATARACT PHACO INTRAOCULAR LENS (IOL);   Surgeon: Radames Laureano MD;  Location: [facility] MAIN OR;  Service: Ophthalmology       Family History   Problem Relation Age of Onset   • Breast cancer Mother    • Diabetes type II Mother    • Atrial fibrillation Mother    • Diabetes Mother         diet controlled   • Hypertension Mother    • Lung cancer Father    • Alcohol abuse Father    • Cancer Father         lung-smoker       Social History     Occupational History   • Not on file   Tobacco Use   • Smoking status: Never   • Smokeless tobacco: Never   Vaping Use   • Vaping Use: Never used   Substance and Sexual Activity   • Alcohol use: Yes     Comment: social   • Drug use: No   • Sexual activity: Not on file         Current Outpatient Medications:   •  acetaminophen (TYLENOL) 650 mg CR tablet, Take 1 tablet (650 mg total) by mouth every 8 (eight) hours as needed for mild pain, Disp: 30 tablet, Rfl: 0  •  amLODIPine (NORVASC) 10 mg tablet, Take 1 tablet (10 mg total) by mouth in the morning , Disp: 90 tablet, Rfl: 3  •  atorvastatin (LIPITOR) 40 mg tablet, Take 1 tablet (40 mg total) by mouth daily, Disp: 90 tablet, Rfl: 1  •  CINNAMON PO, Take 1,000 mg by mouth every morning, Disp: , Rfl:   •  Diclofenac Sodium (VOLTAREN) 1 %, Apply 2 g topically 4 (four) times a day, Disp: 2 g, Rfl: 1  •  Eliquis 5 MG, TAKE ONE TABLET BY MOUTH TWICE A DAY, Disp: 180 tablet, Rfl: 3  •  gabapentin (Neurontin) 300 mg capsule, Take 1 capsule (300 mg total) by mouth 4 (four) times a day, Disp: 120 capsule, Rfl: 3  •  Jardiance 10 MG TABS tablet, TAKE ONE TABLET BY MOUTH EVERY MORNING, Disp: 30 tablet, Rfl: 6  •  lisinopril (ZESTRIL) 10 mg tablet, Take 1 tablet (10 mg total) by mouth daily, Disp: 90 tablet, Rfl: 1  •  metFORMIN (GLUCOPHAGE) 1000 MG tablet, Take 1 tablet by mouth twice a day, Disp: 90 tablet, Rfl: 1  •  metoprolol tartrate (LOPRESSOR) 25 mg tablet, TAKE ONE TABLET BY MOUTH EVERY MORNING, Disp: 90 tablet, Rfl: 1  •  Multiple Vitamins-Minerals (PRESERVISION AREDS PO), Take by mouth, Disp: , Rfl:     Allergies   Allergen Reactions   • Other Allergic Rhinitis     Seasonal/pollen       Objective: There were no vitals filed for this visit  Ortho Exam    Physical Exam        This document was created using speech voice recognition software  Grammatical errors, random word insertions, pronoun errors, and incomplete sentences are an occasional consequence of this system due to software limitations, ambient noise, and hardware issues  Any formal questions or concerns about content, text, or information contained within the body of this dictation should be directly addressed to the provider for clarification

## 2023-04-11 ENCOUNTER — 6 MONTH FOLLOW UP (OUTPATIENT)
Dept: URBAN - METROPOLITAN AREA CLINIC 27 | Facility: CLINIC | Age: 83
End: 2023-04-11

## 2023-04-11 DIAGNOSIS — H35.3113: ICD-10-CM

## 2023-04-11 DIAGNOSIS — H35.3124: ICD-10-CM

## 2023-04-11 DIAGNOSIS — H43.813: ICD-10-CM

## 2023-04-11 DIAGNOSIS — Z96.1: ICD-10-CM

## 2023-04-11 DIAGNOSIS — E11.9: ICD-10-CM

## 2023-04-11 PROCEDURE — 92014 COMPRE OPH EXAM EST PT 1/>: CPT

## 2023-04-11 PROCEDURE — 92134 CPTRZ OPH DX IMG PST SGM RTA: CPT

## 2023-04-11 ASSESSMENT — VISUAL ACUITY
OS_CC: CF 3FT
OD_CC: 20/50-1

## 2023-04-11 ASSESSMENT — TONOMETRY
OD_IOP_MMHG: 16
OS_IOP_MMHG: 17

## 2023-04-24 ENCOUNTER — OFFICE VISIT (OUTPATIENT)
Dept: PHYSICAL THERAPY | Facility: CLINIC | Age: 83
End: 2023-04-24

## 2023-04-24 DIAGNOSIS — M17.11 PRIMARY OSTEOARTHRITIS OF RIGHT KNEE: Primary | ICD-10-CM

## 2023-04-24 DIAGNOSIS — I10 ESSENTIAL HYPERTENSION: ICD-10-CM

## 2023-04-24 RX ORDER — AMLODIPINE BESYLATE 10 MG/1
10 TABLET ORAL DAILY
Qty: 90 TABLET | Refills: 3 | Status: SHIPPED | OUTPATIENT
Start: 2023-04-24 | End: 2023-07-23

## 2023-04-24 NOTE — PROGRESS NOTES
"Daily Note     Today's date: 2023  Patient name: Tim Shultz  : 1940  MRN: [id number]  Referring provider: Adalid Trent*  Dx:   Encounter Diagnosis     ICD-10-CM    1  Primary osteoarthritis of right knee  M17 11                      Subjective: Pt states that she is not feeling well today, no specific reason why just not feeling good  Objective: See treatment diary below      Assessment: Tolerated treatment fair with increased reps and new TE added  Patient demonstrated fatigue post treatment but reports feeling better than when she came in  Pt will continue      Plan: Continue per plan of care  Insurance:  [employer] French/ Noel Leiva #/ Referral # Total units  Start date  Expiration date Extension  Visit limitation? PT only or  PT+OT? Co-Insurance    2023                                                             POC Start Date POC Expiration Date Signed POC?   2023 8 weeks - 2023       Date               Units:  Used               Authed:  Remaining                  Precautions:   Past Medical History:   Diagnosis Date   • Arthritis     knees   • Diabetes mellitus ([facility] )     type 2   • GERD (gastroesophageal reflux disease)    • Hyperlipidemia    • Hypertension    • Stage 3a chronic kidney disease ([facility] ) 2022   • Urinary incontinence    • Use of cane as ambulatory aid    • Wears glasses        Patient provided verbal consent to treatment plan and recommended interventions      Date 2023      Visit Number IE 2 3      FOTO Tracking Intake Survey     Follow-up #1   Manual         Joint mobs         STM         Patella mobs                           TherEx         Active GRAMAJO  NS x5' L1 NS x6' L1      Knee PROM         Standing abd/ext/ flex   2x10 ea B/L               Bridge progression         Squat progression  At // bar 2x10 At // bars 2x10      SL hip ABD         Step ups forward, lateral   x10 B 4\" each       LAQ Discussed x10 B  2x10 " B      Side stepping  4x15' with B UE support 3x17' with B UE support      HR   x20      Neuro Re-Ed         Quad sets Discussed        Sae         SL balance  3x10s B 3x10s B      Seated hip add squeezes   2x10        Sit to stands   2x5 2x5                                 TherAct         Patient education HEP, POC, typical expectations and limitations of current condition                                            Gait Training         AD training                           Modalities         CP

## 2023-04-26 ENCOUNTER — TELEPHONE (OUTPATIENT)
Dept: FAMILY MEDICINE CLINIC | Facility: CLINIC | Age: 83
End: 2023-04-26

## 2023-04-27 ENCOUNTER — APPOINTMENT (OUTPATIENT)
Dept: PHYSICAL THERAPY | Facility: CLINIC | Age: 83
End: 2023-04-27

## 2023-05-01 ENCOUNTER — OFFICE VISIT (OUTPATIENT)
Dept: PHYSICAL THERAPY | Facility: CLINIC | Age: 83
End: 2023-05-01

## 2023-05-01 DIAGNOSIS — M17.11 PRIMARY OSTEOARTHRITIS OF RIGHT KNEE: Primary | ICD-10-CM

## 2023-05-01 NOTE — PROGRESS NOTES
"Daily Note     Today's date: 2023  Patient name: Ilana Montgomery  : 1940  MRN: [id number]  Referring provider: Paul Miranda*  Dx:   Encounter Diagnosis     ICD-10-CM    1  Primary osteoarthritis of right knee  M17 11                      Subjective: Pt states that her knee is doing much better overall  Objective: See treatment diary below    Assessment: Tolerated treatment well today with resuming strengthening based exercises  No reporting of knee irritation during session  Plan: increase step height next visit  Insurance:  AMA/CMS Jessicaal/ Sweetie Dutton #/ Referral # Total units  Start date  Expiration date Extension  Visit limitation? PT only or  PT+OT? Co-Insurance   CMS 2023                                                             POC Start Date POC Expiration Date Signed POC?   2023 8 weeks - 2023       Date               Units:  Used               Authed:  Remaining                  Precautions:   Past Medical History:   Diagnosis Date    Arthritis     knees    Diabetes mellitus ([facility] )     type 2    GERD (gastroesophageal reflux disease)     Hyperlipidemia     Hypertension     Stage 3a chronic kidney disease ([facility] ) 2022    Urinary incontinence     Use of cane as ambulatory aid     Wears glasses        Patient provided verbal consent to treatment plan and recommended interventions      Date 2023     Visit Number IE 2 3 4     FOTO Tracking Intake Survey        Manual         Joint mobs         STM         Patella mobs                           TherEx         Active GRAMAJO  NS x5' L1 NS x6' L1 NS *6' L1     Knee PROM         Standing abd/ext/ flex   2x10 ea B/L 2x10 ea B/L     Squat progression  At // bar 2x10 At // bars 2x10 At // bars 2x10     Step ups forward, lateral   x10 B 4\" each  x10 B 4\" each     LAQ Discussed x10 B  2x10 B 2x10 B     Side stepping  4x15' with B UE support 3x17' with B UE support 1*14' with B UE " support     HR   x20 2*10     Neuro Re-Ed         Quad sets Discussed        Clamshells         SL balance  3x10s B 3x10s B 3x10s B     Seated hip add squeezes   2x10   2*10     Sit to stands   2x5 2x5 2*10                                TherAct         Patient education HEP, POC, typical expectations and limitations of current condition                                            Gait Training         AD training                           Modalities         CP

## 2023-05-04 ENCOUNTER — OFFICE VISIT (OUTPATIENT)
Dept: PHYSICAL THERAPY | Facility: CLINIC | Age: 83
End: 2023-05-04

## 2023-05-04 DIAGNOSIS — M17.11 PRIMARY OSTEOARTHRITIS OF RIGHT KNEE: Primary | ICD-10-CM

## 2023-05-04 NOTE — PROGRESS NOTES
Daily Note     Today's date: 2023  Patient name: Ariana Shoemaker  : 1940  MRN: [id number]  Referring provider: Jahaira Sagastume*  Dx:   Encounter Diagnosis     ICD-10-CM    1  Primary osteoarthritis of right knee  M17 11           Start Time:   Stop Time:   Total time in clinic (min): 40 minutes    Subjective: Patient has no new complaints  Objective: See treatment diary below    Assessment: Patient tolerated treatment session well today with no complaints of increased pain  Patient fatigued during today's session, requiring several breaks  She is progressing well at this time  Plan: increase step height next visit  Insurance:  AMA/CMS Eval/ Homar Coombs #/ Referral # Total units  Start date  Expiration date Extension  Visit limitation? PT only or  PT+OT? Co-Insurance   CMS 2023                                                             POC Start Date POC Expiration Date Signed POC?   2023 8 weeks - 2023       Date               Units:  Used               Authed:  Remaining                  Precautions:   Past Medical History:   Diagnosis Date    Arthritis     knees    Diabetes mellitus ([facility] [address] )     type 2    GERD (gastroesophageal reflux disease)     Hyperlipidemia     Hypertension     Stage 3a chronic kidney disease ([facility] [address] ) 2022    Urinary incontinence     Use of cane as ambulatory aid     Wears glasses        Patient provided verbal consent to treatment plan and recommended interventions      Date 2023    Visit Number IE 2 3 4 5    FOTO Tracking Intake Survey        Manual         Joint mobs         STM         Patella mobs                           TherEx         Active GRAMAJO  NS x5' L1 NS x6' L1 NS *6' L1 NS *6' L1    Knee PROM         Standing abd/ext/ flex   2x10 ea B/L 2x10 ea B/L 2x10 ea B/L on foam    Squat progression  At // bar 2x10 At // bars 2x10 At // bars 2x10 At // bars 2x10    Step ups forward, "lateral, down   x10 B 4\" each  x10 B 4\" each x10 B 4\" step    LAQ Discussed x10 B  2x10 B 2x10 B 2x10 B    Side stepping  4x15' with B UE support 3x17' with B UE support 1*14' with B UE support 1*14' with B UE support    HR   x20 2*10 2x10    Neuro Re-Ed         Quad sets Discussed        [employer]         SL balance  3x10s B 3x10s B 3x10s B 3x10s B    Seated hip add squeezes   2x10   2*10 2x10    Sit to stands   2x5 2x5 2*10                                TherAct         Patient education HEP, POC, typical expectations and limitations of current condition                                            Gait Training         AD training                           Modalities         CP                    "

## 2023-05-08 ENCOUNTER — OFFICE VISIT (OUTPATIENT)
Dept: PHYSICAL THERAPY | Facility: CLINIC | Age: 83
End: 2023-05-08

## 2023-05-08 DIAGNOSIS — M17.11 PRIMARY OSTEOARTHRITIS OF RIGHT KNEE: Primary | ICD-10-CM

## 2023-05-08 NOTE — PROGRESS NOTES
"Daily Note     Today's date: 2023  Patient name: Oralia De Leon  : 1940  MRN: [id number]  Referring provider: Immanuel Quiñones*  Dx:   Encounter Diagnosis     ICD-10-CM    1  Primary osteoarthritis of right knee  M17 11                      Subjective: Patient reports doing well today, no  New complaints at this time  Objective: See treatment diary below      Assessment: Patient reports no pain throughout session only fatigue and some muscle soreness  Plan: Continue per plan of care  Insurance:  A/CMS Eval/ Cindy Bajwa #/ Referral # Total units  Start date  Expiration date Extension  Visit limitation? PT only or  PT+OT? Co-Insurance   CMS 2023                                                             POC Start Date POC Expiration Date Signed POC?   2023 8 weeks - 2023       Date               Units:  Used               Authed:  Remaining                  Precautions:   Past Medical History:   Diagnosis Date   • Arthritis     knees   • Diabetes mellitus ([facility] [address] )     type 2   • GERD (gastroesophageal reflux disease)    • Hyperlipidemia    • Hypertension    • Stage 3a chronic kidney disease ([facility] [address] ) 2022   • Urinary incontinence    • Use of cane as ambulatory aid    • Wears glasses        Patient provided verbal consent to treatment plan and recommended interventions      Date 2023   Visit Number IE 2 3 4 5 6   FOTO Tracking Intake Survey        Manual         Joint mobs         STM         Patella mobs                           TherEx         Active GRAMAJO  NS x5' L1 NS x6' L1 NS *6' L1 NS *6' L1 NS 6' L1   Knee PROM         Standing abd/ext/ flex   2x10 ea B/L 2x10 ea B/L 2x10 ea B/L on foam 2x10 ea B/L on foam   Squat progression  At // bar 2x10 At // bars 2x10 At // bars 2x10 At // bars 2x10 At // bars 2x10   Step ups forward, lateral, down   x10 B 4\" each  x10 B 4\" each x10 B 4\" step x15 ea 4\"    LAQ Discussed x10 B  2x10 " B 2x10 B 2x10 B 2x10 B   Side stepping  4x15' with B UE support 3x17' with B UE support 1*14' with B UE support 1*14' with B UE support 11*16' with B UE support   HR   x20 2*10 2x10 x25   Neuro Re-Ed         Quad sets Discussed        Clamshells         SL balance  3x10s B 3x10s B 3x10s B 3x10s B 3x10s B   Seated hip add squeezes   2x10   2*10 2x10    Sit to stands   2x5 2x5 2*10  2x10                              TherAct         Patient education HEP, POC, typical expectations and limitations of current condition                                            Gait Training         AD training                           Modalities         CP

## 2023-05-10 ENCOUNTER — APPOINTMENT (OUTPATIENT)
Dept: LAB | Facility: CLINIC | Age: 83
End: 2023-05-10

## 2023-05-10 DIAGNOSIS — N18.31 STAGE 3A CHRONIC KIDNEY DISEASE (HCC): ICD-10-CM

## 2023-05-10 DIAGNOSIS — E11.40 TYPE 2 DIABETES MELLITUS WITH DIABETIC NEUROPATHY, WITHOUT LONG-TERM CURRENT USE OF INSULIN (HCC): ICD-10-CM

## 2023-05-10 DIAGNOSIS — I51.89 GRADE II DIASTOLIC DYSFUNCTION: ICD-10-CM

## 2023-05-10 LAB
ANION GAP SERPL CALCULATED.3IONS-SCNC: 4 MMOL/L (ref 4–13)
BUN SERPL-MCNC: 30 MG/DL (ref 5–25)
CALCIUM SERPL-MCNC: 9.3 MG/DL (ref 8.3–10.1)
CHLORIDE SERPL-SCNC: 107 MMOL/L (ref 96–108)
CO2 SERPL-SCNC: 23 MMOL/L (ref 21–32)
CREAT SERPL-MCNC: 1.26 MG/DL (ref 0.6–1.3)
GFR SERPL CREATININE-BSD FRML MDRD: 39 ML/MIN/1.73SQ M
GLUCOSE P FAST SERPL-MCNC: 118 MG/DL (ref 65–99)
POTASSIUM SERPL-SCNC: 4.3 MMOL/L (ref 3.5–5.3)
SODIUM SERPL-SCNC: 134 MMOL/L (ref 135–147)

## 2023-05-11 ENCOUNTER — OFFICE VISIT (OUTPATIENT)
Dept: PHYSICAL THERAPY | Facility: CLINIC | Age: 83
End: 2023-05-11

## 2023-05-11 DIAGNOSIS — M17.11 PRIMARY OSTEOARTHRITIS OF RIGHT KNEE: Primary | ICD-10-CM

## 2023-05-11 NOTE — PROGRESS NOTES
Daily Note     Today's date: 2023  Patient name: David Flores  : 1940  MRN: [id number]  Referring provider: Kishan Vargas*  Dx:   Encounter Diagnosis     ICD-10-CM    1  Primary osteoarthritis of right knee  M17 11           Start Time: 1500  Stop Time: 1545  Total time in clinic (min): 45 minutes    Subjective: Patient reports doing well today, no new complaints at this time  Objective: See treatment diary below      Assessment: Patient is progressing well at this time, with extreme fatigue noted throughout session  Added suitcase carries in hallway without AD to help with ambulation and carrying objects within the community  Plan: Continue per plan of care  Insurance:  A/CMS Eval/ [facility] #/ Referral # Total units  Start date  Expiration date Extension  Visit limitation? PT only or  PT+OT? Co-Insurance   CMS 2023                                                             POC Start Date POC Expiration Date Signed POC?   2023 8 weeks - 2023       Date               Units:  Used               Authed:  Remaining                  Precautions:   Past Medical History:   Diagnosis Date   • Arthritis     knees   • Diabetes mellitus ([facility] [address] )     type 2   • GERD (gastroesophageal reflux disease)    • Hyperlipidemia    • Hypertension    • Stage 3a chronic kidney disease ([facility] [address] ) 2022   • Urinary incontinence    • Use of cane as ambulatory aid    • Wears glasses        Patient provided verbal consent to treatment plan and recommended interventions      Date 2023   Visit Number IE 2 3 4 5 6 7   FOTO Tracking Intake Survey         Manual          Joint mobs          STM          Patella mobs                              TherEx          Active GRAMAJO  NS x5' L1 NS x6' L1 NS *6' L1 NS *6' L1 NS 6' L1 NS 6' L1   Knee PROM          Standing abd/ext/ flex   2x10 ea B/L 2x10 ea B/L 2x10 ea B/L on foam 2x10 ea B/L on foam    Squat "progression  At // bar 2x10 At // bars 2x10 At // bars 2x10 At // bars 2x10 At // bars 2x10 At // bars 2x10   Step ups forward, lateral, down   x10 B 4\" each  x10 B 4\" each x10 B 4\" step x15 ea 4\"  x15 ea 4\"    LAQ Discussed x10 B  2x10 B 2x10 B 2x10 B 2x10 B 2x10 B   Side stepping  4x15' with B UE support 3x17' with B UE support 1*14' with B UE support 1*14' with B UE support 11*16' with B UE support 11*16' with B UE support   HR   x20 2*10 2x10 x25 x20   Neuro Re-Ed          Quad sets Discussed         Cl[employer]hells          SL balance  3x10s B 3x10s B 3x10s B 3x10s B 3x10s B    Seated hip add squeezes   2x10   2*10 2x10     Sit to stands   2x5 2x5 2*10  2x10 x10   Suitcase carries       4x50' 4#                        TherAct          Patient education HEP, POC, typical expectations and limitations of current condition                                                 Gait Training          AD training                              Modalities          CP                       "

## 2023-05-13 LAB
LEFT EYE DIABETIC RETINOPATHY: NORMAL
RIGHT EYE DIABETIC RETINOPATHY: NORMAL

## 2023-05-15 ENCOUNTER — OFFICE VISIT (OUTPATIENT)
Dept: PHYSICAL THERAPY | Facility: CLINIC | Age: 83
End: 2023-05-15

## 2023-05-15 DIAGNOSIS — M17.11 PRIMARY OSTEOARTHRITIS OF RIGHT KNEE: Primary | ICD-10-CM

## 2023-05-15 NOTE — PROGRESS NOTES
Daily Note     Today's date: 5/15/2023  Patient name: Mounika Spain  : 1940  MRN: [id number]  Referring provider: Chris Richards*  Dx:   Encounter Diagnosis     ICD-10-CM    1  Primary osteoarthritis of right knee  M17 11           Start Time: 1545  Stop Time: 1628  Total time in clinic (min): 43 minutes    Subjective: Patient reports no new complaints on presentation      Objective: See treatment diary below      Assessment: Patient is progressing well at this time, requires extended rest breaks  Maintained previous program due to high fatigue levels  Would benefit from continued skilled PT  Plan: Potential discharge next visit  Getting up and down off the floor  Insurance:  [employer]/CMS Eval/ [employer] #/ Referral # Total units  Start date  Expiration date Extension  Visit limitation? PT only or  PT+OT? Co-Insurance   CMS 2023                                                             POC Start Date POC Expiration Date Signed POC?   2023 8 weeks - 2023       Date               Units:  Used               Authed:  Remaining                  Precautions:   Past Medical History:   Diagnosis Date   • Arthritis     knees   • Diabetes mellitus ([facility] [address] )     type 2   • GERD (gastroesophageal reflux disease)    • Hyperlipidemia    • Hypertension    • Stage 3a chronic kidney disease ([facility] [address] ) 2022   • Urinary incontinence    • Use of cane as ambulatory aid    • Wears glasses        Patient provided verbal consent to treatment plan and recommended interventions      Date 4/24/23 5/1/23 5/4/23 5/8 5/11 5/15   Visit Number 3 4 5 6 7 8   FOTO Tracking         Manual         Joint mobs         STM         Patella mobs                           TherEx         Active GRAMAJO NS x6' L1 NS *6' L1 NS *6' L1 NS 6' L1 NS 6' L1 NS 7' L1   Knee PROM         Standing abd/ext/ flex 2x10 ea B/L 2x10 ea B/L 2x10 ea B/L on foam 2x10 ea B/L on foam     Squat progression At // bars 2x10 At // bars 2x10 At // "bars 2x10 At // bars 2x10 At // bars 2x10 At // bars 2x10 UE support   Step ups forward, lateral, down   x10 B 4\" each x10 B 4\" step x15 ea 4\"  x15 ea 4\"  x15 ea 4\"    LAQ 2x10 B 2x10 B 2x10 B 2x10 B 2x10 B x20 B   Side stepping 4x15' with B UE support 1*14' with B UE support 1*14' with B UE support 11*16' with B UE support 11*16' with B UE support 11*16' with B UE support   HR x20 2*10 2x10 x25 x20 x20   Neuro Re-Ed         [employer]         SL balance 3x10s B 3x10s B 3x10s B 3x10s B     Seated hip add squeezes   2*10 2x10      Sit to stands  2x5 2*10  2x10 x10 x10   Suitcase carries     4x50' 4#  4x50' 4#                      TherAct         Patient education                                             Gait Training         AD training                           Modalities         CP                      "

## 2023-05-18 ENCOUNTER — OFFICE VISIT (OUTPATIENT)
Dept: PHYSICAL THERAPY | Facility: CLINIC | Age: 83
End: 2023-05-18

## 2023-05-18 DIAGNOSIS — M17.11 PRIMARY OSTEOARTHRITIS OF RIGHT KNEE: Primary | ICD-10-CM

## 2023-05-18 NOTE — PROGRESS NOTES
Discharge/Daily Note     Today's date: 2023  Patient name: Kai Reinoso  : 1940  MRN: [id number]  Referring provider: Gino Craig*  Dx:   Encounter Diagnosis     ICD-10-CM    1  Primary osteoarthritis of right knee  M17 11           Start Time: 1500  Stop Time: 1545  Total time in clinic (min): 45 minutes    Subjective: Patient has no new complaints  She wishes to be discharged from formal PT intervention at this time as she is moving into the nursing home  Objective: See treatment diary below      Assessment: Patient demonstrates improvements in B LE strength today, with no increased pain noted with step ups  She is moving into the nursing home, where she will receive PT intervention  She will be discharged at this time  Plan: Discharged from formal PT intervention at this time  Insurance:  [employer]/CMS French/ Maranda Encinas #/ Referral # Total units  Start date  Expiration date Extension  Visit limitation? PT only or  PT+OT? Co-Insurance   CMS 2023                                                             POC Start Date POC Expiration Date Signed POC?   2023 8 weeks - 2023       Date               Units:  Used               Authed:  Remaining                  Precautions:   Past Medical History:   Diagnosis Date   • Arthritis     knees   • Diabetes mellitus ([facility] [address] )     type 2   • GERD (gastroesophageal reflux disease)    • Hyperlipidemia    • Hypertension    • Stage 3a chronic kidney disease ([facility] [address] ) 2022   • Urinary incontinence    • Use of cane as ambulatory aid    • Wears glasses        Patient provided verbal consent to treatment plan and recommended interventions      Date 5/4/23 5/8 5/11 5/15 5/18   Visit Number 5 6 7 8 9   FOTO Tracking        Manual        Joint mobs        STM        Patella mobs                        TherEx        Active GRAMAJO NS *6' L1 NS 6' L1 NS 6' L1 NS 7' L1 NS 7' L1   Knee PROM        Standing abd/ext/ flex 2x10 ea B/L on foam "2x10 ea B/L on foam      Squat progression At // bars 2x10 At // bars 2x10 At // bars 2x10 At // bars 2x10 UE support At // bars 2x10 UE support   Step ups forward, lateral, down  x10 B 4\" step x15 ea 4\"  x15 ea 4\"  x15 ea 4\"  x20 ea 4\"    LAQ 2x10 B 2x10 B 2x10 B x20 B x20 B   Side stepping 4*15' with B UE support 11*16' with B UE support 11*16' with B UE support 11*16' with B UE support 11*16' with B UE support   HR 2x10 x25 x20 x20 x20   Neuro Re-Ed        [employer]        SL balance 3x10s B 3x10s B      Seated hip add squeezes  2x10    2x10   Sit to stands   2x10 x10 x10 x10   Suitcase carries   4x50' 4#  4x50' 4#                     TherAct        Patient education                                        Gait Training        AD training                        Modalities        CP                     "

## 2023-06-09 LAB — HBA1C MFR BLD HPLC: 6.5 %

## 2023-07-04 DIAGNOSIS — E11.40 TYPE 2 DIABETES MELLITUS WITH DIABETIC NEUROPATHY, WITHOUT LONG-TERM CURRENT USE OF INSULIN (HCC): ICD-10-CM

## 2023-07-04 DIAGNOSIS — I48.0 PAROXYSMAL ATRIAL FIBRILLATION (HCC): ICD-10-CM

## 2023-07-04 DIAGNOSIS — I51.89 GRADE II DIASTOLIC DYSFUNCTION: ICD-10-CM

## 2023-07-06 ENCOUNTER — DOCUMENTATION (OUTPATIENT)
Dept: FAMILY MEDICINE CLINIC | Facility: CLINIC | Age: 83
End: 2023-07-06

## 2023-07-06 DIAGNOSIS — M17.11 LOCALIZED OSTEOARTHRITIS OF RIGHT KNEE: Primary | ICD-10-CM

## 2023-07-06 RX ORDER — HYLAN G-F 20 16MG/2ML
48 SYRINGE (ML) INTRAARTICULAR ONCE
Qty: 6 ML | Refills: 0 | Status: SHIPPED | OUTPATIENT
Start: 2023-07-06 | End: 2023-07-06

## 2023-07-07 ENCOUNTER — NURSING HOME VISIT (OUTPATIENT)
Dept: FAMILY MEDICINE CLINIC | Facility: CLINIC | Age: 83
End: 2023-07-07
Payer: MEDICARE

## 2023-07-07 DIAGNOSIS — E78.2 MIXED HYPERLIPIDEMIA: ICD-10-CM

## 2023-07-07 DIAGNOSIS — I51.89 GRADE II DIASTOLIC DYSFUNCTION: ICD-10-CM

## 2023-07-07 DIAGNOSIS — M17.11 ARTHRITIS OF RIGHT KNEE: ICD-10-CM

## 2023-07-07 DIAGNOSIS — I48.20 CHRONIC ATRIAL FIBRILLATION (HCC): Primary | ICD-10-CM

## 2023-07-07 PROBLEM — H35.30 MACULAR DEGENERATION: Status: ACTIVE | Noted: 2023-07-07

## 2023-07-07 PROBLEM — M19.90 ARTHRITIS: Status: RESOLVED | Noted: 2022-09-15 | Resolved: 2023-07-07

## 2023-07-07 PROBLEM — R06.2 WHEEZING: Status: RESOLVED | Noted: 2018-12-18 | Resolved: 2023-07-07
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
## 2023-07-07 PROCEDURE — 99308 SBSQ NF CARE LOW MDM 20: CPT | Performed by: FAMILY MEDICINE

## 2023-07-08 NOTE — PROGRESS NOTES
Assessment/Plan:    Chronic atrial fibrillation (HCC)  Chronic; well-controlled     HR in 70's today. Denies any chest pain, palpitations or dizziness. Last seen by cardiologist on 4/13. Advised pt to continue metoprolol 25 mg daily and eliquis 5 mg daily. Hyperlipidemia  Continue Lipitor 40 mg daily. Grade II diastolic dysfunction  Scant edema on bilateral lower legs seen on physical exam today. Continue torsemide 20 mg three times a week on Monday, Wednesday and Friday. Arthritis of right knee  Pt states that she had PT for her right knee which was very helpful and would like to be evaluated and continue with physical therapy while at nursing home. Problem List Items Addressed This Visit        Cardiovascular and Mediastinum    Chronic atrial fibrillation ([address]) - Primary     Chronic; well-controlled     HR in 70's today. Denies any chest pain, palpitations or dizziness. Last seen by cardiologist on 4/13. Advised pt to continue metoprolol 25 mg daily and eliquis 5 mg daily. Musculoskeletal and Integument    Arthritis of right knee     Pt states that she had PT for her right knee which was very helpful and would like to be evaluated and continue with physical therapy while at nursing home. Other    Grade II diastolic dysfunction     Scant edema on bilateral lower legs seen on physical exam today. Continue torsemide 20 mg three times a week on Monday, Wednesday and Friday. Hyperlipidemia     Continue Lipitor 40 mg daily. Subjective:      Patient ID: Dutch Jones is a 79 y.o. female. HPI    The following portions of the patient's history were reviewed and updated as appropriate: allergies, current medications, past family history, past medical history, past social history, past surgical history and problem list.    Review of Systems   Constitutional: Negative for chills and fever. HENT: Negative for ear pain and sore throat.     Eyes: Negative for pain and visual disturbance. Respiratory: Negative for cough and shortness of breath. Cardiovascular: Negative for chest pain and palpitations. Gastrointestinal: Negative for abdominal pain and vomiting. Genitourinary: Negative for dysuria and hematuria. Musculoskeletal: Negative for arthralgias and back pain. Skin: Negative for color change and rash. Neurological: Negative for seizures and syncope. All other systems reviewed and are negative. Objective: There were no vitals taken for this visit. Physical Exam  Constitutional:       Appearance: Normal appearance. HENT:      Head: Atraumatic. Eyes:      General:         Right eye: No discharge. Left eye: No discharge. Conjunctiva/sclera: Conjunctivae normal.   Cardiovascular:      Rate and Rhythm: Normal rate and regular rhythm. Pulmonary:      Effort: Pulmonary effort is normal. No respiratory distress. Breath sounds: Normal breath sounds. Abdominal:      General: Bowel sounds are normal.      Palpations: Abdomen is soft. Tenderness: There is no abdominal tenderness. Musculoskeletal:      Cervical back: Neck supple. Right lower leg: Edema present. Left lower leg: Edema present. Skin:     General: Skin is warm and dry. Capillary Refill: Capillary refill takes less than 2 seconds. Neurological:      Mental Status: She is alert.

## 2023-07-08 NOTE — ASSESSMENT & PLAN NOTE
Pt states that she had PT for her right knee which was very helpful and would like to be evaluated and continue with physical therapy while at nursing home.

## 2023-07-08 NOTE — ASSESSMENT & PLAN NOTE
Chronic; well-controlled     HR in 70's today. Denies any chest pain, palpitations or dizziness. Last seen by cardiologist on 4/13. Advised pt to continue metoprolol 25 mg daily and eliquis 5 mg daily.

## 2023-07-11 DIAGNOSIS — E78.2 MIXED HYPERLIPIDEMIA: ICD-10-CM

## 2023-07-11 DIAGNOSIS — I10 ESSENTIAL HYPERTENSION: ICD-10-CM

## 2023-07-13 RX ORDER — LISINOPRIL 10 MG/1
10 TABLET ORAL DAILY
Qty: 90 TABLET | Refills: 1 | Status: SHIPPED | OUTPATIENT
Start: 2023-07-13

## 2023-07-13 RX ORDER — ATORVASTATIN CALCIUM 40 MG/1
40 TABLET, FILM COATED ORAL DAILY
Qty: 90 TABLET | Refills: 1 | Status: SHIPPED | OUTPATIENT
Start: 2023-07-13

## 2023-08-01 RX ORDER — AMPICILLIN TRIHYDRATE 250 MG
500 CAPSULE ORAL 2 TIMES DAILY
Qty: 60 CAPSULE | Refills: 0 | OUTPATIENT
Start: 2023-08-01

## 2023-08-02 ENCOUNTER — TELEPHONE (OUTPATIENT)
Dept: FAMILY MEDICINE CLINIC | Facility: CLINIC | Age: 83
End: 2023-08-02

## 2023-08-02 NOTE — TELEPHONE ENCOUNTER
Zeina -  at [facility] at [facility] called and wants to schedule an appt for the synvisc injection. Alma Whatley called earlier today stating that you said that we can give it we just have to make sure we have the injection before the appt.  I just want to confirm that I can schedule this appt since she has not had a physical in over a year or if a physical needs to be scheduled first.

## 2023-08-04 DIAGNOSIS — M17.11 ARTHRITIS OF RIGHT KNEE: Primary | ICD-10-CM

## 2023-08-04 RX ORDER — HYLAN G-F 20 16MG/2ML
48 SYRINGE (ML) INTRAARTICULAR ONCE
Qty: 6 ML | Refills: 0 | Status: SHIPPED | OUTPATIENT
Start: 2023-08-04 | End: 2023-08-04

## 2023-08-04 NOTE — PROGRESS NOTES
Patient is nursing home patient and unassisted living at [facility]. She has chronic right osteoarthritis, not surgical candidate. She does not tolerate prednisone steroid injections, and does respond well to Synvisc. Last received Synvisc in March 2023 with great relief. Patient interested in repeat injection. Schedule patient for follow-up for Synvisc-One injection on 8/29/23. Will try to get authorization to complete here.

## 2023-08-15 ENCOUNTER — TELEPHONE (OUTPATIENT)
Dept: CARDIOLOGY CLINIC | Facility: CLINIC | Age: 83
End: 2023-08-15

## 2023-08-15 DIAGNOSIS — I51.89 GRADE II DIASTOLIC DYSFUNCTION: ICD-10-CM

## 2023-08-15 DIAGNOSIS — E78.2 MIXED HYPERLIPIDEMIA: ICD-10-CM

## 2023-08-15 DIAGNOSIS — E11.40 TYPE 2 DIABETES MELLITUS WITH DIABETIC NEUROPATHY, WITHOUT LONG-TERM CURRENT USE OF INSULIN (HCC): Primary | ICD-10-CM

## 2023-08-17 ENCOUNTER — TELEPHONE (OUTPATIENT)
Dept: FAMILY MEDICINE CLINIC | Facility: CLINIC | Age: 83
End: 2023-08-17

## 2023-08-17 NOTE — TELEPHONE ENCOUNTER
Bill Mederos is calling from [facility] to confirm Rx for Synvisc will be called into patients pharmacy for upcoming appointment for her injection on 8/29/23.

## 2023-08-22 DIAGNOSIS — M17.11 ARTHRITIS OF RIGHT KNEE: Primary | ICD-10-CM

## 2023-08-22 RX ORDER — HYLAN G-F 20 16MG/2ML
48 SYRINGE (ML) INTRAARTICULAR ONCE
Qty: 6 ML | Refills: 0 | Status: SHIPPED | OUTPATIENT
Start: 2023-08-22 | End: 2023-08-22

## 2023-08-24 NOTE — TELEPHONE ENCOUNTER
Zeina brown calling from [facility]  regarding Rx (Synvisc) denial  for upcoming appointment 8/29/23. S/w S.S. Clinical Staff will start process of PA.      [phone]

## 2023-08-30 ENCOUNTER — NURSING HOME VISIT (OUTPATIENT)
Dept: FAMILY MEDICINE CLINIC | Facility: CLINIC | Age: 83
End: 2023-08-30

## 2023-08-30 DIAGNOSIS — I87.2 VENOUS STASIS DERMATITIS OF BOTH LOWER EXTREMITIES: ICD-10-CM

## 2023-08-30 DIAGNOSIS — I51.89 GRADE II DIASTOLIC DYSFUNCTION: Primary | ICD-10-CM

## 2023-08-30 DIAGNOSIS — R32 URINARY INCONTINENCE, UNSPECIFIED TYPE: ICD-10-CM

## 2023-08-30 DIAGNOSIS — I48.20 CHRONIC ATRIAL FIBRILLATION (HCC): ICD-10-CM

## 2023-08-30 DIAGNOSIS — E78.2 MIXED HYPERLIPIDEMIA: ICD-10-CM

## 2023-08-30 DIAGNOSIS — M25.561 CHRONIC PAIN OF RIGHT KNEE: ICD-10-CM

## 2023-08-30 DIAGNOSIS — I10 PRIMARY HYPERTENSION: ICD-10-CM

## 2023-08-30 DIAGNOSIS — G89.29 CHRONIC PAIN OF RIGHT KNEE: ICD-10-CM

## 2023-08-30 RX ORDER — AMMONIUM LACTATE 12 G/100G
CREAM TOPICAL 2 TIMES DAILY
COMMUNITY

## 2023-08-30 RX ORDER — CYCLOBENZAPRINE HCL 5 MG
5 TABLET ORAL 2 TIMES DAILY PRN
COMMUNITY
Start: 2023-07-26

## 2023-08-30 RX ORDER — CETIRIZINE HYDROCHLORIDE 10 MG/1
10 TABLET ORAL DAILY
COMMUNITY

## 2023-08-30 RX ORDER — SIMVASTATIN 40 MG
40 TABLET ORAL DAILY
COMMUNITY
Start: 2023-08-02

## 2023-08-30 NOTE — PROGRESS NOTES
Assessment/Plan:    Chronic atrial fibrillation (HCC)  Chronic; well-controlled     HR in 60's today. Denies any chest pain, palpitations or dizziness. Last seen by cardiologist on 4/13. Advised pt to continue metoprolol 25 mg daily and eliquis 5 mg daily. Hyperlipidemia  Continue Simvastatin 40 mg daily. Grade II diastolic dysfunction  Chronic    Echo done on 12/4/18 showed grade II diastolic dysfunction w/ EF of 50-55%. +1 pitting edema on bilateral lower legs up to shin noted on physical exam today, as well as edematous forearms. Pt speaking in complete sentences with mild SOB. · Increased torsemide from 20 mg three times a week on Monday, Wednesday and Friday to daily torsemide. · Would re-assess edema in 2 weeks. Urinary incontinence  Chronic     Continues to use adult diapers for urinating. Denies dysuria or hematuria. Primary hypertension  Chronic    BP around 130's/70's. Denies headaches, blurry vision, chest pain. · Continue amlodipine 10 mg, lisinopril 10 mg and metoprolol 25 mg daily   · Continue Torsemide daily   · Continue to monitor BP    Venous stasis dermatitis of both lower extremities  Chronic     Prior hx of cellulitis on right lower extremity. Scaly, dry skin noted on bilateral lower extremities. · Continue 12% amlactin cream BID daily    Chronic pain of right knee  Chronic     Pt is frustrated that she was not able to get her knee injection due to insurance issues. States that her last injection was around 4 months ago. She is able to ambulate with walker.      · Attempting to process knee injections through medicare B   · Will follow up on approval or denial   · Offered PT but pt states that she has completed extensive PT therapy and she did everything she can there       Diagnoses and all orders for this visit:    Grade II diastolic dysfunction    Chronic pain of right knee    Chronic atrial fibrillation ([address])    Mixed hyperlipidemia    Urinary incontinence, unspecified type    Primary hypertension    Venous stasis dermatitis of both lower extremities    Other orders  -     cyclobenzaprine (FLEXERIL) 5 mg tablet; Take 5 mg by mouth 2 (two) times a day as needed  -     simvastatin (ZOCOR) 40 mg tablet; Take 40 mg by mouth in the morning  -     cetirizine (ZyrTEC) 10 mg tablet; Take 10 mg by mouth daily  -     Diclofenac Sodium (VOLTAREN) 1 %; Apply 2 g topically 2 (two) times a day  -     ammonium lactate (LAC-HYDRIN) 12 % cream; Apply topically 2 (two) times a day          Subjective:      Patient ID: Verona Duran is a 79 y.o. female. HPI   78 y/o female with PMHx of atrial fibrillation, CHF and hypertension seen at nursing home visit. Pt frustrated with not being able to receive her right knee injection this past week, which she states keeps her pain under control. Pt denies headaches, blurry vision, palpitations or any other acute complaints at this time. The following portions of the patient's history were reviewed and updated as appropriate: allergies, current medications, past family history, past medical history, past social history, past surgical history and problem list.    Review of Systems   Constitutional: Negative for chills and fever. HENT: Negative for ear pain and sore throat. Eyes: Negative for pain and visual disturbance. Respiratory: Negative for cough and shortness of breath. Cardiovascular: Negative for chest pain and palpitations. Gastrointestinal: Negative for abdominal pain and vomiting. Genitourinary: Negative for dysuria and hematuria. Musculoskeletal: Negative for arthralgias and back pain. Right knee pain   Skin: Negative for color change and rash. Neurological: Negative for seizures and syncope. All other systems reviewed and are negative. Objective: There were no vitals taken for this visit. Physical Exam  Constitutional:       Appearance: Normal appearance. She is obese. Comments: Pt speaking in full sentences with some mild SOB. HENT:      Head: Atraumatic. Eyes:      General:         Right eye: No discharge. Left eye: No discharge. Conjunctiva/sclera: Conjunctivae normal.   Cardiovascular:      Rate and Rhythm: Normal rate and regular rhythm. Pulmonary:      Effort: Pulmonary effort is normal. No respiratory distress. Breath sounds: Normal breath sounds. Abdominal:      General: Bowel sounds are normal.      Palpations: Abdomen is soft. Tenderness: There is no abdominal tenderness. Musculoskeletal:      Cervical back: Neck supple. Right lower leg: Edema present. Left lower leg: Edema present. Comments: +1 pitting edema up to shins   Skin:     General: Skin is warm and dry. Capillary Refill: Capillary refill takes less than 2 seconds. Neurological:      General: No focal deficit present. Mental Status: She is alert and oriented to person, place, and time. Mental status is at baseline.    Psychiatric:         Mood and Affect: Mood normal.

## 2023-08-30 NOTE — ASSESSMENT & PLAN NOTE
Chronic; well-controlled     HR in 60's today. Denies any chest pain, palpitations or dizziness. Last seen by cardiologist on 4/13. Advised pt to continue metoprolol 25 mg daily and eliquis 5 mg daily.

## 2023-08-31 NOTE — ASSESSMENT & PLAN NOTE
Chronic    BP around 130's/70's. Denies headaches, blurry vision, chest pain.      · Continue amlodipine 10 mg, lisinopril 10 mg and metoprolol 25 mg daily   · Continue Torsemide daily   · Continue to monitor BP

## 2023-08-31 NOTE — ASSESSMENT & PLAN NOTE
Chronic    Echo done on 12/4/18 showed grade II diastolic dysfunction w/ EF of 50-55%. +1 pitting edema on bilateral lower legs up to shin noted on physical exam today, as well as edematous forearms. Pt speaking in complete sentences with mild SOB. · Increased torsemide from 20 mg three times a week on Monday, Wednesday and Friday to daily torsemide. · Would re-assess edema in 2 weeks.

## 2023-08-31 NOTE — ASSESSMENT & PLAN NOTE
Chronic     Prior hx of cellulitis on right lower extremity. Scaly, dry skin noted on bilateral lower extremities.        · Continue 12% amlactin cream BID daily No

## 2023-08-31 NOTE — ASSESSMENT & PLAN NOTE
Chronic     Pt is frustrated that she was not able to get her knee injection due to insurance issues. States that her last injection was around 4 months ago. She is able to ambulate with walker.      · Attempting to process knee injections through medicare B   · Will follow up on approval or denial   · Offered PT but pt states that she has completed extensive PT therapy and she did everything she can there

## 2023-09-11 DIAGNOSIS — M25.561 CHRONIC PAIN OF RIGHT KNEE: Primary | ICD-10-CM

## 2023-09-11 DIAGNOSIS — G89.29 CHRONIC PAIN OF RIGHT KNEE: Primary | ICD-10-CM

## 2023-09-25 ENCOUNTER — TELEPHONE (OUTPATIENT)
Dept: FAMILY MEDICINE CLINIC | Facility: CLINIC | Age: 83
End: 2023-09-25

## 2023-09-29 DIAGNOSIS — M17.11 ARTHRITIS OF RIGHT KNEE: Primary | ICD-10-CM

## 2023-10-11 LAB — HBA1C MFR BLD HPLC: 6.4 %

## 2023-10-12 ENCOUNTER — OFFICE VISIT (OUTPATIENT)
Dept: OBGYN CLINIC | Facility: CLINIC | Age: 83
End: 2023-10-12
Payer: MEDICARE

## 2023-10-12 VITALS
SYSTOLIC BLOOD PRESSURE: 129 MMHG | WEIGHT: 190 LBS | DIASTOLIC BLOOD PRESSURE: 88 MMHG | HEART RATE: 59 BPM | BODY MASS INDEX: 35.87 KG/M2 | HEIGHT: 61 IN

## 2023-10-12 DIAGNOSIS — M17.11 ARTHRITIS OF RIGHT KNEE: ICD-10-CM

## 2023-10-12 DIAGNOSIS — M17.11 PRIMARY OSTEOARTHRITIS OF RIGHT KNEE: Primary | ICD-10-CM

## 2023-10-12 PROCEDURE — 20610 DRAIN/INJ JOINT/BURSA W/O US: CPT | Performed by: ORTHOPAEDIC SURGERY

## 2023-10-12 PROCEDURE — 99213 OFFICE O/P EST LOW 20 MIN: CPT | Performed by: ORTHOPAEDIC SURGERY

## 2023-10-12 RX ORDER — HYALURONATE SODIUM 10 MG/ML
20 SYRINGE (ML) INTRAARTICULAR
Status: COMPLETED | OUTPATIENT
Start: 2023-10-12 | End: 2023-10-12

## 2023-10-12 RX ADMIN — Medication 20 MG: at 13:00

## 2023-10-12 NOTE — PROGRESS NOTES
Assessment/Plan:  1. Primary osteoarthritis of right knee  Injection Procedure Prior Authorization      2. Arthritis of right knee  Ambulatory Referral to [address] [place] [address] has right knee pain consistent with severe osteoarthritis. I recommended proceeding with viscosupplement injections in the right knee again as this is helped her in the past.  We did get approval of insurance and proceed with her first Euflexxa injection of the right knee today. She tolerated this well. Hopefully this gives her significant relief going forward and we can follow-up with her 1 week for the next Euflexxa injection. Large joint arthrocentesis: R knee  Universal Protocol:  Consent: Verbal consent obtained. Risks and benefits: risks, benefits and alternatives were discussed  Consent given by: patient  Site marked: the operative site was marked  Supporting Documentation  Indications: pain   Procedure Details  Location: knee - R knee  Needle size: 22 G  Ultrasound guidance: no  Approach: anterolateral  Medications administered: 20 mg Sodium Hyaluronate (Viscosup) 20 MG/2ML    Patient tolerance: patient tolerated the procedure well with no immediate complications  Dressing:  Sterile dressing applied          Subjective:   Shane Gallardo is a 79 y.o. female who presents to the office for evaluation for right-sided knee pain. She has a history of severe osteoarthritis in her right knee. She was previously treated by Dr. Marian Ayala with a series of Euflexxa injections in March of this year. She had x-rays in February demonstrating severe arthritis. She states the Euflexxa injections significantly relieved her pain and she started feeling increased aching throbbing discomfort over the last 1 month. She is requesting to have the same injections done again. She is a diabetic but does have appropriate control with a recent A1c of 6.5.   She does not want to have consultation for knee replacement as she had knee replacement in the opposite side but does not feel comfortable with that consultation at 79years old. Review of Systems   Constitutional:  Negative for chills, fever and unexpected weight change. HENT:  Negative for hearing loss, nosebleeds and sore throat. Eyes:  Negative for pain, redness and visual disturbance. Respiratory:  Negative for cough, shortness of breath and wheezing. Cardiovascular:  Negative for chest pain, palpitations and leg swelling. Gastrointestinal:  Negative for abdominal pain, nausea and vomiting. Endocrine: Negative for polydipsia and polyuria. Genitourinary:  Negative for dysuria and hematuria. Musculoskeletal:         See HPI   Skin:  Negative for rash and wound. Neurological:  Negative for dizziness, numbness and headaches. Psychiatric/Behavioral:  Negative for decreased concentration and suicidal ideas. The patient is not nervous/anxious. Past Medical History:   Diagnosis Date    Arthritis     knees    Diabetes mellitus ([facility])     type 2    GERD (gastroesophageal reflux disease)     Hyperlipidemia     Hypertension     Stage 3a chronic kidney disease ([facility]) 4/14/2022    Urinary incontinence     Use of cane as ambulatory aid     Wears glasses        Past Surgical History:   Procedure Laterality Date    BREAST BIOPSY Left 2015    benign    BREAST SURGERY Left     nothing was there     COLONOSCOPY      HYSTERECTOMY      complete-fibroid    JOINT REPLACEMENT Left     knee    MT XCAPSL CTRC RMVL INSJ IO LENS PROSTH W/O ECP Right 07/19/2018    Procedure: EXTRACTION EXTRACAPSULAR CATARACT PHACO INTRAOCULAR LENS (IOL); Surgeon: Tressa Ibrahim MD;  Location: [facility] MAIN OR;  Service: Ophthalmology    MT XCAPSL CTRC RMVL INSJ IO LENS PROSTH W/O ECP Left 08/09/2018    Procedure: EXTRACTION EXTRACAPSULAR CATARACT PHACO INTRAOCULAR LENS (IOL);   Surgeon: Tressa Ibrahim MD;  Location: [facility] MAIN OR;  Service: Ophthalmology       Family History   Problem Relation Age of Onset    Breast cancer Mother     Diabetes type II Mother     Atrial fibrillation Mother     Diabetes Mother         diet controlled    Hypertension Mother     Lung cancer Father     Alcohol abuse Father     Cancer Father         lung-smoker       Social History     Occupational History    Not on file   Tobacco Use    Smoking status: Never    Smokeless tobacco: Never   Vaping Use    Vaping Use: Never used   Substance and Sexual Activity    Alcohol use: Yes     Comment: social    Drug use: No    Sexual activity: Not on file         Current Outpatient Medications:     acetaminophen (TYLENOL) 650 mg CR tablet, Take 1 tablet (650 mg total) by mouth every 8 (eight) hours as needed for mild pain, Disp: 30 tablet, Rfl: 0    amLODIPine (NORVASC) 10 mg tablet, Take 1 tablet (10 mg total) by mouth daily, Disp: 90 tablet, Rfl: 3    ammonium lactate (LAC-HYDRIN) 12 % cream, Apply topically 2 (two) times a day, Disp: , Rfl:     atorvastatin (LIPITOR) 40 mg tablet, Take 1 tablet (40 mg total) by mouth daily, Disp: 90 tablet, Rfl: 1    cetirizine (ZyrTEC) 10 mg tablet, Take 10 mg by mouth daily, Disp: , Rfl:     CINNAMON PO, Take 1,000 mg by mouth every morning, Disp: , Rfl:     cyclobenzaprine (FLEXERIL) 5 mg tablet, Take 5 mg by mouth 2 (two) times a day as needed, Disp: , Rfl:     Diclofenac Sodium (VOLTAREN) 1 %, Apply 2 g topically 4 (four) times a day, Disp: 2 g, Rfl: 1    Diclofenac Sodium (VOLTAREN) 1 %, Apply 2 g topically 2 (two) times a day, Disp: , Rfl:     Diclofenac Sodium (VOLTAREN) 1 %, APPLY BILATERALLY TWICE A DAY OR AS DIRECTED BY PRESCRIBER., Disp: 100 g, Rfl: 1    Eliquis 5 MG, TAKE ONE TABLET  TWO TIMES A DAY, Disp: 180 tablet, Rfl: 3    gabapentin (Neurontin) 300 mg capsule, Take 1 capsule (300 mg total) by mouth 4 (four) times a day, Disp: 120 capsule, Rfl: 3    lisinopril (ZESTRIL) 10 mg tablet, Take 1 tablet (10 mg total) by mouth daily, Disp: 90 tablet, Rfl: 1    metFORMIN (GLUCOPHAGE) 500 mg tablet, Take 500 mg by mouth 2 (two) times a day, Disp: , Rfl:     metoprolol tartrate (LOPRESSOR) 25 mg tablet, TAKE ONE TABLET BY MOUTH EVERY MORNING, Disp: 90 tablet, Rfl: 1    Multiple Vitamins-Minerals (PRESERVISION AREDS PO), Take by mouth, Disp: , Rfl:     simvastatin (ZOCOR) 40 mg tablet, Take 40 mg by mouth in the morning, Disp: , Rfl:     torsemide (DEMADEX) 20 mg tablet, Take one tablet [id number] morning, Disp: 45 tablet, Rfl: 3    Allergies   Allergen Reactions    Other Allergic Rhinitis     Seasonal/pollen       Objective:  Vitals:    10/12/23 1314   BP: 129/88   Pulse: 59     Pain Score:   6      Right Knee Exam     Tenderness   The patient is experiencing tenderness in the medial joint line. Range of Motion   Extension:  normal   Flexion:  normal     Other   Erythema: absent  Sensation: normal  Pulse: present  Swelling: none  Effusion: no effusion present          Observations     Right Knee   Negative for effusion. Physical Exam  Vitals and nursing note reviewed. Constitutional:       Appearance: Normal appearance. She is well-developed. HENT:      Head: Normocephalic and atraumatic. Right Ear: External ear normal.      Left Ear: External ear normal.   Eyes:      General: No scleral icterus. Extraocular Movements: Extraocular movements intact. Conjunctiva/sclera: Conjunctivae normal.   Cardiovascular:      Rate and Rhythm: Normal rate. Pulmonary:      Effort: Pulmonary effort is normal. No respiratory distress. Musculoskeletal:      Cervical back: Normal range of motion and neck supple. Right knee: No effusion. Comments: See Ortho exam   Skin:     General: Skin is warm and dry. Neurological:      General: No focal deficit present. Mental Status: She is alert and oriented to person, place, and time.    Psychiatric:         Behavior: Behavior normal.         I have personally reviewed pertinent films in PACS and my interpretation is as follows:  X-rays of the right knee demonstrate severe right knee osteoarthritis. This document was created using speech voice recognition software. Grammatical errors, random word insertions, pronoun errors, and incomplete sentences are an occasional consequence of this system due to software limitations, ambient noise, and hardware issues. Any formal questions or concerns about content, text, or information contained within the body of this dictation should be directly addressed to the provider for clarification.

## 2023-10-17 ENCOUNTER — 6 MONTH FOLLOW UP (OUTPATIENT)
Dept: URBAN - METROPOLITAN AREA CLINIC 27 | Facility: CLINIC | Age: 83
End: 2023-10-17

## 2023-10-17 DIAGNOSIS — H43.813: ICD-10-CM

## 2023-10-17 DIAGNOSIS — H35.3124: ICD-10-CM

## 2023-10-17 DIAGNOSIS — Z96.1: ICD-10-CM

## 2023-10-17 DIAGNOSIS — H35.3113: ICD-10-CM

## 2023-10-17 DIAGNOSIS — E11.9: ICD-10-CM

## 2023-10-17 PROCEDURE — 92014 COMPRE OPH EXAM EST PT 1/>: CPT

## 2023-10-17 PROCEDURE — 92134 CPTRZ OPH DX IMG PST SGM RTA: CPT

## 2023-10-17 ASSESSMENT — TONOMETRY
OS_IOP_MMHG: 15
OD_IOP_MMHG: 13

## 2023-10-17 ASSESSMENT — VISUAL ACUITY
OS_CC: CF 3FT
OD_CC: 20/60

## 2023-10-19 ENCOUNTER — PROCEDURE VISIT (OUTPATIENT)
Dept: OBGYN CLINIC | Facility: CLINIC | Age: 83
End: 2023-10-19
Payer: MEDICARE

## 2023-10-19 DIAGNOSIS — M17.11 PRIMARY OSTEOARTHRITIS OF RIGHT KNEE: Primary | ICD-10-CM

## 2023-10-19 PROCEDURE — 20610 DRAIN/INJ JOINT/BURSA W/O US: CPT | Performed by: ORTHOPAEDIC SURGERY

## 2023-10-19 RX ORDER — HYALURONATE SODIUM 10 MG/ML
20 SYRINGE (ML) INTRAARTICULAR
Status: COMPLETED | OUTPATIENT
Start: 2023-10-19 | End: 2023-10-19

## 2023-10-19 RX ADMIN — Medication 20 MG: at 11:00

## 2023-10-19 NOTE — PROGRESS NOTES
Assessment/Plan:  1. Primary osteoarthritis of right knee            Sussy Poole tolerated her second Euflexxa injection the right knee today. Follow-up in 1 week for the next injection. Subjective:   Madalyn Castaneda is a 79 y.o. female who presents for her second Euflexxa injection of the right knee today. Past Medical History:   Diagnosis Date    Arthritis     knees    Diabetes mellitus ([address])     type 2    GERD (gastroesophageal reflux disease)     Hyperlipidemia     Hypertension     Stage 3a chronic kidney disease ([address]) 4/14/2022    Urinary incontinence     Use of cane as ambulatory aid     Wears glasses        Past Surgical History:   Procedure Laterality Date    BREAST BIOPSY Left 2015    benign    BREAST SURGERY Left     nothing was there     COLONOSCOPY      HYSTERECTOMY      complete-fibroid    JOINT REPLACEMENT Left     knee    GA XCAPSL CTRC RMVL INSJ IO LENS PROSTH W/O ECP Right 07/19/2018    Procedure: EXTRACTION EXTRACAPSULAR CATARACT PHACO INTRAOCULAR LENS (IOL); Surgeon: Hugh Lee MD;  Location: [facility] MAIN OR;  Service: Ophthalmology    GA XCAPSL CTRC RMVL INSJ IO LENS PROSTH W/O ECP Left 08/09/2018    Procedure: EXTRACTION EXTRACAPSULAR CATARACT PHACO INTRAOCULAR LENS (IOL);   Surgeon: Hugh Lee MD;  Location: [facility] MAIN OR;  Service: Ophthalmology       Family History   Problem Relation Age of Onset    Breast cancer Mother     Diabetes type II Mother     Atrial fibrillation Mother     Diabetes Mother         diet controlled    Hypertension Mother     Lung cancer Father     Alcohol abuse Father     Cancer Father         lung-smoker       Social History     Occupational History    Not on file   Tobacco Use    Smoking status: Never    Smokeless tobacco: Never   Vaping Use    Vaping Use: Never used   Substance and Sexual Activity    Alcohol use: Yes     Comment: social    Drug use: No    Sexual activity: Not on file         Current Outpatient Medications:     acetaminophen (TYLENOL) 650 mg CR tablet, Take 1 tablet (650 mg total) by mouth every 8 (eight) hours as needed for mild pain, Disp: 30 tablet, Rfl: 0    amLODIPine (NORVASC) 10 mg tablet, Take 1 tablet (10 mg total) by mouth daily, Disp: 90 tablet, Rfl: 3    ammonium lactate (LAC-HYDRIN) 12 % cream, Apply topically 2 (two) times a day, Disp: , Rfl:     atorvastatin (LIPITOR) 40 mg tablet, Take 1 tablet (40 mg total) by mouth daily, Disp: 90 tablet, Rfl: 1    cetirizine (ZyrTEC) 10 mg tablet, Take 10 mg by mouth daily, Disp: , Rfl:     CINNAMON PO, Take 1,000 mg by mouth every morning, Disp: , Rfl:     cyclobenzaprine (FLEXERIL) 5 mg tablet, Take 5 mg by mouth 2 (two) times a day as needed, Disp: , Rfl:     Diclofenac Sodium (VOLTAREN) 1 %, Apply 2 g topically 4 (four) times a day, Disp: 2 g, Rfl: 1    Diclofenac Sodium (VOLTAREN) 1 %, Apply 2 g topically 2 (two) times a day, Disp: , Rfl:     Diclofenac Sodium (VOLTAREN) 1 %, APPLY BILATERALLY TWICE A DAY OR AS DIRECTED BY PRESCRIBER., Disp: 100 g, Rfl: 1    Eliquis 5 MG, TAKE ONE TABLET  TWO TIMES A DAY, Disp: 180 tablet, Rfl: 3    gabapentin (Neurontin) 300 mg capsule, Take 1 capsule (300 mg total) by mouth 4 (four) times a day, Disp: 120 capsule, Rfl: 3    lisinopril (ZESTRIL) 10 mg tablet, Take 1 tablet (10 mg total) by mouth daily, Disp: 90 tablet, Rfl: 1    metFORMIN (GLUCOPHAGE) 500 mg tablet, Take 500 mg by mouth 2 (two) times a day, Disp: , Rfl:     metoprolol tartrate (LOPRESSOR) 25 mg tablet, TAKE ONE TABLET BY MOUTH EVERY MORNING, Disp: 90 tablet, Rfl: 1    Multiple Vitamins-Minerals (PRESERVISION AREDS PO), Take by mouth, Disp: , Rfl:     simvastatin (ZOCOR) 40 mg tablet, Take 40 mg by mouth in the morning, Disp: , Rfl:     torsemide (DEMADEX) 20 mg tablet, Take one tablet [id number] morning, Disp: 45 tablet, Rfl: 3    Allergies   Allergen Reactions    Other Allergic Rhinitis     Seasonal/pollen       Objective: There were no vitals filed for this visit.     Ortho Exam    Physical Exam    Large joint arthrocentesis: R knee  Universal Protocol:  Consent: Verbal consent obtained. Risks and benefits: risks, benefits and alternatives were discussed  Consent given by: patient  Site marked: the operative site was marked  Supporting Documentation  Indications: pain   Procedure Details  Location: knee - R knee  Needle size: 22 G  Ultrasound guidance: no  Approach: anterolateral  Medications administered: 20 mg Sodium Hyaluronate (Viscosup) 20 MG/2ML    Patient tolerance: patient tolerated the procedure well with no immediate complications  Dressing:  Sterile dressing applied            This document was created using speech voice recognition software. Grammatical errors, random word insertions, pronoun errors, and incomplete sentences are an occasional consequence of this system due to software limitations, ambient noise, and hardware issues. Any formal questions or concerns about content, text, or information contained within the body of this dictation should be directly addressed to the provider for clarification.

## 2023-10-26 ENCOUNTER — PROCEDURE VISIT (OUTPATIENT)
Dept: OBGYN CLINIC | Facility: CLINIC | Age: 83
End: 2023-10-26
Payer: MEDICARE

## 2023-10-26 DIAGNOSIS — M17.11 PRIMARY OSTEOARTHRITIS OF RIGHT KNEE: Primary | ICD-10-CM

## 2023-10-26 PROCEDURE — 20610 DRAIN/INJ JOINT/BURSA W/O US: CPT | Performed by: ORTHOPAEDIC SURGERY

## 2023-10-26 RX ORDER — HYALURONATE SODIUM 10 MG/ML
20 SYRINGE (ML) INTRAARTICULAR
Status: COMPLETED | OUTPATIENT
Start: 2023-10-26 | End: 2023-10-26

## 2023-10-26 RX ADMIN — Medication 20 MG: at 10:15

## 2023-10-26 NOTE — PROGRESS NOTES
Assessment/Plan:  1. Primary osteoarthritis of right knee            Inocente Brower tolerated her third Euflexxa injection the right knee today. We could repeat these injections in 6 months if clinically indicated. Subjective:   Sarah Yi is a 79 y.o. female who presents for her third Euflexxa injection in the right knee today         Past Medical History:   Diagnosis Date    Arthritis     knees    Diabetes mellitus ([address])     type 2    GERD (gastroesophageal reflux disease)     Hyperlipidemia     Hypertension     Stage 3a chronic kidney disease ([address]) 4/14/2022    Urinary incontinence     Use of cane as ambulatory aid     Wears glasses        Past Surgical History:   Procedure Laterality Date    BREAST BIOPSY Left 2015    benign    BREAST SURGERY Left     nothing was there     COLONOSCOPY      HYSTERECTOMY      complete-fibroid    JOINT REPLACEMENT Left     knee    CT XCAPSL CTRC RMVL INSJ IO LENS PROSTH W/O ECP Right 07/19/2018    Procedure: EXTRACTION EXTRACAPSULAR CATARACT PHACO INTRAOCULAR LENS (IOL); Surgeon: Fletcher Bosworth, MD;  Location: [facility] MAIN OR;  Service: Ophthalmology    CT XCAPSL CTRC RMVL INSJ IO LENS PROSTH W/O ECP Left 08/09/2018    Procedure: EXTRACTION EXTRACAPSULAR CATARACT PHACO INTRAOCULAR LENS (IOL);   Surgeon: Fletcher Bosworth, MD;  Location: [facility] MAIN OR;  Service: Ophthalmology       Family History   Problem Relation Age of Onset    Breast cancer Mother     Diabetes type II Mother     Atrial fibrillation Mother     Diabetes Mother         diet controlled    Hypertension Mother     Lung cancer Father     Alcohol abuse Father     Cancer Father         lung-smoker       Social History     Occupational History    Not on file   Tobacco Use    Smoking status: Never    Smokeless tobacco: Never   Vaping Use    Vaping Use: Never used   Substance and Sexual Activity    Alcohol use: Yes     Comment: social    Drug use: No    Sexual activity: Not on file         Current Outpatient Medications: acetaminophen (TYLENOL) 650 mg CR tablet, Take 1 tablet (650 mg total) by mouth every 8 (eight) hours as needed for mild pain, Disp: 30 tablet, Rfl: 0    amLODIPine (NORVASC) 10 mg tablet, Take 1 tablet (10 mg total) by mouth daily, Disp: 90 tablet, Rfl: 3    ammonium lactate (LAC-HYDRIN) 12 % cream, Apply topically 2 (two) times a day, Disp: , Rfl:     atorvastatin (LIPITOR) 40 mg tablet, Take 1 tablet (40 mg total) by mouth daily, Disp: 90 tablet, Rfl: 1    cetirizine (ZyrTEC) 10 mg tablet, Take 10 mg by mouth daily, Disp: , Rfl:     CINNAMON PO, Take 1,000 mg by mouth every morning, Disp: , Rfl:     cyclobenzaprine (FLEXERIL) 5 mg tablet, Take 5 mg by mouth 2 (two) times a day as needed, Disp: , Rfl:     Diclofenac Sodium (VOLTAREN) 1 %, Apply 2 g topically 4 (four) times a day, Disp: 2 g, Rfl: 1    Diclofenac Sodium (VOLTAREN) 1 %, Apply 2 g topically 2 (two) times a day, Disp: , Rfl:     Diclofenac Sodium (VOLTAREN) 1 %, APPLY BILATERALLY TWICE A DAY OR AS DIRECTED BY PRESCRIBER., Disp: 100 g, Rfl: 1    Eliquis 5 MG, TAKE ONE TABLET  TWO TIMES A DAY, Disp: 180 tablet, Rfl: 3    gabapentin (Neurontin) 300 mg capsule, Take 1 capsule (300 mg total) by mouth 4 (four) times a day, Disp: 120 capsule, Rfl: 3    lisinopril (ZESTRIL) 10 mg tablet, Take 1 tablet (10 mg total) by mouth daily, Disp: 90 tablet, Rfl: 1    metFORMIN (GLUCOPHAGE) 500 mg tablet, Take 500 mg by mouth 2 (two) times a day, Disp: , Rfl:     metoprolol tartrate (LOPRESSOR) 25 mg tablet, TAKE ONE TABLET BY MOUTH EVERY MORNING, Disp: 90 tablet, Rfl: 1    Multiple Vitamins-Minerals (PRESERVISION AREDS PO), Take by mouth, Disp: , Rfl:     simvastatin (ZOCOR) 40 mg tablet, Take 40 mg by mouth in the morning, Disp: , Rfl:     torsemide (DEMADEX) 20 mg tablet, Take one tablet [id number] morning, Disp: 45 tablet, Rfl: 3    Allergies   Allergen Reactions    Other Allergic Rhinitis     Seasonal/pollen       Objective:   There were no vitals filed for this visit. Ortho Exam    Physical Exam    Large joint arthrocentesis: R knee  Universal Protocol:  Consent: Verbal consent obtained. Risks and benefits: risks, benefits and alternatives were discussed  Consent given by: patient  Site marked: the operative site was marked  Supporting Documentation  Indications: pain   Procedure Details  Location: knee - R knee  Needle size: 22 G  Ultrasound guidance: no  Approach: anterolateral  Medications administered: 20 mg Sodium Hyaluronate (Viscosup) 20 MG/2ML    Patient tolerance: patient tolerated the procedure well with no immediate complications  Dressing:  Sterile dressing applied            This document was created using speech voice recognition software. Grammatical errors, random word insertions, pronoun errors, and incomplete sentences are an occasional consequence of this system due to software limitations, ambient noise, and hardware issues. Any formal questions or concerns about content, text, or information contained within the body of this dictation should be directly addressed to the provider for clarification.

## 2023-11-09 ENCOUNTER — OFFICE VISIT (OUTPATIENT)
Dept: CARDIOLOGY CLINIC | Facility: CLINIC | Age: 83
End: 2023-11-09
Payer: MEDICARE

## 2023-11-09 VITALS
WEIGHT: 201.5 LBS | OXYGEN SATURATION: 99 % | HEIGHT: 61 IN | HEART RATE: 57 BPM | SYSTOLIC BLOOD PRESSURE: 130 MMHG | BODY MASS INDEX: 38.04 KG/M2 | DIASTOLIC BLOOD PRESSURE: 70 MMHG

## 2023-11-09 DIAGNOSIS — N18.31 STAGE 3A CHRONIC KIDNEY DISEASE (HCC): ICD-10-CM

## 2023-11-09 DIAGNOSIS — R60.0 EDEMA OF LEFT LOWER EXTREMITY: ICD-10-CM

## 2023-11-09 DIAGNOSIS — I48.20 CHRONIC ATRIAL FIBRILLATION (HCC): Primary | ICD-10-CM

## 2023-11-09 DIAGNOSIS — E78.2 MIXED HYPERLIPIDEMIA: ICD-10-CM

## 2023-11-09 PROCEDURE — 99214 OFFICE O/P EST MOD 30 MIN: CPT | Performed by: INTERNAL MEDICINE

## 2023-11-09 PROCEDURE — 93000 ELECTROCARDIOGRAM COMPLETE: CPT | Performed by: INTERNAL MEDICINE

## 2023-11-09 RX ORDER — TORSEMIDE 20 MG/1
20 TABLET ORAL EVERY OTHER DAY
Qty: 45 TABLET | Refills: 3 | Status: SHIPPED | COMMUNITY
Start: 2023-11-09

## 2023-11-09 NOTE — PROGRESS NOTES
Cardiology Outpatient Follow-up                                                          Sixto Stanley  [id number]  1940      1. Chronic atrial fibrillation (HCC)  POCT ECG      2. Mixed hyperlipidemia  POCT ECG          Discussion/Summary:  Acute on chronic diastolic hf- creatinine higher. May need to cut torsemide to every other day. Jardiance 10mg daily. She will continue on metoprolol 25 mg in morning. Recommend periodic monitoring of electrolytes     Afib/flutter- . Rate controlled. metoprolol 25mg in the morning. She is currently on Eliquis 5 mg twice a day. If her creatinine remains above 1.5 we will need to reduce her Eliquis to 2.5 mg no further falls. No dizziness or light-headness. GERD- nexium 40mg daily    Htn- controlled. amlodipne + lisinopril    LPa- LDL<70. Currently on atorvastatin 80mg    Dm2- atorvastatin. 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 She also has a history of diastolic dysfunction and lower extremity edema chronic. She states being compliant with her diuretic but with poor response. She has been of plan with her medications. She denies having chest heaviness. She denies having any history of prior CVA. She denies having any major bleeding episodes. 07/24/2019:  Her lower extremity edema is significantly improved. Her blood pressures been well controlled. Her heart rates have been controlled. She denies having any further falls. We reviewed through her last blood work. She denies feeling dizziness or lightheadedness. 10/23/2019:  Her weight has been trending down. She denies having recurrence of lower extremity edema. She is in chronic atrial fibrillation and rate controlled. She denies feeling dizziness or lightheadedness.   There has been no falls. She has no trouble with Eliquis therapy. There has been no major bleeding. 08/25/2020:  She denies feeling dizziness or lightheadedness. Her weight has been stable. Her lower extremity swelling has been well controlled. She is compliant with diuretic. We reviewed her last blood work. Her potassium was mildly higher. We will discontinue potassium supplementation. 02/24/2021:  She has received her COVID vaccine. Her blood pressures have been under control. She was noted to have a heart rate in the 50s. She denies feeling dizziness or lightheadedness. She is compliant with her medications. She feels some leg cramps if she does not take potassium. 04/29/2021:  Her heart rates are and the 50 to 60s. She denies having any dizziness or lightheadedness. She denies having chest heaviness. She is compliant with her medications. She denies having any major swelling. She received her COVID vaccine. 10/28:  She hit her head. No chest pain. Compliant with meds    04/15/2022:  She is using the diuretic for lower extremity swelling. She denies having dizziness or lightheadedness. 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 She denies having dizziness or lightheadedness. She denies having major palpitations. She denies having recent falls. We reviewed her EKG. She is now in rate controlled atrial flutter. She denies having major swelling. 4/13/2023: She has had increased lower extremity swelling. She denies having dizziness or lightheadedness. She has significant osteoarthritis. 11/9/2023: She is currently at a assisted living. She has had some lower extremity swelling. She denies having chest heaviness. Her A-fib is well controlled. She is compliant with her medications. She was noted to have elevated kidney function on repeat blood work.       Past Medical History:   Diagnosis Date    Arthritis     knees    Diabetes mellitus ([address]) type 2    GERD (gastroesophageal reflux disease)     Hyperlipidemia     Hypertension     Stage 3a chronic kidney disease ([address]) 4/14/2022    Urinary incontinence     Use of cane as ambulatory aid     Wears glasses      Social History     Socioeconomic History    Marital status: Single     Spouse name: Not on file    Number of children: Not on file    Years of education: Not on file    Highest education level: Not on file   Occupational History    Not on file   Tobacco Use    Smoking status: Never    Smokeless tobacco: Never   Vaping Use    Vaping Use: Never used   Substance and Sexual Activity    Alcohol use: Yes     Comment: social    Drug use: No    Sexual activity: Not on file   Other Topics Concern    Not on file   Social History Narrative    Not on file     Social Determinants of Health     Financial Resource Strain: Not on file   Food Insecurity: Not on file   Transportation Needs: Not on file   Physical Activity: Not on file   Stress: Not on file   Social Connections: Not on file   Intimate Partner Violence: Not on file   Housing Stability: Not on file      Family History   Problem Relation Age of Onset    Breast cancer Mother     Diabetes type II Mother     Atrial fibrillation Mother     Diabetes Mother         diet controlled    Hypertension Mother     Lung cancer Father     Alcohol abuse Father     Cancer Father         lung-smoker     Past Surgical History:   Procedure Laterality Date    BREAST BIOPSY Left 2015    benign    BREAST SURGERY Left     nothing was there     COLONOSCOPY      HYSTERECTOMY      complete-fibroid    JOINT REPLACEMENT Left     knee    OR XCAPSL CTRC RMVL INSJ IO LENS PROSTH W/O ECP Right 07/19/2018    Procedure: EXTRACTION EXTRACAPSULAR CATARACT PHACO INTRAOCULAR LENS (IOL);   Surgeon: Hugh Lee MD;  Location: [facility] MAIN OR;  Service: Ophthalmology    OR XCAPSL CTRC RMVL INSJ IO LENS PROSTH W/O ECP Left 08/09/2018    Procedure: EXTRACTION EXTRACAPSULAR CATARACT PHACO INTRAOCULAR LENS (IOL);   Surgeon: Swathi Wall MD;  Location: [facility] MAIN OR;  Service: Ophthalmology       Current Outpatient Medications:     acetaminophen (TYLENOL) 650 mg CR tablet, Take 1 tablet (650 mg total) by mouth every 8 (eight) hours as needed for mild pain, Disp: 30 tablet, Rfl: 0    ammonium lactate (LAC-HYDRIN) 12 % cream, Apply topically 2 (two) times a day, Disp: , Rfl:     atorvastatin (LIPITOR) 40 mg tablet, Take 1 tablet (40 mg total) by mouth daily, Disp: 90 tablet, Rfl: 1    cetirizine (ZyrTEC) 10 mg tablet, Take 10 mg by mouth daily, Disp: , Rfl:     CINNAMON PO, Take 1,000 mg by mouth every morning, Disp: , Rfl:     cyclobenzaprine (FLEXERIL) 5 mg tablet, Take 5 mg by mouth 2 (two) times a day as needed, Disp: , Rfl:     Diclofenac Sodium (VOLTAREN) 1 %, Apply 2 g topically 4 (four) times a day, Disp: 2 g, Rfl: 1    Diclofenac Sodium (VOLTAREN) 1 %, Apply 2 g topically 2 (two) times a day, Disp: , Rfl:     Diclofenac Sodium (VOLTAREN) 1 %, APPLY BILATERALLY TWICE A DAY OR AS DIRECTED BY PRESCRIBER., Disp: 100 g, Rfl: 1    Eliquis 5 MG, TAKE ONE TABLET  TWO TIMES A DAY, Disp: 180 tablet, Rfl: 3    gabapentin (Neurontin) 300 mg capsule, Take 1 capsule (300 mg total) by mouth 4 (four) times a day, Disp: 120 capsule, Rfl: 3    lisinopril (ZESTRIL) 10 mg tablet, Take 1 tablet (10 mg total) by mouth daily, Disp: 90 tablet, Rfl: 1    metFORMIN (GLUCOPHAGE) 500 mg tablet, Take 500 mg by mouth 2 (two) times a day, Disp: , Rfl:     metoprolol tartrate (LOPRESSOR) 25 mg tablet, TAKE ONE TABLET BY MOUTH EVERY MORNING, Disp: 90 tablet, Rfl: 1    Multiple Vitamins-Minerals (PRESERVISION AREDS PO), Take by mouth, Disp: , Rfl:     simvastatin (ZOCOR) 40 mg tablet, Take 40 mg by mouth in the morning, Disp: , Rfl:     torsemide (DEMADEX) 20 mg tablet, Take one tablet [id number] morning, Disp: 45 tablet, Rfl: 3    amLODIPine (NORVASC) 10 mg tablet, Take 1 tablet (10 mg total) by mouth daily, Disp: 90 tablet, Rfl: 3  Allergies   Allergen Reactions    Other Allergic Rhinitis     Seasonal/pollen     Vitals:    11/09/23 1409   BP: 130/70   BP Location: Right arm   Patient Position: Sitting   Cuff Size: Large   Pulse: 57   SpO2: 99%   Weight: 91.4 kg (201 lb 8 oz)   Height: 5' 1" (1.549 m)       Review of Systems:  Review of Systems   Constitutional: Negative. Negative for activity change, appetite change, chills, diaphoresis, fatigue, fever and unexpected weight change. HENT: Negative. Negative for congestion, dental problem, drooling, ear discharge, ear pain, facial swelling, hearing loss, mouth sores, nosebleeds, postnasal drip, rhinorrhea, sinus pressure, sinus pain, sneezing, sore throat, tinnitus, trouble swallowing and voice change. Eyes: Negative. Negative for photophobia, pain, redness, itching and visual disturbance. Respiratory:  Positive for shortness of breath. Negative for apnea, cough, choking, chest tightness, wheezing and stridor. Cardiovascular:  Positive for leg swelling. Negative for chest pain and palpitations. Gastrointestinal: Negative. Negative for abdominal distention, abdominal pain, anal bleeding, blood in stool, constipation, diarrhea, nausea, rectal pain and vomiting. Endocrine: Negative. Negative for cold intolerance, heat intolerance, polydipsia, polyphagia and polyuria. Genitourinary: Negative. Negative for decreased urine volume, difficulty urinating, dyspareunia, dysuria, enuresis, flank pain, frequency, genital sores, hematuria, menstrual problem, pelvic pain, urgency, vaginal bleeding, vaginal discharge and vaginal pain. Musculoskeletal: Negative. Negative for arthralgias, back pain, gait problem, joint swelling, myalgias, neck pain and neck stiffness. Skin: Negative. Negative for color change, pallor, rash and wound. Allergic/Immunologic: Negative. Negative for environmental allergies, food allergies and immunocompromised state. Neurological: Negative. Negative for dizziness, tremors, seizures, syncope, facial asymmetry, speech difficulty, weakness, light-headedness, numbness and headaches. Hematological: Negative. Negative for adenopathy. Does not bruise/bleed easily. Psychiatric/Behavioral: Negative. Negative for agitation, behavioral problems, confusion, decreased concentration, dysphoric mood, hallucinations, self-injury, sleep disturbance and suicidal ideas. The patient is not nervous/anxious and is not hyperactive. All other systems reviewed and are negative. Vitals:    11/09/23 1409   BP: 130/70   BP Location: Right arm   Patient Position: Sitting   Cuff Size: Large   Pulse: 57   SpO2: 99%   Weight: 91.4 kg (201 lb 8 oz)   Height: 5' 1" (1.549 m)     Physical Exam:  Physical Exam  Constitutional:       General: She is not in acute distress. Appearance: She is well-developed. She is not diaphoretic. HENT:      Head: Normocephalic and atraumatic. Right Ear: External ear normal.      Left Ear: External ear normal.   Eyes:      General: No scleral icterus. Right eye: No discharge. Left eye: No discharge. Conjunctiva/sclera: Conjunctivae normal.      Pupils: Pupils are equal, round, and reactive to light. Neck:      Thyroid: No thyromegaly. Vascular: JVD present. Trachea: No tracheal deviation. Cardiovascular:      Rate and Rhythm: Normal rate and regular rhythm. Heart sounds: Murmur heard. No friction rub. No gallop. Pulmonary:      Effort: Pulmonary effort is normal. No respiratory distress. Breath sounds: Normal breath sounds. No stridor. No wheezing or rales. Chest:      Chest wall: No tenderness. Abdominal:      General: Bowel sounds are normal. There is no distension. Palpations: Abdomen is soft. There is no mass. Tenderness: There is no abdominal tenderness. There is no guarding or rebound. Musculoskeletal:         General: Swelling present.  No tenderness or deformity. Normal range of motion. Cervical back: Normal range of motion and neck supple. Skin:     General: Skin is warm and dry. Coloration: Skin is not pale. Findings: No erythema or rash. Neurological:      Mental Status: She is alert and oriented to person, place, and time. Cranial Nerves: No cranial nerve deficit. Motor: No abnormal muscle tone. Coordination: Coordination normal.      Deep Tendon Reflexes: Reflexes are normal and symmetric. Reflexes normal.   Psychiatric:         Behavior: Behavior normal.         Thought Content: Thought content normal.         Judgment: Judgment normal.         Labs:     Lab Results   Component Value Date    WBC 6.38 11/04/2022    HGB 11.0 (L) 11/04/2022    HCT 37.4 11/04/2022    MCV 82 11/04/2022     11/04/2022     Lab Results   Component Value Date    K 4.3 05/10/2023     05/10/2023    CO2 23 05/10/2023    BUN 30 (H) 05/10/2023    CREATININE 1.26 05/10/2023    GLUF 118 (H) 05/10/2023    CALCIUM 9.3 05/10/2023    CORRECTEDCA 10.1 11/04/2022    AST 14 11/04/2022    ALT 24 11/04/2022    ALKPHOS 84 11/04/2022    EGFR 39 05/10/2023     No results found for: "CHOL"  Lab Results   Component Value Date    HDL 90 11/04/2022    HDL 81 11/04/2021    HDL 76 02/17/2021     Lab Results   Component Value Date    LDLCALC 36 11/04/2022    LDLCALC 53 11/04/2021    LDLCALC 54 02/17/2021     Lab Results   Component Value Date    TRIG 95 11/04/2022    TRIG 82 11/04/2021    TRIG 90 02/17/2021     Lab Results   Component Value Date    HGBA1C 6.4 10/11/2023     No results found for: "TSH"    Imaging & Testing   I have personally reviewed pertinent reports. EKG: Personally reviewed.     Atrial fibrillation nonspecific st-t wave changes    Cardiac testing:   Results for orders placed during the hospital encounter of 12/04/18   Echo complete with contrast if indicated    Narrative [address] [place] [address].  [place] [address] [place] [address]  [phone]    Transthoracic Echocardiogram  2D, M-mode, Doppler, and Color Doppler    Study date:  04-Dec-2018    Patient: Radha Klein  MR number: [id number]  Account number: [de-identified]  : 1940  Age: 77 years  Gender: Female  Status: Outpatient  Location: Echo lab  Height: 61 in  Weight: 225.5 lb  BP: 147/ 97 mmHg    Indications: Dyspnea    Diagnoses: 794.31 - ABNORM ELECTROCARDIOGRAM    Sonographer:  AGAPITO Lozoya  Primary Physician: Kenna Gale DO  Referring Physician:  Rosa Pierce:  [facility]  Interpreting Physician:  Mirian Underwood MD    SUMMARY    LEFT VENTRICLE:  Systolic function was at the lower limits of normal. Ejection fraction was estimated in the range of 50 % to 55 % to be 55 %. There were no regional wall motion abnormalities. Wall thickness was mildly increased. Features were consistent with a pseudonormal left ventricular filling pattern, with concomitant abnormal relaxation and increased filling pressure (grade 2 diastolic dysfunction). LEFT ATRIUM:  The atrium was mildly dilated. RIGHT ATRIUM:  The atrium was mildly dilated. AORTIC VALVE:  The valve was functionally bicuspid. Leaflets exhibited mildly to moderately increased thickness, mild to moderate calcification, mildly reduced cuspal separation, and sclerosis. Transaortic velocity was increased due to valvular stenosis. There was mild stenosis. Valve mean gradient was 15 mmHg. TRICUSPID VALVE:  There was trace regurgitation. The findings suggest mild pulmonary hypertension. HISTORY: PRIOR HISTORY: Diabetes, Edema,HTN,Hyperlipidemia,Gastroesophageal reflux disease    PROCEDURE: The procedure was performed in the echo lab. This was a routine study. The transthoracic approach was used. The study included complete 2D imaging, M-mode, complete spectral Doppler, and color Doppler. The heart rate was 67 bpm,  at the start of the study.  Images were obtained from the parasternal, apical, subcostal, and suprasternal notch acoustic windows. Echocardiographic views were limited due to restricted patient mobility, poor patient compliance, poor  acoustic window availability, decreased penetration, and lung interference. This was a technically difficult study. LEFT VENTRICLE: Size was normal. Systolic function was at the lower limits of normal. Ejection fraction was estimated in the range of 50 % to 55 % to be 55 %. There were no regional wall motion abnormalities. Wall thickness was mildly  increased. No evidence of apical thrombus. DOPPLER: Features were consistent with a pseudonormal left ventricular filling pattern, with concomitant abnormal relaxation and increased filling pressure (grade 2 diastolic dysfunction). RIGHT VENTRICLE: The size was normal. Systolic function was normal. Wall thickness was normal.    LEFT ATRIUM: The atrium was mildly dilated. RIGHT ATRIUM: The atrium was mildly dilated. MITRAL VALVE: Valve structure was normal. There was normal leaflet separation. DOPPLER: The transmitral velocity was within the normal range. There was no evidence for stenosis. There was no significant regurgitation. AORTIC VALVE: The valve was functionally bicuspid. Leaflets exhibited mildly to moderately increased thickness, mild to moderate calcification, mildly reduced cuspal separation, and sclerosis. DOPPLER: Transaortic velocity was increased  due to valvular stenosis. There was mild stenosis. There was no significant regurgitation. TRICUSPID VALVE: The valve structure was normal. There was normal leaflet separation. DOPPLER: The transtricuspid velocity was within the normal range. There was no evidence for stenosis. There was trace regurgitation. Estimated peak PA  pressure was 38 mmHg. The findings suggest mild pulmonary hypertension. PULMONIC VALVE: Leaflets exhibited normal thickness, no calcification, and normal cuspal separation.  DOPPLER: The transpulmonic velocity was within the normal range. There was no significant regurgitation. PERICARDIUM: There was no pericardial effusion. The pericardium was normal in appearance. AORTA: The root exhibited normal size. SYSTEMIC VEINS: IVC: The inferior vena cava was normal in size. MEASUREMENT TABLES    DOPPLER MEASUREMENTS  Aortic valve   (Reference normals)  Peak steve   250 cm/s   (--)  Peak gradient   26 mmHg   (--)  Mean gradient   15 mmHg   (--)    SYSTEM MEASUREMENT TABLES    2D mode  AoR Diam 2D: 3.2 cm  LA Diam (2D): 4.1 cm  LA/Ao (2D): 1.28  FS (2D Teich): 26.4 %  IVSd (2D): 1.31 cm  LVDEV: 72.1 cm³  LVEDV MOD BP: 102 cm³  LVESV: 34.4 cm³  LVIDd(2D): 4.05 cm  LVISd (2D): 2.98 cm  LVOT Area 2D: 3.14 cm squared  LVPWd (2D): 1.3 cm  SV (Teich): 37.7 cm³    Apical four chamber  LVEF A4C: 50 %    Apical two chamber  LVEF A2C: 52 %    Unspecified Scan Mode  DEVON Cont Eq (Peak Steve): 1.49 cm squared  LVOT Diam.: 2 cm  LVOT Vmax: 1210 mm/s  LVOT Vmax; Mean: 1210 mm/s  Peak Grad.; Mean: 6 mm[Hg]  MV Peak A Steve: 291 mm/s  MV Peak E Steve. Mean: 1060 mm/s  MVA (PHT): 6.29 cm squared  PHT: 35 ms  Max P mm[Hg]  V Max: 2390 mm/s  Vmax: 2880 mm/s  RA Area: 21.5 cm squared  RA Volume: 65.8 cm³  TAPSE: 1.9 cm    [employer] Accredited Echocardiography Laboratory    Prepared and electronically signed by    Christiano Humphrey MD  Signed 05-Dec-2018 11:11:08       AFLUTTER with controlled ventricular response    Christiano Humphrey MD [address] [place] [address]  Please call with any questions or suggestions    Counseling :  A description of the counseling:   Goals and Barriers:  Patient's ability to self care:  Medication side effect reviewed with patient in detail and all their questions answered. "This note has been constructed using a voice recognition system. Therefore there may be syntax, spelling, and/or grammatical errors.  Please call if you have any questions. "

## 2023-12-01 ENCOUNTER — VBI (OUTPATIENT)
Dept: ADMINISTRATIVE | Facility: OTHER | Age: 83
End: 2023-12-01

## 2023-12-15 ENCOUNTER — TELEPHONE (OUTPATIENT)
Dept: FAMILY MEDICINE CLINIC | Facility: CLINIC | Age: 83
End: 2023-12-15

## 2023-12-15 NOTE — TELEPHONE ENCOUNTER
FYI - We received bw for a lipid panel results from the nursing home.  Scanned into chart for you to review.

## 2023-12-17 ENCOUNTER — VBI (OUTPATIENT)
Dept: ADMINISTRATIVE | Facility: OTHER | Age: 83
End: 2023-12-17

## 2023-12-17 NOTE — TELEPHONE ENCOUNTER
12/17/23 12:56 PM     [employer] SmartForm used to document caregap status.    Antoinette Estrada MA

## 2024-01-07 ENCOUNTER — VBI (OUTPATIENT)
Dept: ADMINISTRATIVE | Facility: OTHER | Age: 84
End: 2024-01-07

## 2024-01-16 ENCOUNTER — HOSPITAL ENCOUNTER (OUTPATIENT)
Dept: RADIOLOGY | Facility: HOSPITAL | Age: 84
Discharge: HOME | End: 2024-01-16
Attending: FAMILY MEDICINE
Payer: MEDICARE

## 2024-01-16 VITALS — BODY MASS INDEX: 37.95 KG/M2 | WEIGHT: 201 LBS | HEIGHT: 61 IN

## 2024-01-16 DIAGNOSIS — Z12.31 ENCOUNTER FOR SCREENING MAMMOGRAM FOR MALIGNANT NEOPLASM OF BREAST: ICD-10-CM

## 2024-01-16 PROCEDURE — 77067 SCR MAMMO BI INCL CAD: CPT

## 2024-01-16 PROCEDURE — 77063 BREAST TOMOSYNTHESIS BI: CPT

## 2024-02-28 ENCOUNTER — NURSING HOME VISIT (OUTPATIENT)
Dept: FAMILY MEDICINE CLINIC | Facility: CLINIC | Age: 84
End: 2024-02-28

## 2024-02-28 VITALS
DIASTOLIC BLOOD PRESSURE: 78 MMHG | HEART RATE: 68 BPM | SYSTOLIC BLOOD PRESSURE: 141 MMHG | RESPIRATION RATE: 18 BRPM | OXYGEN SATURATION: 98 %

## 2024-02-28 DIAGNOSIS — I51.89 GRADE II DIASTOLIC DYSFUNCTION: ICD-10-CM

## 2024-02-28 DIAGNOSIS — I10 PRIMARY HYPERTENSION: Primary | ICD-10-CM

## 2024-02-28 DIAGNOSIS — I48.20 CHRONIC ATRIAL FIBRILLATION (HCC): ICD-10-CM

## 2024-02-28 DIAGNOSIS — M17.11 ARTHRITIS OF RIGHT KNEE: ICD-10-CM

## 2024-02-28 NOTE — ASSESSMENT & PLAN NOTE
Recently seen by Dr. Castaneda on 11/9/23  -Rate-controlled  -Continue metoprolol 25 mg in morning, Eliquis 5 mg BID. If creatinine >1.5, consider reducing eliquis to 2.5 mg BID.

## 2024-02-28 NOTE — ASSESSMENT & PLAN NOTE
Chronic     Echo done on 12/4/18 showed grade II diastolic dysfunction w/ EF of 50-55%. Follows up with Cardio. Last seen my Dr. Vasquez on 11/29/23.     Mild non pitting edema on bilateral lower legs noted on physical exam today.     Metoprolol 25 mg daily  Torsemide 20 mg three times weekly on Monday, Wednesday and Friday   Recheck CMP

## 2024-02-28 NOTE — ASSESSMENT & PLAN NOTE
Pt states that she received a knee injection in February which has been helpful. She does feel mild pain of right knee but able to tolerate and denies any difficulty ambulating.

## 2024-02-28 NOTE — PROGRESS NOTES
Assessment/Plan:    Primary hypertension  Well-controlled.     -On lisinopril 10 mg daily      Chronic atrial fibrillation (HCC)  Recently seen by Dr. Castaneda on 11/9/23  -Rate-controlled  -Continue metoprolol 25 mg in morning, Eliquis 5 mg BID. If creatinine >1.5, consider reducing eliquis to 2.5 mg BID.    Arthritis of right knee  Pt states that she received a knee injection in February which has been helpful. She does feel mild pain of right knee but able to tolerate and denies any difficulty ambulating.     Grade II diastolic dysfunction  Chronic     Echo done on 12/4/18 showed grade II diastolic dysfunction w/ EF of 50-55%. Follows up with Cardio. Last seen my Dr. Vasquez on 11/29/23.     Mild non pitting edema on bilateral lower legs noted on physical exam today.     Metoprolol 25 mg daily  Torsemide 20 mg three times weekly on Monday, Wednesday and Friday   Recheck CMP      Diagnoses and all orders for this visit:    Primary hypertension    Chronic atrial fibrillation (HCC)    Arthritis of right knee    Grade II diastolic dysfunction          Subjective:      Patient ID: Yessenia Iraheta is a 83 y.o. female.    HPI  84 y/o F w/PMH afib, HF, right knee arthritis was seen today at hospitals. Pt was sitting comfortably in chair. States that everything is going well and she has no complaints. Pt does admit to having some right knee pain but states that it is tolerable and she is able to ambulate with no difficulty. Denies any other complaints at this time.     The following portions of the patient's history were reviewed and updated as appropriate: allergies, current medications, past family history, past medical history, past social history, past surgical history, and problem list.    Review of Systems   Constitutional:  Negative for chills and fever.   HENT:  Negative for ear pain and sore throat.    Eyes:  Negative for pain and visual disturbance.   Respiratory:  Negative for cough and shortness of breath.     Cardiovascular:  Negative for chest pain and palpitations.   Gastrointestinal:  Negative for abdominal pain and vomiting.   Genitourinary:  Negative for dysuria and hematuria.   Musculoskeletal:  Negative for arthralgias and back pain.   Skin:  Negative for color change and rash.   Neurological:  Negative for seizures and syncope.   All other systems reviewed and are negative.        Objective:      /78   Pulse 68   Resp 18   SpO2 98%          Physical Exam  Constitutional:       Appearance: Normal appearance.      Comments: Sitting comfortably in chair   HENT:      Head: Atraumatic.   Eyes:      General:         Right eye: No discharge.         Left eye: No discharge.      Conjunctiva/sclera: Conjunctivae normal.   Cardiovascular:      Rate and Rhythm: Normal rate and regular rhythm.      Heart sounds: Murmur heard.   Pulmonary:      Effort: Pulmonary effort is normal. No respiratory distress.      Breath sounds: Normal breath sounds.   Abdominal:      General: Bowel sounds are normal.      Palpations: Abdomen is soft.      Tenderness: There is no abdominal tenderness.   Musculoskeletal:      Cervical back: Neck supple.      Right lower leg: Edema present.      Left lower leg: Edema present.   Skin:     General: Skin is warm and dry.      Capillary Refill: Capillary refill takes less than 2 seconds.   Neurological:      Mental Status: She is alert.

## 2024-03-06 ENCOUNTER — TELEPHONE (OUTPATIENT)
Dept: OBGYN CLINIC | Facility: HOSPITAL | Age: 84
End: 2024-03-06

## 2024-03-06 NOTE — TELEPHONE ENCOUNTER
Caller: Tyree Valdivia Zephyr  Doctor/office: Sourav REESE#: [phone]      [facility] called to start auth/delivery for VISCO/EUFLEXXA/injections    Body Part: Right Knee  Pain Level (scale 1-10): 5  Date of last Gel Injection: 10/26/2023    Educated patient on Visco procedure. Auth team will review insurance and process the request appropriately. Patient will be contacted if the have any questions and when ready to schedule.

## 2024-04-09 ENCOUNTER — NURSING HOME VISIT (OUTPATIENT)
Age: 84
End: 2024-04-09

## 2024-04-09 DIAGNOSIS — M17.11 ARTHRITIS OF RIGHT KNEE: ICD-10-CM

## 2024-04-09 DIAGNOSIS — I51.89 GRADE II DIASTOLIC DYSFUNCTION: ICD-10-CM

## 2024-04-09 DIAGNOSIS — I48.20 CHRONIC ATRIAL FIBRILLATION (HCC): ICD-10-CM

## 2024-04-09 DIAGNOSIS — I10 PRIMARY HYPERTENSION: Primary | ICD-10-CM

## 2024-04-10 NOTE — PROGRESS NOTES
Name: Yessenia Iraheta      : 1940      MRN: [id number]  Encounter Provider: Marcela Short MD  Encounter Date: 2024   Encounter department: [facility]    Assessment & Plan     1. Primary hypertension    2. Chronic atrial fibrillation (HCC)    3. Arthritis of right knee    4. Grade II diastolic dysfunction    Primary hypertension  Well-controlled.   -Continue lisinopril 10 mg daily     Chronic atrial fibrillation (HCC)  Recently seen by Dr. Vasquez on 23  -Rate-controlled  -Continue metoprolol 25 mg in morning, Eliquis 5 mg BID. If creatinine >1.5, consider reducing eliquis to 2.5 mg BID.     Arthritis of right knee  Pt states that she received a knee injection, which is helpful and she is due for her next one soon. She does feel mild pain of right knee but able to tolerate and denies any difficulty ambulating.      Grade II diastolic dysfunction  Chronic     Echo done on 18 showed grade II diastolic dysfunction w/ EF of 50-55%. Follows up with Cardio. Last seen by Dr. Vasquez on 23.     Mild non pitting edema on bilateral lower legs noted on physical exam today.     Metoprolol 25 mg daily  Torsemide 20 mg three times weekly on Monday, Wednesday and Friday    Recheck CMP     BMI Counseling: There is no height or weight on file to calculate BMI. The BMI is above normal. Nutrition recommendations include decreasing portion sizes and encouraging healthy choices of fruits and vegetables. Exercise recommendations include exercising 3-5 times per week. Rationale for BMI follow-up plan is due to patient being overweight or obese.       Subjective      82 y/o F w/ PMH afib, heart failure, hypertension and arthritis is a resident of [facility] seen today for nursing home visit. Patient states that she is doing well. Her right knee pain has recently been worsening because she is due for her injection soon. She is able to eat and void well with no  issues. No other complaints.     Review of Systems   Constitutional:  Negative for chills and fever.   HENT:  Negative for ear pain and sore throat.    Eyes:  Negative for pain and visual disturbance.   Respiratory:  Negative for cough and shortness of breath.    Cardiovascular:  Negative for chest pain and palpitations.   Gastrointestinal:  Negative for abdominal pain and vomiting.   Genitourinary:  Negative for dysuria and hematuria.   Musculoskeletal:  Positive for arthralgias (right knee). Negative for back pain.   Skin:  Negative for color change and rash.   Neurological:  Negative for seizures and syncope.   All other systems reviewed and are negative.      Current Outpatient Medications on File Prior to Visit   Medication Sig   • acetaminophen (TYLENOL) 650 mg CR tablet Take 1 tablet (650 mg total) by mouth every 8 (eight) hours as needed for mild pain   • ammonium lactate (LAC-HYDRIN) 12 % cream Apply topically 2 (two) times a day   • atorvastatin (LIPITOR) 40 mg tablet Take 1 tablet (40 mg total) by mouth daily   • cetirizine (ZyrTEC) 10 mg tablet Take 10 mg by mouth daily   • CINNAMON PO Take 1,000 mg by mouth every morning   • cyclobenzaprine (FLEXERIL) 5 mg tablet Take 5 mg by mouth 2 (two) times a day as needed   • Diclofenac Sodium (VOLTAREN) 1 % Apply 2 g topically 4 (four) times a day   • Diclofenac Sodium (VOLTAREN) 1 % Apply 2 g topically 2 (two) times a day   • Diclofenac Sodium (VOLTAREN) 1 % APPLY BILATERALLY TWICE A DAY OR AS DIRECTED BY PRESCRIBER.   • Eliquis 5 MG TAKE ONE TABLET  TWO TIMES A DAY   • gabapentin (Neurontin) 300 mg capsule Take 1 capsule (300 mg total) by mouth 4 (four) times a day   • lisinopril (ZESTRIL) 10 mg tablet Take 1 tablet (10 mg total) by mouth daily   • metFORMIN (GLUCOPHAGE) 500 mg tablet Take 500 mg by mouth 2 (two) times a day   • metoprolol tartrate (LOPRESSOR) 25 mg tablet TAKE ONE TABLET BY MOUTH EVERY MORNING   • Multiple Vitamins-Minerals (PRESERVISION AREDS  PO) Take by mouth   • simvastatin (ZOCOR) 40 mg tablet Take 40 mg by mouth in the morning   • torsemide (DEMADEX) 20 mg tablet Take 1 tablet (20 mg total) by mouth every other day Take one tablet [id number] morning       Objective     There were no vitals taken for this visit.    Physical Exam  Constitutional:       Appearance: Normal appearance.   HENT:      Head: Atraumatic.   Eyes:      General:         Right eye: No discharge.         Left eye: No discharge.      Conjunctiva/sclera: Conjunctivae normal.   Cardiovascular:      Rate and Rhythm: Normal rate and regular rhythm.   Pulmonary:      Effort: Pulmonary effort is normal. No respiratory distress.      Breath sounds: Normal breath sounds.   Abdominal:      General: Bowel sounds are normal.      Palpations: Abdomen is soft.      Tenderness: There is no abdominal tenderness.   Musculoskeletal:      Cervical back: Neck supple.      Right lower leg: Edema present.      Left lower leg: Edema present.   Skin:     General: Skin is warm and dry.      Capillary Refill: Capillary refill takes less than 2 seconds.   Neurological:      Mental Status: She is alert.     Marcela Short MD

## 2024-04-16 ENCOUNTER — 6 MONTH FOLLOW UP (OUTPATIENT)
Dept: URBAN - METROPOLITAN AREA CLINIC 27 | Facility: CLINIC | Age: 84
End: 2024-04-16

## 2024-04-16 DIAGNOSIS — H43.813: ICD-10-CM

## 2024-04-16 DIAGNOSIS — H35.3113: ICD-10-CM

## 2024-04-16 DIAGNOSIS — Z96.1: ICD-10-CM

## 2024-04-16 DIAGNOSIS — H35.3124: ICD-10-CM

## 2024-04-16 DIAGNOSIS — E11.9: ICD-10-CM

## 2024-04-16 PROCEDURE — 92134 CPTRZ OPH DX IMG PST SGM RTA: CPT

## 2024-04-16 PROCEDURE — 92014 COMPRE OPH EXAM EST PT 1/>: CPT

## 2024-04-16 PROCEDURE — 92201 OPSCPY EXTND RTA DRAW UNI/BI: CPT

## 2024-04-16 ASSESSMENT — VISUAL ACUITY
OS_PH: 20/400
OD_CC: 20/70
OS_CC: CF 4FT

## 2024-04-16 ASSESSMENT — TONOMETRY
OD_IOP_MMHG: 17
OS_IOP_MMHG: 20

## 2024-04-17 NOTE — ASSESSMENT & PLAN NOTE
Patient is a 65y old  Male who presents with a chief complaint of colitis (16 Apr 2024 08:38)        MEDICATIONS  (STANDING):  aspirin enteric coated 81 milliGRAM(s) Oral daily  atorvastatin 80 milliGRAM(s) Oral at bedtime  chlorhexidine 2% Cloths 1 Application(s) Topical <User Schedule>  ciprofloxacin   IVPB 400 milliGRAM(s) IV Intermittent every 12 hours  clopidogrel Tablet 75 milliGRAM(s) Oral daily  diltiazem    milliGRAM(s) Oral daily  isosorbide   mononitrate ER Tablet (IMDUR) 30 milliGRAM(s) Oral daily  metroNIDAZOLE  IVPB 500 milliGRAM(s) IV Intermittent every 8 hours  polyethylene glycol 3350 17 Gram(s) Oral two times a day  saccharomyces boulardii 250 milliGRAM(s) Oral two times a day  senna 2 Tablet(s) Oral at bedtime    MEDICATIONS  (PRN):  acetaminophen     Tablet .. 650 milliGRAM(s) Oral every 6 hours PRN Temp greater or equal to 38C (100.4F), Mild Pain (1 - 3)  aluminum hydroxide/magnesium hydroxide/simethicone Suspension 30 milliLiter(s) Oral every 4 hours PRN Dyspepsia  clonazePAM  Tablet 0.5 milliGRAM(s) Oral daily PRN anxiety  melatonin 5 milliGRAM(s) Oral at bedtime PRN Insomnia  ondansetron Injectable 4 milliGRAM(s) IV Push every 8 hours PRN Nausea and/or Vomiting      CAPILLARY BLOOD GLUCOSE  I&O's Summary    15 Apr 2024 07:01  -  16 Apr 2024 07:00  --------------------------------------------------------  IN: 0 mL / OUT: 2075 mL / NET: -2075 mL    16 Apr 2024 07:01  -  16 Apr 2024 15:40  --------------------------------------------------------  IN: 0 mL / OUT: 1000 mL / NET: -1000 mL        PHYSICAL EXAM:  Vital Signs Last 24 Hrs  T(C): 36.1 (16 Apr 2024 14:00), Max: 36.1 (15 Apr 2024 20:42)  T(F): 97 (16 Apr 2024 14:00), Max: 97 (15 Apr 2024 20:42)  HR: 63 (16 Apr 2024 14:00) (63 - 96)  BP: 141/75 (16 Apr 2024 14:00) (115/58 - 141/75)  BP(mean): --  RR: 18 (16 Apr 2024 14:00) (18 - 18)  SpO2: 99% (16 Apr 2024 05:05) (97% - 99%)    Parameters below as of 16 Apr 2024 05:05  Patient On (Oxygen Delivery Method): room air      GENERAL: No acute distress, well-developed  HEAD:  Atraumatic, Normocephalic  EYES: EOMI, PERRLA, conjunctiva and sclera clear  NECK: Supple, no lymphadenopathy, no JVD  CHEST/LUNG: CTAB; No wheezes, rales, or rhonchi  HEART: Regular rate and rhythm; No murmurs, rubs, or gallops  ABDOMEN: Soft, non-tender, non-distended; normal bowel sounds  EXTREMITIES:  2+ peripheral pulses b/l, No clubbing, cyanosis, or edema  NEUROLOGY: A&O x 3, no focal deficits  SKIN: No rashes or lesions    LABS:                        15.1   10.21 )-----------( 122      ( 15 Apr 2024 08:28 )             42.7     04-15    141  |  106  |  10  ----------------------------<  121<H>  4.0   |  24  |  0.9    Ca    9.0      15 Apr 2024 08:28    TPro  6.0  /  Alb  4.1  /  TBili  0.9  /  DBili  x   /  AST  16  /  ALT  18  /  AlkPhos  73  04-15    PT/INR - ( 15 Apr 2024 08:28 )   PT: 13.80 sec;   INR: 1.21 ratio         PTT - ( 15 Apr 2024 08:28 )  PTT:27.8 sec      Urinalysis Basic - ( 15 Apr 2024 08:28 )    Color: x / Appearance: x / SG: x / pH: x  Gluc: 121 mg/dL / Ketone: x  / Bili: x / Urobili: x   Blood: x / Protein: x / Nitrite: x   Leuk Esterase: x / RBC: x / WBC x   Sq Epi: x / Non Sq Epi: x / Bacteria: x         Scant edema on bilateral lower legs seen on physical exam today. Continue torsemide 20 mg three times a week on Monday, Wednesday and Friday. Patient states he has no pain this AM  Reports loose BM this AM x 1  Denies nausea, vomiting, fevers, chills    T(C): 36.8 (04-15-24 @ 05:13), Max: 37.2 (24 @ 18:23)  HR: 72 (04-15-24 @ 05:13) (72 - 85)  BP: 112/64 (04-15-24 @ 05:13) (112/64 - 139/79)  RR: 18 (04-15-24 @ 05:13) (18 - 19)  SpO2: 98% (04-15-24 @ 00:16) (97% - 99%)    I&O's Detail    2024 07:01  -  15 Apr 2024 07:00  --------------------------------------------------------  IN:  Total IN: 0 mL    OUT:    Voided (mL): 700 mL  Total OUT: 700 mL    Total NET: -700 mL      MEDICATIONS  (STANDING):  aspirin enteric coated 81 milliGRAM(s) Oral daily  atorvastatin 80 milliGRAM(s) Oral at bedtime  atorvastatin 20 milliGRAM(s) Oral at bedtime  chlorhexidine 2% Cloths 1 Application(s) Topical <User Schedule>  ciprofloxacin   IVPB 400 milliGRAM(s) IV Intermittent every 12 hours  clopidogrel Tablet 75 milliGRAM(s) Oral daily  diltiazem    milliGRAM(s) Oral daily  heparin   Injectable 5000 Unit(s) SubCutaneous every 12 hours  iohexol 300 mG (iodine)/mL Oral Solution 30 milliLiter(s) Oral once  isosorbide   mononitrate ER Tablet (IMDUR) 30 milliGRAM(s) Oral daily  lactated ringers. 1000 milliLiter(s) (75 mL/Hr) IV Continuous <Continuous>  metroNIDAZOLE  IVPB 500 milliGRAM(s) IV Intermittent every 8 hours    MEDICATIONS  (PRN):  acetaminophen     Tablet .. 650 milliGRAM(s) Oral every 6 hours PRN Temp greater or equal to 38C (100.4F), Mild Pain (1 - 3)  aluminum hydroxide/magnesium hydroxide/simethicone Suspension 30 milliLiter(s) Oral every 4 hours PRN Dyspepsia  clonazePAM  Tablet 0.5 milliGRAM(s) Oral daily PRN anxiety  melatonin 5 milliGRAM(s) Oral at bedtime PRN Insomnia  morphine  - Injectable 2 milliGRAM(s) IV Push every 4 hours PRN Moderate Pain (4 - 6)  ondansetron Injectable 4 milliGRAM(s) IV Push every 8 hours PRN Nausea and/or Vomiting    PHYSICAL EXAM:  GENERAL: NAD, well-developed  HEAD:  Atraumatic, Normocephalic  ABDOMEN: Soft, Nontender, Nondistended  PSYCH: AAOx3, cooperative, appropriate    Labs:  CAPILLARY BLOOD GLUCOSE                              15.1   10.21 )-----------( 122      ( 15 Apr 2024 08:28 )             42.7       Auto Neutrophil %: 74.9 % (04-15-24 @ 08:28)  Auto Immature Granulocyte %: 0.4 % (04-15-24 @ 08:28)  Auto Immature Granulocyte %: 0.4 % (24 @ 19:00)  Auto Neutrophil %: 88.5 % (24 @ 19:00)        133<L>  |  100  |  16  ----------------------------<  127<H>  4.4   |  22  |  1.0      Calcium: 9.7 mg/dL (24 @ 19:00)      LFTs:             6.6  | 0.6  | 22       ------------------[81      ( 2024 19:00 )  4.5  | x    | 22          Lipase:25     Amylase:x             Coags:     13.80  ----< 1.21    ( 15 Apr 2024 08:28 )     27.8       Urinalysis Basic - ( 2024 20:00 )    Color: Yellow / Appearance: Clear / S.023 / pH: x  Gluc: x / Ketone: Trace mg/dL  / Bili: Negative / Urobili: 1.0 mg/dL   Blood: x / Protein: Negative mg/dL / Nitrite: Negative   Leuk Esterase: Negative / RBC: x / WBC x   Sq Epi: x / Non Sq Epi: x / Bacteria: x        EXAM:  CT ABDOMEN AND PELVIS IC   ORDERED BY: MAGGIE HALL   PROCEDURE DATE:  2024      IMPRESSION:  Inflammatory changes surrounding the rectum suspicious for infectious or   inflammatory etiology. Scattered foci are air within the rectal wall;   developing pneumatosis is not excluded and follow-up is recommended    Underdistended urinary bladder surrounding a Mack catheter, limiting   evaluation. Intraluminal air is noted which may be related to   instrumentation     65yMale  Being followed for abnormal CT scan  Interval history: Patient denies nausea, vomiting, hematemesis, melena, blood in stool, diarrhea, constipation, abdominal pain. Patient feels some incomplete evacuation.       PAST MEDICAL & SURGICAL HISTORY:   Eosinophilic esophagitis      HTN (hypertension)      Strangulated inguinal hernia      Intestinal obstruction                Social History: No smoking. No alcohol. No illegal drug use.            MEDICATIONS  (STANDING):  aspirin enteric coated 81 milliGRAM(s) Oral daily  atorvastatin 80 milliGRAM(s) Oral at bedtime  chlorhexidine 2% Cloths 1 Application(s) Topical <User Schedule>  ciprofloxacin   IVPB 400 milliGRAM(s) IV Intermittent every 12 hours  clopidogrel Tablet 75 milliGRAM(s) Oral daily  diltiazem    milliGRAM(s) Oral daily  isosorbide   mononitrate ER Tablet (IMDUR) 30 milliGRAM(s) Oral daily  metroNIDAZOLE  IVPB 500 milliGRAM(s) IV Intermittent every 8 hours  polyethylene glycol 3350 17 Gram(s) Oral two times a day  saccharomyces boulardii 250 milliGRAM(s) Oral two times a day  senna 2 Tablet(s) Oral at bedtime    MEDICATIONS  (PRN):  acetaminophen     Tablet .. 650 milliGRAM(s) Oral every 6 hours PRN Temp greater or equal to 38C (100.4F), Mild Pain (1 - 3)  aluminum hydroxide/magnesium hydroxide/simethicone Suspension 30 milliLiter(s) Oral every 4 hours PRN Dyspepsia  clonazePAM  Tablet 0.5 milliGRAM(s) Oral daily PRN anxiety  melatonin 5 milliGRAM(s) Oral at bedtime PRN Insomnia  ondansetron Injectable 4 milliGRAM(s) IV Push every 8 hours PRN Nausea and/or Vomiting      Allergies:   No Known Allergies  Intolerances          REVIEW OF SYSTEMS:  General:  No weight loss, fevers, or chills.  Eyes:  No reported pain or visual changes  ENT:  No sore throat or runny nose.  NECK: No stiffness   CV:  No chest pain or palpitations.  Resp:  No shortness of breath, cough  GI:  No abdominal pain, nausea, vomiting, dysphagia, diarrhea or constipation. No rectal bleeding, melena, or hematemesis.  Neuro:  No tingling, numbness           VITAL SIGNS:   T(F): 96.7 (04-16-24 @ 05:05), Max: 97 (04-15-24 @ 20:42)  HR: 71 (04-16-24 @ 05:05) (71 - 96)  BP: 115/58 (04-16-24 @ 05:05) (115/58 - 135/85)  RR: 18 (04-16-24 @ 05:05) (18 - 18)  SpO2: 99% (04-16-24 @ 05:05) (97% - 99%)    PHYSICAL EXAM:  GENERAL: AAOx3, no acute distress.  HEAD:  Atraumatic, Normocephalic  EYES: conjunctiva and sclera clear  NECK: Supple, no JVD or thyromegaly  CHEST/LUNG: Clear to auscultation bilaterally; No wheeze, rhonchi, or rales  HEART: Regular rate and rhythm; normal S1, S2, No murmurs.  ABDOMEN: Soft, nontender, nondistended; Bowel sounds present  NEUROLOGY: No asterixis or tremor.   SKIN: Intact, no jaundice            LABS:                        15.1   10.21 )-----------( 122      ( 15 Apr 2024 08:28 )             42.7     04-15    141  |  106  |  10  ----------------------------<  121<H>  4.0   |  24  |  0.9    Ca    9.0      15 Apr 2024 08:28    TPro  6.0  /  Alb  4.1  /  TBili  0.9  /  DBili  x   /  AST  16  /  ALT  18  /  AlkPhos  73  04-15    LIVER FUNCTIONS - ( 15 Apr 2024 08:28 )  Alb: 4.1 g/dL / Pro: 6.0 g/dL / ALK PHOS: 73 U/L / ALT: 18 U/L / AST: 16 U/L / GGT: x           PT/INR - ( 15 Apr 2024 08:28 )   PT: 13.80 sec;   INR: 1.21 ratio         PTT - ( 15 Apr 2024 08:28 )  PTT:27.8 sec    IMAGING:    < from: CT Abdomen and Pelvis w/ Oral Cont (04.15.24 @ 12:40) >    ACC: [id number] EXAM:  CT ABDOMEN AND PELVIS OC   ORDERED BY: TAMERA ARORA     PROCEDURE DATE:  04/15/2024          INTERPRETATION:  CLINICAL HISTORY: Abdominal pain.    TECHNIQUE: Contiguous axial CT images were obtained from the lower chest   to the pubic symphysis without intravenous contrast.  Oral contrast was   administered.  Reformatted images in the coronal and sagittal planes were   acquired.    COMPARISON: CT dated 4/14/2024.      FINDINGS: Evaluation of intra-abdominal organs is limited due to lack of   intravenous contrast.    Redemonstration of fat stranding surrounding the rectum. Rectum is less   distended compared to prior with decreased stool burden. No rectal   pneumatosis is identified. No evidence of bowel obstruction or free air.    Vicarious excretion of contrast via gallbladder.    Otherwise, remainder of findings are without significant change on this   short-term follow-up CT. See CT from less than one day prior for   additional findings.    IMPRESSION:    Redemonstration of fat stranding surrounding the rectum. Rectum is less   distended compared to prior with decreased stool burden. No rectal   pneumatosis is identified.    Otherwise, remainder of findings are without significant change on this   short-term follow-up CT. See CT from less than one day prior for   additional findings.    --- End of Report ---            ROSALINDA MCKEON MD; Attending Radiologist  This document has been electronically signed. Apr 15 2024  1:09PM    < end of copied text >         65yMale  Being followed for fecal retention   Interval history: Patient denies nausea, vomiting, hematemesis, melena, blood in stool, diarrhea, constipation, abdominal pain. Patient having bms feeling 100% better, urinating well.      PAST MEDICAL & SURGICAL HISTORY:   Eosinophilic esophagitis      HTN (hypertension)      Strangulated inguinal hernia      Intestinal obstruction                Social History: No smoking. No alcohol. No illegal drug use.            MEDICATIONS  (STANDING):  aspirin enteric coated 81 milliGRAM(s) Oral daily  atorvastatin 80 milliGRAM(s) Oral at bedtime  bisacodyl Suppository 10 milliGRAM(s) Rectal at bedtime  chlorhexidine 2% Cloths 1 Application(s) Topical <User Schedule>  ciprofloxacin   IVPB 400 milliGRAM(s) IV Intermittent every 12 hours  clopidogrel Tablet 75 milliGRAM(s) Oral daily  diltiazem    milliGRAM(s) Oral daily  isosorbide   mononitrate ER Tablet (IMDUR) 30 milliGRAM(s) Oral daily  lactulose Syrup 10 Gram(s) Oral every 18 hours  metroNIDAZOLE  IVPB 500 milliGRAM(s) IV Intermittent every 8 hours  polyethylene glycol 3350 17 Gram(s) Oral two times a day  saccharomyces boulardii 250 milliGRAM(s) Oral two times a day  senna 2 Tablet(s) Oral at bedtime    MEDICATIONS  (PRN):  acetaminophen     Tablet .. 650 milliGRAM(s) Oral every 6 hours PRN Temp greater or equal to 38C (100.4F), Mild Pain (1 - 3)  clonazePAM  Tablet 0.5 milliGRAM(s) Oral daily PRN anxiety  melatonin 5 milliGRAM(s) Oral at bedtime PRN Insomnia      Allergies:   No Known Allergies              REVIEW OF SYSTEMS:  General:  No weight loss, fevers, or chills.  Eyes:  No reported pain or visual changes  ENT:  No sore throat or runny nose.  NECK: No stiffness   CV:  No chest pain or palpitations.  Resp:  No shortness of breath, cough  GI:  No abdominal pain, nausea, vomiting, dysphagia, diarrhea or constipation. No rectal bleeding, melena, or hematemesis.  Muscle:  No aches or weakness  Neuro:  No tingling, numbness           VITAL SIGNS:   T(F): 96.1 (04-17-24 @ 04:42), Max: 97 (04-16-24 @ 14:00)  HR: 61 (04-17-24 @ 04:42) (61 - 63)  BP: 132/77 (04-17-24 @ 04:42) (131/81 - 141/75)  RR: 18 (04-17-24 @ 04:42) (18 - 18)  SpO2: 99% (04-17-24 @ 04:42) (99% - 99%)    PHYSICAL EXAM:  GENERAL: AAOx3, no acute distress.  HEAD:  Atraumatic, Normocephalic  EYES: conjunctiva and sclera clear  NECK: Supple, no JVD or thyromegaly  CHEST/LUNG: Clear to auscultation bilaterally; No wheeze, rhonchi, or rales  HEART: Regular rate and rhythm; normal S1, S2, No murmurs.  ABDOMEN: Soft, nontender, nondistended; Bowel sounds present  NEUROLOGY: No asterixis or tremor.   SKIN: Intact, no jaundice  Rectal exam-empty rectal vault within finger's reach           LABS:    none today            IMAGING:    < from: CT Abdomen and Pelvis w/ Oral Cont (04.15.24 @ 12:40) >    ACC: [id number] EXAM:  CT ABDOMEN AND PELVIS OC   ORDERED BY: TAMERA ARORA     PROCEDURE DATE:  04/15/2024          INTERPRETATION:  CLINICAL HISTORY: Abdominal pain.    TECHNIQUE: Contiguous axial CT images were obtained from the lower chest   to the pubic symphysis without intravenous contrast.  Oral contrast was   administered.  Reformatted images in the coronal and sagittal planes were   acquired.    COMPARISON: CT dated 4/14/2024.      FINDINGS: Evaluation of intra-abdominal organs is limited due to lack of   intravenous contrast.    Redemonstration of fat stranding surrounding the rectum. Rectum is less   distended compared to prior with decreased stool burden. No rectal   pneumatosis is identified. No evidence of bowel obstruction or free air.    Vicarious excretion of contrast via gallbladder.    Otherwise, remainder of findings are without significant change on this   short-term follow-up CT. See CT from less than one day prior for   additional findings.    IMPRESSION:    Redemonstration of fat stranding surrounding the rectum. Rectum is less   distended compared to prior with decreased stool burden. No rectal   pneumatosis is identified.    Otherwise, remainder of findings are without significant change on this   short-term follow-up CT. See CT from less than one day prior for   additional findings.    --- End of Report ---            ROSALINDA MCKEON MD; Attending Radiologist  This document has been electronically signed. Apr 15 2024  1:09PM    < end of copied text >         Patient is a 65y old  Male who presents with a chief complaint of colitis (15 Apr 2024 09:42)      MEDICATIONS  (STANDING):  aspirin enteric coated 81 milliGRAM(s) Oral daily  atorvastatin 80 milliGRAM(s) Oral at bedtime  atorvastatin 20 milliGRAM(s) Oral at bedtime  chlorhexidine 2% Cloths 1 Application(s) Topical <User Schedule>  ciprofloxacin   IVPB 400 milliGRAM(s) IV Intermittent every 12 hours  clopidogrel Tablet 75 milliGRAM(s) Oral daily  diltiazem    milliGRAM(s) Oral daily  isosorbide   mononitrate ER Tablet (IMDUR) 30 milliGRAM(s) Oral daily  metroNIDAZOLE  IVPB 500 milliGRAM(s) IV Intermittent every 8 hours  polyethylene glycol 3350 17 Gram(s) Oral two times a day  saccharomyces boulardii 250 milliGRAM(s) Oral two times a day  senna 2 Tablet(s) Oral at bedtime    MEDICATIONS  (PRN):  acetaminophen     Tablet .. 650 milliGRAM(s) Oral every 6 hours PRN Temp greater or equal to 38C (100.4F), Mild Pain (1 - 3)  aluminum hydroxide/magnesium hydroxide/simethicone Suspension 30 milliLiter(s) Oral every 4 hours PRN Dyspepsia  clonazePAM  Tablet 0.5 milliGRAM(s) Oral daily PRN anxiety  melatonin 5 milliGRAM(s) Oral at bedtime PRN Insomnia  ondansetron Injectable 4 milliGRAM(s) IV Push every 8 hours PRN Nausea and/or Vomiting      CAPILLARY BLOOD GLUCOSE        I&O's Summary    14 Apr 2024 07:01  -  15 Apr 2024 07:00  --------------------------------------------------------  IN: 0 mL / OUT: 700 mL / NET: -700 mL    15 Apr 2024 07:01  -  15 Apr 2024 16:33  --------------------------------------------------------  IN: 0 mL / OUT: 75 mL / NET: -75 mL        PHYSICAL EXAM:  Vital Signs Last 24 Hrs  T(C): 36.8 (15 Apr 2024 05:13), Max: 37.2 (14 Apr 2024 18:23)  T(F): 98.2 (15 Apr 2024 05:13), Max: 99 (14 Apr 2024 18:23)  HR: 95 (15 Apr 2024 13:47) (72 - 95)  BP: 135/85 (15 Apr 2024 13:47) (112/64 - 139/79)  BP(mean): --  RR: 18 (15 Apr 2024 13:47) (18 - 19)  SpO2: 98% (15 Apr 2024 13:47) (97% - 99%)    Parameters below as of 15 Apr 2024 00:16  Patient On (Oxygen Delivery Method): room air      GENERAL: No acute distress, well-developed  HEAD:  Atraumatic, Normocephalic  EYES: EOMI, PERRLA, conjunctiva and sclera clear  NECK: Supple, no lymphadenopathy, no JVD  CHEST/LUNG: CTAB; No wheezes, rales, or rhonchi  HEART: Regular rate and rhythm; No murmurs, rubs, or gallops  ABDOMEN: Soft, non-tender, non-distended; normal bowel sounds  EXTREMITIES:  2+ peripheral pulses b/l, No clubbing, cyanosis, or edema  NEUROLOGY: A&O x 3, no focal deficits  SKIN: No rashes or lesions    LABS:                        15.1   10.21 )-----------( 122      ( 15 Apr 2024 08:28 )             42.7     04-15    141  |  106  |  10  ----------------------------<  121<H>  4.0   |  24  |  0.9    Ca    9.0      15 Apr 2024 08:28    TPro  6.0  /  Alb  4.1  /  TBili  0.9  /  DBili  x   /  AST  16  /  ALT  18  /  AlkPhos  73  04-15    PT/INR - ( 15 Apr 2024 08:28 )   PT: 13.80 sec;   INR: 1.21 ratio         PTT - ( 15 Apr 2024 08:28 )  PTT:27.8 sec      Urinalysis Basic - ( 15 Apr 2024 08:28 )    Color: x / Appearance: x / SG: x / pH: x  Gluc: 121 mg/dL / Ketone: x  / Bili: x / Urobili: x   Blood: x / Protein: x / Nitrite: x   Leuk Esterase: x / RBC: x / WBC x   Sq Epi: x / Non Sq Epi: x / Bacteria: x     Patient is feeling well this AM  Denies any abdominal pain overnight  Reports urinating without issue and multiple loose BMs  Denies nausea, vomiting, fevers, chills    T(C): 35.9 (04-16-24 @ 05:05), Max: 36.1 (04-15-24 @ 20:42)  HR: 71 (04-16-24 @ 05:05) (71 - 96)  BP: 115/58 (04-16-24 @ 05:05) (115/58 - 135/85)  RR: 18 (04-16-24 @ 05:05) (18 - 18)  SpO2: 99% (04-16-24 @ 05:05) (97% - 99%)    MEDICATIONS  (STANDING):  aspirin enteric coated 81 milliGRAM(s) Oral daily  atorvastatin 80 milliGRAM(s) Oral at bedtime  chlorhexidine 2% Cloths 1 Application(s) Topical <User Schedule>  ciprofloxacin   IVPB 400 milliGRAM(s) IV Intermittent every 12 hours  clopidogrel Tablet 75 milliGRAM(s) Oral daily  diltiazem    milliGRAM(s) Oral daily  isosorbide   mononitrate ER Tablet (IMDUR) 30 milliGRAM(s) Oral daily  metroNIDAZOLE  IVPB 500 milliGRAM(s) IV Intermittent every 8 hours  polyethylene glycol 3350 17 Gram(s) Oral two times a day  saccharomyces boulardii 250 milliGRAM(s) Oral two times a day  senna 2 Tablet(s) Oral at bedtime    MEDICATIONS  (PRN):  acetaminophen     Tablet .. 650 milliGRAM(s) Oral every 6 hours PRN Temp greater or equal to 38C (100.4F), Mild Pain (1 - 3)  aluminum hydroxide/magnesium hydroxide/simethicone Suspension 30 milliLiter(s) Oral every 4 hours PRN Dyspepsia  clonazePAM  Tablet 0.5 milliGRAM(s) Oral daily PRN anxiety  melatonin 5 milliGRAM(s) Oral at bedtime PRN Insomnia  ondansetron Injectable 4 milliGRAM(s) IV Push every 8 hours PRN Nausea and/or Vomiting      PHYSICAL EXAM:  GENERAL: NAD, well-developed  HEAD:  Atraumatic, Normocephalic  ABDOMEN: Soft, Nontender, Nondistended  EXTREMITIES: No clubbing, cyanosis, or edema  PSYCH: AAOx3, cooperative, appropriate      Labs:  CAPILLARY BLOOD GLUCOSE                              15.1   10.21 )-----------( 122      ( 15 Apr 2024 08:28 )             42.7         04-15    141  |  106  |  10  ----------------------------<  121<H>  4.0   |  24  |  0.9          LFTs:             6.0  | 0.9  | 16       ------------------[73      ( 15 Apr 2024 08:28 )  4.1  | x    | 18          Lipase:x      Amylase:x             Coags:     13.80  ----< 1.21    ( 15 Apr 2024 08:28 )     27.8            Urinalysis Basic - ( 15 Apr 2024 08:28 )    Color: x / Appearance: x / SG: x / pH: x  Gluc: 121 mg/dL / Ketone: x  / Bili: x / Urobili: x   Blood: x / Protein: x / Nitrite: x   Leuk Esterase: x / RBC: x / WBC x   Sq Epi: x / Non Sq Epi: x / Bacteria: x        EXAM:  CT ABDOMEN AND PELVIS OC   ORDERED BY: TAMERA ARORA   PROCEDURE DATE:  04/15/2024      INTERPRETATION:  CLINICAL HISTORY: Abdominal pain.    TECHNIQUE: Contiguous axial CT images were obtained from the lower chest   to the pubic symphysis without intravenous contrast.  Oral contrast was   administered.  Reformatted images in the coronal and sagittal planes were   acquired.    COMPARISON: CT dated 4/14/2024.    IMPRESSION:    Redemonstration of fat stranding surrounding the rectum. Rectum is less   distended compared to prior with decreased stool burden. No rectal   pneumatosis is identified.    Otherwise, remainder of findings are without significant change on this   short-term follow-up CT. See CT from less than one day prior for   additional findings.

## 2024-04-30 ENCOUNTER — PROCEDURE VISIT (OUTPATIENT)
Dept: OBGYN CLINIC | Facility: CLINIC | Age: 84
End: 2024-04-30
Payer: MEDICARE

## 2024-04-30 DIAGNOSIS — M17.11 PRIMARY OSTEOARTHRITIS OF RIGHT KNEE: Primary | ICD-10-CM

## 2024-04-30 PROCEDURE — 20610 DRAIN/INJ JOINT/BURSA W/O US: CPT | Performed by: ORTHOPAEDIC SURGERY

## 2024-04-30 NOTE — PROGRESS NOTES
Assessment/Plan:  1. Primary osteoarthritis of right knee  Large joint arthrocentesis: R knee          Yessenia tolerated her first Orthovisc injection of the right knee today.  Follow-up in 1 week.    Subjective:   Yessenia Iraheta is a 83 y.o. female who presents to the office for her first Orthovisc injection in the right knee.         Past Medical History:   Diagnosis Date    Arthritis     knees    Diabetes mellitus (HCC)     type 2    GERD (gastroesophageal reflux disease)     Hyperlipidemia     Hypertension     Stage 3a chronic kidney disease (HCC) 4/14/2022    Urinary incontinence     Use of cane as ambulatory aid     Wears glasses        Past Surgical History:   Procedure Laterality Date    BREAST BIOPSY Left 2015    benign    BREAST SURGERY Left     nothing was there     COLONOSCOPY      HYSTERECTOMY      complete-fibroid    JOINT REPLACEMENT Left     knee    WV XCAPSL CTRC RMVL INSJ IO LENS PROSTH W/O ECP Right 07/19/2018    Procedure: EXTRACTION EXTRACAPSULAR CATARACT PHACO INTRAOCULAR LENS (IOL);  Surgeon: Chary Oliver MD;  Location: [facility] MAIN OR;  Service: Ophthalmology    WV XCAPSL CTRC RMVL INSJ IO LENS PROSTH W/O ECP Left 08/09/2018    Procedure: EXTRACTION EXTRACAPSULAR CATARACT PHACO INTRAOCULAR LENS (IOL);  Surgeon: Chary Oliver MD;  Location: [facility] MAIN OR;  Service: Ophthalmology       Family History   Problem Relation Age of Onset    Breast cancer Mother 79    Diabetes type II Mother     Atrial fibrillation Mother     Diabetes Mother         diet controlled    Hypertension Mother     Lung cancer Father     Alcohol abuse Father     Cancer Father         lung-smoker       Social History     Occupational History    Not on file   Tobacco Use    Smoking status: Never    Smokeless tobacco: Never   Vaping Use    Vaping status: Never Used   Substance and Sexual Activity    Alcohol use: Yes     Comment: social    Drug use: No    Sexual activity: Not on file         Current Outpatient Medications:      acetaminophen (TYLENOL) 650 mg CR tablet, Take 1 tablet (650 mg total) by mouth every 8 (eight) hours as needed for mild pain, Disp: 30 tablet, Rfl: 0    ammonium lactate (LAC-HYDRIN) 12 % cream, Apply topically 2 (two) times a day, Disp: , Rfl:     atorvastatin (LIPITOR) 40 mg tablet, Take 1 tablet (40 mg total) by mouth daily, Disp: 90 tablet, Rfl: 1    cetirizine (ZyrTEC) 10 mg tablet, Take 10 mg by mouth daily, Disp: , Rfl:     CINNAMON PO, Take 1,000 mg by mouth every morning, Disp: , Rfl:     cyclobenzaprine (FLEXERIL) 5 mg tablet, Take 5 mg by mouth 2 (two) times a day as needed, Disp: , Rfl:     Diclofenac Sodium (VOLTAREN) 1 %, Apply 2 g topically 4 (four) times a day, Disp: 2 g, Rfl: 1    Diclofenac Sodium (VOLTAREN) 1 %, Apply 2 g topically 2 (two) times a day, Disp: , Rfl:     Diclofenac Sodium (VOLTAREN) 1 %, APPLY BILATERALLY TWICE A DAY OR AS DIRECTED BY PRESCRIBER., Disp: 100 g, Rfl: 1    Eliquis 5 MG, TAKE ONE TABLET  TWO TIMES A DAY, Disp: 180 tablet, Rfl: 3    gabapentin (Neurontin) 300 mg capsule, Take 1 capsule (300 mg total) by mouth 4 (four) times a day, Disp: 120 capsule, Rfl: 3    lisinopril (ZESTRIL) 10 mg tablet, Take 1 tablet (10 mg total) by mouth daily, Disp: 90 tablet, Rfl: 1    metFORMIN (GLUCOPHAGE) 500 mg tablet, Take 500 mg by mouth 2 (two) times a day, Disp: , Rfl:     metoprolol tartrate (LOPRESSOR) 25 mg tablet, TAKE ONE TABLET BY MOUTH EVERY MORNING, Disp: 90 tablet, Rfl: 1    Multiple Vitamins-Minerals (PRESERVISION AREDS PO), Take by mouth, Disp: , Rfl:     simvastatin (ZOCOR) 40 mg tablet, Take 40 mg by mouth in the morning, Disp: , Rfl:     torsemide (DEMADEX) 20 mg tablet, Take 1 tablet (20 mg total) by mouth every other day Take one tablet [id number] morning, Disp: 45 tablet, Rfl: 3    Allergies   Allergen Reactions    Other Allergic Rhinitis     Seasonal/pollen       Objective:  There were no vitals filed for this visit.  Pain Score:   3      Ortho  Exam    Physical Exam  Vitals and nursing note reviewed.   Constitutional:       Appearance: Normal appearance. She is well-developed.   HENT:      Head: Normocephalic and atraumatic.      Right Ear: External ear normal.      Left Ear: External ear normal.   Eyes:      General: No scleral icterus.     Extraocular Movements: Extraocular movements intact.      Conjunctiva/sclera: Conjunctivae normal.   Cardiovascular:      Rate and Rhythm: Normal rate.   Pulmonary:      Effort: Pulmonary effort is normal. No respiratory distress.   Musculoskeletal:      Cervical back: Normal range of motion and neck supple.      Comments: See Ortho exam   Skin:     General: Skin is warm and dry.   Neurological:      General: No focal deficit present.      Mental Status: She is alert and oriented to person, place, and time.   Psychiatric:         Behavior: Behavior normal.         Large joint arthrocentesis: R knee  Universal Protocol:  Consent: Verbal consent obtained.  Risks and benefits: risks, benefits and alternatives were discussed  Consent given by: patient  Site marked: the operative site was marked  Supporting Documentation  Indications: pain   Procedure Details  Location: knee - R knee  Needle size: 22 G  Ultrasound guidance: no  Approach: anterolateral  Medications administered: 30 mg sodium hyaluronate 30 mg/2 mL    Patient tolerance: patient tolerated the procedure well with no immediate complications  Dressing:  Sterile dressing applied            This document was created using speech voice recognition software.   Grammatical errors, random word insertions, pronoun errors, and incomplete sentences are an occasional consequence of this system due to software limitations, ambient noise, and hardware issues.   Any formal questions or concerns about content, text, or information contained within the body of this dictation should be directly addressed to the provider for clarification.

## 2024-05-07 ENCOUNTER — PROCEDURE VISIT (OUTPATIENT)
Dept: OBGYN CLINIC | Facility: CLINIC | Age: 84
End: 2024-05-07
Payer: MEDICARE

## 2024-05-07 DIAGNOSIS — M17.11 PRIMARY OSTEOARTHRITIS OF RIGHT KNEE: Primary | ICD-10-CM

## 2024-05-07 PROCEDURE — 20610 DRAIN/INJ JOINT/BURSA W/O US: CPT | Performed by: ORTHOPAEDIC SURGERY

## 2024-05-07 NOTE — PROGRESS NOTES
Assessment/Plan:  1. Primary osteoarthritis of right knee            Yessenia tolerated her third Orthovisc injection the right knee today.    Subjective:   Yessenia Iraheta is a 83 y.o. female who presents for her third Orthovisc injection the right knee         Past Medical History:   Diagnosis Date    Arthritis     knees    Diabetes mellitus (HCC)     type 2    GERD (gastroesophageal reflux disease)     Hyperlipidemia     Hypertension     Stage 3a chronic kidney disease (HCC) 4/14/2022    Urinary incontinence     Use of cane as ambulatory aid     Wears glasses        Past Surgical History:   Procedure Laterality Date    BREAST BIOPSY Left 2015    benign    BREAST SURGERY Left     nothing was there     COLONOSCOPY      HYSTERECTOMY      complete-fibroid    JOINT REPLACEMENT Left     knee    [place] XCAPSL CTRC RMVL INSJ IO LENS PROSTH W/O ECP Right 07/19/2018    Procedure: EXTRACTION EXTRACAPSULAR CATARACT PHACO INTRAOCULAR LENS (IOL);  Surgeon: Chary Oliver MD;  Location: [facility] MAIN OR;  Service: Ophthalmology    NH XCAPSL CTRC RMVL INSJ IO LENS PROSTH W/O ECP Left 08/09/2018    Procedure: EXTRACTION EXTRACAPSULAR CATARACT PHACO INTRAOCULAR LENS (IOL);  Surgeon: Chary Oliver MD;  Location: [facility] MAIN OR;  Service: Ophthalmology       Family History   Problem Relation Age of Onset    Breast cancer Mother 79    Diabetes type II Mother     Atrial fibrillation Mother     Diabetes Mother         diet controlled    Hypertension Mother     Lung cancer Father     Alcohol abuse Father     Cancer Father         lung-smoker       Social History     Occupational History    Not on file   Tobacco Use    Smoking status: Never    Smokeless tobacco: Never   Vaping Use    Vaping status: Never Used   Substance and Sexual Activity    Alcohol use: Yes     Comment: social    Drug use: No    Sexual activity: Not on file         Current Outpatient Medications:     acetaminophen (TYLENOL) 650 mg CR tablet, Take 1 tablet (650 mg total) by  mouth every 8 (eight) hours as needed for mild pain, Disp: 30 tablet, Rfl: 0    ammonium lactate (LAC-HYDRIN) 12 % cream, Apply topically 2 (two) times a day, Disp: , Rfl:     atorvastatin (LIPITOR) 40 mg tablet, Take 1 tablet (40 mg total) by mouth daily, Disp: 90 tablet, Rfl: 1    cetirizine (ZyrTEC) 10 mg tablet, Take 10 mg by mouth daily, Disp: , Rfl:     CINNAMON PO, Take 1,000 mg by mouth every morning, Disp: , Rfl:     cyclobenzaprine (FLEXERIL) 5 mg tablet, Take 5 mg by mouth 2 (two) times a day as needed, Disp: , Rfl:     Diclofenac Sodium (VOLTAREN) 1 %, Apply 2 g topically 4 (four) times a day, Disp: 2 g, Rfl: 1    Diclofenac Sodium (VOLTAREN) 1 %, Apply 2 g topically 2 (two) times a day, Disp: , Rfl:     Diclofenac Sodium (VOLTAREN) 1 %, APPLY BILATERALLY TWICE A DAY OR AS DIRECTED BY PRESCRIBER., Disp: 100 g, Rfl: 1    Eliquis 5 MG, TAKE ONE TABLET  TWO TIMES A DAY, Disp: 180 tablet, Rfl: 3    gabapentin (Neurontin) 300 mg capsule, Take 1 capsule (300 mg total) by mouth 4 (four) times a day, Disp: 120 capsule, Rfl: 3    lisinopril (ZESTRIL) 10 mg tablet, Take 1 tablet (10 mg total) by mouth daily, Disp: 90 tablet, Rfl: 1    metFORMIN (GLUCOPHAGE) 500 mg tablet, Take 500 mg by mouth 2 (two) times a day, Disp: , Rfl:     metoprolol tartrate (LOPRESSOR) 25 mg tablet, TAKE ONE TABLET BY MOUTH EVERY MORNING, Disp: 90 tablet, Rfl: 1    Multiple Vitamins-Minerals (PRESERVISION AREDS PO), Take by mouth, Disp: , Rfl:     simvastatin (ZOCOR) 40 mg tablet, Take 40 mg by mouth in the morning, Disp: , Rfl:     torsemide (DEMADEX) 20 mg tablet, Take 1 tablet (20 mg total) by mouth every other day Take one tablet [id number] morning, Disp: 45 tablet, Rfl: 3    Allergies   Allergen Reactions    Other Allergic Rhinitis     Seasonal/pollen       Objective:  There were no vitals filed for this visit.         Ortho Exam    Physical Exam    Large joint arthrocentesis: R knee  Universal Protocol:  Consent: Verbal consent  obtained.  Risks and benefits: risks, benefits and alternatives were discussed  Consent given by: patient  Site marked: the operative site was marked  Supporting Documentation  Indications: pain   Procedure Details  Location: knee - R knee  Needle size: 22 G  Ultrasound guidance: no  Approach: anterolateral  Medications administered: 30 mg sodium hyaluronate 30 mg/2 mL    Patient tolerance: patient tolerated the procedure well with no immediate complications  Dressing:  Sterile dressing applied            This document was created using speech voice recognition software.   Grammatical errors, random word insertions, pronoun errors, and incomplete sentences are an occasional consequence of this system due to software limitations, ambient noise, and hardware issues.   Any formal questions or concerns about content, text, or information contained within the body of this dictation should be directly addressed to the provider for clarification.

## 2024-05-08 ENCOUNTER — NURSING HOME VISIT (OUTPATIENT)
Age: 84
End: 2024-05-08

## 2024-05-08 VITALS
SYSTOLIC BLOOD PRESSURE: 127 MMHG | OXYGEN SATURATION: 91 % | DIASTOLIC BLOOD PRESSURE: 66 MMHG | HEART RATE: 80 BPM | TEMPERATURE: 97.3 F | RESPIRATION RATE: 16 BRPM

## 2024-05-08 DIAGNOSIS — M17.11 ARTHRITIS OF RIGHT KNEE: ICD-10-CM

## 2024-05-08 DIAGNOSIS — I10 PRIMARY HYPERTENSION: ICD-10-CM

## 2024-05-08 DIAGNOSIS — E78.49 OTHER HYPERLIPIDEMIA: ICD-10-CM

## 2024-05-08 DIAGNOSIS — I48.20 CHRONIC ATRIAL FIBRILLATION (HCC): Primary | ICD-10-CM

## 2024-05-08 DIAGNOSIS — M79.2 NEUROPATHIC PAIN: ICD-10-CM

## 2024-05-08 PROBLEM — E87.6 HYPOKALEMIA: Status: RESOLVED | Noted: 2017-03-20 | Resolved: 2024-05-08

## 2024-05-08 PROCEDURE — 99308 SBSQ NF CARE LOW MDM 20: CPT | Performed by: FAMILY MEDICINE

## 2024-05-08 NOTE — ASSESSMENT & PLAN NOTE
Updated medication list on Epic.  She was previously on gabapentin 300 mg 4 times daily.  However, Gabapentin is not on most recent medication list obtained from nursing home.  Unble to find records of when it was discontinued exactly.

## 2024-05-08 NOTE — PROGRESS NOTES
Resides at [facility] at [place]    Name: Yessenia Iraheta      : 1940      MRN: [id number]  Encounter Provider: Julieta Gaffney MD  Encounter Date: 2024   Encounter department: [facility]    Assessment & Plan     1. Chronic atrial fibrillation (HCC)  Assessment & Plan:  Follows with Cardiologist, Dr. Vasquez regularly.  Last seen on 2023, next appointment 2024    Continue metoprolol 25 mg daily  Continue with Eliquis 5 mg daily, reduce to 2.5 mg if creatinine greater than 1.5      2. Primary hypertension  Assessment & Plan:  Blood pressure well-controlled.    Continue lisinopril 10 mg daily      3. Arthritis of right knee  Assessment & Plan:  Patient follows with Dr. Benjamin, orthopedist.  Patient was last seen on 2024, and received her third Orthovisc right knee injection.  Next appointment scheduled on 2024.  Patient reports significant improvement in her right knee pain.    Continue to follow-up with Dr. Benjamin      4. Other hyperlipidemia  Assessment & Plan:  Continue Lipitor 40 mg daily  And was previously on simvastatin 40 mg -removed from medication list on epic      5. Neuropathic pain  Assessment & Plan:  Updated medication list on Epic.  She was previously on gabapentin 300 mg 4 times daily.  However, Gabapentin is not on most recent medication list obtained from nursing [facility].  Unble to find records of when it was discontinued exactly.             Subjective      Patient is a pleasant 83-year-old female with past medical history of hypertension, chronic A-fib, and arthritis of right knee, hyperlipidemia, neuropathic pain.  Patient was seen at [facility].  Patient states that she is doing very well and is in good spirits.  Patient had an appointment with orthopedist yesterday and got her knee injection and states that she is feeling very well and is in no pain currently.  Patient is eating well and voiding well.  Last  bowel movement was yesterday. Patient states that she did not sleep well last night however is looking forward to taking a nap during the day today.       Review of Systems   Constitutional:  Negative for appetite change, chills and fever.   Respiratory:  Negative for cough, chest tightness and shortness of breath.    Cardiovascular:  Negative for chest pain, palpitations and leg swelling.   Gastrointestinal:  Negative for abdominal pain, nausea and vomiting.   Genitourinary:  Negative for dysuria.   Musculoskeletal:  Positive for arthralgias. Negative for back pain and myalgias.   Skin:  Negative for rash.   Neurological:  Negative for weakness, light-headedness and headaches.       Current Outpatient Medications on File Prior to Visit   Medication Sig    acetaminophen (TYLENOL) 650 mg CR tablet Take 1 tablet (650 mg total) by mouth every 8 (eight) hours as needed for mild pain    ammonium lactate (LAC-HYDRIN) 12 % cream Apply topically 2 (two) times a day    atorvastatin (LIPITOR) 40 mg tablet Take 1 tablet (40 mg total) by mouth daily    cetirizine (ZyrTEC) 10 mg tablet Take 10 mg by mouth daily    CINNAMON PO Take 1,000 mg by mouth every morning    cyclobenzaprine (FLEXERIL) 5 mg tablet Take 5 mg by mouth 2 (two) times a day as needed    Eliquis 5 MG TAKE ONE TABLET  TWO TIMES A DAY    lisinopril (ZESTRIL) 10 mg tablet Take 1 tablet (10 mg total) by mouth daily    metFORMIN (GLUCOPHAGE) 500 mg tablet Take 500 mg by mouth 2 (two) times a day    metoprolol tartrate (LOPRESSOR) 25 mg tablet TAKE ONE TABLET BY MOUTH EVERY MORNING    Multiple Vitamins-Minerals (PRESERVISION AREDS PO) Take by mouth    torsemide (DEMADEX) 20 mg tablet Take 1 tablet (20 mg total) by mouth every other day Take one tablet [id number]-[id number] morning    [DISCONTINUED] Diclofenac Sodium (VOLTAREN) 1 % Apply 2 g topically 4 (four) times a day    [DISCONTINUED] Diclofenac Sodium (VOLTAREN) 1 % Apply 2 g topically 2 (two) times a day     [DISCONTINUED] Diclofenac Sodium (VOLTAREN) 1 % APPLY BILATERALLY TWICE A DAY OR AS DIRECTED BY PRESCRIBER.    [DISCONTINUED] gabapentin (Neurontin) 300 mg capsule Take 1 capsule (300 mg total) by mouth 4 (four) times a day    [DISCONTINUED] simvastatin (ZOCOR) 40 mg tablet Take 40 mg by mouth in the morning       Objective     /66   Pulse 80   Temp (!) 97.3 °F (36.3 °C)   Resp 16   SpO2 91%     Physical Exam  Constitutional:       General: She is not in acute distress.     Appearance: Normal appearance. She is not ill-appearing.   HENT:      Head: Normocephalic and atraumatic.   Cardiovascular:      Rate and Rhythm: Normal rate and regular rhythm.      Pulses: Normal pulses.      Heart sounds: Normal heart sounds. No murmur heard.  Pulmonary:      Effort: Pulmonary effort is normal. No respiratory distress.      Breath sounds: Normal breath sounds. No wheezing.   Abdominal:      General: Abdomen is flat. There is no distension.      Palpations: Abdomen is soft. There is no mass.      Tenderness: There is no abdominal tenderness. There is no right CVA tenderness or left CVA tenderness.   Musculoskeletal:         General: Normal range of motion.      Right lower leg: No edema.      Left lower leg: No edema.      Comments: Bandaid on R knee at the site of injection. No swelling or erythema noted.   Skin:     General: Skin is warm and dry.   Neurological:      Mental Status: She is alert.       Oseas Weinberg MD  PGY 1

## 2024-05-08 NOTE — ASSESSMENT & PLAN NOTE
Patient follows with Dr. Benjamin, orthopedist.  Patient was last seen on 5/7/2024, and received her third Orthovisc right knee injection.  Next appointment scheduled on 5/14/2024.  Patient reports significant improvement in her right knee pain.    Continue to follow-up with Dr. Benjamin

## 2024-05-08 NOTE — ASSESSMENT & PLAN NOTE
Continue Lipitor 40 mg daily  And was previously on simvastatin 40 mg -removed from medication list on epic

## 2024-05-08 NOTE — ASSESSMENT & PLAN NOTE
Follows with Cardiologist, Dr. Vasquez regularly.  Last seen on 11/2023, next appointment 8/2024    Continue metoprolol 25 mg daily  Continue with Eliquis 5 mg daily, reduce to 2.5 mg if creatinine greater than 1.5

## 2024-05-16 ENCOUNTER — PROCEDURE VISIT (OUTPATIENT)
Dept: OBGYN CLINIC | Facility: CLINIC | Age: 84
End: 2024-05-16
Payer: MEDICARE

## 2024-05-16 DIAGNOSIS — M17.11 PRIMARY OSTEOARTHRITIS OF RIGHT KNEE: Primary | ICD-10-CM

## 2024-05-16 PROCEDURE — 20610 DRAIN/INJ JOINT/BURSA W/O US: CPT | Performed by: ORTHOPAEDIC SURGERY

## 2024-05-16 NOTE — PROGRESS NOTES
Assessment/Plan:  1. Primary osteoarthritis of right knee            Yessenia tolerated her third Orthovisc injection the right knee.    Subjective:   Yessenia Iraheta is a 83 y.o. female who presents to the office for  her third Orthovisc injection the right knee.         Past Medical History:   Diagnosis Date    Arthritis     knees    Diabetes mellitus (HCC)     type 2    GERD (gastroesophageal reflux disease)     Hyperlipidemia     Hypertension     Hypokalemia 03/20/2017    Stage 3a chronic kidney disease (HCC) 04/14/2022    Urinary incontinence     Use of cane as ambulatory aid     Wears glasses        Past Surgical History:   Procedure Laterality Date    BREAST BIOPSY Left 2015    benign    BREAST SURGERY Left     nothing was there     COLONOSCOPY      HYSTERECTOMY      complete-fibroid    JOINT REPLACEMENT Left     knee    [place] XCAPSL CTRC RMVL INSJ IO LENS PROSTH W/O ECP Right 07/19/2018    Procedure: EXTRACTION EXTRACAPSULAR CATARACT PHACO INTRAOCULAR LENS (IOL);  Surgeon: Chary Oliver MD;  Location: [facility] MAIN OR;  Service: Ophthalmology    [place] XCAPSL CTRC RMVL INSJ IO LENS PROSTH W/O ECP Left 08/09/2018    Procedure: EXTRACTION EXTRACAPSULAR CATARACT PHACO INTRAOCULAR LENS (IOL);  Surgeon: Chary Oliver MD;  Location: [facility] MAIN OR;  Service: Ophthalmology       Family History   Problem Relation Age of Onset    Breast cancer Mother 79    Diabetes type II Mother     Atrial fibrillation Mother     Diabetes Mother         diet controlled    Hypertension Mother     Lung cancer Father     Alcohol abuse Father     Cancer Father         lung-smoker       Social History     Occupational History    Not on file   Tobacco Use    Smoking status: Never    Smokeless tobacco: Never   Vaping Use    Vaping status: Never Used   Substance and Sexual Activity    Alcohol use: Yes     Comment: social    Drug use: No    Sexual activity: Not on file         Current Outpatient Medications:     acetaminophen (TYLENOL) 650 mg CR  tablet, Take 1 tablet (650 mg total) by mouth every 8 (eight) hours as needed for mild pain, Disp: 30 tablet, Rfl: 0    ammonium lactate (LAC-HYDRIN) 12 % cream, Apply topically 2 (two) times a day, Disp: , Rfl:     atorvastatin (LIPITOR) 40 mg tablet, Take 1 tablet (40 mg total) by mouth daily, Disp: 90 tablet, Rfl: 1    cetirizine (ZyrTEC) 10 mg tablet, Take 10 mg by mouth daily, Disp: , Rfl:     CINNAMON PO, Take 1,000 mg by mouth every morning, Disp: , Rfl:     cyclobenzaprine (FLEXERIL) 5 mg tablet, Take 5 mg by mouth 2 (two) times a day as needed, Disp: , Rfl:     Eliquis 5 MG, TAKE ONE TABLET  TWO TIMES A DAY, Disp: 180 tablet, Rfl: 3    lisinopril (ZESTRIL) 10 mg tablet, Take 1 tablet (10 mg total) by mouth daily, Disp: 90 tablet, Rfl: 1    metFORMIN (GLUCOPHAGE) 500 mg tablet, Take 500 mg by mouth 2 (two) times a day, Disp: , Rfl:     metoprolol tartrate (LOPRESSOR) 25 mg tablet, TAKE ONE TABLET BY MOUTH EVERY MORNING, Disp: 90 tablet, Rfl: 1    Multiple Vitamins-Minerals (PRESERVISION AREDS PO), Take by mouth, Disp: , Rfl:     torsemide (DEMADEX) 20 mg tablet, Take 1 tablet (20 mg total) by mouth every other day Take one tablet [id number] morning, Disp: 45 tablet, Rfl: 3    Allergies   Allergen Reactions    Other Allergic Rhinitis     Seasonal/pollen       Objective:  There were no vitals filed for this visit.         Ortho Exam    Physical Exam    Large joint arthrocentesis: R knee  Universal Protocol:  Consent: Verbal consent obtained.  Risks and benefits: risks, benefits and alternatives were discussed  Consent given by: patient  Site marked: the operative site was marked  Supporting Documentation  Indications: pain   Procedure Details  Location: knee - R knee  Needle size: 22 G  Ultrasound guidance: no  Approach: anterolateral  Medications administered: 30 mg sodium hyaluronate 30 mg/2 mL    Patient tolerance: patient tolerated the procedure well with no immediate complications  Dressing:  Sterile  dressing applied            This document was created using speech voice recognition software.   Grammatical errors, random word insertions, pronoun errors, and incomplete sentences are an occasional consequence of this system due to software limitations, ambient noise, and hardware issues.   Any formal questions or concerns about content, text, or information contained within the body of this dictation should be directly addressed to the provider for clarification.

## 2024-08-27 ENCOUNTER — OFFICE VISIT (OUTPATIENT)
Dept: CARDIOLOGY CLINIC | Facility: CLINIC | Age: 84
End: 2024-08-27
Payer: MEDICARE

## 2024-08-27 VITALS
WEIGHT: 222 LBS | BODY MASS INDEX: 41.91 KG/M2 | OXYGEN SATURATION: 99 % | HEIGHT: 61 IN | HEART RATE: 66 BPM | SYSTOLIC BLOOD PRESSURE: 122 MMHG | DIASTOLIC BLOOD PRESSURE: 60 MMHG

## 2024-08-27 DIAGNOSIS — I10 PRIMARY HYPERTENSION: ICD-10-CM

## 2024-08-27 DIAGNOSIS — E11.40 TYPE 2 DIABETES MELLITUS WITH DIABETIC NEUROPATHY, WITHOUT LONG-TERM CURRENT USE OF INSULIN (HCC): ICD-10-CM

## 2024-08-27 DIAGNOSIS — I48.20 CHRONIC ATRIAL FIBRILLATION (HCC): Primary | ICD-10-CM

## 2024-08-27 DIAGNOSIS — I51.89 GRADE II DIASTOLIC DYSFUNCTION: ICD-10-CM

## 2024-08-27 DIAGNOSIS — N18.31 STAGE 3A CHRONIC KIDNEY DISEASE (HCC): ICD-10-CM

## 2024-08-27 PROCEDURE — 93000 ELECTROCARDIOGRAM COMPLETE: CPT | Performed by: INTERNAL MEDICINE

## 2024-08-27 PROCEDURE — 99214 OFFICE O/P EST MOD 30 MIN: CPT | Performed by: INTERNAL MEDICINE

## 2024-08-27 RX ORDER — SIMVASTATIN 40 MG
40 TABLET ORAL
COMMUNITY
Start: 2024-08-08

## 2024-08-27 NOTE — PROGRESS NOTES
Cardiology Outpatient Follow-up                                                          Yessenia Iraheta  [id number]  1940      1. Chronic atrial fibrillation (HCC)  POCT ECG      2. Grade II diastolic dysfunction  POCT ECG      3. Primary hypertension  POCT ECG          Discussion/Summary:  Acute on chronic diastolic hf- creatinine higher.torsemide to every other day. Jardiance 10mg daily-unclear why it was previously discontinued.  She will continue on metoprolol 25 mg in morning. Recommend periodic monitoring of electrolytes we will recheck in 3 to 4 weeks.  She needs to be on a low-sodium diet    Afib/flutter- . Rate controlled. metoprolol 25mg in the morning.  She is currently on Eliquis 5 mg twice a day.  If her creatinine remains above 1.5 we will need to reduce her Eliquis to 2.5 mg no further falls. No dizziness or light-headness.     GERD- nexium 40mg daily    Htn- controlled. Lisinopril + netiorikik    LPa- LDL<70. Currently on atorvastatin 40mg    Dm2- atorvastatin.     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  She also has a history of diastolic dysfunction and lower extremity edema chronic.  She states being compliant with her diuretic but with poor response.  She has been of plan with her medications.  She denies having chest heaviness.  She denies having any history of prior CVA.  She denies having any major bleeding episodes.    07/24/2019:  Her lower extremity edema is significantly improved.  Her blood pressures been well controlled.  Her heart rates have been controlled.  She denies having any further falls.  We reviewed through her last blood work.  She denies feeling dizziness or lightheadedness.    10/23/2019:  Her weight has been trending down.  She denies having  recurrence of lower extremity edema.  She is in chronic atrial fibrillation and rate controlled.  She denies feeling dizziness or lightheadedness.  There has been no falls.  She has no trouble with Eliquis therapy.  There has been no major bleeding.    08/25/2020:  She denies feeling dizziness or lightheadedness.  Her weight has been stable.  Her lower extremity swelling has been well controlled.  She is compliant with diuretic.  We reviewed her last blood work.  Her potassium was mildly higher.  We will discontinue potassium supplementation.    02/24/2021:  She has received her COVID vaccine.  Her blood pressures have been under control.  She was noted to have a heart rate in the 50s.  She denies feeling dizziness or lightheadedness.  She is compliant with her medications.  She feels some leg cramps if she does not take potassium.    04/29/2021:  Her heart rates are and the 50 to 60s.  She denies having any dizziness or lightheadedness.  She denies having chest heaviness.  She is compliant with her medications.  She denies having any major swelling.  She received her COVID vaccine.    10/28:  She hit her head. No chest pain. Compliant with meds    04/15/2022:  She is using the diuretic for lower extremity swelling.  She denies having dizziness or lightheadedness.      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  She denies having dizziness or lightheadedness.  She denies having major palpitations.  She denies having recent falls.  We reviewed her EKG.  She is now in rate controlled atrial flutter.  She denies having major swelling.    4/13/2023: She has had increased lower extremity swelling.  She denies having dizziness or lightheadedness.  She has significant osteoarthritis.    11/9/2023: She is currently at a assisted living.  She has had some lower extremity swelling.  She denies having chest heaviness.  Her A-fib is well controlled.  She is compliant with her medications.  She was noted  to have elevated kidney function on repeat blood work.      8/27/2024: She is at assisted living facility.  She currently denies having chest pain.  Denies major palpitations.  She is compliant with her medications.  Her lower extremity swelling has been controlled    Past Medical History:   Diagnosis Date    Arthritis     knees    Diabetes mellitus (HCC)     type 2    GERD (gastroesophageal reflux disease)     Hyperlipidemia     Hypertension     Hypokalemia 03/20/2017    Stage 3a chronic kidney disease (HCC) 04/14/2022    Urinary incontinence     Use of cane as ambulatory aid     Wears glasses      Social History     Socioeconomic History    Marital status: Single     Spouse name: Not on file    Number of children: Not on file    Years of education: Not on file    Highest education level: Not on file   Occupational History    Not on file   Tobacco Use    Smoking status: Never    Smokeless tobacco: Never   Vaping Use    Vaping status: Never Used   Substance and Sexual Activity    Alcohol use: Yes     Comment: social    Drug use: No    Sexual activity: Not on file   Other Topics Concern    Not on file   Social History Narrative    Not on file     Social Determinants of Health     Financial Resource Strain: Not on file   Food Insecurity: Not on file   Transportation Needs: Not on file   Physical Activity: Not on file   Stress: Not on file   Social Connections: Not on file   Intimate Partner Violence: Not on file   Housing Stability: Not on file      Family History   Problem Relation Age of Onset    Breast cancer Mother 79    Diabetes type II Mother     Atrial fibrillation Mother     Diabetes Mother         diet controlled    Hypertension Mother     Lung cancer Father     Alcohol abuse Father     Cancer Father         lung-smoker     Past Surgical History:   Procedure Laterality Date    BREAST BIOPSY Left 2015    benign    BREAST SURGERY Left     nothing was there     COLONOSCOPY      HYSTERECTOMY      complete-fibroid     JOINT REPLACEMENT Left     knee    VT XCAPSL CTRC RMVL INSJ IO LENS PROSTH W/O ECP Right 07/19/2018    Procedure: EXTRACTION EXTRACAPSULAR CATARACT PHACO INTRAOCULAR LENS (IOL);  Surgeon: Cahry Oliver MD;  Location: [facility] MAIN OR;  Service: Ophthalmology    VT XCAPSL CTRC RMVL INSJ IO LENS PROSTH W/O ECP Left 08/09/2018    Procedure: EXTRACTION EXTRACAPSULAR CATARACT PHACO INTRAOCULAR LENS (IOL);  Surgeon: Chary Oliver MD;  Location: [facility] MAIN OR;  Service: Ophthalmology       Current Outpatient Medications:     acetaminophen (TYLENOL) 650 mg CR tablet, Take 1 tablet (650 mg total) by mouth every 8 (eight) hours as needed for mild pain, Disp: 30 tablet, Rfl: 0    ammonium lactate (LAC-HYDRIN) 12 % cream, Apply topically 2 (two) times a day, Disp: , Rfl:     atorvastatin (LIPITOR) 40 mg tablet, Take 1 tablet (40 mg total) by mouth daily, Disp: 90 tablet, Rfl: 1    cetirizine (ZyrTEC) 10 mg tablet, Take 10 mg by mouth daily, Disp: , Rfl:     CINNAMON PO, Take 1,000 mg by mouth every morning, Disp: , Rfl:     cyclobenzaprine (FLEXERIL) 5 mg tablet, Take 5 mg by mouth 2 (two) times a day as needed, Disp: , Rfl:     Eliquis 5 MG, TAKE ONE TABLET  TWO TIMES A DAY, Disp: 180 tablet, Rfl: 3    lisinopril (ZESTRIL) 10 mg tablet, Take 1 tablet (10 mg total) by mouth daily, Disp: 90 tablet, Rfl: 1    metFORMIN (GLUCOPHAGE) 500 mg tablet, Take 500 mg by mouth 2 (two) times a day, Disp: , Rfl:     metoprolol tartrate (LOPRESSOR) 25 mg tablet, TAKE ONE TABLET BY MOUTH EVERY MORNING, Disp: 90 tablet, Rfl: 1    Multiple Vitamins-Minerals (PRESERVISION AREDS PO), Take by mouth, Disp: , Rfl:     simvastatin (ZOCOR) 40 mg tablet, Take 40 mg by mouth daily at bedtime, Disp: , Rfl:     torsemide (DEMADEX) 20 mg tablet, Take 1 tablet (20 mg total) by mouth every other day Take one tablet [id number] morning, Disp: 45 tablet, Rfl: 3  Allergies   Allergen Reactions    Other Allergic Rhinitis     Seasonal/pollen     Vitals:     "08/27/24 0919   BP: 122/60   BP Location: Right arm   Patient Position: Sitting   Cuff Size: Large   Pulse: 66   SpO2: 99%   Weight: 101 kg (222 lb)   Height: 5' 1\" (1.549 m)       Review of Systems:  Review of Systems   Constitutional: Negative.  Negative for activity change, appetite change, chills, diaphoresis, fatigue, fever and unexpected weight change.   HENT: Negative.  Negative for congestion, dental problem, drooling, ear discharge, ear pain, facial swelling, hearing loss, mouth sores, nosebleeds, postnasal drip, rhinorrhea, sinus pressure, sinus pain, sneezing, sore throat, tinnitus, trouble swallowing and voice change.    Eyes: Negative.  Negative for photophobia, pain, redness, itching and visual disturbance.   Respiratory:  Positive for shortness of breath. Negative for apnea, cough, choking, chest tightness, wheezing and stridor.    Cardiovascular:  Positive for leg swelling. Negative for chest pain and palpitations.   Gastrointestinal: Negative.  Negative for abdominal distention, abdominal pain, anal bleeding, blood in stool, constipation, diarrhea, nausea, rectal pain and vomiting.   Endocrine: Negative.  Negative for cold intolerance, heat intolerance, polydipsia, polyphagia and polyuria.   Genitourinary: Negative.  Negative for decreased urine volume, difficulty urinating, dyspareunia, dysuria, enuresis, flank pain, frequency, genital sores, hematuria, menstrual problem, pelvic pain, urgency, vaginal bleeding, vaginal discharge and vaginal pain.   Musculoskeletal: Negative.  Negative for arthralgias, back pain, gait problem, joint swelling, myalgias, neck pain and neck stiffness.   Skin: Negative.  Negative for color change, pallor, rash and wound.   Allergic/Immunologic: Negative.  Negative for environmental allergies, food allergies and immunocompromised state.   Neurological: Negative.  Negative for dizziness, tremors, seizures, syncope, facial asymmetry, speech difficulty, weakness, " "light-headedness, numbness and headaches.   Hematological: Negative.  Negative for adenopathy. Does not bruise/bleed easily.   Psychiatric/Behavioral: Negative.  Negative for agitation, behavioral problems, confusion, decreased concentration, dysphoric mood, hallucinations, self-injury, sleep disturbance and suicidal ideas. The patient is not nervous/anxious and is not hyperactive.    All other systems reviewed and are negative.      Vitals:    08/27/24 0919   BP: 122/60   BP Location: Right arm   Patient Position: Sitting   Cuff Size: Large   Pulse: 66   SpO2: 99%   Weight: 101 kg (222 lb)   Height: 5' 1\" (1.549 m)     Physical Exam:  Physical Exam  Constitutional:       General: She is not in acute distress.     Appearance: She is well-developed. She is not diaphoretic.   HENT:      Head: Normocephalic and atraumatic.      Right Ear: External ear normal.      Left Ear: External ear normal.   Eyes:      General: No scleral icterus.        Right eye: No discharge.         Left eye: No discharge.      Conjunctiva/sclera: Conjunctivae normal.      Pupils: Pupils are equal, round, and reactive to light.   Neck:      Thyroid: No thyromegaly.      Vascular: JVD present.      Trachea: No tracheal deviation.   Cardiovascular:      Rate and Rhythm: Normal rate and regular rhythm.      Heart sounds: Murmur heard.      No friction rub. No gallop.   Pulmonary:      Effort: Pulmonary effort is normal. No respiratory distress.      Breath sounds: Normal breath sounds. No stridor. No wheezing or rales.   Chest:      Chest wall: No tenderness.   Abdominal:      General: Bowel sounds are normal. There is no distension.      Palpations: Abdomen is soft. There is no mass.      Tenderness: There is no abdominal tenderness. There is no guarding or rebound.   Musculoskeletal:         General: Swelling present. No tenderness or deformity. Normal range of motion.      Cervical back: Normal range of motion and neck supple.   Skin:     " "General: Skin is warm and dry.      Coloration: Skin is not pale.      Findings: No erythema or rash.   Neurological:      Mental Status: She is alert and oriented to person, place, and time.      Cranial Nerves: No cranial nerve deficit.      Motor: No abnormal muscle tone.      Coordination: Coordination normal.      Deep Tendon Reflexes: Reflexes are normal and symmetric. Reflexes normal.   Psychiatric:         Behavior: Behavior normal.         Thought Content: Thought content normal.         Judgment: Judgment normal.         Labs:     Lab Results   Component Value Date    WBC 6.38 11/04/2022    HGB 11.0 (L) 11/04/2022    HCT 37.4 11/04/2022    MCV 82 11/04/2022     11/04/2022     Lab Results   Component Value Date    K 4.3 05/10/2023     05/10/2023    CO2 23 05/10/2023    BUN 30 (H) 05/10/2023    CREATININE 1.26 05/10/2023    GLUF 118 (H) 05/10/2023    CALCIUM 9.3 05/10/2023    CORRECTEDCA 10.1 11/04/2022    AST 14 11/04/2022    ALT 24 11/04/2022    ALKPHOS 84 11/04/2022    EGFR 39 05/10/2023     No results found for: \"CHOL\"  Lab Results   Component Value Date    HDL 90 11/04/2022    HDL 81 11/04/2021    HDL 76 02/17/2021     Lab Results   Component Value Date    LDLCALC 36 11/04/2022    LDLCALC 53 11/04/2021    LDLCALC 54 02/17/2021     Lab Results   Component Value Date    TRIG 95 11/04/2022    TRIG 82 11/04/2021    TRIG 90 02/17/2021     Lab Results   Component Value Date    HGBA1C 6.4 10/11/2023     No results found for: \"TSH\"    Imaging & Testing   I have personally reviewed pertinent reports.      EKG: Personally reviewed.    Atrial fibrillation nonspecific st-t wave changes    Cardiac testing:   Results for orders placed during the hospital encounter of 12/04/18   Echo complete with contrast if indicated    Narrative [address] [phone]    Transthoracic Echocardiogram  2D, M-mode, Doppler, and Color Doppler    Study date:  " 04-Dec-2018    Patient: DAVID FREIRE  MR number: [id number]  Account number: [id number]  : 1940  Age: 78 years  Gender: Female  Status: Outpatient  Location: Echo lab  Height: 61 in  Weight: 225.5 lb  BP: 147/ 97 mmHg    Indications: Dyspnea    Diagnoses: 794.31 - ABNORM ELECTROCARDIOGRAM    Sonographer:  AGAPITO Nur  Primary Physician:  Melissa Michel DO  Referring Physician:  JAMES YOUSSEF  Group:  [facility]  Interpreting Physician:  Carlos Vasquez MD    SUMMARY    LEFT VENTRICLE:  Systolic function was at the lower limits of normal. Ejection fraction was estimated in the range of 50 % to 55 % to be 55 %.  There were no regional wall motion abnormalities.  Wall thickness was mildly increased.  Features were consistent with a pseudonormal left ventricular filling pattern, with concomitant abnormal relaxation and increased filling pressure (grade 2 diastolic dysfunction).    LEFT ATRIUM:  The atrium was mildly dilated.    RIGHT ATRIUM:  The atrium was mildly dilated.    AORTIC VALVE:  The valve was functionally bicuspid. Leaflets exhibited mildly to moderately increased thickness, mild to moderate calcification, mildly reduced cuspal separation, and sclerosis.  Transaortic velocity was increased due to valvular stenosis.  There was mild stenosis.  Valve mean gradient was 15 mmHg.    TRICUSPID VALVE:  There was trace regurgitation.  The findings suggest mild pulmonary hypertension.    HISTORY: PRIOR HISTORY: Diabetes, Edema,HTN,Hyperlipidemia,Gastroesophageal reflux disease    PROCEDURE: The procedure was performed in the echo lab. This was a routine study. The transthoracic approach was used. The study included complete 2D imaging, M-mode, complete spectral Doppler, and color Doppler. The heart rate was 67 bpm,  at the start of the study. Images were obtained from the parasternal, apical, subcostal, and suprasternal notch acoustic windows. Echocardiographic views were  limited due to restricted patient mobility, poor patient compliance, poor  acoustic window availability, decreased penetration, and lung interference. This was a technically difficult study.    LEFT VENTRICLE: Size was normal. Systolic function was at the lower limits of normal. Ejection fraction was estimated in the range of 50 % to 55 % to be 55 %. There were no regional wall motion abnormalities. Wall thickness was mildly  increased. No evidence of apical thrombus. DOPPLER: Features were consistent with a pseudonormal left ventricular filling pattern, with concomitant abnormal relaxation and increased filling pressure (grade 2 diastolic dysfunction).    RIGHT VENTRICLE: The size was normal. Systolic function was normal. Wall thickness was normal.    LEFT ATRIUM: The atrium was mildly dilated.    RIGHT ATRIUM: The atrium was mildly dilated.    MITRAL VALVE: Valve structure was normal. There was normal leaflet separation. DOPPLER: The transmitral velocity was within the normal range. There was no evidence for stenosis. There was no significant regurgitation.    AORTIC VALVE: The valve was functionally bicuspid. Leaflets exhibited mildly to moderately increased thickness, mild to moderate calcification, mildly reduced cuspal separation, and sclerosis. DOPPLER: Transaortic velocity was increased  due to valvular stenosis. There was mild stenosis. There was no significant regurgitation.    TRICUSPID VALVE: The valve structure was normal. There was normal leaflet separation. DOPPLER: The transtricuspid velocity was within the normal range. There was no evidence for stenosis. There was trace regurgitation. Estimated peak PA  pressure was 38 mmHg. The findings suggest mild pulmonary hypertension.    PULMONIC VALVE: Leaflets exhibited normal thickness, no calcification, and normal cuspal separation. DOPPLER: The transpulmonic velocity was within the normal range. There was no significant regurgitation.    PERICARDIUM:  "There was no pericardial effusion. The pericardium was normal in appearance.    AORTA: The root exhibited normal size.    SYSTEMIC VEINS: IVC: The inferior vena cava was normal in size.    MEASUREMENT TABLES    DOPPLER MEASUREMENTS  Aortic valve   (Reference normals)  Peak steve   250 cm/s   (--)  Peak gradient   26 mmHg   (--)  Mean gradient   15 mmHg   (--)    SYSTEM MEASUREMENT TABLES    2D mode  AoR Diam 2D: 3.2 cm  LA Diam (2D): 4.1 cm  LA/Ao (2D): 1.28  FS (2D Teich): 26.4 %  IVSd (2D): 1.31 cm  LVDEV: 72.1 cm³  LVEDV MOD BP: 102 cm³  LVESV: 34.4 cm³  LVIDd(2D): 4.05 cm  LVISd (2D): 2.98 cm  LVOT Area 2D: 3.14 cm squared  LVPWd (2D): 1.3 cm  SV (Teich): 37.7 cm³    Apical four chamber  LVEF A4C: 50 %    Apical two chamber  LVEF A2C: 52 %    Unspecified Scan Mode  DEVON Cont Eq (Peak Steve): 1.49 cm squared  LVOT Diam.: 2 cm  LVOT Vmax: 1210 mm/s  LVOT Vmax; Mean: 1210 mm/s  Peak Grad.; Mean: 6 mm Hg  MV Peak A Steve: 291 mm/s  MV Peak E Steve. Mean: 1060 mm/s  MVA (PHT): 6.29 cm squared  PHT: 35 ms  Max P mm Hg  V Max: 2390 mm/s  Vmax: 2880 mm/s  RA Area: 21.5 cm squared  RA Volume: 65.8 cm³  TAPSE: 1.9 cm    [employer] Accredited Echocardiography Laboratory    Prepared and electronically signed by    Carlos Vasquez MD  Signed 05-Dec-2018 11:11:08       AFLUTTER with controlled ventricular response    Carlos Vasquez MD [facility]  Please call with any questions or suggestions    Counseling :  A description of the counseling:   Goals and Barriers:  Patient's ability to self care:  Medication side effect reviewed with patient in detail and all their questions answered.    \"This note has been constructed using a voice recognition system.Therefore there may be syntax, spelling, and/or grammatical errors. Please call if you have any questions. \"    "

## 2024-08-28 DIAGNOSIS — I10 ESSENTIAL HYPERTENSION: ICD-10-CM

## 2024-08-28 RX ORDER — LISINOPRIL 10 MG/1
10 TABLET ORAL DAILY
Qty: 30 TABLET | Refills: 6 | Status: SHIPPED | OUTPATIENT
Start: 2024-08-28

## 2024-10-01 ENCOUNTER — TELEPHONE (OUTPATIENT)
Age: 84
End: 2024-10-01

## 2024-10-03 ENCOUNTER — TELEPHONE (OUTPATIENT)
Age: 84
End: 2024-10-03

## 2024-12-04 ENCOUNTER — NURSING HOME VISIT (OUTPATIENT)
Age: 84
End: 2024-12-04

## 2024-12-04 VITALS
DIASTOLIC BLOOD PRESSURE: 67 MMHG | SYSTOLIC BLOOD PRESSURE: 122 MMHG | BODY MASS INDEX: 42.89 KG/M2 | HEART RATE: 67 BPM | WEIGHT: 227 LBS | TEMPERATURE: 97.1 F | RESPIRATION RATE: 20 BRPM | OXYGEN SATURATION: 94 %

## 2024-12-04 DIAGNOSIS — J06.9 VIRAL URI WITH COUGH: Primary | ICD-10-CM

## 2024-12-04 PROCEDURE — 99308 SBSQ NF CARE LOW MDM 20: CPT | Performed by: FAMILY MEDICINE

## 2024-12-04 NOTE — PROGRESS NOTES
Name: Yessenia Iraheta      : 1940      MRN: [id number]  Encounter Provider: Glenda Pineda MD  Encounter Date: 2024   Encounter department: [facility]  :  Assessment & Plan  Viral URI with cough  Patient complains of cold like symptoms and persistent productive cough. No complaints of fever/chills/ n/v/d/ sob/ smoking history. No history of allergies/ asthma. No recent sick contact/ change in environment. No other complaints. Overall she reports symptoms are progressively improving. Lungs CTAB.    -Continue supportive measures and warm fluid intake  -Robitussin DM 20ml q4-6hr PRN for cough   -Monitor fevers and vitals              History of Present Illness     Patient complains of cold like symptoms and persistent productive cough. Overall she reports symptoms are progressively improving.  No fevers or chills noted.       Review of Systems   Constitutional:  Negative for chills and fever.   HENT:  Positive for congestion and rhinorrhea. Negative for trouble swallowing.    Respiratory:  Positive for cough. Negative for shortness of breath.    Cardiovascular:  Negative for chest pain.   Gastrointestinal:  Negative for abdominal pain, diarrhea, nausea and vomiting.          Objective   /67   Pulse 67   Temp (!) 97.1 °F (36.2 °C)   Resp 20   Wt 103 kg (227 lb)   SpO2 94%   BMI 42.89 kg/m²      Physical Exam  Vitals and nursing note reviewed.   Constitutional:       General: She is not in acute distress.     Appearance: She is well-developed. She is obese. She is not toxic-appearing.   HENT:      Head: Normocephalic and atraumatic.      Right Ear: External ear normal.      Left Ear: External ear normal.      Nose: Congestion and rhinorrhea present.      Mouth/Throat:      Pharynx: No oropharyngeal exudate or posterior oropharyngeal erythema.   Eyes:      General: No scleral icterus.        Right eye: No discharge.         Left eye: No discharge.       Conjunctiva/sclera: Conjunctivae normal.   Cardiovascular:      Rate and Rhythm: Normal rate.      Pulses: Normal pulses.   Pulmonary:      Effort: Pulmonary effort is normal. No respiratory distress.      Breath sounds: Normal breath sounds. No wheezing or rales.   Abdominal:      General: Abdomen is flat. Bowel sounds are normal. There is no distension.      Palpations: Abdomen is soft.      Tenderness: There is no abdominal tenderness. There is no guarding.   Musculoskeletal:         General: No swelling or tenderness.      Cervical back: Neck supple. No rigidity or tenderness.   Skin:     General: Skin is warm and dry.      Capillary Refill: Capillary refill takes less than 2 seconds.   Neurological:      General: No focal deficit present.      Mental Status: She is alert and oriented to person, place, and time. Mental status is at baseline.      Cranial Nerves: No cranial nerve deficit.      Motor: No weakness.      Gait: Gait normal.   Psychiatric:         Mood and Affect: Mood normal.         Thought Content: Thought content normal.         Judgment: Judgment normal.

## 2024-12-16 DIAGNOSIS — E11.40 TYPE 2 DIABETES MELLITUS WITH DIABETIC NEUROPATHY, WITHOUT LONG-TERM CURRENT USE OF INSULIN (HCC): ICD-10-CM

## 2024-12-16 DIAGNOSIS — I48.0 PAROXYSMAL ATRIAL FIBRILLATION (HCC): ICD-10-CM

## 2024-12-16 DIAGNOSIS — I51.89 GRADE II DIASTOLIC DYSFUNCTION: ICD-10-CM

## 2024-12-19 RX ORDER — METOPROLOL TARTRATE 25 MG/1
25 TABLET, FILM COATED ORAL DAILY
Qty: 30 TABLET | Refills: 6 | Status: SHIPPED | OUTPATIENT
Start: 2024-12-19

## 2025-01-17 ENCOUNTER — TELEPHONE (OUTPATIENT)
Age: 85
End: 2025-01-17

## 2025-01-17 NOTE — TELEPHONE ENCOUNTER
PT called to let us know she is in an assisted living , where the Doctors do all appts on facility.

## 2025-02-11 DIAGNOSIS — I51.89 GRADE II DIASTOLIC DYSFUNCTION: ICD-10-CM

## 2025-02-11 DIAGNOSIS — N18.31 STAGE 3A CHRONIC KIDNEY DISEASE (HCC): ICD-10-CM

## 2025-02-11 DIAGNOSIS — E11.40 TYPE 2 DIABETES MELLITUS WITH DIABETIC NEUROPATHY, WITHOUT LONG-TERM CURRENT USE OF INSULIN (HCC): ICD-10-CM

## 2025-02-12 RX ORDER — EMPAGLIFLOZIN 10 MG/1
TABLET, FILM COATED ORAL
Qty: 30 TABLET | Refills: 0 | Status: SHIPPED | OUTPATIENT
Start: 2025-02-12

## 2025-02-16 DIAGNOSIS — E78.2 MIXED HYPERLIPIDEMIA: Primary | ICD-10-CM

## 2025-02-17 NOTE — TELEPHONE ENCOUNTER
I called Tenisha, patient is in the [place] - was transferred to [place].   She said she will fax labs.

## 2025-02-23 RX ORDER — SIMVASTATIN 40 MG
TABLET ORAL
Qty: 30 TABLET | Refills: 6 | Status: SHIPPED | OUTPATIENT
Start: 2025-02-23 | End: 2025-03-03

## 2025-02-24 ENCOUNTER — TELEPHONE (OUTPATIENT)
Age: 85
End: 2025-02-24

## 2025-02-24 NOTE — TELEPHONE ENCOUNTER
Zeina Tabares Calling from [facility] calling because patient told her that she received a $200 bill from us and isnt sure why. Apologized and advised I would do some research to see what happened and will call patient directly (her room telephone# is [phone])

## 2025-03-03 ENCOUNTER — NURSING HOME VISIT (OUTPATIENT)
Dept: GERIATRICS | Facility: OTHER | Age: 85
End: 2025-03-03

## 2025-03-03 VITALS
DIASTOLIC BLOOD PRESSURE: 74 MMHG | SYSTOLIC BLOOD PRESSURE: 114 MMHG | TEMPERATURE: 97.3 F | OXYGEN SATURATION: 97 % | HEART RATE: 54 BPM | RESPIRATION RATE: 20 BRPM

## 2025-03-03 DIAGNOSIS — I48.20 CHRONIC ATRIAL FIBRILLATION (HCC): ICD-10-CM

## 2025-03-03 DIAGNOSIS — E11.40 TYPE 2 DIABETES MELLITUS WITH DIABETIC NEUROPATHY, WITHOUT LONG-TERM CURRENT USE OF INSULIN (HCC): ICD-10-CM

## 2025-03-03 DIAGNOSIS — I51.89 GRADE II DIASTOLIC DYSFUNCTION: ICD-10-CM

## 2025-03-03 DIAGNOSIS — I87.2 VENOUS STASIS DERMATITIS OF BOTH LOWER EXTREMITIES: ICD-10-CM

## 2025-03-03 DIAGNOSIS — I10 PRIMARY HYPERTENSION: Primary | ICD-10-CM

## 2025-03-03 NOTE — PROGRESS NOTES
Name: Yessenia Iraheta      : 1940      MRN: [id number]  Encounter Provider: Julieta Gaffney MD  Encounter Date: 3/3/2025   Encounter department: [facility]  :  Assessment & Plan  Primary hypertension  Chronic, stable    Continue current regimen lisinopril 10 mg daily and metoprolol 25 mg daily  Monitor blood pressure       Chronic atrial fibrillation (HCC)  Chronic, stable.  Heart rate well-controlled.  Denies chest pain and palpitations    Continue Eliquis 5 mg daily  Continue metoprolol 25 mg daily       Venous stasis dermatitis of both lower extremities  History of chronic venous stasis dermatitis.  Physical exam unremarkable for lower extremity swelling or edema.    Continue torsemide 20 mg every other day.  Monitor BMP every 3 to 6 months.       Type 2 diabetes mellitus with diabetic neuropathy, without long-term current use of insulin ([facility])    Lab Results   Component Value Date    HGBA1C 6.4 10/11/2023     Chronic, stable.  HbA1c at goal.  1/3/2025 - HbA1c 7.1    Continue metformin 500 mg twice daily  Continue Jardiance 10 mg daily  Diabetic diet  Monitor for signs of hypoglycemia        Grade II diastolic dysfunction  History of grade 2 diastolic dysfunction.  No signs of volume overload    Continue Demadex 20 mg every other day           History of Present Illness   HPI  Yessenia Iraheta is a 84 y.o. female who was seen at [facility].  Patient was sitting comfortably in chair in her room.  Denies any questions or concerns at this time. Notes lower extremity swelling has improved significantly.  Patient states she has does not have knee pain since her knee injection. Notes regular bowel movements with Miralax. Last BM yesterday.       Review of Systems   Constitutional:  Negative for appetite change, chills and fever.   Respiratory:  Negative for cough, chest tightness and shortness of breath.    Cardiovascular:  Negative for chest pain, palpitations and leg  swelling.   Gastrointestinal:  Negative for abdominal pain, nausea and vomiting.   Genitourinary:  Negative for dysuria.   Musculoskeletal:  Negative for arthralgias, back pain and myalgias.   Skin:  Negative for rash.   Neurological:  Negative for weakness, light-headedness and headaches.          Objective   /74   Pulse (!) 54   Temp (!) 97.3 °F (36.3 °C)   Resp 20   SpO2 97%      Physical Exam  Constitutional:       Appearance: Normal appearance.   HENT:      Head: Normocephalic and atraumatic.   Cardiovascular:      Rate and Rhythm: Normal rate and regular rhythm.      Pulses: Normal pulses.      Heart sounds: Murmur heard.   Pulmonary:      Effort: Pulmonary effort is normal. No respiratory distress.      Breath sounds: Normal breath sounds. No wheezing.   Abdominal:      General: Abdomen is flat. There is no distension.      Palpations: Abdomen is soft.   Musculoskeletal:         General: No swelling or tenderness.      Right lower leg: No edema.      Left lower leg: No edema.   Skin:     Comments: Stasis dermatitis of BL LE   Neurological:      General: No focal deficit present.      Mental Status: She is alert and oriented to person, place, and time.   Psychiatric:         Mood and Affect: Mood normal.

## 2025-03-03 NOTE — ASSESSMENT & PLAN NOTE
Lab Results   Component Value Date    HGBA1C 6.4 10/11/2023     Chronic, stable.  HbA1c at goal.  1/3/2025 - HbA1c 7.1    Continue metformin 500 mg twice daily  Continue Jardiance 10 mg daily  Diabetic diet  Monitor for signs of hypoglycemia

## 2025-03-03 NOTE — ASSESSMENT & PLAN NOTE
Chronic, stable.  Heart rate well-controlled.  Denies chest pain and palpitations    Continue Eliquis 5 mg daily  Continue metoprolol 25 mg daily

## 2025-03-03 NOTE — ASSESSMENT & PLAN NOTE
History of grade 2 diastolic dysfunction.  No signs of volume overload    Continue Demadex 20 mg every other day

## 2025-03-03 NOTE — ASSESSMENT & PLAN NOTE
History of chronic venous stasis dermatitis.  Physical exam unremarkable for lower extremity swelling or edema.    Continue torsemide 20 mg every other day.  Monitor BMP every 3 to 6 months.

## 2025-03-03 NOTE — ASSESSMENT & PLAN NOTE
Chronic, stable    Continue current regimen lisinopril 10 mg daily and metoprolol 25 mg daily  Monitor blood pressure

## 2025-03-05 ENCOUNTER — OFFICE VISIT (OUTPATIENT)
Dept: CARDIOLOGY CLINIC | Facility: CLINIC | Age: 85
End: 2025-03-05
Payer: MEDICARE

## 2025-03-05 VITALS
HEIGHT: 61 IN | DIASTOLIC BLOOD PRESSURE: 64 MMHG | BODY MASS INDEX: 40.97 KG/M2 | OXYGEN SATURATION: 98 % | SYSTOLIC BLOOD PRESSURE: 122 MMHG | HEART RATE: 62 BPM | WEIGHT: 217 LBS

## 2025-03-05 DIAGNOSIS — E78.2 MIXED HYPERLIPIDEMIA: ICD-10-CM

## 2025-03-05 DIAGNOSIS — N18.31 STAGE 3A CHRONIC KIDNEY DISEASE (HCC): ICD-10-CM

## 2025-03-05 DIAGNOSIS — I48.20 CHRONIC ATRIAL FIBRILLATION (HCC): Primary | ICD-10-CM

## 2025-03-05 DIAGNOSIS — E11.40 TYPE 2 DIABETES MELLITUS WITH DIABETIC NEUROPATHY, WITHOUT LONG-TERM CURRENT USE OF INSULIN (HCC): ICD-10-CM

## 2025-03-05 DIAGNOSIS — I10 PRIMARY HYPERTENSION: ICD-10-CM

## 2025-03-05 DIAGNOSIS — R60.0 EDEMA OF LEFT LOWER EXTREMITY: ICD-10-CM

## 2025-03-05 DIAGNOSIS — I51.89 GRADE II DIASTOLIC DYSFUNCTION: ICD-10-CM

## 2025-03-05 PROCEDURE — 93000 ELECTROCARDIOGRAM COMPLETE: CPT | Performed by: INTERNAL MEDICINE

## 2025-03-05 PROCEDURE — 99214 OFFICE O/P EST MOD 30 MIN: CPT | Performed by: INTERNAL MEDICINE

## 2025-03-05 NOTE — PROGRESS NOTES
Cardiology Outpatient Follow-up                                                          Yessenia Iraheta  [id number]  1940      1. Chronic atrial fibrillation (HCC)  POCT ECG      2. Grade II diastolic dysfunction  POCT ECG      3. Primary hypertension  POCT ECG          Discussion/Summary:  Acute on chronic diastolic hf- creatinine higher.torsemide to every other day. Jardiance 10mg daily-unclear why it was previously discontinued.  She will continue on metoprolol 25 mg in morning. Recommend periodic monitoring of electrolytes at least every 6 months She needs to be on a low-sodium diet. Diabetic compression sock during the day 15-20mHg    Afib/flutter- . Rate controlled. metoprolol 25mg in the morning.  She is currently on Eliquis 5 mg twice a day.  If her creatinine remains above 1.5 we will need to reduce her Eliquis to 2.5 mg no further falls. No dizziness or light-headness.     GERD- nexium 40mg daily    Htn- controlled. Lisinopril + netiorikik    LPa- LDL<70. Currently on atorvastatin 40mg    Dm2- atorvastatin.     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  She also has a history of diastolic dysfunction and lower extremity edema chronic.  She states being compliant with her diuretic but with poor response.  She has been of plan with her medications.  She denies having chest heaviness.  She denies having any history of prior CVA.  She denies having any major bleeding episodes.    07/24/2019:  Her lower extremity edema is significantly improved.  Her blood pressures been well controlled.  Her heart rates have been controlled.  She denies having any further falls.  We reviewed through her last blood work.  She denies feeling dizziness or lightheadedness.    10/23/2019:  Her weight has been  trending down.  She denies having recurrence of lower extremity edema.  She is in chronic atrial fibrillation and rate controlled.  She denies feeling dizziness or lightheadedness.  There has been no falls.  She has no trouble with Eliquis therapy.  There has been no major bleeding.    08/25/2020:  She denies feeling dizziness or lightheadedness.  Her weight has been stable.  Her lower extremity swelling has been well controlled.  She is compliant with diuretic.  We reviewed her last blood work.  Her potassium was mildly higher.  We will discontinue potassium supplementation.    02/24/2021:  She has received her COVID vaccine.  Her blood pressures have been under control.  She was noted to have a heart rate in the 50s.  She denies feeling dizziness or lightheadedness.  She is compliant with her medications.  She feels some leg cramps if she does not take potassium.    04/29/2021:  Her heart rates are and the 50 to 60s.  She denies having any dizziness or lightheadedness.  She denies having chest heaviness.  She is compliant with her medications.  She denies having any major swelling.  She received her COVID vaccine.    10/28:  She hit her head. No chest pain. Compliant with meds    04/15/2022:  She is using the diuretic for lower extremity swelling.  She denies having dizziness or lightheadedness.      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  She denies having dizziness or lightheadedness.  She denies having major palpitations.  She denies having recent falls.  We reviewed her EKG.  She is now in rate controlled atrial flutter.  She denies having major swelling.    4/13/2023: She has had increased lower extremity swelling.  She denies having dizziness or lightheadedness.  She has significant osteoarthritis.    11/9/2023: She is currently at a assisted living.  She has had some lower extremity swelling.  She denies having chest heaviness.  Her A-fib is well controlled.  She is compliant  with her medications.  She was noted to have elevated kidney function on repeat blood work.      8/27/2024: She is at assisted living facility.  She currently denies having chest pain.  Denies major palpitations.  She is compliant with her medications.  Her lower extremity swelling has been controlled    3/5/2025: She is compliant with her medications.  Denies major palpitations.  Denies major shortness of breath.  Her lab work reviewed.  Her blood counts have been stable.  Your kidney function has fluctuated in the 1.2-1.4 range.  Her potassium and sodium are normal.  She reports having some lower extremity swelling.  She had a skin breakdown on her lower extremity which was treated.  She denies major palpitations.    Past Medical History:   Diagnosis Date    Arthritis     knees    Diabetes mellitus (HCC)     type 2    GERD (gastroesophageal reflux disease)     Hyperlipidemia     Hypertension     Hypokalemia 03/20/2017    Stage 3a chronic kidney disease (HCC) 04/14/2022    Urinary incontinence     Use of cane as ambulatory aid     Wears glasses      Social History     Socioeconomic History    Marital status: Single     Spouse name: Not on file    Number of children: Not on file    Years of education: Not on file    Highest education level: Not on file   Occupational History    Not on file   Tobacco Use    Smoking status: Never    Smokeless tobacco: Never   Vaping Use    Vaping status: Never Used   Substance and Sexual Activity    Alcohol use: Yes     Comment: social    Drug use: No    Sexual activity: Not on file   Other Topics Concern    Not on file   Social History Narrative    Not on file     Social Drivers of Health     Financial Resource Strain: Not on file   Food Insecurity: Not on file   Transportation Needs: Not on file   Physical Activity: Not on file   Stress: Not on file   Social Connections: Not on file   Intimate Partner Violence: Not on file   Housing Stability: Not on file      Family History    Problem Relation Age of Onset    Breast cancer Mother 79    Diabetes type II Mother     Atrial fibrillation Mother     Diabetes Mother         diet controlled    Hypertension Mother     Lung cancer Father     Alcohol abuse Father     Cancer Father         lung-smoker     Past Surgical History:   Procedure Laterality Date    BREAST BIOPSY Left 2015    benign    BREAST SURGERY Left     nothing was there     COLONOSCOPY      HYSTERECTOMY      complete-fibroid    JOINT REPLACEMENT Left     knee    CT XCAPSL CTRC RMVL INSJ IO LENS PROSTH W/O ECP Right 07/19/2018    Procedure: EXTRACTION EXTRACAPSULAR CATARACT PHACO INTRAOCULAR LENS (IOL);  Surgeon: Chary Oliver MD;  Location: [facility] MAIN OR;  Service: Ophthalmology    CT XCAPSL CTRC RMVL INSJ IO LENS PROSTH W/O ECP Left 08/09/2018    Procedure: EXTRACTION EXTRACAPSULAR CATARACT PHACO INTRAOCULAR LENS (IOL);  Surgeon: Chary Oliver MD;  Location: [facility] MAIN OR;  Service: Ophthalmology       Current Outpatient Medications:     acetaminophen (TYLENOL) 650 mg CR tablet, Take 1 tablet (650 mg total) by mouth every 8 (eight) hours as needed for mild pain, Disp: 30 tablet, Rfl: 0    ammonium lactate (LAC-HYDRIN) 12 % cream, Apply topically 2 (two) times a day, Disp: , Rfl:     atorvastatin (LIPITOR) 40 mg tablet, Take 1 tablet (40 mg total) by mouth daily, Disp: 90 tablet, Rfl: 1    cetirizine (ZyrTEC) 10 mg tablet, Take 10 mg by mouth daily, Disp: , Rfl:     CINNAMON PO, Take 1,000 mg by mouth every morning, Disp: , Rfl:     cyclobenzaprine (FLEXERIL) 5 mg tablet, Take 5 mg by mouth 2 (two) times a day as needed, Disp: , Rfl:     Eliquis 5 MG, TAKE ONE TABLET  TWO TIMES A DAY, Disp: 180 tablet, Rfl: 3    Empagliflozin (Jardiance) 10 MG TABS tablet, ONE TAB BY MOUTH DAILY IN THE MORNING, Disp: 30 tablet, Rfl: 0    lisinopril (ZESTRIL) 10 mg tablet, ONE TAB DAILY, Disp: 30 tablet, Rfl: 6    metFORMIN (GLUCOPHAGE) 500 mg tablet, Take 500 mg by mouth 2 (two) times a day, Disp:  ", Rfl:     metoprolol tartrate (LOPRESSOR) 25 mg tablet, TAKE 1 TABLET BY MOUTH DAILY, Disp: 30 tablet, Rfl: 6    Multiple Vitamins-Minerals (PRESERVISION AREDS PO), Take by mouth, Disp: , Rfl:     torsemide (DEMADEX) 20 mg tablet, Take 1 tablet (20 mg total) by mouth every other day Take one tablet qnl-iin-[id number] morning, Disp: 45 tablet, Rfl: 3  Allergies   Allergen Reactions    Other Allergic Rhinitis     Seasonal/pollen     Vitals:    03/05/25 1640   BP: 122/64   BP Location: Right arm   Patient Position: Sitting   Cuff Size: Large   Pulse: 62   SpO2: 98%   Weight: 98.4 kg (217 lb)   Height: 5' 1\" (1.549 m)       Review of Systems:  Review of Systems   Constitutional: Negative.  Negative for activity change, appetite change, chills, diaphoresis, fatigue, fever and unexpected weight change.   HENT: Negative.  Negative for congestion, dental problem, drooling, ear discharge, ear pain, facial swelling, hearing loss, mouth sores, nosebleeds, postnasal drip, rhinorrhea, sinus pressure, sinus pain, sneezing, sore throat, tinnitus, trouble swallowing and voice change.    Eyes: Negative.  Negative for photophobia, pain, redness, itching and visual disturbance.   Respiratory:  Positive for shortness of breath. Negative for apnea, cough, choking, chest tightness, wheezing and stridor.    Cardiovascular:  Positive for leg swelling. Negative for chest pain and palpitations.   Gastrointestinal: Negative.  Negative for abdominal distention, abdominal pain, anal bleeding, blood in stool, constipation, diarrhea, nausea, rectal pain and vomiting.   Endocrine: Negative.  Negative for cold intolerance, heat intolerance, polydipsia, polyphagia and polyuria.   Genitourinary: Negative.  Negative for decreased urine volume, difficulty urinating, dyspareunia, dysuria, enuresis, flank pain, frequency, genital sores, hematuria, menstrual problem, pelvic pain, urgency, vaginal bleeding, vaginal discharge and vaginal pain. " "  Musculoskeletal: Negative.  Negative for arthralgias, back pain, gait problem, joint swelling, myalgias, neck pain and neck stiffness.   Skin: Negative.  Negative for color change, pallor, rash and wound.   Allergic/Immunologic: Negative.  Negative for environmental allergies, food allergies and immunocompromised state.   Neurological: Negative.  Negative for dizziness, tremors, seizures, syncope, facial asymmetry, speech difficulty, weakness, light-headedness, numbness and headaches.   Hematological: Negative.  Negative for adenopathy. Does not bruise/bleed easily.   Psychiatric/Behavioral: Negative.  Negative for agitation, behavioral problems, confusion, decreased concentration, dysphoric mood, hallucinations, self-injury, sleep disturbance and suicidal ideas. The patient is not nervous/anxious and is not hyperactive.    All other systems reviewed and are negative.      Vitals:    03/05/25 1640   BP: 122/64   BP Location: Right arm   Patient Position: Sitting   Cuff Size: Large   Pulse: 62   SpO2: 98%   Weight: 98.4 kg (217 lb)   Height: 5' 1\" (1.549 m)     Physical Exam:  Physical Exam  Constitutional:       General: She is not in acute distress.     Appearance: She is well-developed. She is not diaphoretic.   HENT:      Head: Normocephalic and atraumatic.      Right Ear: External ear normal.      Left Ear: External ear normal.   Eyes:      General: No scleral icterus.        Right eye: No discharge.         Left eye: No discharge.      Conjunctiva/sclera: Conjunctivae normal.      Pupils: Pupils are equal, round, and reactive to light.   Neck:      Thyroid: No thyromegaly.      Vascular: JVD present.      Trachea: No tracheal deviation.   Cardiovascular:      Rate and Rhythm: Normal rate and regular rhythm.      Heart sounds: Murmur heard.      No friction rub. No gallop.   Pulmonary:      Effort: Pulmonary effort is normal. No respiratory distress.      Breath sounds: Normal breath sounds. No stridor. No " "wheezing or rales.   Chest:      Chest wall: No tenderness.   Abdominal:      General: Bowel sounds are normal. There is distension.      Palpations: Abdomen is soft. There is no mass.      Tenderness: There is no abdominal tenderness. There is no guarding or rebound.   Musculoskeletal:         General: Swelling present. No tenderness or deformity. Normal range of motion.      Cervical back: Normal range of motion and neck supple.   Skin:     General: Skin is warm and dry.      Coloration: Skin is not pale.      Findings: Erythema present. No rash.   Neurological:      Mental Status: She is alert and oriented to person, place, and time.      Cranial Nerves: No cranial nerve deficit.      Motor: No abnormal muscle tone.      Coordination: Coordination normal.      Deep Tendon Reflexes: Reflexes are normal and symmetric. Reflexes normal.   Psychiatric:         Behavior: Behavior normal.         Thought Content: Thought content normal.         Judgment: Judgment normal.         Labs:     Lab Results   Component Value Date    WBC 6.38 11/04/2022    HGB 11.0 (L) 11/04/2022    HCT 37.4 11/04/2022    MCV 82 11/04/2022     11/04/2022     Lab Results   Component Value Date    K 4.3 05/10/2023     05/10/2023    CO2 23 05/10/2023    BUN 30 (H) 05/10/2023    CREATININE 1.26 05/10/2023    GLUF 118 (H) 05/10/2023    CALCIUM 9.3 05/10/2023    CORRECTEDCA 10.1 11/04/2022    AST 14 11/04/2022    ALT 24 11/04/2022    ALKPHOS 84 11/04/2022    EGFR 39 05/10/2023     No results found for: \"CHOL\"  Lab Results   Component Value Date    HDL 90 11/04/2022    HDL 81 11/04/2021    HDL 76 02/17/2021     Lab Results   Component Value Date    LDLCALC 36 11/04/2022    LDLCALC 53 11/04/2021    LDLCALC 54 02/17/2021     Lab Results   Component Value Date    TRIG 95 11/04/2022    TRIG 82 11/04/2021    TRIG 90 02/17/2021     Lab Results   Component Value Date    HGBA1C 6.4 10/11/2023     No results found for: \"TSH\"    Imaging & Testing "   I have personally reviewed pertinent reports.      EKG: Personally reviewed.    Atrial fibrillation nonspecific st-t wave changes    Cardiac testing:   Results for orders placed during the hospital encounter of 18   Echo complete with contrast if indicated    Narrative [address] [address]  [phone]    Transthoracic Echocardiogram  2D, M-mode, Doppler, and Color Doppler    Study date:  04-Dec-2018    Patient: DAVID FREIRE  MR number: [id number]  Account number: [id number]  : 1940  Age: 78 years  Gender: Female  Status: Outpatient  Location: Echo lab  Height: 61 in  Weight: 225.5 lb  BP: 147/ 97 mmHg    Indications: Dyspnea    Diagnoses: 794.31 - ABNORM ELECTROCARDIOGRAM    Sonographer:  AGAPITO Nur  Primary Physician:  Melissa Michel DO  Referring Physician:  JAMES YOUSSEF  Group:  [facility]  Interpreting Physician:  Carlos Vasquez MD    SUMMARY    LEFT VENTRICLE:  Systolic function was at the lower limits of normal. Ejection fraction was estimated in the range of 50 % to 55 % to be 55 %.  There were no regional wall motion abnormalities.  Wall thickness was mildly increased.  Features were consistent with a pseudonormal left ventricular filling pattern, with concomitant abnormal relaxation and increased filling pressure (grade 2 diastolic dysfunction).    LEFT ATRIUM:  The atrium was mildly dilated.    RIGHT ATRIUM:  The atrium was mildly dilated.    AORTIC VALVE:  The valve was functionally bicuspid. Leaflets exhibited mildly to moderately increased thickness, mild to moderate calcification, mildly reduced cuspal separation, and sclerosis.  Transaortic velocity was increased due to valvular stenosis.  There was mild stenosis.  Valve mean gradient was 15 mmHg.    TRICUSPID VALVE:  There was trace regurgitation.  The findings suggest mild pulmonary hypertension.    HISTORY: PRIOR HISTORY: Diabetes,  Edema,HTN,Hyperlipidemia,Gastroesophageal reflux disease    PROCEDURE: The procedure was performed in the echo lab. This was a routine study. The transthoracic approach was used. The study included complete 2D imaging, M-mode, complete spectral Doppler, and color Doppler. The heart rate was 67 bpm,  at the start of the study. Images were obtained from the parasternal, apical, subcostal, and suprasternal notch acoustic windows. Echocardiographic views were limited due to restricted patient mobility, poor patient compliance, poor  acoustic window availability, decreased penetration, and lung interference. This was a technically difficult study.    LEFT VENTRICLE: Size was normal. Systolic function was at the lower limits of normal. Ejection fraction was estimated in the range of 50 % to 55 % to be 55 %. There were no regional wall motion abnormalities. Wall thickness was mildly  increased. No evidence of apical thrombus. DOPPLER: Features were consistent with a pseudonormal left ventricular filling pattern, with concomitant abnormal relaxation and increased filling pressure (grade 2 diastolic dysfunction).    RIGHT VENTRICLE: The size was normal. Systolic function was normal. Wall thickness was normal.    LEFT ATRIUM: The atrium was mildly dilated.    RIGHT ATRIUM: The atrium was mildly dilated.    MITRAL VALVE: Valve structure was normal. There was normal leaflet separation. DOPPLER: The transmitral velocity was within the normal range. There was no evidence for stenosis. There was no significant regurgitation.    AORTIC VALVE: The valve was functionally bicuspid. Leaflets exhibited mildly to moderately increased thickness, mild to moderate calcification, mildly reduced cuspal separation, and sclerosis. DOPPLER: Transaortic velocity was increased  due to valvular stenosis. There was mild stenosis. There was no significant regurgitation.    TRICUSPID VALVE: The valve structure was normal. There was normal leaflet  separation. DOPPLER: The transtricuspid velocity was within the normal range. There was no evidence for stenosis. There was trace regurgitation. Estimated peak PA  pressure was 38 mmHg. The findings suggest mild pulmonary hypertension.    PULMONIC VALVE: Leaflets exhibited normal thickness, no calcification, and normal cuspal separation. DOPPLER: The transpulmonic velocity was within the normal range. There was no significant regurgitation.    PERICARDIUM: There was no pericardial effusion. The pericardium was normal in appearance.    AORTA: The root exhibited normal size.    SYSTEMIC VEINS: IVC: The inferior vena cava was normal in size.    MEASUREMENT TABLES    DOPPLER MEASUREMENTS  Aortic valve   (Reference normals)  Peak steve   250 cm/s   (--)  Peak gradient   26 mmHg   (--)  Mean gradient   15 mmHg   (--)    SYSTEM MEASUREMENT TABLES    2D mode  AoR Diam 2D: 3.2 cm  LA Diam (2D): 4.1 cm  LA/Ao (2D): 1.28  FS (2D Teich): 26.4 %  IVSd (2D): 1.31 cm  LVDEV: 72.1 cm³  LVEDV MOD BP: 102 cm³  LVESV: 34.4 cm³  LVIDd(2D): 4.05 cm  LVISd (2D): 2.98 cm  LVOT Area 2D: 3.14 cm squared  LVPWd (2D): 1.3 cm  SV (Teich): 37.7 cm³    Apical four chamber  LVEF A4C: 50 %    Apical two chamber  LVEF A2C: 52 %    Unspecified Scan Mode  DEVON Cont Eq (Peak Steve): 1.49 cm squared  LVOT Diam.: 2 cm  LVOT Vmax: 1210 mm/s  LVOT Vmax; Mean: 1210 mm/s  Peak Grad.; Mean: 6 mm Hg  MV Peak A Steve: 291 mm/s  MV Peak E Steve. Mean: 1060 mm/s  MVA (PHT): 6.29 cm squared  PHT: 35 ms  Max P mm Hg  V Max: 2390 mm/s  Vmax: 2880 mm/s  RA Area: 21.5 cm squared  RA Volume: 65.8 cm³  TAPSE: 1.9 cm    [employer] Accredited Echocardiography Laboratory    Prepared and electronically signed by    Carlos Vasquez MD  Signed 05-Dec-2018 11:11:08       AFLUTTER with controlled ventricular response    Carlos Vasquez MD [facility]  Please call with any questions or suggestions    Counseling :  A description of the counseling:   Goals and  "Barriers:  Patient's ability to self care:  Medication side effect reviewed with patient in detail and all their questions answered.    \"This note has been constructed using a voice recognition system.Therefore there may be syntax, spelling, and/or grammatical errors. Please call if you have any questions. \"    "

## 2025-03-18 RX ORDER — POLYETHYLENE GLYCOL 3350 17 G/17G
POWDER ORAL
Qty: 510 G | Refills: 6 | OUTPATIENT
Start: 2025-03-18

## 2025-03-25 ENCOUNTER — TELEPHONE (OUTPATIENT)
Age: 85
End: 2025-03-25

## 2025-03-25 NOTE — TELEPHONE ENCOUNTER
Hi Dr. Gaffney -    Imaging called and asked if you could co sign the order for the Mammogram screening bilateral. Patient scheduled an appointment prior to you issuing the order.    Please advise.    Thank you.

## 2025-04-02 ENCOUNTER — TELEPHONE (OUTPATIENT)
Age: 85
End: 2025-04-02

## 2025-04-02 DIAGNOSIS — Z12.31 ENCOUNTER FOR SCREENING MAMMOGRAM FOR BREAST CANCER: Primary | ICD-10-CM

## 2025-04-02 NOTE — TELEPHONE ENCOUNTER
Hi Dr. Gulshan Guzman imaging called about this patients mammo, stating that you need to co-sign the order. I see that you signed this one too, not sure why they are saying this?  Please advise.    Thanks!

## 2025-04-04 ENCOUNTER — NURSING HOME VISIT (OUTPATIENT)
Age: 85
End: 2025-04-04

## 2025-04-04 VITALS
TEMPERATURE: 97.3 F | SYSTOLIC BLOOD PRESSURE: 116 MMHG | RESPIRATION RATE: 19 BRPM | HEART RATE: 54 BPM | DIASTOLIC BLOOD PRESSURE: 70 MMHG

## 2025-04-04 DIAGNOSIS — I48.20 CHRONIC ATRIAL FIBRILLATION (HCC): ICD-10-CM

## 2025-04-04 DIAGNOSIS — R60.0 LOCALIZED EDEMA: ICD-10-CM

## 2025-04-04 DIAGNOSIS — I87.2 VENOUS STASIS ULCER OF OTHER PART OF LEFT LOWER LEG WITH OTHER ULCER SEVERITY WITHOUT VARICOSE VEINS (HCC): ICD-10-CM

## 2025-04-04 DIAGNOSIS — E11.40 TYPE 2 DIABETES MELLITUS WITH DIABETIC NEUROPATHY, WITHOUT LONG-TERM CURRENT USE OF INSULIN (HCC): ICD-10-CM

## 2025-04-04 DIAGNOSIS — L97.828 VENOUS STASIS ULCER OF OTHER PART OF LEFT LOWER LEG WITH OTHER ULCER SEVERITY WITHOUT VARICOSE VEINS (HCC): ICD-10-CM

## 2025-04-04 DIAGNOSIS — G89.29 CHRONIC LEFT SHOULDER PAIN: ICD-10-CM

## 2025-04-04 DIAGNOSIS — M25.512 CHRONIC LEFT SHOULDER PAIN: ICD-10-CM

## 2025-04-04 DIAGNOSIS — J98.8 VIRAL RESPIRATORY INFECTION: Primary | ICD-10-CM

## 2025-04-04 DIAGNOSIS — I10 PRIMARY HYPERTENSION: ICD-10-CM

## 2025-04-04 DIAGNOSIS — B97.89 VIRAL RESPIRATORY INFECTION: Primary | ICD-10-CM

## 2025-04-04 PROBLEM — I83.009 STASIS ULCER (HCC): Status: ACTIVE | Noted: 2025-04-04

## 2025-04-04 PROBLEM — L97.909 STASIS ULCER (HCC): Status: ACTIVE | Noted: 2025-04-04

## 2025-04-04 PROCEDURE — 99309 SBSQ NF CARE MODERATE MDM 30: CPT | Performed by: NURSE PRACTITIONER

## 2025-04-04 RX ORDER — FUROSEMIDE 20 MG/1
20 TABLET ORAL EVERY OTHER DAY
COMMUNITY

## 2025-04-04 NOTE — PROGRESS NOTES
:  Assessment & Plan  Viral respiratory infection  Viral panel pending  Continue supportive measures       Venous stasis ulcer of other part of left lower leg with other ulcer severity without varicose veins (HCC)  Currently cleansing and applying antibiotic ointment and mepilex to wound  Consult for VN for wound care         Chronic left shoulder pain  Continue moist heat to affected shoulder  Continue tylenol for pain       Primary hypertension  Chronic, stable    Continue current regimen lisinopril 10 mg daily and metoprolol 25 mg daily  Monitor blood pressure             Chronic atrial fibrillation (HCC)  Chronic, stable.  Heart rate well-controlled.  Denies chest pain and palpitations    Continue Eliquis 5 mg daily  Continue metoprolol 25 mg daily             Type 2 diabetes mellitus with diabetic neuropathy, without long-term current use of insulin (HCC)  Chronic, stable  Continue jardiance and metformin  Lab Results   Component Value Date    HGBA1C 6.4 10/11/2023            Localized edema  Bilateral edema of both LE with increase in size  Increase lasix to 20mg po daily           History of Present Illness     Yessenia Iraheta is a 84 y.o. female   Patient seen and examined at bedside today at [facility].    Complaint of cough and wound of LE.  Patient treated previously for viral URI.  Continues to exhibit cough and fatigue.  Patient has had contact with roommate positive for covid but tested negative.  Increase in lower extremity edema noted.        Review of Systems   Constitutional:  Positive for fatigue.   HENT: Negative.     Eyes: Negative.    Respiratory:  Positive for cough.    Cardiovascular:  Positive for leg swelling.   Gastrointestinal: Negative.    Endocrine: Negative.    Genitourinary: Negative.    Musculoskeletal:  Positive for arthralgias.   Skin:  Positive for wound.   Allergic/Immunologic: Negative.    Neurological: Negative.    Hematological: Negative.     Psychiatric/Behavioral: Negative.       Objective   There were no vitals taken for this visit.     Physical Exam  Constitutional:       General: She is not in acute distress.     Appearance: She is obese. She is not ill-appearing, toxic-appearing or diaphoretic.   HENT:      Head: Normocephalic and atraumatic.      Right Ear: External ear normal.      Left Ear: External ear normal.      Nose: Nose normal. No congestion.      Mouth/Throat:      Mouth: Mucous membranes are moist.      Pharynx: Oropharynx is clear.   Eyes:      General:         Right eye: No discharge.         Left eye: Discharge present.     Conjunctiva/sclera: Conjunctivae normal.   Cardiovascular:      Rate and Rhythm: Normal rate and regular rhythm.      Pulses: Normal pulses.      Heart sounds: Normal heart sounds.   Pulmonary:      Effort: Pulmonary effort is normal. No respiratory distress.      Breath sounds: Normal breath sounds. No wheezing, rhonchi or rales.      Comments: Dry cough noted  Abdominal:      General: Bowel sounds are normal.      Palpations: Abdomen is soft.      Tenderness: There is no abdominal tenderness. There is no guarding.   Musculoskeletal:         General: Normal range of motion.      Cervical back: Normal range of motion. No rigidity.      Right lower leg: Edema present.      Left lower leg: Edema present.      Comments: C/o pain to left shoulder   Lymphadenopathy:      Cervical: No cervical adenopathy.   Skin:     General: Skin is warm.      Findings: No rash.      Comments: Venous stasis ulcer of inner left leg   Neurological:      Mental Status: She is alert and oriented to person, place, and time. Mental status is at baseline.      Gait: Gait abnormal.      Comments: Walker baseline   Psychiatric:         Mood and Affect: Mood normal.         Behavior: Behavior normal.         Thought Content: Thought content normal.         Judgment: Judgment normal.

## 2025-04-04 NOTE — ASSESSMENT & PLAN NOTE
Currently cleansing and applying antibiotic ointment and mepilex to wound  Consult for VN for wound care

## 2025-04-04 NOTE — ASSESSMENT & PLAN NOTE
Chronic, stable  Continue jardiance and metformin  Lab Results   Component Value Date    HGBA1C 6.4 10/11/2023

## 2025-04-09 ENCOUNTER — PATIENT OUTREACH (OUTPATIENT)
Age: 85
End: 2025-04-09

## 2025-04-09 DIAGNOSIS — I87.2 STASIS ULCER DUE TO TYPE 2 DIABETES MELLITUS (HCC): Primary | ICD-10-CM

## 2025-04-09 DIAGNOSIS — E11.59 STASIS ULCER DUE TO TYPE 2 DIABETES MELLITUS (HCC): Primary | ICD-10-CM

## 2025-04-09 DIAGNOSIS — L97.909 STASIS ULCER DUE TO TYPE 2 DIABETES MELLITUS (HCC): Primary | ICD-10-CM

## 2025-04-09 NOTE — PROGRESS NOTES
Received call from [facility]. Pt was evaluated at [facility] by provider. Provider requested [facility] services. Orders placed as requested.

## 2025-04-22 ENCOUNTER — 6 MONTH FOLLOW UP (OUTPATIENT)
Dept: URBAN - METROPOLITAN AREA CLINIC 27 | Facility: CLINIC | Age: 85
End: 2025-04-22

## 2025-04-22 DIAGNOSIS — H35.3113: ICD-10-CM

## 2025-04-22 DIAGNOSIS — H35.3124: ICD-10-CM

## 2025-04-22 DIAGNOSIS — E11.9: ICD-10-CM

## 2025-04-22 PROCEDURE — 92134 CPTRZ OPH DX IMG PST SGM RTA: CPT

## 2025-04-22 PROCEDURE — 92014 COMPRE OPH EXAM EST PT 1/>: CPT

## 2025-04-22 PROCEDURE — 92202 OPSCPY EXTND ON/MAC DRAW: CPT

## 2025-04-22 ASSESSMENT — VISUAL ACUITY
OD_CC: 20/200+1
OS_CC: CF 2FT

## 2025-04-22 ASSESSMENT — TONOMETRY
OD_IOP_MMHG: 20
OS_IOP_MMHG: 22

## 2025-05-04 PROBLEM — J98.8 VIRAL RESPIRATORY INFECTION: Status: RESOLVED | Noted: 2025-04-04 | Resolved: 2025-05-04

## 2025-05-04 PROBLEM — B97.89 VIRAL RESPIRATORY INFECTION: Status: RESOLVED | Noted: 2025-04-04 | Resolved: 2025-05-04

## 2025-05-23 DIAGNOSIS — I48.0 PAROXYSMAL ATRIAL FIBRILLATION (HCC): ICD-10-CM

## 2025-05-23 DIAGNOSIS — R79.89 ELEVATED BRAIN NATRIURETIC PEPTIDE (BNP) LEVEL: ICD-10-CM

## 2025-05-23 DIAGNOSIS — I10 ESSENTIAL HYPERTENSION: ICD-10-CM

## 2025-05-23 DIAGNOSIS — J30.2 SEASONAL ALLERGIES: Primary | ICD-10-CM

## 2025-05-23 DIAGNOSIS — R60.0 EDEMA OF LEFT LOWER EXTREMITY: ICD-10-CM

## 2025-05-23 DIAGNOSIS — I51.89 GRADE II DIASTOLIC DYSFUNCTION: ICD-10-CM

## 2025-05-23 RX ORDER — CETIRIZINE HYDROCHLORIDE 10 MG/1
10 TABLET ORAL DAILY
Qty: 30 TABLET | Refills: 6 | Status: SHIPPED | OUTPATIENT
Start: 2025-05-23

## 2025-05-23 RX ORDER — APIXABAN 5 MG/1
TABLET, FILM COATED ORAL
Qty: 60 TABLET | Refills: 6 | OUTPATIENT
Start: 2025-05-23

## 2025-06-06 ENCOUNTER — NURSING HOME VISIT (OUTPATIENT)
Age: 85
End: 2025-06-06

## 2025-06-06 VITALS
OXYGEN SATURATION: 98 % | TEMPERATURE: 97 F | DIASTOLIC BLOOD PRESSURE: 60 MMHG | RESPIRATION RATE: 19 BRPM | SYSTOLIC BLOOD PRESSURE: 109 MMHG | HEART RATE: 84 BPM

## 2025-06-06 DIAGNOSIS — R23.4 SCAB: Primary | ICD-10-CM

## 2025-06-06 DIAGNOSIS — M25.512 CHRONIC LEFT SHOULDER PAIN: ICD-10-CM

## 2025-06-06 DIAGNOSIS — I48.20 CHRONIC ATRIAL FIBRILLATION (HCC): ICD-10-CM

## 2025-06-06 DIAGNOSIS — R60.0 LOCALIZED EDEMA: ICD-10-CM

## 2025-06-06 DIAGNOSIS — E11.40 TYPE 2 DIABETES MELLITUS WITH DIABETIC NEUROPATHY, WITHOUT LONG-TERM CURRENT USE OF INSULIN (HCC): ICD-10-CM

## 2025-06-06 DIAGNOSIS — I10 PRIMARY HYPERTENSION: ICD-10-CM

## 2025-06-06 DIAGNOSIS — G89.29 CHRONIC LEFT SHOULDER PAIN: ICD-10-CM

## 2025-06-06 PROCEDURE — 99308 SBSQ NF CARE LOW MDM 20: CPT | Performed by: NURSE PRACTITIONER

## 2025-06-06 NOTE — ASSESSMENT & PLAN NOTE
Bilateral edema of both LE with decreased edema  Continue lasix 20mg po daily  Continue to wear compression stockings

## 2025-06-06 NOTE — ASSESSMENT & PLAN NOTE
Chronic, stable  Most recent /60     Continue current regimen lisinopril 10 mg daily and metoprolol 25 mg daily  Monitor blood pressure

## 2025-06-06 NOTE — ASSESSMENT & PLAN NOTE
Lab Results   Component Value Date    HGBA1C 6.4 10/11/2023   Chronic, stable  Continue jardiance and metformin        Lab Results   Component Value Date     HGBA1C 6.4 10/11/2023

## 2025-06-06 NOTE — ASSESSMENT & PLAN NOTE
Chronic, stable  Pulse today 84 with regular rhythm     Continue current regimen lisinopril 10 mg daily and metoprolol 25 mg daily  Monitor blood pressure

## 2025-06-06 NOTE — PROGRESS NOTES
:  Assessment & Plan  Scab  Educated to moisturize with Lizzy Lotion       Chronic left shoulder pain  Continue moist heat to affected shoulder  Continue tylenol for pain         Primary hypertension  Chronic, stable  Most recent /60     Continue current regimen lisinopril 10 mg daily and metoprolol 25 mg daily  Monitor blood pressure         Chronic atrial fibrillation (HCC)  Chronic, stable  Pulse today 84 with regular rhythm     Continue current regimen lisinopril 10 mg daily and metoprolol 25 mg daily  Monitor blood pressure         Type 2 diabetes mellitus with diabetic neuropathy, without long-term current use of insulin (HCC)    Lab Results   Component Value Date    HGBA1C 6.4 10/11/2023   Chronic, stable  Continue jardiance and metformin        Lab Results   Component Value Date     HGBA1C 6.4 10/11/2023            Localized edema  Bilateral edema of both LE with decreased edema  Continue lasix 20mg po daily  Continue to wear compression stockings             History of Present Illness     Yessenia Iraheta is a 84 y.o. female   Yessenia is an 83 yo female seen at [facility] today.  She complains of scabs on her arms from scratching.  She is mobile with walker.  She also complains of left shoulder pain.  Her venous ulcer has healed.        Review of Systems   Constitutional: Negative.    HENT: Negative.     Eyes: Negative.    Respiratory: Negative.     Cardiovascular: Negative.    Gastrointestinal: Negative.    Endocrine: Negative.    Genitourinary: Negative.    Musculoskeletal: Negative.         Chronic left shoulder pain   Skin:         Complains of scabs on her arms from scratching.   Allergic/Immunologic: Negative.    Neurological: Negative.    Hematological: Negative.    Psychiatric/Behavioral: Negative.       Objective   /60   Pulse 84   Temp (!) 97 °F (36.1 °C)   Resp 19   SpO2 98% Comment: on room air     Physical Exam  Constitutional:       General: She is not in acute  distress.     Appearance: She is obese. She is not ill-appearing, toxic-appearing or diaphoretic.   HENT:      Head: Normocephalic and atraumatic.      Right Ear: External ear normal.      Left Ear: External ear normal.      Nose: Nose normal. No congestion.      Mouth/Throat:      Mouth: Mucous membranes are moist.      Pharynx: Oropharynx is clear.     Eyes:      General:         Right eye: No discharge.         Left eye: No discharge.      Conjunctiva/sclera: Conjunctivae normal.       Cardiovascular:      Rate and Rhythm: Normal rate and regular rhythm.      Pulses: Normal pulses.      Heart sounds: Normal heart sounds.   Pulmonary:      Effort: Pulmonary effort is normal. No respiratory distress.      Breath sounds: Normal breath sounds. No wheezing, rhonchi or rales.   Abdominal:      General: There is no distension.      Tenderness: There is no abdominal tenderness. There is no right CVA tenderness, left CVA tenderness, guarding or rebound.      Hernia: No hernia is present.     Musculoskeletal:      Cervical back: Normal range of motion. No rigidity.      Right lower leg: Edema present.      Left lower leg: Edema present.      Comments: Edema controlled with compression stockings  Limited mobility of left shoulder   Lymphadenopathy:      Cervical: No cervical adenopathy.     Skin:     General: Skin is warm and dry.      Findings: No bruising, erythema, lesion or rash.      Comments: Scabs noted B/L arms     Neurological:      Mental Status: She is alert and oriented to person, place, and time.      Gait: Gait abnormal.      Comments: Walker baseline   Psychiatric:         Mood and Affect: Mood normal.         Behavior: Behavior normal.         Thought Content: Thought content normal.         Judgment: Judgment normal.

## 2025-06-17 ENCOUNTER — HOSPITAL ENCOUNTER (OUTPATIENT)
Dept: RADIOLOGY | Facility: HOSPITAL | Age: 85
Discharge: HOME | End: 2025-06-17
Attending: FAMILY MEDICINE
Payer: MEDICARE

## 2025-06-17 VITALS — BODY MASS INDEX: 37.76 KG/M2 | WEIGHT: 200 LBS | HEIGHT: 61 IN

## 2025-06-17 DIAGNOSIS — Z12.31 ENCOUNTER FOR SCREENING MAMMOGRAM FOR BREAST CANCER: ICD-10-CM

## 2025-06-17 PROCEDURE — 77067 SCR MAMMO BI INCL CAD: CPT

## 2025-06-17 PROCEDURE — 77063 BREAST TOMOSYNTHESIS BI: CPT

## 2025-07-01 ENCOUNTER — NURSING HOME VISIT (OUTPATIENT)
Age: 85
End: 2025-07-01

## 2025-07-01 VITALS
WEIGHT: 229 LBS | DIASTOLIC BLOOD PRESSURE: 60 MMHG | OXYGEN SATURATION: 98 % | RESPIRATION RATE: 20 BRPM | BODY MASS INDEX: 43.27 KG/M2 | HEART RATE: 71 BPM | TEMPERATURE: 97 F | SYSTOLIC BLOOD PRESSURE: 109 MMHG

## 2025-07-01 DIAGNOSIS — M79.2 NEUROPATHIC PAIN: ICD-10-CM

## 2025-07-01 DIAGNOSIS — I10 PRIMARY HYPERTENSION: ICD-10-CM

## 2025-07-01 DIAGNOSIS — E78.2 MIXED HYPERLIPIDEMIA: ICD-10-CM

## 2025-07-01 DIAGNOSIS — M17.11 ARTHRITIS OF RIGHT KNEE: ICD-10-CM

## 2025-07-01 DIAGNOSIS — I48.20 CHRONIC ATRIAL FIBRILLATION (HCC): Primary | ICD-10-CM

## 2025-07-01 PROBLEM — L97.909 STASIS ULCER (HCC): Status: RESOLVED | Noted: 2025-04-04 | Resolved: 2025-07-01

## 2025-07-01 PROBLEM — I83.009 STASIS ULCER (HCC): Status: RESOLVED | Noted: 2025-04-04 | Resolved: 2025-07-01

## 2025-07-01 PROBLEM — R23.4 SCAB: Status: RESOLVED | Noted: 2025-06-06 | Resolved: 2025-07-01

## 2025-07-01 PROCEDURE — 99308 SBSQ NF CARE LOW MDM 20: CPT | Performed by: NURSE PRACTITIONER

## 2025-07-01 PROCEDURE — G0439 PPPS, SUBSEQ VISIT: HCPCS | Performed by: NURSE PRACTITIONER

## 2025-07-01 NOTE — PROGRESS NOTES
Assessment and Plan:     Problem List Items Addressed This Visit       Primary hypertension      Saw cardio March 2025  Blood pressure well-controlled.  BP today 109/60     Continue lisinopril 10 mg daily         Neuropathic pain    Assessment & Plan:  Updated medication list on Epic.  She was previously on gabapentin 300 mg 4 times daily.  However, Gabapentin is not on most recent medication list obtained from nursing home.  Unble to find records of when it was discontinued exactly.  Denies c/o neuropathy         Hyperlipidemia    Continue Lipitor 40 mg daily           Chronic atrial fibrillation (HCC) - Primary                   Apical pulse 71 today with regular rhythm     Follows with Cardiologist, Dr. Vasquez regularly.         Arthritis of right knee    Patient follows with Dr. Benjamin, orthopedist.  Patient was last seen on 5/7/2024, and received her third Orthovisc right knee injection. .  Patient reports significant improvement in her right knee pain.     Continue to follow-up with Dr. Benjamin          BMI Counseling: Body mass index is 43.27 kg/m². The BMI is above normal. Nutrition recommendations include decreasing portion sizes, consuming healthier snacks and limiting drinks that contain sugar. Rationale for BMI follow-up plan is due to patient being overweight or obese. Current weight 229#    Depression Screening and Follow-up Plan: Patient was screened for depression during today's encounter. They screened negative with a PHQ-2 score of 0.      Falls Plan of Care: balance, strength, and gait training instructions were provided.     Urinary Incontinence Plan of Care: counseling topics discussed: use restroom every 2 hours.       Preventive health issues were discussed with patient, and age appropriate screening tests were ordered as noted in patient's After Visit Summary.  Personalized health advice and appropriate referrals for health education or preventive services given if needed, as noted in  patient's After Visit Summary.     History of Present Illness:     Patient presents for a Medicare Wellness Visit    Patient is an 83yo female seen and examined today in the residential care unit at [facility] for AWV.  She denies any c/o today.  States yesterday she had pain in her shoulders and PT put ice packs on them which relieved the pain.  No c/o of pain today.  She ambulates with a walker and is eating meals in the lounge.       Patient Care Team:  Aisha Fritz MD as PCP - General (Family Medicine)     Review of Systems:     Review of Systems   Constitutional: Negative.    HENT: Negative.     Eyes: Negative.    Respiratory: Negative.     Cardiovascular: Negative.    Gastrointestinal: Negative.    Endocrine: Negative.    Genitourinary: Negative.    Musculoskeletal: Negative.    Skin: Negative.    Allergic/Immunologic: Negative.    Neurological: Negative.    Hematological: Negative.    Psychiatric/Behavioral: Negative.        Problem List:     Problem List[1]   Past Medical and Surgical History:     Past Medical History[2]  Past Surgical History[3]   Family History:     Family History[4]   Social History:     Social History[5]   Medications and Allergies:     Current Medications[6]  Allergies[7]   Immunizations:     Immunization History   Administered Date(s) Administered    COVID-19 MODERNA VACC 0.5 ML IM 01/08/2021, 02/09/2021, 11/10/2021, 06/08/2022    COVID-19 Pfizer Vac BIVALENT Tha-sucrose 12 Yr+ IM 09/28/2022    INFLUENZA 09/24/2015, 10/15/2018    Influenza Quadrivalent Preservative Free 3 years and older IM 09/24/2015, 11/01/2017    Pneumococcal Conjugate 13-Valent 02/22/2018    Pneumococcal Conjugate Vaccine 20-valent (Pcv20), Polysace 09/28/2022    Tdap 02/22/2018      Health Maintenance:         Topic Date Due    Breast Cancer Screening: Mammogram  06/17/2026         Topic Date Due    COVID-19 Vaccine (6 - 2024-25 season) 09/01/2024    Influenza Vaccine (1) 09/01/2025       Medicare Screening Tests and Risk Assessments:         Health Risk Assessment:   Patient rates overall health as good. Patient feels that their physical health rating is same. Patient is satisfied with their life. Eyesight was rated as same. Hearing was rated as same. Patient feels that their emotional and mental health rating is slightly better. Patients states they are never, rarely angry. Patient states they are never, rarely unusually tired/fatigued. Pain experienced in the last 7 days has been none. Patient states that she has experienced no weight loss or gain in last 6 months.     Depression Screening:   PHQ-2 Score: 0      Fall Risk Screening:   In the past year, patient has experienced: no history of falling in past year      Urinary Incontinence Screening:   Patient has leaked urine accidently in the last six months. Patient has intermittent urinary incontinence    Home Safety:  Patient has trouble with stairs inside or outside of their home. Patient has working smoke alarms and has working carbon monoxide detector. Home safety hazards include: none. Patient lives in residential living    Nutrition:   Current diet is Regular. Patient is obese.  Educated on diet    Medications:   Patient is not currently taking any over-the-counter supplements. Patient is not able to manage medications. Medications administered by nurse      Activities of Daily Living (ADLs)/Instrumental Activities of Daily Living (IADLs):   Walk and transfer into and out of bed and chair?: Yes  Dress and groom yourself?: Yes    Bathe or shower yourself?: Yes    Feed yourself? Yes  Do your laundry/housekeeping?: No  Manage your money, pay your bills and track your expenses?: No  Make your own meals?: No    Do your own shopping?: No    ADL comments: Patient lives in residential living    Previous Hospitalizations:   Any hospitalizations or ED visits within the last 12 months?: No      Advance Care Planning:   Living will: No    Durable POA  for healthcare: No    Advanced directive: No    Advanced directive counseling given: No    Five wishes given: No    Patient declined ACP directive: No    End of Life Decisions reviewed with patient: No      Preventive Screenings      Cardiovascular Screening:    General: Screening Not Indicated and History Lipid Disorder      Diabetes Screening:     General: Screening Not Indicated and History Diabetes      Colorectal Cancer Screening:     General: Screening Not Indicated      Breast Cancer Screening:     General: Screening Current      Cervical Cancer Screening:    General: Screening Not Indicated      Osteoporosis Screening:    General: Patient Declines      Abdominal Aortic Aneurysm (AAA) Screening:        General: Screening Not Indicated      Lung Cancer Screening:     General: Screening Not Indicated      Hepatitis C Screening:    General: Patient Declines    Immunizations:  - Immunizations due: Zoster (Shingrix)  - The patient declines recommended vaccines currently despite my recommendations      Screening, Brief Intervention, and Referral to Treatment (SBIRT)     Screening  Typical number of drinks in a day: 0  Typical number of drinks in a week: 0  Interpretation: Low risk drinking behavior.    Single Item Drug Screening:  How often have you used an illegal drug (including marijuana) or a prescription medication for non-medical reasons in the past year? never    Single Item Drug Screen Score: 0  Interpretation: Negative screen for possible drug use disorder    Other Counseling Topics:   Regular weightbearing exercise.     No results found.     Physical Exam:     /60   Pulse 71   Temp (!) 97 °F (36.1 °C)   Resp 20   Wt 104 kg (229 lb)   SpO2 98%   BMI 43.27 kg/m²     Physical Exam  Constitutional:       General: She is not in acute distress.     Appearance: She is obese. She is not ill-appearing, toxic-appearing or diaphoretic.   HENT:      Head: Normocephalic and atraumatic.      Right Ear:  External ear normal.      Left Ear: External ear normal.      Nose: Nose normal. No congestion.      Mouth/Throat:      Mouth: Mucous membranes are moist.      Pharynx: Oropharynx is clear.     Eyes:      General:         Right eye: No discharge.         Left eye: No discharge.       Cardiovascular:      Rate and Rhythm: Normal rate and regular rhythm.      Pulses: Normal pulses.      Heart sounds: Normal heart sounds.   Pulmonary:      Effort: Pulmonary effort is normal. No respiratory distress.      Breath sounds: Normal breath sounds. No wheezing, rhonchi or rales.   Abdominal:      Tenderness: There is no abdominal tenderness. There is no right CVA tenderness, left CVA tenderness, guarding or rebound.     Musculoskeletal:      Cervical back: Normal range of motion. No rigidity.      Right lower leg: Edema present.      Left lower leg: Edema present.      Comments: Compression stockings on  Walker baseline   Lymphadenopathy:      Cervical: No cervical adenopathy.     Skin:     General: Skin is warm and dry.      Findings: No bruising, erythema, lesion or rash.     Neurological:      General: No focal deficit present.      Mental Status: She is alert and oriented to person, place, and time.      Gait: Gait abnormal.     Psychiatric:         Mood and Affect: Mood normal.         Behavior: Behavior normal.         Thought Content: Thought content normal.         Judgment: Judgment normal.          Tuesday ELBA Carey       [1]   Patient Active Problem List  Diagnosis    Primary hypertension    Anemia    Urinary incontinence    Grade II diastolic dysfunction    Stage 3a chronic kidney disease (HCC)    Neuropathic pain    Hyperlipidemia    Pain of joint of both hands    Chronic atrial fibrillation (HCC)    Macular degeneration    Arthritis of right knee    Venous stasis dermatitis of both lower extremities    Chronic pain of right knee    Type 2 diabetes mellitus with diabetic neuropathy, without long-term current  use of insulin (HCC)    Mixed hyperlipidemia    Localized edema    Chronic left shoulder pain   [2]   Past Medical History:  Diagnosis Date    Arthritis     knees    Diabetes mellitus (HCC)     type 2    GERD (gastroesophageal reflux disease)     Hyperlipidemia     Hypertension     Hypokalemia 03/20/2017    Stage 3a chronic kidney disease (HCC) 04/14/2022    Urinary incontinence     Use of cane as ambulatory aid     Wears glasses    [3]   Past Surgical History:  Procedure Laterality Date    BREAST BIOPSY Left 2015    benign    BREAST SURGERY Left     nothing was there     COLONOSCOPY      HYSTERECTOMY      complete-fibroid    JOINT REPLACEMENT Left     knee    KY XCAPSL CTRC RMVL INSJ IO LENS PROSTH W/O ECP Right 07/19/2018    Procedure: EXTRACTION EXTRACAPSULAR CATARACT PHACO INTRAOCULAR LENS (IOL);  Surgeon: Chary Oliver MD;  Location: [facility] MAIN OR;  Service: Ophthalmology    [place] XCAPSL CTRC RMVL INSJ IO LENS PROSTH W/O ECP Left 08/09/2018    Procedure: EXTRACTION EXTRACAPSULAR CATARACT PHACO INTRAOCULAR LENS (IOL);  Surgeon: Chary Oliver MD;  Location: [facility] MAIN OR;  Service: Ophthalmology   [4]   Family History  Problem Relation Name Age of Onset    Breast cancer Mother  79    Diabetes type II Mother      Atrial fibrillation Mother      Diabetes Mother          diet controlled    Hypertension Mother      Lung cancer Father      Alcohol abuse Father      Cancer Father          lung-smoker    Breast cancer Maternal Aunt      Breast cancer Cousin      Breast cancer Cousin     [5]   Social History  Socioeconomic History    Marital status: Single   Tobacco Use    Smoking status: Never    Smokeless tobacco: Never   Vaping Use    Vaping status: Never Used   Substance and Sexual Activity    Alcohol use: Yes     Comment: social    Drug use: No   [6]   Current Outpatient Medications   Medication Sig Dispense Refill    acetaminophen (TYLENOL) 650 mg CR tablet Take 1 tablet (650 mg total) by mouth every 8 (eight) hours  as needed for mild pain 30 tablet 0    ammonium lactate (LAC-HYDRIN) 12 % cream Apply topically 2 (two) times a day      atorvastatin (LIPITOR) 40 mg tablet Take 1 tablet (40 mg total) by mouth daily 90 tablet 1    cetirizine (ZyrTEC) 10 mg tablet ONE TAB DAILY 30 tablet 6    CINNAMON PO Take 1,000 mg by mouth every morning      cyclobenzaprine (FLEXERIL) 5 mg tablet Take 5 mg by mouth 2 (two) times a day as needed      Eliquis 5 MG TAKE ONE TABLET  TWO TIMES A  tablet 3    Empagliflozin (Jardiance) 10 MG TABS tablet ONE TAB BY MOUTH DAILY IN THE MORNING 30 tablet 0    furosemide (LASIX) 20 mg tablet Take 20 mg by mouth every other day      lisinopril (ZESTRIL) 10 mg tablet ONE TAB DAILY 30 tablet 6    metFORMIN (GLUCOPHAGE) 500 mg tablet Take 500 mg by mouth 2 (two) times a day      metoprolol tartrate (LOPRESSOR) 25 mg tablet TAKE 1 TABLET BY MOUTH DAILY 30 tablet 6    Multiple Vitamins-Minerals (PRESERVISION AREDS PO) Take by mouth       No current facility-administered medications for this visit.   [7]   Allergies  Allergen Reactions    Other Allergic Rhinitis     Seasonal/pollen

## 2025-07-01 NOTE — ASSESSMENT & PLAN NOTE
Assessment & Plan:  Updated medication list on Epic.  She was previously on gabapentin 300 mg 4 times daily.  However, Gabapentin is not on most recent medication list obtained from nursing home.  Unble to find records of when it was discontinued exactly.

## 2025-07-08 DIAGNOSIS — E11.40 TYPE 2 DIABETES MELLITUS WITH DIABETIC NEUROPATHY, WITHOUT LONG-TERM CURRENT USE OF INSULIN (HCC): Primary | ICD-10-CM

## 2025-08-08 ENCOUNTER — NURSING HOME VISIT (OUTPATIENT)
Age: 85
End: 2025-08-08

## 2025-08-08 VITALS
TEMPERATURE: 97.3 F | DIASTOLIC BLOOD PRESSURE: 75 MMHG | RESPIRATION RATE: 20 BRPM | HEART RATE: 59 BPM | OXYGEN SATURATION: 98 % | SYSTOLIC BLOOD PRESSURE: 121 MMHG

## 2025-08-08 DIAGNOSIS — M79.2 NEUROPATHIC PAIN: ICD-10-CM

## 2025-08-08 DIAGNOSIS — I48.20 CHRONIC ATRIAL FIBRILLATION (HCC): ICD-10-CM

## 2025-08-08 DIAGNOSIS — E11.40 TYPE 2 DIABETES MELLITUS WITH DIABETIC NEUROPATHY, WITHOUT LONG-TERM CURRENT USE OF INSULIN (HCC): ICD-10-CM

## 2025-08-08 DIAGNOSIS — I10 PRIMARY HYPERTENSION: Primary | ICD-10-CM

## 2025-08-08 DIAGNOSIS — E78.2 MIXED HYPERLIPIDEMIA: ICD-10-CM

## 2025-08-08 DIAGNOSIS — M17.11 ARTHRITIS OF RIGHT KNEE: ICD-10-CM

## 2025-08-08 PROCEDURE — 99308 SBSQ NF CARE LOW MDM 20: CPT | Performed by: NURSE PRACTITIONER

## (undated) DEVICE — TURBOSONICS MICROSMOOTH MICROTIP PARTS KIT: Brand: TURBOSONICS, MICROSMOOTH, MICROTIP, ALCON

## (undated) DEVICE — CANNULA HYDRODISSECTION NUCLEUS 25G X 7/8IN 35DEG 8MM FROM END SINGLE-USE VISITEC

## (undated) DEVICE — ACTIVE FMS W/ 0.9 MM INFUSION SLEEVES, 0.9MM 30° ABS* INTREPID* BALANCED TIP: Brand: ALCON

## (undated) DEVICE — EYE PADS 1 5/8"X2 5/8": Brand: MCKESSON

## (undated) DEVICE — B-H IRRIGATING CAN 19GA FLAT ANGLED 8MM: Brand: OPHTHALMIC CANNULA

## (undated) DEVICE — NEEDLE PERIBULBAR 25G X 7/8 IN

## (undated) DEVICE — THE MONARCH® "D" CARTRIDGE IS A SINGLE-USE POLYPROPYLENE CARTRIDGE FOR POSTERIOR CHAMBER IOL DELIVERY: Brand: MONARCH® III

## (undated) DEVICE — MICROSURGICAL INSTRUMENT IRR. CYSTITOME 25GA STRAIGHT-REVERSE CUTTING: Brand: ALCON

## (undated) DEVICE — GLOVE SRG BIOGEL 7

## (undated) DEVICE — SUT VICRYL 10-0 CS140-6 4 IN V960G

## (undated) DEVICE — EYE PACK CUSTOM -FINNEGAN

## (undated) DEVICE — CLEARCUT® SLIT KNIFE 2.75MM ANGLED: Brand: CLEARCUT®

## (undated) DEVICE — 3M™ TEGADERM™ TRANSPARENT FILM DRESSING FRAME STYLE, 1624W, 2-3/8 IN X 2-3/4 IN (6 CM X 7 CM), 100/CT 4CT/CASE: Brand: 3M™ TEGADERM™

## (undated) DEVICE — INTREPID® TRANSFORMER IA HP: Brand: INTREPID®

## (undated) DEVICE — AIR INJECT CANNULA 27GA: Brand: OPHTHALMIC CANNULA

## (undated) DEVICE — Device